# Patient Record
Sex: MALE | Race: BLACK OR AFRICAN AMERICAN | NOT HISPANIC OR LATINO | Employment: OTHER | URBAN - METROPOLITAN AREA
[De-identification: names, ages, dates, MRNs, and addresses within clinical notes are randomized per-mention and may not be internally consistent; named-entity substitution may affect disease eponyms.]

---

## 2017-01-03 ENCOUNTER — PATIENT MESSAGE (OUTPATIENT)
Dept: INTERNAL MEDICINE | Facility: CLINIC | Age: 62
End: 2017-01-03

## 2017-01-03 DIAGNOSIS — Z86.010 HISTORY OF ADENOMATOUS POLYP OF COLON: Primary | ICD-10-CM

## 2017-01-27 DIAGNOSIS — Z12.11 SPECIAL SCREENING FOR MALIGNANT NEOPLASMS, COLON: Primary | ICD-10-CM

## 2017-01-27 RX ORDER — POLYETHYLENE GLYCOL 3350, SODIUM SULFATE ANHYDROUS, SODIUM BICARBONATE, SODIUM CHLORIDE, POTASSIUM CHLORIDE 236; 22.74; 6.74; 5.86; 2.97 G/4L; G/4L; G/4L; G/4L; G/4L
4 POWDER, FOR SOLUTION ORAL ONCE
Qty: 4000 ML | Refills: 0 | Status: SHIPPED | OUTPATIENT
Start: 2017-01-27 | End: 2017-01-27

## 2017-01-31 ENCOUNTER — ANESTHESIA (OUTPATIENT)
Dept: ENDOSCOPY | Facility: HOSPITAL | Age: 62
End: 2017-01-31
Payer: COMMERCIAL

## 2017-01-31 ENCOUNTER — HOSPITAL ENCOUNTER (OUTPATIENT)
Facility: HOSPITAL | Age: 62
Discharge: HOME | End: 2017-01-31
Attending: COLON & RECTAL SURGERY | Admitting: COLON & RECTAL SURGERY
Payer: COMMERCIAL

## 2017-01-31 ENCOUNTER — ANESTHESIA EVENT (OUTPATIENT)
Dept: ENDOSCOPY | Facility: HOSPITAL | Age: 62
End: 2017-01-31
Payer: COMMERCIAL

## 2017-01-31 VITALS
TEMPERATURE: 98 F | WEIGHT: 158 LBS | HEIGHT: 67 IN | SYSTOLIC BLOOD PRESSURE: 153 MMHG | RESPIRATION RATE: 18 BRPM | DIASTOLIC BLOOD PRESSURE: 82 MMHG | OXYGEN SATURATION: 100 % | BODY MASS INDEX: 24.8 KG/M2 | HEART RATE: 56 BPM

## 2017-01-31 VITALS — RESPIRATION RATE: 14 BRPM

## 2017-01-31 DIAGNOSIS — Z13.9 SCREENING: ICD-10-CM

## 2017-01-31 PROCEDURE — 25000003 PHARM REV CODE 250: Performed by: NURSE PRACTITIONER

## 2017-01-31 PROCEDURE — 88305 TISSUE EXAM BY PATHOLOGIST: CPT | Mod: 26,,, | Performed by: PATHOLOGY

## 2017-01-31 PROCEDURE — 45380 COLONOSCOPY AND BIOPSY: CPT | Mod: 33,,, | Performed by: COLON & RECTAL SURGERY

## 2017-01-31 PROCEDURE — 25000003 PHARM REV CODE 250: Performed by: REGISTERED NURSE

## 2017-01-31 PROCEDURE — 37000009 HC ANESTHESIA EA ADD 15 MINS: Performed by: COLON & RECTAL SURGERY

## 2017-01-31 PROCEDURE — 45380 COLONOSCOPY AND BIOPSY: CPT | Performed by: COLON & RECTAL SURGERY

## 2017-01-31 PROCEDURE — 37000008 HC ANESTHESIA 1ST 15 MINUTES: Performed by: COLON & RECTAL SURGERY

## 2017-01-31 PROCEDURE — 27201012 HC FORCEPS, HOT/COLD, DISP: Performed by: COLON & RECTAL SURGERY

## 2017-01-31 PROCEDURE — 88305 TISSUE EXAM BY PATHOLOGIST: CPT | Performed by: PATHOLOGY

## 2017-01-31 PROCEDURE — D9220A PRA ANESTHESIA: Mod: 33,,, | Performed by: ANESTHESIOLOGY

## 2017-01-31 PROCEDURE — 63600175 PHARM REV CODE 636 W HCPCS: Performed by: REGISTERED NURSE

## 2017-01-31 RX ORDER — LIDOCAINE HCL/PF 100 MG/5ML
SYRINGE (ML) INTRAVENOUS
Status: DISCONTINUED | OUTPATIENT
Start: 2017-01-31 | End: 2017-01-31

## 2017-01-31 RX ORDER — SODIUM CHLORIDE 9 MG/ML
INJECTION, SOLUTION INTRAVENOUS CONTINUOUS
Status: DISCONTINUED | OUTPATIENT
Start: 2017-01-31 | End: 2017-01-31 | Stop reason: HOSPADM

## 2017-01-31 RX ORDER — PROPOFOL 10 MG/ML
VIAL (ML) INTRAVENOUS CONTINUOUS PRN
Status: DISCONTINUED | OUTPATIENT
Start: 2017-01-31 | End: 2017-01-31

## 2017-01-31 RX ORDER — PROPOFOL 10 MG/ML
VIAL (ML) INTRAVENOUS
Status: DISCONTINUED | OUTPATIENT
Start: 2017-01-31 | End: 2017-01-31

## 2017-01-31 RX ADMIN — PROPOFOL 175 MCG/KG/MIN: 10 INJECTION, EMULSION INTRAVENOUS at 11:01

## 2017-01-31 RX ADMIN — SODIUM CHLORIDE: 0.9 INJECTION, SOLUTION INTRAVENOUS at 10:01

## 2017-01-31 RX ADMIN — LIDOCAINE HYDROCHLORIDE 50 MG: 20 INJECTION, SOLUTION INTRAVENOUS at 11:01

## 2017-01-31 RX ADMIN — PROPOFOL 100 MG: 10 INJECTION, EMULSION INTRAVENOUS at 11:01

## 2017-01-31 RX ADMIN — PROPOFOL 30 MG: 10 INJECTION, EMULSION INTRAVENOUS at 11:01

## 2017-01-31 NOTE — IP AVS SNAPSHOT
[facility]  [address]  [facility] [address]  Phone: [phone]           Patient Discharge Instructions     Our goal is to set you up for success. This packet includes information on your condition, medications, and your home care. It will help you to care for yourself so you don't get sicker and need to go back to the hospital.     Please ask your nurse if you have any questions.        There are many details to remember when preparing to leave the hospital. Here is what you will need to do:    1. Take your medicine. If you are prescribed medications, review your Medication List in the following pages. You may have new medications to  at the pharmacy and others that you'll need to stop taking. Review the instructions for how and when to take your medications. Talk with your doctor or nurses if you are unsure of what to do.     2. Go to your follow-up appointments. Specific follow-up information is listed in the following pages. Your may be contacted by a transition nurse or clinical provider about future appointments. Be sure we have all of the phone numbers to reach you, if needed. Please contact your provider's office if you are unable to make an appointment.     3. Watch for warning signs. Your doctor or nurse will give you detailed warning signs to watch for and when to call for assistance. These instructions may also include educational information about your condition. If you experience any of warning signs to your health, call your doctor.               [facility]  Unless otherwise directed by your provider, please contact [facility], our nurse care line that is available for 24/7 assistance.     [phone] (toll-free)    Registered nurses in the [facility] provide clinical advisement, health education, appointment booking, and other advisory services.                    ** Verify the list of medication(s) below is accurate and up  to date. Carry this with you in case of emergency. If your medications have changed, please notify your healthcare provider.             Medication List      CONTINUE taking these medications        Additional Info                      diltiaZEM 240 MG 24 hr capsule   Commonly known as:  CARDIZEM CD   Quantity:  90 capsule   Refills:  3   Dose:  240 mg    Instructions:  Take 1 capsule (240 mg total) by mouth once daily.     Begin Date    AM    Noon    PM    Bedtime       sildenafil 100 MG tablet   Commonly known as:  VIAGRA   Quantity:  36 tablet   Refills:  3   Dose:  100 mg    Instructions:  Take 1 tablet (100 mg total) by mouth as needed for Erectile Dysfunction.     Begin Date    AM    Noon    PM    Bedtime       ZYRTEC 10 MG tablet   Refills:  0   Dose:  1 tablet   Generic drug:  cetirizine    Instructions:  Take 1 tablet by mouth Daily.     Begin Date    AM    Noon    PM    Bedtime                  Please bring to all follow up appointments:    1. A copy of your discharge instructions.  2. All medicines you are currently taking in their original bottles.  3. Identification and insurance card.    Please arrive 15 minutes ahead of scheduled appointment time.    Please call 24 hours in advance if you must reschedule your appointment and/or time.        Your Scheduled Appointments     Apr 27, 2017 10:00 AM CDT   Annual Checkup/Physical with Melquiades Valdivia MD   [facility] ([facility])    [address]  [place] [address] [phone]              Follow-up Information     Follow up with LICHA Winn MD.    Specialty:  Colon and Rectal Surgery    Why:  As needed    Contact information:    [address] [facility] [address] [phone]          Discharge Instructions     Future Orders    Activity as tolerated     Call MD for:  persistent nausea and vomiting or diarrhea     Call MD for:  severe uncontrolled pain     Call MD for:  temperature >100.4      Diet general     Questions:    Total calories:      Fat restriction, if any:      Protein restriction, if any:      Na restriction, if any:      Fluid restriction:      Additional restrictions:          Discharge Instructions         Colonoscopy     A camera attached to a flexible tube with a viewing lens is used to take video pictures.     Colonoscopy is used to view the inside of your lower digestive tract (colon and rectum). It can help screen for colon cancer and can help find the source of abdominal pain, bleeding, and changes in bowel habits. The test is usually done in the hospital on an outpatient basis. During the exam, the doctor can remove a small tissue sample ( a biopsy) for testing. Small growths, such as polyps, may also be removed during colonoscopy.  Getting ready   · Be sure to tell your doctor about any medications you take. Also tell your doctor about any health conditions you may have.  · Discuss the risks of the test with your doctor. These include bleeding and bowel puncture.  · Your rectum and colon must be empty for the test. So be sure to follow the diet and bowel prep instructions exactly. If you dont, the test may need to be rescheduled.  · Ask your doctor whether you need to have a friend or family member prepared to drive you home after the test.  During the test   · You are given sedating (relaxing) medication through an IV line. You may be drowsy or completely asleep.  · The procedure takes 30 minutes or longer.  · The doctor performs a digital rectal exam to check for anal and rectal problems. The rectum is lubricated and the scope inserted.  · If you are awake, you may have a feeling similar to needing to have a bowel movement. You may also feel pressure as air is pumped into the colon. Its OK to pass gas during the procedure.  After the test   ·      Colonoscopy provides an inside view of the entire colon.     You may discuss the results with your doctor right away or at a  "future visit.  · Try to pass all the gas right after the test to help prevent bloating and cramping.  · After the test, you can go back to your normal eating and other activities.  Risks and possible complications include:  · Bleeding · A puncture or tear in the colon · Risks of anesthesia       © 8737-3242 [employer]. [address] [place] [address]. All rights reserved. This information is not intended as a substitute for professional medical care. Always follow your healthcare professional's instructions.            Primary Diagnosis     Your primary diagnosis was:  Screening      Admission Information     Date & Time Provider Department CSN    1/31/2017  9:19 AM BASILIO Winn MD [facility] [id number]      Care Providers     Provider Role Specialty Primary office phone    BASILIO Winn MD Attending Provider Colon and Rectal Surgery [phone]    BASILIO Winn MD Surgeon  Colon and Rectal Surgery [phone]      Your Vitals Were     BP Pulse Temp Resp Height Weight    122/63 (BP Location: Left arm, Patient Position: Lying, BP Method: Automatic) 51 97.8 °F (36.6 °C) (Axillary) 18 5' 7" (1.702 m) 71.7 kg (158 lb)    SpO2 BMI             100% 24.75 kg/m2         Recent Lab Values     No lab values to display.      Pending Labs     Order Current Status    Specimen to Pathology - Surgery Collected (01/31/17 1138)      Allergies as of 1/31/2017        Reactions    Allegra-d 12 Hour [Fexofenadine-pseudoephedrine] Other (See Comments)    Trouble urinating    Mucinex D [Pseudoephedrine-guaifenesin] Other (See Comments)    Trouble urinating    Hyzaar  [Losartan-hydrochlorothiazide]     Other reaction(s): Hives      Advance Directives     An advance directive is a document which, in the event you are no longer able to make decisions for yourself, tells your healthcare team what kind of treatment you do or do not want to receive, or who you would like to make those " decisions for you.  If you do not currently have an advance directive, [facility] encourages you to create one.  For more information call:  [phone]), [phone] ([phone]),  or log on to [email or web address].        Language Assistance Services     ATTENTION: Language assistance services are available, free of charge. Please call [phone].      ATENCIÓN: Si habla lexiimelanie, tiene a ireland disposición servicios gratuitos de asistencia lingüística. Llame al [phone].     [facility] Ý: N?u b?n nói Ti?ng Vi?t, có các d?ch v? h? tr? ngôn ng? mi?n phí dành cho b?n. G?i s? [phone].         [facility] complies with applicable Federal civil rights laws and does not discriminate on the basis of race, color, national origin, age, disability, or sex.

## 2017-01-31 NOTE — ANESTHESIA PREPROCEDURE EVALUATION
01/31/2017  Abdias Kim is a 61 y.o., male.    [facility] Anesthesia Evaluation    I have reviewed the Patient Summary Reports.    I have reviewed the Nursing Notes.   I have reviewed the Medications.     Review of Systems  Anesthesia Hx:  No problems with previous Anesthesia  History of prior surgery of interest to airway management or planning: Previous anesthesia: General Denies Family Hx of Anesthesia complications.   Denies Personal Hx of Anesthesia complications.   Social:  Non-Smoker, Social Alcohol Use    Hematology/Oncology:         -- Anemia:   Cardiovascular:   Exercise tolerance: good Hypertension        Physical Exam  General:  Well nourished    Airway/Jaw/Neck:  Airway Findings: Mouth Opening: Normal Tongue: Normal  General Airway Assessment: Adult  Mallampati: II  Improves to II with phonation.  TM Distance: Normal, at least 6 cm  Jaw/Neck Findings:  Neck ROM: Normal ROM      Dental:  Dental Findings: In tact   Chest/Lungs:  Chest/Lungs Findings: Clear to auscultation, Normal Respiratory Rate     Heart/Vascular:  Heart Findings: Rate: Normal  Rhythm: Regular Rhythm  Sounds: Normal        Mental Status:  Mental Status Findings:  Cooperative, Alert and Oriented         Anesthesia Plan  Type of Anesthesia, risks & benefits discussed:  Anesthesia Type:  general  Patient's Preference:   Intra-op Monitoring Plan: standard ASA monitors  Intra-op Monitoring Plan Comments:   Post Op Pain Control Plan:   Post Op Pain Control Plan Comments:   Induction:   IV  Beta Blocker:  Patient is not currently on a Beta-Blocker (No further documentation required).       Informed Consent: Patient understands risks and agrees with Anesthesia plan.  Questions answered. Anesthesia consent signed with patient.  ASA Score: 2     Day of Surgery Review of History & Physical:    H&P update referred to the surgeon.         Ready  For Surgery From Anesthesia Perspective.

## 2017-01-31 NOTE — H&P
Endoscopy H&P    Procedure : Colonoscopy Screening    Abdias Kim is a 61 y.o. male presenting today for colonoscopy. Indications:  asymptomatic screening exam, family history of colon cancer (father, 60's), and personal history of colon polyps     Previous colonoscopy in 2013 - One sigmoid polyp biopsied; final pathology was tubular adenoma.       Past Medical History   Diagnosis Date    Anemia     Diverticulosis     Hypertension      Prior Sedation Problems: NO  Family History   Problem Relation Age of Onset    Heart disease Mother      mi    Cancer Father      colon/prostate    Stroke Neg Hx     Diabetes Neg Hx      Social History     Social History    Marital status:      Spouse name: N/A    Number of children: N/A    Years of education: N/A     Occupational History    Not on file.     Social History Main Topics    Smoking status: Never Smoker    Smokeless tobacco: Never Used    Alcohol use Yes      Comment: weekends    Drug use: No    Sexual activity: Not on file     Other Topics Concern    Not on file     Social History Narrative     Review of patient's allergies indicates:   Allergen Reactions    Allegra-d 12 hour [fexofenadine-pseudoephedrine] Other (See Comments)     Trouble urinating    Mucinex d [pseudoephedrine-guaifenesin] Other (See Comments)     Trouble urinating    Hyzaar  [losartan-hydrochlorothiazide]      Other reaction(s): Hives     No current facility-administered medications for this encounter.        Review of Systems - Negative except as outlined in the HPI     Physical Exam:  General: well developed, well nourished, no distress  Head: normocephalic, atraumatic  Airway:  normal oropharynx, airway normal  Lungs:  normal respiratory effort  Heart: regular rate and rhythm  Abdomen: soft, non-tender non-distented; bowel sounds normal; no masses,  no organomegaly  Extremities: warm, well  perfused    Deep Sedation: Mallampati Score per anesthesia     Sedation Plan: MAC    ASA : II    A/P: Proceed with colonoscopy

## 2017-01-31 NOTE — DISCHARGE INSTRUCTIONS
Colonoscopy     A camera attached to a flexible tube with a viewing lens is used to take video pictures.     Colonoscopy is used to view the inside of your lower digestive tract (colon and rectum). It can help screen for colon cancer and can help find the source of abdominal pain, bleeding, and changes in bowel habits. The test is usually done in the hospital on an outpatient basis. During the exam, the doctor can remove a small tissue sample ( a biopsy) for testing. Small growths, such as polyps, may also be removed during colonoscopy.  Getting ready   · Be sure to tell your doctor about any medications you take. Also tell your doctor about any health conditions you may have.  · Discuss the risks of the test with your doctor. These include bleeding and bowel puncture.  · Your rectum and colon must be empty for the test. So be sure to follow the diet and bowel prep instructions exactly. If you dont, the test may need to be rescheduled.  · Ask your doctor whether you need to have a friend or family member prepared to drive you home after the test.  During the test   · You are given sedating (relaxing) medication through an IV line. You may be drowsy or completely asleep.  · The procedure takes 30 minutes or longer.  · The doctor performs a digital rectal exam to check for anal and rectal problems. The rectum is lubricated and the scope inserted.  · If you are awake, you may have a feeling similar to needing to have a bowel movement. You may also feel pressure as air is pumped into the colon. Its OK to pass gas during the procedure.  After the test   ·      Colonoscopy provides an inside view of the entire colon.     You may discuss the results with your doctor right away or at a future visit.  · Try to pass all the gas right after the test to help prevent bloating and cramping.  · After the test, you can go back to your normal eating and other activities.  Risks and possible complications include:  · Bleeding · A  puncture or tear in the colon · Risks of anesthesia       © 2983-9518 [employer]. [address] [place] [address], [place] [address]. All rights reserved. This information is not intended as a substitute for professional medical care. Always follow your healthcare professional's instructions.

## 2017-01-31 NOTE — ANESTHESIA POSTPROCEDURE EVALUATION
"Anesthesia Post Evaluation    Patient: Abdias Kim    Procedure(s) Performed: Procedure(s) (LRB):  COLONOSCOPY (N/A)    Final Anesthesia Type: general  Patient location during evaluation: GI PACU  Patient participation: Yes- Able to Participate  Level of consciousness: awake and alert  Post-procedure vital signs: reviewed and stable  Pain management: adequate  Airway patency: patent  PONV status at discharge: No PONV  Anesthetic complications: no      Cardiovascular status: blood pressure returned to baseline  Respiratory status: unassisted  Hydration status: euvolemic  Follow-up not needed.        Visit Vitals    BP (!) 153/82    Pulse (!) 56    Temp 36.6 °C (97.8 °F) (Axillary)    Resp 18    Ht 5' 7" (1.702 m)    Wt 71.7 kg (158 lb)    SpO2 100%    BMI 24.75 kg/m2       Pain/Terence Score: Pain Assessment Performed: Yes (1/31/2017 11:58 AM)  Presence of Pain: denies (1/31/2017 11:58 AM)  Terence Score: 10 (1/31/2017 11:58 AM)      "

## 2017-01-31 NOTE — TRANSFER OF CARE
"Anesthesia Transfer of Care Note    Patient: Abdias Kim    Procedure(s) Performed: Procedure(s) (LRB):  COLONOSCOPY (N/A)    Patient location: [facility]    Anesthesia Type: general    Transport from OR: Transported from OR on 6-10 L/min O2 by face mask with adequate spontaneous ventilation    Post pain: adequate analgesia    Post assessment: no apparent anesthetic complications    Post vital signs: stable    Level of consciousness: sedated    Nausea/Vomiting: no nausea/vomiting    Complications: none          Last vitals:   Visit Vitals    BP (!) 186/86    Pulse 64    Temp 37 °C (98.6 °F)    Resp 15    Ht 5' 7" (1.702 m)    Wt 71.7 kg (158 lb)    SpO2 100%    BMI 24.75 kg/m2     "

## 2017-02-06 NOTE — PROGRESS NOTES
1) Sigmoid colon polyp.  2) Sigmoid colon polyp.  FINAL PATHOLOGIC DIAGNOSIS  1. COLON, SIGMOID POLYP (BIOPSY):  -Nonneoplastic inflammatory polyp with surrounding regenerative changes  2. COLON, SIGMOID POLYP (BIOPSY):  - Tubular adenoma  Diagnosed by: Mirela De La Rosa M.D.  (Electronically Signed: 2017-02-01 15:27:14)    Repeat colonoscopy in 5 years       If you have any questions or if I can clarify any of the above please contact me:    Dyn [phone]   Pager [phone]  email [email or web address]  Nurse Natalia Gutierrez [phone]   Isis Martinez:  [phone]    Sincerely  H, Channing Winn MD, FACS, FASCRS  Staff Surgeon  Dept of Colon and Rectal Surgery

## 2017-02-07 ENCOUNTER — TELEPHONE (OUTPATIENT)
Dept: ENDOSCOPY | Facility: HOSPITAL | Age: 62
End: 2017-02-07

## 2017-03-07 DIAGNOSIS — N52.9 ERECTILE DYSFUNCTION, UNSPECIFIED ERECTILE DYSFUNCTION TYPE: ICD-10-CM

## 2017-03-07 RX ORDER — SILDENAFIL 100 MG/1
100 TABLET, FILM COATED ORAL
Qty: 36 TABLET | Refills: 3 | OUTPATIENT
Start: 2017-03-07 | End: 2017-03-23 | Stop reason: SDUPTHER

## 2017-03-07 RX ORDER — DILTIAZEM HYDROCHLORIDE 240 MG/1
240 CAPSULE, COATED, EXTENDED RELEASE ORAL DAILY
Qty: 90 CAPSULE | Refills: 3 | Status: SHIPPED | OUTPATIENT
Start: 2017-03-07 | End: 2018-03-26 | Stop reason: SDUPTHER

## 2017-03-23 DIAGNOSIS — N52.9 ERECTILE DYSFUNCTION, UNSPECIFIED ERECTILE DYSFUNCTION TYPE: ICD-10-CM

## 2017-03-23 RX ORDER — SILDENAFIL 100 MG/1
100 TABLET, FILM COATED ORAL
Qty: 36 TABLET | Refills: 3 | Status: SHIPPED | OUTPATIENT
Start: 2017-03-23 | End: 2017-03-23 | Stop reason: SDUPTHER

## 2017-03-23 RX ORDER — SILDENAFIL 100 MG/1
100 TABLET, FILM COATED ORAL
Qty: 36 TABLET | Refills: 3 | Status: SHIPPED | OUTPATIENT
Start: 2017-03-23 | End: 2018-03-26 | Stop reason: SDUPTHER

## 2017-04-18 ENCOUNTER — TELEPHONE (OUTPATIENT)
Dept: INTERNAL MEDICINE | Facility: CLINIC | Age: 62
End: 2017-04-18

## 2017-04-18 ENCOUNTER — PATIENT MESSAGE (OUTPATIENT)
Dept: INTERNAL MEDICINE | Facility: CLINIC | Age: 62
End: 2017-04-18

## 2017-04-18 DIAGNOSIS — Z00.00 ROUTINE GENERAL MEDICAL EXAMINATION AT A HEALTH CARE FACILITY: Primary | ICD-10-CM

## 2017-04-24 ENCOUNTER — LAB VISIT (OUTPATIENT)
Dept: LAB | Facility: HOSPITAL | Age: 62
End: 2017-04-24
Attending: INTERNAL MEDICINE
Payer: COMMERCIAL

## 2017-04-24 DIAGNOSIS — Z00.00 ROUTINE GENERAL MEDICAL EXAMINATION AT A HEALTH CARE FACILITY: ICD-10-CM

## 2017-04-24 DIAGNOSIS — Z00.00 ROUTINE HEALTH MAINTENANCE: ICD-10-CM

## 2017-04-24 LAB
ALBUMIN SERPL BCP-MCNC: 4.1 G/DL
ALP SERPL-CCNC: 52 U/L
ALT SERPL W/O P-5'-P-CCNC: 20 U/L
ANION GAP SERPL CALC-SCNC: 9 MMOL/L
ANISOCYTOSIS BLD QL SMEAR: SLIGHT
AST SERPL-CCNC: 21 U/L
BASOPHILS # BLD AUTO: 0.01 K/UL
BASOPHILS NFR BLD: 0.2 %
BILIRUB SERPL-MCNC: 0.7 MG/DL
BUN SERPL-MCNC: 19 MG/DL
CALCIUM SERPL-MCNC: 9.5 MG/DL
CHLORIDE SERPL-SCNC: 107 MMOL/L
CHOLEST/HDLC SERPL: 3.6 {RATIO}
CO2 SERPL-SCNC: 25 MMOL/L
COMPLEXED PSA SERPL-MCNC: 0.5 NG/ML
CREAT SERPL-MCNC: 1.3 MG/DL
DIFFERENTIAL METHOD: ABNORMAL
EOSINOPHIL # BLD AUTO: 0.2 K/UL
EOSINOPHIL NFR BLD: 4.6 %
ERYTHROCYTE [DISTWIDTH] IN BLOOD BY AUTOMATED COUNT: 15.2 %
EST. GFR  (AFRICAN AMERICAN): >60 ML/MIN/1.73 M^2
EST. GFR  (NON AFRICAN AMERICAN): 58.1 ML/MIN/1.73 M^2
GLUCOSE SERPL-MCNC: 82 MG/DL
HCT VFR BLD AUTO: 37.4 %
HDL/CHOLESTEROL RATIO: 27.6 %
HDLC SERPL-MCNC: 203 MG/DL
HDLC SERPL-MCNC: 56 MG/DL
HGB BLD-MCNC: 12.8 G/DL
HYPOCHROMIA BLD QL SMEAR: ABNORMAL
LDLC SERPL CALC-MCNC: 135.8 MG/DL
LYMPHOCYTES # BLD AUTO: 1.5 K/UL
LYMPHOCYTES NFR BLD: 33.5 %
MCH RBC QN AUTO: 22.3 PG
MCHC RBC AUTO-ENTMCNC: 34.2 %
MCV RBC AUTO: 65 FL
MONOCYTES # BLD AUTO: 0.3 K/UL
MONOCYTES NFR BLD: 7.4 %
NEUTROPHILS # BLD AUTO: 2.4 K/UL
NEUTROPHILS NFR BLD: 54.3 %
NONHDLC SERPL-MCNC: 147 MG/DL
PLATELET # BLD AUTO: 177 K/UL
PMV BLD AUTO: 10.1 FL
POTASSIUM SERPL-SCNC: 4.9 MMOL/L
PROT SERPL-MCNC: 7.7 G/DL
RBC # BLD AUTO: 5.74 M/UL
SODIUM SERPL-SCNC: 141 MMOL/L
TRIGL SERPL-MCNC: 56 MG/DL
WBC # BLD AUTO: 4.33 K/UL

## 2017-04-24 PROCEDURE — 80053 COMPREHEN METABOLIC PANEL: CPT

## 2017-04-24 PROCEDURE — 84153 ASSAY OF PSA TOTAL: CPT

## 2017-04-24 PROCEDURE — 86803 HEPATITIS C AB TEST: CPT

## 2017-04-24 PROCEDURE — 85025 COMPLETE CBC W/AUTO DIFF WBC: CPT

## 2017-04-24 PROCEDURE — 36415 COLL VENOUS BLD VENIPUNCTURE: CPT | Mod: PO

## 2017-04-24 PROCEDURE — 80061 LIPID PANEL: CPT

## 2017-04-25 LAB — HCV AB SERPL QL IA: NEGATIVE

## 2017-04-27 ENCOUNTER — OFFICE VISIT (OUTPATIENT)
Dept: INTERNAL MEDICINE | Facility: CLINIC | Age: 62
End: 2017-04-27
Payer: COMMERCIAL

## 2017-04-27 VITALS
HEIGHT: 67 IN | DIASTOLIC BLOOD PRESSURE: 72 MMHG | SYSTOLIC BLOOD PRESSURE: 122 MMHG | BODY MASS INDEX: 26.26 KG/M2 | HEART RATE: 56 BPM | WEIGHT: 167.31 LBS

## 2017-04-27 DIAGNOSIS — J30.9 ALLERGIC RHINITIS, UNSPECIFIED ALLERGIC RHINITIS TRIGGER, UNSPECIFIED RHINITIS SEASONALITY: ICD-10-CM

## 2017-04-27 DIAGNOSIS — N52.9 ERECTILE DYSFUNCTION, UNSPECIFIED ERECTILE DYSFUNCTION TYPE: ICD-10-CM

## 2017-04-27 DIAGNOSIS — I10 ESSENTIAL HYPERTENSION: Primary | ICD-10-CM

## 2017-04-27 PROCEDURE — 99999 PR PBB SHADOW E&M-EST. PATIENT-LVL III: CPT | Mod: PBBFAC,,, | Performed by: INTERNAL MEDICINE

## 2017-04-27 PROCEDURE — 3078F DIAST BP <80 MM HG: CPT | Mod: S$GLB,,, | Performed by: INTERNAL MEDICINE

## 2017-04-27 PROCEDURE — 99214 OFFICE O/P EST MOD 30 MIN: CPT | Mod: S$GLB,,, | Performed by: INTERNAL MEDICINE

## 2017-04-27 PROCEDURE — 3074F SYST BP LT 130 MM HG: CPT | Mod: S$GLB,,, | Performed by: INTERNAL MEDICINE

## 2017-04-27 PROCEDURE — 1160F RVW MEDS BY RX/DR IN RCRD: CPT | Mod: S$GLB,,, | Performed by: INTERNAL MEDICINE

## 2017-05-01 PROBLEM — Z13.9 SCREENING: Status: RESOLVED | Noted: 2017-01-31 | Resolved: 2017-05-01

## 2017-05-01 PROBLEM — N52.9 ED (ERECTILE DYSFUNCTION): Status: ACTIVE | Noted: 2017-05-01

## 2017-05-01 NOTE — PROGRESS NOTES
Subjective:       Patient ID: Abdias Kim is a 61 y.o. male.    Chief Complaint: Annual Exam    Hypertension   This is a chronic problem. The problem is unchanged. The problem is controlled. Pertinent negatives include no chest pain, headaches, palpitations or shortness of breath. The current treatment provides significant improvement. There are no compliance problems.      Review of Systems   Constitutional: Negative for activity change, fatigue and unexpected weight change.   HENT: Negative for hearing loss, rhinorrhea, sore throat and trouble swallowing.    Eyes: Negative for discharge. Visual disturbance: time for eye recheck- pt will arrange.   Respiratory: Negative for chest tightness, shortness of breath and wheezing.    Cardiovascular: Negative for chest pain and palpitations.   Gastrointestinal: Negative for abdominal pain, blood in stool, constipation, diarrhea and vomiting.   Endocrine: Positive for polyuria. Negative for polydipsia.   Genitourinary: Negative for difficulty urinating, dysuria, frequency, hematuria and urgency.   Musculoskeletal: Positive for arthralgias. Negative for joint swelling.   Skin: Negative for rash.   Neurological: Negative for speech difficulty, weakness and headaches.   Psychiatric/Behavioral: Negative for confusion and dysphoric mood.       Objective:      Physical Exam   Constitutional: He is oriented to person, place, and time. He appears well-developed and well-nourished. No distress.   HENT:   Head: Normocephalic and atraumatic.   Mouth/Throat: Oropharynx is clear and moist.   Eyes: Conjunctivae are normal. Pupils are equal, round, and reactive to light.   Neck: Normal range of motion. Neck supple.   Cardiovascular: Normal rate, regular rhythm and normal heart sounds.    Pulmonary/Chest: Effort normal and breath sounds normal. He has no wheezes.   Abdominal: Soft. Bowel sounds are normal. There is no tenderness.   Musculoskeletal: Normal range of motion. He exhibits no  edema or tenderness.   Neurological: He is alert and oriented to person, place, and time. No cranial nerve deficit.   Skin: No erythema.   Psychiatric: He has a normal mood and affect.   Vitals reviewed.      Assessment:       1. Essential hypertension    2. Allergic rhinitis, unspecified allergic rhinitis trigger, unspecified rhinitis seasonality    3. Erectile dysfunction, unspecified erectile dysfunction type        Plan:       Abdias was seen today for annual exam.    Diagnoses and all orders for this visit:    Essential hypertension    Allergic rhinitis, unspecified allergic rhinitis trigger, unspecified rhinitis seasonality    Erectile dysfunction, unspecified erectile dysfunction type        Return in about 6 months (around 10/27/2017) for F/U WITH ME OR APC.

## 2017-07-10 ENCOUNTER — TELEPHONE (OUTPATIENT)
Dept: INTERNAL MEDICINE | Facility: CLINIC | Age: 62
End: 2017-07-10

## 2017-07-10 NOTE — TELEPHONE ENCOUNTER
----- Message from Morenita Saunders sent at 7/10/2017  8:20 AM CDT -----  Contact: Dank/ [facility] in [place], [place]/ [phone]   Dank is calling to check to see if the office receive a fax for authorization. Please call and advise       Thank you

## 2017-07-19 ENCOUNTER — TELEPHONE (OUTPATIENT)
Dept: INTERNAL MEDICINE | Facility: CLINIC | Age: 62
End: 2017-07-19

## 2017-07-19 NOTE — TELEPHONE ENCOUNTER
----- Message from Matt William MA sent at 7/19/2017  9:59 AM CDT -----  Contact: [facility] - [phone]   Requesting a diagnosis code for the Rx for sildenafil (VIAGRA) 100 MG tablet. Please call. Thanks!

## 2017-11-29 ENCOUNTER — INITIAL CONSULT (OUTPATIENT)
Dept: OPTOMETRY | Facility: CLINIC | Age: 62
End: 2017-11-29
Payer: COMMERCIAL

## 2017-11-29 ENCOUNTER — IMMUNIZATION (OUTPATIENT)
Dept: INTERNAL MEDICINE | Facility: CLINIC | Age: 62
End: 2017-11-29
Payer: COMMERCIAL

## 2017-11-29 ENCOUNTER — OFFICE VISIT (OUTPATIENT)
Dept: INTERNAL MEDICINE | Facility: CLINIC | Age: 62
End: 2017-11-29
Payer: COMMERCIAL

## 2017-11-29 VITALS
BODY MASS INDEX: 25.99 KG/M2 | DIASTOLIC BLOOD PRESSURE: 82 MMHG | HEART RATE: 60 BPM | HEIGHT: 67 IN | SYSTOLIC BLOOD PRESSURE: 128 MMHG | WEIGHT: 165.56 LBS

## 2017-11-29 DIAGNOSIS — H52.203 HYPEROPIA WITH ASTIGMATISM AND PRESBYOPIA, BILATERAL: ICD-10-CM

## 2017-11-29 DIAGNOSIS — N40.1 BPH ASSOCIATED WITH NOCTURIA: ICD-10-CM

## 2017-11-29 DIAGNOSIS — H04.123 BILATERAL DRY EYES: Primary | ICD-10-CM

## 2017-11-29 DIAGNOSIS — R35.1 BPH ASSOCIATED WITH NOCTURIA: ICD-10-CM

## 2017-11-29 DIAGNOSIS — I10 ESSENTIAL HYPERTENSION: Primary | ICD-10-CM

## 2017-11-29 DIAGNOSIS — H52.03 HYPEROPIA WITH ASTIGMATISM AND PRESBYOPIA, BILATERAL: ICD-10-CM

## 2017-11-29 DIAGNOSIS — H25.13 NUCLEAR SCLEROTIC CATARACT OF BOTH EYES: ICD-10-CM

## 2017-11-29 DIAGNOSIS — H53.9 VISUAL DISTURBANCE: ICD-10-CM

## 2017-11-29 DIAGNOSIS — H52.4 HYPEROPIA WITH ASTIGMATISM AND PRESBYOPIA, BILATERAL: ICD-10-CM

## 2017-11-29 PROCEDURE — 90686 IIV4 VACC NO PRSV 0.5 ML IM: CPT | Mod: S$GLB,,, | Performed by: INTERNAL MEDICINE

## 2017-11-29 PROCEDURE — 99999 PR PBB SHADOW E&M-EST. PATIENT-LVL II: CPT | Mod: PBBFAC,,, | Performed by: OPTOMETRIST

## 2017-11-29 PROCEDURE — 90471 IMMUNIZATION ADMIN: CPT | Mod: S$GLB,,, | Performed by: INTERNAL MEDICINE

## 2017-11-29 PROCEDURE — 92015 DETERMINE REFRACTIVE STATE: CPT | Mod: S$GLB,,, | Performed by: OPTOMETRIST

## 2017-11-29 PROCEDURE — 99999 PR PBB SHADOW E&M-EST. PATIENT-LVL III: CPT | Mod: PBBFAC,,, | Performed by: INTERNAL MEDICINE

## 2017-11-29 PROCEDURE — 99213 OFFICE O/P EST LOW 20 MIN: CPT | Mod: 25,S$GLB,, | Performed by: INTERNAL MEDICINE

## 2017-11-29 PROCEDURE — 92004 COMPRE OPH EXAM NEW PT 1/>: CPT | Mod: S$GLB,,, | Performed by: OPTOMETRIST

## 2017-11-29 NOTE — PROGRESS NOTES
HPI     Mr. Abdias Kim was referred by Melquiades Valdivia MD for visual   disturbance.    Patient complains of changes in vision at all ranges even with glasses on   for the past 6-9 months (glasses are about 6 years old). Patient requests   refraction today.   Pt c/o eyes often burning in the evening and watering, onset since about   2011.    (-)drops  (-)flashes  (+)floaters: OU longstanding and stable   (-)diplopia    Diabetic no  No results found for: HGBA1C    OCULAR HISTORY  Last Eye Exam 2011   (-)eye surgery   (-)diagnosed or treated for any eye conditions or diseases none     FAMILY HISTORY  (-)Glaucoma none       Last edited by Rupinder Romo, OD on 11/29/2017  5:05 PM. (History)            Assessment /Plan     For exam results, see Encounter Report.    Bilateral dry eyes   Cause of burning/watery eyes. Discussed relationship between dry eyes and epiphora. Recommended artificial tears BID OU.    Nuclear sclerotic cataract of both eyes   Mild, not visually significant. Monitor.    Hyperopia with astigmatism and presbyopia, bilateral   Small change OU. New glasses prescription released, adaptation expected.    Eyeglass Final Rx     Eyeglass Final Rx       Sphere Cylinder Axis Dist VA Add    Right +1.25 +0.75 002 20/20 +2.25    Left +0.75 +0.75 002 20/20 +2.25    Expiration Date:  11/30/2018                   RTC 1 yr

## 2017-11-29 NOTE — LETTER
November 29, 2017      Melquiades Valdivia MD  [address]  [facility] [address]           [facility]  [address]  [facility] [address]  Phone: [phone]          Patient: Abdias Kim   MR Number: [id number]   YOB: 1955   Date of Visit: 11/29/2017       Dear Dr. Melquiades Valdivia:    Thank you for referring Abdias Kim to me for evaluation. Attached you will find relevant portions of my assessment and plan of care.    If you have questions, please do not hesitate to call me. I look forward to following Abdias Kim along with you.    Sincerely,    Rupinder Romo, OD    Enclosure  CC:  No Recipients    If you would like to receive this communication electronically, please contact [email or web address] or [phone] to request more information on [employer] access.    For providers and/or their staff who would like to refer a patient to [facility], please contact us through our one-stop-shop provider referral line, [facility], at [phone].    If you feel you have received this communication in error or would no longer like to receive these types of communications, please e-mail [email or web address]

## 2017-12-01 NOTE — PROGRESS NOTES
Subjective:       Patient ID: Abdias Kim is a 62 y.o. male.    Chief Complaint: Hypertension (6 mth fu)    Hypertension   This is a chronic problem. The problem is unchanged. The problem is controlled. Pertinent negatives include no chest pain, headaches, neck pain, palpitations or shortness of breath. The current treatment provides significant improvement. There are no compliance problems.      Review of Systems   Constitutional: Negative for activity change and unexpected weight change.   HENT: Negative for hearing loss, rhinorrhea and trouble swallowing.    Eyes: Positive for visual disturbance. Negative for discharge.   Respiratory: Negative for chest tightness, shortness of breath and wheezing.    Cardiovascular: Negative for chest pain and palpitations.   Gastrointestinal: Negative for blood in stool, constipation, diarrhea and vomiting.   Endocrine: Positive for polyuria (nocturia). Negative for polydipsia.   Genitourinary: Negative for difficulty urinating, hematuria and urgency.   Musculoskeletal: Negative for arthralgias, joint swelling and neck pain.   Neurological: Negative for weakness and headaches.   Psychiatric/Behavioral: Negative for confusion and dysphoric mood.       Objective:      Physical Exam   Constitutional: He is oriented to person, place, and time. He appears well-developed and well-nourished. No distress.   HENT:   Head: Normocephalic and atraumatic.   Mouth/Throat: Oropharynx is clear and moist.   Eyes: Conjunctivae are normal. Pupils are equal, round, and reactive to light.   Neck: Normal range of motion. Neck supple.   Cardiovascular: Normal rate, regular rhythm and normal heart sounds.    Pulmonary/Chest: Effort normal and breath sounds normal. He has no wheezes.   Abdominal: Soft. Bowel sounds are normal. There is no tenderness.   Musculoskeletal: Normal range of motion. He exhibits no edema or tenderness.   Neurological: He is alert and oriented to person, place, and time. No  cranial nerve deficit.   Skin: No erythema.   Psychiatric: He has a normal mood and affect.   Vitals reviewed.      Assessment:       1. Essential hypertension    2. Visual disturbance    3. BPH associated with nocturia        Plan:       Abdias was seen today for hypertension.    Diagnoses and all orders for this visit:    Essential hypertension    Visual disturbance  Comments:  wants recheck of glasses  Orders:  -     Ambulatory consult to Optometry    BPH associated with nocturia  Comments:  more bothersome lately        Return in about 6 months (around 5/29/2018) for PHYSICAL EXAM, F/U WITH PCP.

## 2018-03-26 DIAGNOSIS — N52.9 ERECTILE DYSFUNCTION, UNSPECIFIED ERECTILE DYSFUNCTION TYPE: ICD-10-CM

## 2018-03-26 RX ORDER — DILTIAZEM HYDROCHLORIDE 240 MG/1
240 CAPSULE, COATED, EXTENDED RELEASE ORAL DAILY
Qty: 90 CAPSULE | Refills: 3 | Status: SHIPPED | OUTPATIENT
Start: 2018-03-26 | End: 2019-04-22 | Stop reason: SDUPTHER

## 2018-03-26 RX ORDER — SILDENAFIL 100 MG/1
100 TABLET, FILM COATED ORAL
Qty: 36 TABLET | Refills: 3 | Status: SHIPPED | OUTPATIENT
Start: 2018-03-26 | End: 2019-07-29 | Stop reason: SDUPTHER

## 2018-05-02 ENCOUNTER — TELEPHONE (OUTPATIENT)
Dept: INTERNAL MEDICINE | Facility: CLINIC | Age: 63
End: 2018-05-02

## 2018-05-02 DIAGNOSIS — Z12.5 SCREENING FOR PROSTATE CANCER: ICD-10-CM

## 2018-05-02 DIAGNOSIS — Z00.00 ROUTINE GENERAL MEDICAL EXAMINATION AT A HEALTH CARE FACILITY: Primary | ICD-10-CM

## 2018-05-25 ENCOUNTER — PATIENT MESSAGE (OUTPATIENT)
Dept: INTERNAL MEDICINE | Facility: CLINIC | Age: 63
End: 2018-05-25

## 2018-05-25 DIAGNOSIS — K42.9 UMBILICAL HERNIA WITHOUT OBSTRUCTION AND WITHOUT GANGRENE: Primary | ICD-10-CM

## 2018-05-28 ENCOUNTER — PATIENT MESSAGE (OUTPATIENT)
Dept: INTERNAL MEDICINE | Facility: CLINIC | Age: 63
End: 2018-05-28

## 2018-06-06 ENCOUNTER — OFFICE VISIT (OUTPATIENT)
Dept: SURGERY | Facility: CLINIC | Age: 63
End: 2018-06-06
Payer: COMMERCIAL

## 2018-06-06 VITALS
BODY MASS INDEX: 24.55 KG/M2 | WEIGHT: 156.44 LBS | SYSTOLIC BLOOD PRESSURE: 169 MMHG | HEART RATE: 70 BPM | TEMPERATURE: 98 F | HEIGHT: 67 IN | DIASTOLIC BLOOD PRESSURE: 92 MMHG

## 2018-06-06 DIAGNOSIS — R10.9 ABDOMINAL WALL PAIN: Primary | ICD-10-CM

## 2018-06-06 PROCEDURE — 99999 PR PBB SHADOW E&M-EST. PATIENT-LVL III: CPT | Mod: PBBFAC,,, | Performed by: SURGERY

## 2018-06-06 PROCEDURE — 99203 OFFICE O/P NEW LOW 30 MIN: CPT | Mod: S$GLB,,, | Performed by: SURGERY

## 2018-06-06 NOTE — PROGRESS NOTES
"Patient ID: Abdias Kim is a 62 y.o. male with a history of HTN and diverticulosis.        HPI: Patient presents with a history of palpable and reducible mass approximately 2 inches above his umbilicus two weeks ago. Patient reported feeling sore and bloated when the mass was palpable. Patient could reduce mass and heard a "squishing" sound. Patient experienced a band-like pattern of soreness 8 to 10 weeks prior to the occurrence of the mass that resolved soon after. He does not recall any inciting activities. This pain has now resolved and he has noted to mass present since this time.     Review of Systems   Constitutional: Positive for appetite change. Negative for fever.   Respiratory: Negative for shortness of breath.    Cardiovascular: Negative for chest pain and palpitations.   Gastrointestinal: Negative for constipation, diarrhea, nausea and vomiting.   Genitourinary: Negative for dysuria.   Neurological: Negative for seizures and headaches.   Hematological: Does not bruise/bleed easily.      Current Medications          Current Outpatient Prescriptions   Medication Sig Dispense Refill    cetirizine (ZYRTEC) 10 MG tablet Take 1 tablet by mouth Daily.        diltiaZEM (CARDIZEM CD) 240 MG 24 hr capsule Take 1 capsule (240 mg total) by mouth once daily. 90 capsule 3    sildenafil (VIAGRA) 100 MG tablet Take 1 tablet (100 mg total) by mouth as needed for Erectile Dysfunction. 36 tablet 3      No current facility-administered medications for this visit.                   Review of patient's allergies indicates:   Allergen Reactions    Allegra-d 12 hour [fexofenadine-pseudoephedrine] Other (See Comments)       Trouble urinating    Mucinex d [pseudoephedrine-guaifenesin] Other (See Comments)       Trouble urinating    Hyzaar  [losartan-hydrochlorothiazide]         Other reaction(s): Hives              Past Medical History:   Diagnosis Date    Anemia      Diverticulosis      Hypertension               "   Past Surgical History:   Procedure Laterality Date    APPENDECTOMY        COLONOSCOPY N/A 1/31/2017     Procedure: COLONOSCOPY;  Surgeon: BASILIO Winn MD;  Location: [facility] ([address] [place] [address]);  Service: Endoscopy;  Laterality: N/A;               Family History   Problem Relation Age of Onset    Heart disease Mother           mi    Cancer Father           colon/prostate    Stroke Neg Hx      Diabetes Neg Hx           Social History   Social History            Social History    Marital status:        Spouse name: N/A    Number of children: N/A    Years of education: N/A          Occupational History    Not on file.             Social History Main Topics    Smoking status: Never Smoker    Smokeless tobacco: Never Used    Alcohol use Yes         Comment: few times a week     Drug use: No    Sexual activity: Not on file           Other Topics Concern    Not on file          Social History Narrative    No narrative on file                Vitals:     06/06/18 1521   BP: (!) 169/92   Pulse: 70   Temp: 97.8 °F (36.6 °C)         Physical Exam   Constitutional: He appears well-developed and well-nourished. No distress.   Cardiovascular: Normal rate, regular rhythm and normal heart sounds.    Pulmonary/Chest: Effort normal and breath sounds normal. No respiratory distress. He has no wheezes.   Abdominal: Soft. Bowel sounds are normal. He exhibits no distension.   Skin: No rash noted.      Abdomen: No mass palpated at this time.     Assessment & Plan:      62 year old male with suspected umbilical hernia     Plan: Observation: Patient received contact details and was encouraged to follow up PRN should symptoms or mass recur.

## 2018-06-06 NOTE — LETTER
June 6, 2018      Melquiades Valdivia MD  [address] [facility] [facility] [address]  [facility] [address]           [facility] - [facility]  [address] [facility]  [facility] [address]  Phone: [phone]          Patient: Abdias Kim   MR Number: [id number]   YOB: 1955   Date of Visit: 6/6/2018       Dear Dr. Melquiades Valdivia:    Thank you for referring Abdias Kim to me for evaluation. Attached you will find relevant portions of my assessment and plan of care.    If you have questions, please do not hesitate to call me. I look forward to following Abdias Kim along with you.    Sincerely,    Aric Krueger Jr., MD    Enclosure  CC:  No Recipients    If you would like to receive this communication electronically, please contact [email or web address] or [phone] to request more information on [employer] access.    For providers and/or their staff who would like to refer a patient to [facility], please contact us through our one-stop-shop provider referral line, [facility], at [phone].    If you feel you have received this communication in error or would no longer like to receive these types of communications, please e-mail [email or web address]

## 2018-06-06 NOTE — MEDICAL/APP STUDENT
"Patient ID: Abdias Kim is a 62 y.o. male with a history of HTN and diverticulosis.      HPI: Patient presents with a palpable and reducible mass approximately 2 inches above his umbilicus two weeks ago. Patient reported feeling sore and bloated when the mass was palpable. Patient could reduce mass and heard a "squishing" sound. Patient experienced a band-like pattern of soreness 8 to 10 weeks prior to the occurrence of the mass that resolved soon after. He does not recall any inciting activities.     Review of Systems   Constitutional: Positive for appetite change. Negative for fever.   Respiratory: Negative for shortness of breath.    Cardiovascular: Negative for chest pain and palpitations.   Gastrointestinal: Negative for constipation, diarrhea, nausea and vomiting.   Genitourinary: Negative for dysuria.   Neurological: Negative for seizures and headaches.   Hematological: Does not bruise/bleed easily.     Current Outpatient Prescriptions   Medication Sig Dispense Refill    cetirizine (ZYRTEC) 10 MG tablet Take 1 tablet by mouth Daily.      diltiaZEM (CARDIZEM CD) 240 MG 24 hr capsule Take 1 capsule (240 mg total) by mouth once daily. 90 capsule 3    sildenafil (VIAGRA) 100 MG tablet Take 1 tablet (100 mg total) by mouth as needed for Erectile Dysfunction. 36 tablet 3     No current facility-administered medications for this visit.        Review of patient's allergies indicates:   Allergen Reactions    Allegra-d 12 hour [fexofenadine-pseudoephedrine] Other (See Comments)     Trouble urinating    Mucinex d [pseudoephedrine-guaifenesin] Other (See Comments)     Trouble urinating    Hyzaar  [losartan-hydrochlorothiazide]      Other reaction(s): Hives       Past Medical History:   Diagnosis Date    Anemia     Diverticulosis     Hypertension        Past Surgical History:   Procedure Laterality Date    APPENDECTOMY      COLONOSCOPY N/A 1/31/2017    Procedure: COLONOSCOPY;  Surgeon: BASILIO Winn MD;  " Location: [facility] ([address] [place] [address]);  Service: Endoscopy;  Laterality: N/A;       Family History   Problem Relation Age of Onset    Heart disease Mother         mi    Cancer Father         colon/prostate    Stroke Neg Hx     Diabetes Neg Hx        Social History     Social History    Marital status:      Spouse name: N/A    Number of children: N/A    Years of education: N/A     Occupational History    Not on file.     Social History Main Topics    Smoking status: Never Smoker    Smokeless tobacco: Never Used    Alcohol use Yes      Comment: few times a week     Drug use: No    Sexual activity: Not on file     Other Topics Concern    Not on file     Social History Narrative    No narrative on file       Vitals:    06/06/18 1521   BP: (!) 169/92   Pulse: 70   Temp: 97.8 °F (36.6 °C)       Physical Exam   Constitutional: He appears well-developed and well-nourished. No distress.   Cardiovascular: Normal rate, regular rhythm and normal heart sounds.    Pulmonary/Chest: Effort normal and breath sounds normal. No respiratory distress. He has no wheezes.   Abdominal: Soft. Bowel sounds are normal. He exhibits no distension.   Skin: No rash noted.     Abdomen: No mass palpated at this time.    Assessment & Plan:      62 year old male with suspected umbilical hernia    Plan: Observation: Patient received contact details and was encouraged to follow up PRN should symptoms or mass recur.

## 2018-06-14 ENCOUNTER — TELEPHONE (OUTPATIENT)
Dept: SURGERY | Facility: CLINIC | Age: 63
End: 2018-06-14

## 2018-06-14 NOTE — TELEPHONE ENCOUNTER
----- Message from Fabián Edgar sent at 6/14/2018  2:39 PM CDT -----  Needs Advice    Reason for call:  Still feeling symptoms of a hernia. Pt said when he came in he was told by Dr. Krueger that he didn't feel a hernia and that it might be small. Pt said he was told to call office if he still felt symptoms.    Communication Preference: By phone at [phone]  Additional Information:

## 2018-06-22 ENCOUNTER — HOSPITAL ENCOUNTER (OUTPATIENT)
Dept: RADIOLOGY | Facility: HOSPITAL | Age: 63
Discharge: HOME | End: 2018-06-22
Attending: SURGERY
Payer: COMMERCIAL

## 2018-06-22 ENCOUNTER — OFFICE VISIT (OUTPATIENT)
Dept: SURGERY | Facility: CLINIC | Age: 63
End: 2018-06-22
Payer: COMMERCIAL

## 2018-06-22 VITALS
WEIGHT: 156 LBS | DIASTOLIC BLOOD PRESSURE: 73 MMHG | BODY MASS INDEX: 24.48 KG/M2 | HEART RATE: 49 BPM | HEIGHT: 67 IN | SYSTOLIC BLOOD PRESSURE: 144 MMHG

## 2018-06-22 DIAGNOSIS — K42.9 UMBILICAL HERNIA WITHOUT OBSTRUCTION AND WITHOUT GANGRENE: ICD-10-CM

## 2018-06-22 DIAGNOSIS — R10.12 ABDOMINAL PAIN, LUQ: ICD-10-CM

## 2018-06-22 DIAGNOSIS — R10.12 ABDOMINAL PAIN, LUQ: Primary | ICD-10-CM

## 2018-06-22 PROCEDURE — 25500020 PHARM REV CODE 255: Performed by: SURGERY

## 2018-06-22 PROCEDURE — 99999 PR PBB SHADOW E&M-EST. PATIENT-LVL III: CPT | Mod: PBBFAC,,, | Performed by: SURGERY

## 2018-06-22 PROCEDURE — 74177 CT ABD & PELVIS W/CONTRAST: CPT | Mod: TC

## 2018-06-22 PROCEDURE — 99212 OFFICE O/P EST SF 10 MIN: CPT | Mod: S$GLB,,, | Performed by: SURGERY

## 2018-06-22 PROCEDURE — 74177 CT ABD & PELVIS W/CONTRAST: CPT | Mod: 26,,, | Performed by: RADIOLOGY

## 2018-06-22 RX ADMIN — IOHEXOL 15 ML: 350 INJECTION, SOLUTION INTRAVENOUS at 03:06

## 2018-06-22 RX ADMIN — IOHEXOL 75 ML: 350 INJECTION, SOLUTION INTRAVENOUS at 04:06

## 2018-06-22 NOTE — PROGRESS NOTES
"Patient ID: Abdias Kim is a 62 y.o. male with a history of HTN and diverticulosis.        HPI: Patient presents with a history of palpable and reducible mass approximately 2 inches above his umbilicus two weeks ago. Patient reported feeling sore and bloated when the mass was palpable. Patient could reduce mass and heard a "squishing" sound. Patient experienced a band-like pattern of soreness 8 to 10 weeks prior to the occurrence of the mass that resolved soon after. He does not recall any inciting activities. This pain has now resolved and he has noted to mass present since this time.     Review of Systems   Constitutional: Positive for appetite change. Negative for fever.   Respiratory: Negative for shortness of breath.    Cardiovascular: Negative for chest pain and palpitations.   Gastrointestinal: Negative for constipation, diarrhea, nausea and vomiting. Heavy feeling in LUQ.  Genitourinary: Negative for dysuria.   Neurological: Negative for seizures and headaches.   Hematological: Does not bruise/bleed easily.      Current Medications               Current Outpatient Prescriptions   Medication Sig Dispense Refill    cetirizine (ZYRTEC) 10 MG tablet Take 1 tablet by mouth Daily.        diltiaZEM (CARDIZEM CD) 240 MG 24 hr capsule Take 1 capsule (240 mg total) by mouth once daily. 90 capsule 3    sildenafil (VIAGRA) 100 MG tablet Take 1 tablet (100 mg total) by mouth as needed for Erectile Dysfunction. 36 tablet 3      No current facility-administered medications for this visit.                       Review of patient's allergies indicates:   Allergen Reactions    Allegra-d 12 hour [fexofenadine-pseudoephedrine] Other (See Comments)       Trouble urinating    Mucinex d [pseudoephedrine-guaifenesin] Other (See Comments)       Trouble urinating    Hyzaar  [losartan-hydrochlorothiazide]         Other reaction(s): Hives                 Past Medical History:   Diagnosis Date    Anemia      Diverticulosis   " "   Hypertension                     Past Surgical History:   Procedure Laterality Date    APPENDECTOMY        COLONOSCOPY N/A 1/31/2017     Procedure: COLONOSCOPY;  Surgeon: BASILIO Winn MD;  Location: [facility] ([address] [place] [address]);  Service: Endoscopy;  Laterality: N/A;                   Family History   Problem Relation Age of Onset    Heart disease Mother           mi    Cancer Father           colon/prostate    Stroke Neg Hx      Diabetes Neg Hx           Social History   Social History                Social History    Marital status:        Spouse name: N/A    Number of children: N/A    Years of education: N/A            Occupational History    Not on file.                  Social History Main Topics    Smoking status: Never Smoker    Smokeless tobacco: Never Used    Alcohol use Yes         Comment: few times a week     Drug use: No    Sexual activity: Not on file              Other Topics Concern    Not on file            Social History Narrative    No narrative on file            BP (!) 144/73   Pulse (!) 49   Ht 5' 7" (1.702 m)   Wt 70.8 kg (156 lb)   BMI 24.43 kg/m²        Physical Exam   Constitutional: He appears well-developed and well-nourished. No distress.   Cardiovascular: Normal rate, regular rhythm and normal heart sounds.    Pulmonary/Chest: Effort normal and breath sounds normal. No respiratory distress. He has no wheezes.   Abdominal: Soft. Bowel sounds are normal. He exhibits no distension.   Skin: No rash noted.      Abdomen: Questionable small mass supra umbilical.     Assessment & Plan:     62 year old male with suspected umbilical hernia     Plan:   Will plan for CT abd pelvis with po contrast.  Will contact after with results.    "

## 2018-06-28 ENCOUNTER — TELEPHONE (OUTPATIENT)
Dept: SURGERY | Facility: CLINIC | Age: 63
End: 2018-06-28

## 2018-06-28 NOTE — TELEPHONE ENCOUNTER
Attempted to inform Mr. Kim of the results, but there was no answer nor was there a voice messaging system set up.      ----- Message from Aric Krueger Jr., MD sent at 6/27/2018  8:41 PM CDT -----  There is a tiny umbilical hernia.  If he wants surgery we can do it for him.  Noot a concerning hernia.

## 2018-07-01 ENCOUNTER — PATIENT MESSAGE (OUTPATIENT)
Dept: SURGERY | Facility: CLINIC | Age: 63
End: 2018-07-01

## 2018-07-02 ENCOUNTER — PATIENT MESSAGE (OUTPATIENT)
Dept: INTERNAL MEDICINE | Facility: CLINIC | Age: 63
End: 2018-07-02

## 2018-08-15 ENCOUNTER — LAB VISIT (OUTPATIENT)
Dept: LAB | Facility: HOSPITAL | Age: 63
End: 2018-08-15
Attending: INTERNAL MEDICINE
Payer: COMMERCIAL

## 2018-08-15 DIAGNOSIS — Z00.00 ROUTINE GENERAL MEDICAL EXAMINATION AT A HEALTH CARE FACILITY: ICD-10-CM

## 2018-08-15 DIAGNOSIS — Z12.5 SCREENING FOR PROSTATE CANCER: ICD-10-CM

## 2018-08-15 LAB
ALBUMIN SERPL BCP-MCNC: 4 G/DL
ALP SERPL-CCNC: 51 U/L
ALT SERPL W/O P-5'-P-CCNC: 23 U/L
ANION GAP SERPL CALC-SCNC: 6 MMOL/L
AST SERPL-CCNC: 18 U/L
BASOPHILS # BLD AUTO: 0.02 K/UL
BASOPHILS NFR BLD: 0.4 %
BILIRUB SERPL-MCNC: 0.9 MG/DL
BUN SERPL-MCNC: 16 MG/DL
CALCIUM SERPL-MCNC: 9.4 MG/DL
CHLORIDE SERPL-SCNC: 109 MMOL/L
CHOLEST SERPL-MCNC: 170 MG/DL
CHOLEST/HDLC SERPL: 2.9 {RATIO}
CO2 SERPL-SCNC: 26 MMOL/L
COMPLEXED PSA SERPL-MCNC: 0.42 NG/ML
CREAT SERPL-MCNC: 1.2 MG/DL
DIFFERENTIAL METHOD: ABNORMAL
EOSINOPHIL # BLD AUTO: 0.1 K/UL
EOSINOPHIL NFR BLD: 2.8 %
ERYTHROCYTE [DISTWIDTH] IN BLOOD BY AUTOMATED COUNT: 16.1 %
EST. GFR  (AFRICAN AMERICAN): >60 ML/MIN/1.73 M^2
EST. GFR  (NON AFRICAN AMERICAN): >60 ML/MIN/1.73 M^2
GLUCOSE SERPL-MCNC: 83 MG/DL
HCT VFR BLD AUTO: 35.7 %
HDLC SERPL-MCNC: 58 MG/DL
HDLC SERPL: 34.1 %
HGB BLD-MCNC: 11.4 G/DL
IMM GRANULOCYTES # BLD AUTO: 0 K/UL
IMM GRANULOCYTES NFR BLD AUTO: 0 %
LDLC SERPL CALC-MCNC: 104 MG/DL
LYMPHOCYTES # BLD AUTO: 1.6 K/UL
LYMPHOCYTES NFR BLD: 34.8 %
MCH RBC QN AUTO: 22 PG
MCHC RBC AUTO-ENTMCNC: 31.9 G/DL
MCV RBC AUTO: 69 FL
MONOCYTES # BLD AUTO: 0.4 K/UL
MONOCYTES NFR BLD: 8.2 %
NEUTROPHILS # BLD AUTO: 2.5 K/UL
NEUTROPHILS NFR BLD: 53.8 %
NONHDLC SERPL-MCNC: 112 MG/DL
NRBC BLD-RTO: 0 /100 WBC
PLATELET # BLD AUTO: 189 K/UL
PMV BLD AUTO: 10.9 FL
POTASSIUM SERPL-SCNC: 4.4 MMOL/L
PROT SERPL-MCNC: 7.1 G/DL
RBC # BLD AUTO: 5.19 M/UL
SODIUM SERPL-SCNC: 141 MMOL/L
TRIGL SERPL-MCNC: 40 MG/DL
WBC # BLD AUTO: 4.62 K/UL

## 2018-08-15 PROCEDURE — 80061 LIPID PANEL: CPT

## 2018-08-15 PROCEDURE — 36415 COLL VENOUS BLD VENIPUNCTURE: CPT | Mod: PO

## 2018-08-15 PROCEDURE — 84153 ASSAY OF PSA TOTAL: CPT

## 2018-08-15 PROCEDURE — 80053 COMPREHEN METABOLIC PANEL: CPT

## 2018-08-15 PROCEDURE — 85025 COMPLETE CBC W/AUTO DIFF WBC: CPT

## 2018-08-20 ENCOUNTER — OFFICE VISIT (OUTPATIENT)
Dept: INTERNAL MEDICINE | Facility: CLINIC | Age: 63
End: 2018-08-20
Payer: COMMERCIAL

## 2018-08-20 VITALS
SYSTOLIC BLOOD PRESSURE: 130 MMHG | DIASTOLIC BLOOD PRESSURE: 70 MMHG | BODY MASS INDEX: 25.6 KG/M2 | OXYGEN SATURATION: 99 % | HEIGHT: 67 IN | HEART RATE: 51 BPM | WEIGHT: 163.13 LBS

## 2018-08-20 DIAGNOSIS — Z23 VACCINE FOR STREPTOCOCCUS PNEUMONIAE AND INFLUENZA: ICD-10-CM

## 2018-08-20 DIAGNOSIS — I10 ESSENTIAL HYPERTENSION: Primary | ICD-10-CM

## 2018-08-20 DIAGNOSIS — K42.9 UMBILICAL HERNIA WITHOUT OBSTRUCTION AND WITHOUT GANGRENE: ICD-10-CM

## 2018-08-20 PROCEDURE — 82270 OCCULT BLOOD FECES: CPT | Mod: S$GLB,,, | Performed by: INTERNAL MEDICINE

## 2018-08-20 PROCEDURE — 99214 OFFICE O/P EST MOD 30 MIN: CPT | Mod: S$GLB,,, | Performed by: INTERNAL MEDICINE

## 2018-08-20 PROCEDURE — 99999 PR PBB SHADOW E&M-EST. PATIENT-LVL III: CPT | Mod: PBBFAC,,, | Performed by: INTERNAL MEDICINE

## 2018-08-20 NOTE — PROGRESS NOTES
Subjective:       Patient ID: Abdias Kim is a 63 y.o. male.    Chief Complaint: Annual Exam    Hypertension   This is a chronic problem. The problem is unchanged. The problem is controlled. Pertinent negatives include no chest pain, headaches, neck pain, palpitations or shortness of breath. The current treatment provides significant improvement. There are no compliance problems.      Review of Systems   Constitutional: Negative for activity change and unexpected weight change.   HENT: Negative for hearing loss, rhinorrhea and trouble swallowing.    Eyes: Negative for discharge and visual disturbance.   Respiratory: Negative for chest tightness, shortness of breath and wheezing.    Cardiovascular: Negative for chest pain and palpitations.   Gastrointestinal: Negative for blood in stool, constipation, diarrhea and vomiting.   Endocrine: Negative for polydipsia and polyuria.   Genitourinary: Negative for difficulty urinating, hematuria and urgency.   Musculoskeletal: Negative for arthralgias, joint swelling and neck pain.   Neurological: Negative for weakness and headaches.   Psychiatric/Behavioral: Negative for confusion and dysphoric mood.       Objective:      Physical Exam   Constitutional: He is oriented to person, place, and time. He appears well-developed and well-nourished. No distress.   HENT:   Head: Normocephalic and atraumatic.   Mouth/Throat: Oropharynx is clear and moist.   Eyes: Conjunctivae are normal. Pupils are equal, round, and reactive to light.   Neck: Normal range of motion. Neck supple.   Cardiovascular: Normal rate, regular rhythm and normal heart sounds.   Pulmonary/Chest: Effort normal and breath sounds normal. He has no wheezes.   Abdominal: Soft. Bowel sounds are normal. There is no tenderness. A hernia is present.       Genitourinary: Prostate normal. Rectal exam shows guaiac negative stool.   Musculoskeletal: Normal range of motion. He exhibits no edema or tenderness.   Neurological:  He is alert and oriented to person, place, and time. No cranial nerve deficit.   Skin: No erythema.   Psychiatric: He has a normal mood and affect.   Vitals reviewed.      Assessment:       1. Essential hypertension    2. Vaccine for streptococcus pneumoniae and influenza    3. Umbilical hernia without obstruction and without gangrene        Plan:       Abdias was seen today for annual exam.    Diagnoses and all orders for this visit:    Essential hypertension  -     CBC auto differential; Future  -     Comprehensive metabolic panel; Future  -     PSA, Screening; Future  -     Lipid panel; Future    Vaccine for streptococcus pneumoniae and influenza  -     (In Office Administered) Pneumococcal Conjugate Vaccine (13 Valent) (IM)    Umbilical hernia without obstruction and without gangrene  Comments:  observe for now-  asymptomatic        Follow-up in about 1 year (around 8/20/2019) for F/U APPOINTMENT WITH ME, PHYSICAL EXAM, WITH LAB BEFORE.

## 2019-04-22 RX ORDER — DILTIAZEM HYDROCHLORIDE 240 MG/1
240 CAPSULE, COATED, EXTENDED RELEASE ORAL DAILY
Qty: 90 CAPSULE | Refills: 3 | Status: SHIPPED | OUTPATIENT
Start: 2019-04-22 | End: 2020-04-14 | Stop reason: SDUPTHER

## 2019-06-12 ENCOUNTER — PATIENT MESSAGE (OUTPATIENT)
Dept: INTERNAL MEDICINE | Facility: CLINIC | Age: 64
End: 2019-06-12

## 2019-07-24 ENCOUNTER — LAB VISIT (OUTPATIENT)
Dept: LAB | Facility: HOSPITAL | Age: 64
End: 2019-07-24
Attending: INTERNAL MEDICINE
Payer: MEDICARE

## 2019-07-24 DIAGNOSIS — I10 ESSENTIAL HYPERTENSION: ICD-10-CM

## 2019-07-24 LAB
ALBUMIN SERPL BCP-MCNC: 4 G/DL (ref 3.5–5.2)
ALP SERPL-CCNC: 54 U/L (ref 55–135)
ALT SERPL W/O P-5'-P-CCNC: 34 U/L (ref 10–44)
ANION GAP SERPL CALC-SCNC: 7 MMOL/L (ref 8–16)
ANISOCYTOSIS BLD QL SMEAR: SLIGHT
AST SERPL-CCNC: 23 U/L (ref 10–40)
BASOPHILS # BLD AUTO: 0.03 K/UL (ref 0–0.2)
BASOPHILS NFR BLD: 0.6 % (ref 0–1.9)
BILIRUB SERPL-MCNC: 0.8 MG/DL (ref 0.1–1)
BUN SERPL-MCNC: 17 MG/DL (ref 8–23)
CALCIUM SERPL-MCNC: 9.9 MG/DL (ref 8.7–10.5)
CHLORIDE SERPL-SCNC: 104 MMOL/L (ref 95–110)
CHOLEST SERPL-MCNC: 216 MG/DL (ref 120–199)
CHOLEST/HDLC SERPL: 3.7 {RATIO} (ref 2–5)
CO2 SERPL-SCNC: 27 MMOL/L (ref 23–29)
COMPLEXED PSA SERPL-MCNC: 0.48 NG/ML (ref 0–4)
CREAT SERPL-MCNC: 1.2 MG/DL (ref 0.5–1.4)
DACRYOCYTES BLD QL SMEAR: ABNORMAL
DIFFERENTIAL METHOD: ABNORMAL
EOSINOPHIL # BLD AUTO: 0.2 K/UL (ref 0–0.5)
EOSINOPHIL NFR BLD: 4.6 % (ref 0–8)
ERYTHROCYTE [DISTWIDTH] IN BLOOD BY AUTOMATED COUNT: 16.2 % (ref 11.5–14.5)
EST. GFR  (AFRICAN AMERICAN): >60 ML/MIN/1.73 M^2
EST. GFR  (NON AFRICAN AMERICAN): >60 ML/MIN/1.73 M^2
GLUCOSE SERPL-MCNC: 86 MG/DL (ref 70–110)
HCT VFR BLD AUTO: 38.6 % (ref 40–54)
HDLC SERPL-MCNC: 59 MG/DL (ref 40–75)
HDLC SERPL: 27.3 % (ref 20–50)
HGB BLD-MCNC: 12.8 G/DL (ref 14–18)
HYPOCHROMIA BLD QL SMEAR: ABNORMAL
IMM GRANULOCYTES # BLD AUTO: 0.01 K/UL (ref 0–0.04)
IMM GRANULOCYTES NFR BLD AUTO: 0.2 % (ref 0–0.5)
LDLC SERPL CALC-MCNC: 142.2 MG/DL (ref 63–159)
LYMPHOCYTES # BLD AUTO: 1.9 K/UL (ref 1–4.8)
LYMPHOCYTES NFR BLD: 39.2 % (ref 18–48)
MCH RBC QN AUTO: 22.8 PG (ref 27–31)
MCHC RBC AUTO-ENTMCNC: 33.2 G/DL (ref 32–36)
MCV RBC AUTO: 69 FL (ref 82–98)
MONOCYTES # BLD AUTO: 0.4 K/UL (ref 0.3–1)
MONOCYTES NFR BLD: 8.4 % (ref 4–15)
NEUTROPHILS # BLD AUTO: 2.3 K/UL (ref 1.8–7.7)
NEUTROPHILS NFR BLD: 47 % (ref 38–73)
NONHDLC SERPL-MCNC: 157 MG/DL
NRBC BLD-RTO: 0 /100 WBC
OVALOCYTES BLD QL SMEAR: ABNORMAL
PLATELET # BLD AUTO: 168 K/UL (ref 150–350)
PMV BLD AUTO: 11.7 FL (ref 9.2–12.9)
POIKILOCYTOSIS BLD QL SMEAR: SLIGHT
POLYCHROMASIA BLD QL SMEAR: ABNORMAL
POTASSIUM SERPL-SCNC: 4.3 MMOL/L (ref 3.5–5.1)
PROT SERPL-MCNC: 7.2 G/DL (ref 6–8.4)
RBC # BLD AUTO: 5.61 M/UL (ref 4.6–6.2)
SODIUM SERPL-SCNC: 138 MMOL/L (ref 136–145)
TRIGL SERPL-MCNC: 74 MG/DL (ref 30–150)
WBC # BLD AUTO: 4.79 K/UL (ref 3.9–12.7)

## 2019-07-24 PROCEDURE — 36415 COLL VENOUS BLD VENIPUNCTURE: CPT | Mod: PO

## 2019-07-24 PROCEDURE — 85025 COMPLETE CBC W/AUTO DIFF WBC: CPT

## 2019-07-24 PROCEDURE — 80053 COMPREHEN METABOLIC PANEL: CPT

## 2019-07-24 PROCEDURE — 84153 ASSAY OF PSA TOTAL: CPT

## 2019-07-24 PROCEDURE — 80061 LIPID PANEL: CPT

## 2019-07-26 RX ORDER — FLUTICASONE PROPIONATE 50 MCG
SPRAY, SUSPENSION (ML) NASAL
COMMUNITY
End: 2022-05-16 | Stop reason: SDUPTHER

## 2019-07-29 ENCOUNTER — OFFICE VISIT (OUTPATIENT)
Dept: INTERNAL MEDICINE | Facility: CLINIC | Age: 64
End: 2019-07-29
Payer: MEDICARE

## 2019-07-29 VITALS
SYSTOLIC BLOOD PRESSURE: 138 MMHG | DIASTOLIC BLOOD PRESSURE: 80 MMHG | HEART RATE: 64 BPM | BODY MASS INDEX: 27.41 KG/M2 | HEIGHT: 67 IN | WEIGHT: 174.63 LBS

## 2019-07-29 DIAGNOSIS — Z12.5 SCREENING FOR PROSTATE CANCER: ICD-10-CM

## 2019-07-29 DIAGNOSIS — N52.9 ERECTILE DYSFUNCTION, UNSPECIFIED ERECTILE DYSFUNCTION TYPE: ICD-10-CM

## 2019-07-29 DIAGNOSIS — M19.041 PRIMARY OSTEOARTHRITIS OF RIGHT HAND: ICD-10-CM

## 2019-07-29 DIAGNOSIS — M54.2 NECK PAIN: ICD-10-CM

## 2019-07-29 DIAGNOSIS — I10 ESSENTIAL HYPERTENSION: Primary | ICD-10-CM

## 2019-07-29 DIAGNOSIS — K21.9 GASTROESOPHAGEAL REFLUX DISEASE, ESOPHAGITIS PRESENCE NOT SPECIFIED: ICD-10-CM

## 2019-07-29 PROCEDURE — 99999 PR PBB SHADOW E&M-EST. PATIENT-LVL III: ICD-10-PCS | Mod: PBBFAC,,, | Performed by: INTERNAL MEDICINE

## 2019-07-29 PROCEDURE — 99214 OFFICE O/P EST MOD 30 MIN: CPT | Mod: S$PBB,,, | Performed by: INTERNAL MEDICINE

## 2019-07-29 PROCEDURE — 82270 OCCULT BLOOD FECES: CPT | Mod: PBBFAC | Performed by: INTERNAL MEDICINE

## 2019-07-29 PROCEDURE — 99999 PR PBB SHADOW E&M-EST. PATIENT-LVL III: CPT | Mod: PBBFAC,,, | Performed by: INTERNAL MEDICINE

## 2019-07-29 PROCEDURE — 99213 OFFICE O/P EST LOW 20 MIN: CPT | Mod: PBBFAC | Performed by: INTERNAL MEDICINE

## 2019-07-29 PROCEDURE — 99214 PR OFFICE/OUTPT VISIT, EST, LEVL IV, 30-39 MIN: ICD-10-PCS | Mod: S$PBB,,, | Performed by: INTERNAL MEDICINE

## 2019-07-29 RX ORDER — SILDENAFIL 100 MG/1
100 TABLET, FILM COATED ORAL
Qty: 36 TABLET | Refills: 3 | Status: SHIPPED | OUTPATIENT
Start: 2019-07-29 | End: 2020-11-18 | Stop reason: SDUPTHER

## 2019-07-29 NOTE — PROGRESS NOTES
Subjective:       Patient ID: Abdias Kim is a 63 y.o. male.    Chief Complaint: Annual Exam; Hypertension; and Diverticulosis    Hypertension   This is a chronic problem. The problem is unchanged. The problem is controlled. Associated symptoms include neck pain (discussed neck exercises). Pertinent negatives include no chest pain, headaches, palpitations or shortness of breath. The current treatment provides significant improvement. There are no compliance problems.      Review of Systems   Constitutional: Positive for activity change. Negative for unexpected weight change.   HENT: Negative for hearing loss, rhinorrhea and trouble swallowing.    Eyes: Negative for discharge and visual disturbance.   Respiratory: Negative for chest tightness, shortness of breath and wheezing.    Cardiovascular: Negative for chest pain and palpitations.   Gastrointestinal: Negative for blood in stool, constipation, diarrhea and vomiting.        Occ heartburn last 2-3 mos, improves with Nexium   Endocrine: Negative for polydipsia and polyuria.   Genitourinary: Negative for difficulty urinating, hematuria and urgency.   Musculoskeletal: Positive for arthralgias (hand and foot) and neck pain (discussed neck exercises). Negative for joint swelling.   Neurological: Negative for weakness and headaches.   Psychiatric/Behavioral: Negative for confusion and dysphoric mood.       Objective:      Physical Exam   Constitutional: He is oriented to person, place, and time. He appears well-developed and well-nourished. No distress.   HENT:   Head: Normocephalic and atraumatic.   Mouth/Throat: Oropharynx is clear and moist.   Eyes: Pupils are equal, round, and reactive to light. Conjunctivae are normal.   Neck: Normal range of motion. Neck supple.   Cardiovascular: Normal rate, regular rhythm and normal heart sounds.   Pulmonary/Chest: Effort normal and breath sounds normal. He has no wheezes.   Abdominal: Soft. Bowel sounds are normal. There is  no tenderness.   Genitourinary: Prostate normal. Rectal exam shows guaiac negative stool.   Musculoskeletal: Normal range of motion. He exhibits no edema or tenderness.   Neurological: He is alert and oriented to person, place, and time. No cranial nerve deficit.   Skin: No erythema.   Psychiatric: He has a normal mood and affect.   Vitals reviewed.      Assessment:       1. Essential hypertension    2. Erectile dysfunction, unspecified erectile dysfunction type    3. Gastroesophageal reflux disease, esophagitis presence not specified    4. Primary osteoarthritis of right hand    5. Neck pain    6. Screening for prostate cancer        Plan:       Abdias was seen today for annual exam, hypertension and diverticulosis.    Diagnoses and all orders for this visit:    Essential hypertension  -     CBC auto differential; Future  -     Comprehensive metabolic panel; Future  -     Lipid panel; Future    Erectile dysfunction, unspecified erectile dysfunction type  -     sildenafil (VIAGRA) 100 MG tablet; Take 1 tablet (100 mg total) by mouth as needed for Erectile Dysfunction.    Gastroesophageal reflux disease, esophagitis presence not specified  Comments:  rec one month trial of Nexium daily to see if esophagus can heal itself    Primary osteoarthritis of right hand  Comments:  trial of NSAID prn    Neck pain  Comments:  try neck exercises    Screening for prostate cancer  -     PSA, Screening; Future        Follow up in about 1 year (around 7/29/2020) for PHYSICAL EXAM, WITH LAB BEFORE.

## 2019-10-22 ENCOUNTER — PATIENT MESSAGE (OUTPATIENT)
Dept: INTERNAL MEDICINE | Facility: CLINIC | Age: 64
End: 2019-10-22

## 2019-10-22 DIAGNOSIS — K21.9 GASTROESOPHAGEAL REFLUX DISEASE, ESOPHAGITIS PRESENCE NOT SPECIFIED: Primary | ICD-10-CM

## 2019-10-23 ENCOUNTER — TELEPHONE (OUTPATIENT)
Dept: INTERNAL MEDICINE | Facility: CLINIC | Age: 64
End: 2019-10-23

## 2019-10-23 NOTE — TELEPHONE ENCOUNTER
Attempted to schedule appointment with the first available for January 30th. Spoke with gastro department, they were able to get him in next month. Notified patient of appointment.

## 2019-10-23 NOTE — TELEPHONE ENCOUNTER
----- Message from Maria Dolores Lujan DNP sent at 10/22/2019  4:46 PM CDT -----  Regarding: referral  Gastro referral please schedule see email with pt and myself as to why

## 2019-10-29 ENCOUNTER — PATIENT OUTREACH (OUTPATIENT)
Dept: ADMINISTRATIVE | Facility: OTHER | Age: 64
End: 2019-10-29

## 2019-10-31 ENCOUNTER — OFFICE VISIT (OUTPATIENT)
Dept: GASTROENTEROLOGY | Facility: CLINIC | Age: 64
End: 2019-10-31
Payer: MEDICARE

## 2019-10-31 VITALS
BODY MASS INDEX: 27.71 KG/M2 | HEART RATE: 65 BPM | SYSTOLIC BLOOD PRESSURE: 171 MMHG | HEIGHT: 67 IN | DIASTOLIC BLOOD PRESSURE: 84 MMHG | WEIGHT: 176.56 LBS

## 2019-10-31 DIAGNOSIS — Z86.010 HX OF COLONIC POLYPS: ICD-10-CM

## 2019-10-31 DIAGNOSIS — K21.9 GASTROESOPHAGEAL REFLUX DISEASE, ESOPHAGITIS PRESENCE NOT SPECIFIED: Primary | ICD-10-CM

## 2019-10-31 DIAGNOSIS — R14.2 BELCHING: ICD-10-CM

## 2019-10-31 PROCEDURE — 99999 PR PBB SHADOW E&M-EST. PATIENT-LVL V: ICD-10-PCS | Mod: PBBFAC,,, | Performed by: NURSE PRACTITIONER

## 2019-10-31 PROCEDURE — 99204 PR OFFICE/OUTPT VISIT, NEW, LEVL IV, 45-59 MIN: ICD-10-PCS | Mod: S$PBB,,, | Performed by: NURSE PRACTITIONER

## 2019-10-31 PROCEDURE — 99999 PR PBB SHADOW E&M-EST. PATIENT-LVL V: CPT | Mod: PBBFAC,,, | Performed by: NURSE PRACTITIONER

## 2019-10-31 PROCEDURE — 99204 OFFICE O/P NEW MOD 45 MIN: CPT | Mod: S$PBB,,, | Performed by: NURSE PRACTITIONER

## 2019-10-31 PROCEDURE — 99215 OFFICE O/P EST HI 40 MIN: CPT | Mod: PBBFAC | Performed by: NURSE PRACTITIONER

## 2019-10-31 NOTE — LETTER
October 31, 2019      Melquiades Valdivia MD  [address]  [facility] [address]           [facility]  [address]  [facility] [address]  Phone: [phone]  Fax: [phone]          Patient: Abdias Kim   MR Number: [id number]   YOB: 1955   Date of Visit: 10/31/2019       Dear Dr. Melquiades Valdivia:    Thank you for referring Abdias Kim to me for evaluation. Attached you will find relevant portions of my assessment and plan of care.    If you have questions, please do not hesitate to call me. I look forward to following Abdias Kim along with you.    Sincerely,    Tanya Peres, CANDELARIO    Enclosure  CC:  No Recipients    If you would like to receive this communication electronically, please contact [email or web address] or [phone] to request more information on [employer] access.    For providers and/or their staff who would like to refer a patient to [facility], please contact us through our one-stop-shop provider referral line, [facility], at [phone].    If you feel you have received this communication in error or would no longer like to receive these types of communications, please e-mail [email or web address]

## 2019-10-31 NOTE — PROGRESS NOTES
[facility] Consultation Note    Reason for Consult:  The primary encounter diagnosis was Gastroesophageal reflux disease, esophagitis presence not specified. Diagnoses of Belching and Hx of colonic polyps were also pertinent to this visit.    PCP:   Melquiades Valdivia   [address] / [place] [address]    Referring MD:  Melquiades Valdivia Md  [address] [place] [address]    HPI:  This is a 64 y.o. male here for evaluation of GERD.  He is a new patient.  Referred by primary care.  Patient reports he has had reflux off and on for many years.  Most recently it has gotten a little worse.  He describes his reflux as belching, abdominal fullness and early satiety.  He does not get pyrosis or regurgitation.  Denies nausea or vomiting and melena.  He does eat dinner late and lays down shortly after which causes discomfort.    Has been taking Nexium on and off in the past.  Most recently during this flare he is taking it daily with complete relief.    ROS:  Constitutional: No fevers, chills, No weight loss  ENT: No allergies  CV: No chest pain  Pulm: No cough, No shortness of breath  Ophtho: No vision changes  GI: see HPI  Derm: No rash  Heme: No lymphadenopathy, No bruising  MSK: + arthritis  : No dysuria, No hematuria  Neuro: No syncope, No seizure  Psych: No anxiety, No depression    Medical History:  has a past medical history of Anemia, Diverticulosis, and Hypertension.    Surgical History:  has a past surgical history that includes Appendectomy and Colonoscopy (N/A, 1/31/2017).    Family History: family history includes Cancer in his father; Colon polyps in his father; Heart disease in his mother..     Social History:  reports that he has never smoked. He has never used smokeless tobacco. He reports that he drinks alcohol. He reports that he does not use drugs.    Review of patient's allergies indicates:   Allergen Reactions    Allegra-d 12 hour [fexofenadine-pseudoephedrine]  "Other (See Comments)     Trouble urinating    Mucinex d [pseudoephedrine-guaifenesin] Other (See Comments)     Trouble urinating    Losartan-hydrochlorothiazide Hives     Other reaction(s): Hives       Current Outpatient Medications on File Prior to Visit   Medication Sig Dispense Refill    cetirizine (ZYRTEC) 10 MG tablet Take 1 tablet by mouth Daily.      diltiaZEM (CARDIZEM CD) 240 MG 24 hr capsule Take 1 capsule (240 mg total) by mouth once daily. 90 capsule 3    fluticasone propionate (FLONASE) 50 mcg/actuation nasal spray       sildenafil (VIAGRA) 100 MG tablet Take 1 tablet (100 mg total) by mouth as needed for Erectile Dysfunction. 36 tablet 3     No current facility-administered medications on file prior to visit.          Objective Findings:    Vital Signs:  BP (!) 171/84 (BP Location: Left arm)   Pulse 65   Ht 5' 7" (1.702 m)   Wt 80.1 kg (176 lb 9.4 oz)   BMI 27.66 kg/m²   Body mass index is 27.66 kg/m².    Physical Exam:  General Appearance: Well appearing in no acute distress  Head:   Normocephalic, without obvious abnormality  Eyes:    No scleral icterus  ENT: Neck supple  Lungs: CTA bilaterally in anterior and posterior fields, no wheezes, no crackles.  Heart:  Regular rate and rhythm, S1, S2 normal, no murmurs heard  Abdomen: Soft,+ reducible umbilical hernia  Extremities: No edema  Skin: No rash  Neurologic: AAO x 3      Labs:  Lab Results   Component Value Date    WBC 4.79 2019    HGB 12.8 (L) 2019    HCT 38.6 (L) 2019     2019    CHOL 216 (H) 2019    TRIG 74 2019    HDL 59 2019    ALT 34 2019    AST 23 2019     2019    K 4.3 2019     2019    CREATININE 1.2 2019    BUN 17 2019    CO2 27 2019    TSH 0.839 10/22/2011    PSA 0.48 2019    INR 1.0 2004       Imagin2018 CT   No acute process identified in the abdomen and pelvis.  Small fat containing abdominal " hernia.  Colonic diverticulosis without definitive CT evidence of diverticulitis.  Small hepatic and right renal hypodensities, too small to characterize.    Endoscopy:    1/2017 Colonoscopy for hx of polyps  - Two 2 to 3 mm polyps in the proximal sigmoid                         colon and in the distal sigmoid colon, removed with                         a jumbo cold forceps. Resected and retrieved.                        - Diverticulosis in the entire examined colon.    Assessment:    Mr. Kim is a 64 y.o.  M with:  1. Gastroesophageal reflux disease, esophagitis presence not specified    2. Belching    3. Hx of colonic polyps      GERD worse with eating late. Drinks a lot of caffeine and sodas. Discussed diet and lifestyle mods since he is concerned with PPI use.      Recommendations:  1. PPI PRN or QOD  2. EGD w/ screening colon in Jan 2020.    No follow-ups on file.      Order summary:  Orders Placed This Encounter    Case request GI: EGD (ESOPHAGOGASTRODUODENOSCOPY), COLONOSCOPY         Thank you so much for allowing me to participate in the care of Abdias Kim    Tanya Peres, PALAK-C

## 2019-10-31 NOTE — PATIENT INSTRUCTIONS
Message me in December and let me know how you are doing.    [email or web address]  Dropping Acid The Reflux Diet Cookbook and Cure -  Moises Sousa M.D.    GERD  Worst Foods for Acid Reflux  Chocolate (milk chocolate worse than dark chocolate)  Soda (all carbonated beverages)  Alcohol (beer, liquor, wine)  Fried foods  Crump, sausage, ribs  Cream sauce  Fatty meats (beef)  Butter, margarine, lard, shortening  Coffee, tea  Mint   High fat nuts  Hot sauces and pepper  Citrus fruit/juices      Acidic foods (pH - 1 is MORE acidic, 5 is LESS acidic)     Do not eat or drink these (lower numbers are worse)    Induction diet - For 2 weeks eat nothing below pH 5     Lemon juice 2.3  Grape cranberry juice 2.5  Stomach Acid 2.5  Gelatin Dessert 2.6  Lemon/lime 2.9/2.7  Vinegar 2.9  Gatorade 3.0  Fruits - plums, apricots, strawberries, cherries 3.0  Vitamin C (ascorbic acid) 3.0  Iced tea, Snapple 3.1  Mustard 3.2  Soft drinks 3.3  Nectarines 3.3  Pomegranate 3.3  Applesauce 3.4  Grapefruit 3.4  Kiwi 3.4  Barbecue sauce 3.4  Caesar dressing 3.5  Thousand island dressing 3.6  Strawberries 3.5  Pineapple juice 3.5  Beer 3.5  Wine 3.5  Grape 3.6  Apples 3.6  Pineapple 3.7  Pickle 3.7  Blackberries, blueberries 3.7  Charan 3.7  Orange 3.8  Cherries 3.9  Red Bull 3.9  Tomatoes 4.2  Coffee 5.1      These are Safe foods:  Agave  Aloe Vera  Apple (only red)  Bagels  Banana (worsens reflux in 1%)  Beans - black, red, lima, lentils  Bread - whole grain, rye  Caramel  Celery  Chamomile tea  Chicken - skinless, never fried  Chicken stock or bouillon  Coffee - one cup/day with milk  Fennel  Fish  Rossy  Green vegetables (no green peppers)  Herbs  Honey  Melon  Milk - skin, soy, or Lactaid skim milk  Mushrooms  Oatmeal  Olive oil  Parsley  Pasta  Pears  Popcorn  Potatoes  Red bell peppers  Rice  Soups  Tofu  Turkey Breast  Turnip  Vegetables - no onion, tomatoes, peppers  Vinaigrette  Water - non carbonated  Whole grain breads,  crackers, breakfast cereals      Best Foods for Acid Reflux  Whole grain breads  Oatmeal  Aloe Vera  Salad (no tomatoes, onions, cheese, or high fat dressing)  Banana  Melon  Fennel  Chicken and turkey (skinless, never fried)  Fish/seafood (never fried)  Celery  Parsley  Couscous and Rice    Maybe bad foods (Everyone is unique)  Tomatoes  Garlic  Onion  Nuts (macadamia nuts)  Apples (especially green)  Cucumber  Green peppers  Spicy food  Some herbal teas    GERD tips  Change what you eat:  Eat smaller meals  Eat slowly and chew thoroughly until food is almost liquid  Cut down on junk carbohydrates such as sugar and white flour  Use herbs in your cooking  Eat more raw foods (more than 10 ingredients is not a raw food)  Avoid trans fats and partially hydrogenated oils  Eat more fish and switch to grass fed beef  Switch your cooking oil to macadamia nut or olive oil  Watch extremes of salt intake (too high or too low is bad)    If just cutting out acidic foods is not enough, change how you eat:  Large breakfast, medium lunch, light dinner  Dont mix fruit juices, sweet fruits, and refined starches with meats and heavy food  Dont wash your food down with a lot of liquid      Change these habits:  Stop smoking  Eat dinner earlier (3-4 hours before lying down to sleep)  Elevate the head of your bed 6 inches (blocks under the head of the bed are better than pillows) OR [employer] sells a special MedCline Reflux relief system  That may be purchased online for a comfortable, individual solution for raising the head of the bed.  Exercise (but wait 2 hours after eating)  Drink more water (between meals)    Take these supplements:  Multi vitamin  Probiotic  Fish oil    Most common food allergens: milk, eggs, peanuts, tree nuts, fish, shellfish, wheat, and soy    All natural immediate relief:  Chew 2-3 soft probiotic capsules - Dr. Muse's Probiotics 12 Plus  Chew chewable DGL licorice tablet  Chew papaya tablet with high  "protein meal - [employer]  Drink 2 ounces of aloe vera juice  Swedish bitters  Prelief- reduce the acid in food to keep it form burning sensitive tissue  Iberogast  Slippery Elm  Drink Chamomile Tea  Teaspoon of baking soda in water  Spoonful of vinegar in water      All natural ulcer healers:  Zinc carnosine - 75.5 mg with food twice a day x 8 weeks   PepZinGI by Marlyn - $8 for 60 pills  DGL (deglycyrrhizinated licorice) - 2 tablets before meals. Heals stomach lining   Natural Factors brand, Enzymatic therapy brand.  Aloe Vera juice  - 2 to 8 ounces a day   [place] or [place]                        What is an abdominal wall hernia? -- Your internal organs and tissues are held in place by a tough outer wall of tissue called the "abdominal wall." An abdominal hernia is an area in that wall that is weak or torn. Often when there is a hernia, organs or tissues that are normally held in place by the abdominal wall bulge or stick out through the weak or torn spot.  There are many different kinds of abdominal wall hernias (figure 1).  What are the symptoms of abdominal wall hernias? -- Abdominal wall hernias do not always cause symptoms. When they do, they can cause some or all of these symptoms:  ?A bulge somewhere on the trunk of the body - This bulge can be so small that you don't even realize it's there.  ?Pain, especially when coughing, straining, or using nearby muscles  ?A pulling sensation around the bulge  Abdominal wall hernias can balloon out and form a sac. That sac can end up holding a loop of intestine or a piece of fat that should normally be tucked inside the belly. This can be painful and even dangerous if the tissue in the hernia gets trapped and unable to slide back into the belly. When this happens, the tissue does not get enough blood, so it can become swollen or even die (figure 2).  Should I see a doctor or nurse? -- Yes. See a doctor or nurse if you have any of the symptoms of a " "hernia. In most cases, doctors can diagnose a hernia just by doing an exam. During the exam, the doctor might ask you to cough or bear down while pressing on your hernia. This might be uncomfortable, but it is necessary to find the source of the problem.  Most of the time, the contents of the hernia can be "reduced," or gently pushed back into the belly. Still, there are times when the hernia gets trapped and won't go back in. If that happens, the tissue that is trapped can get damaged.  If you develop pain around a hernia bulge or feel sick, call your doctor or surgeon right away.   How are hernias treated? -- Not all hernias need treatment right away. But many do need to be repaired with surgery. Surgeons can repair most hernias in 1 of 2 ways. The right surgery for you will depend on the size of your hernia, where on the abdominal wall it is, whether this is the first time it is getting repaired, and what your general health is like. The types of surgery are:  ?Open surgery - During an open surgery, the surgeon makes an incision near the hernia. Then he or she looks at the tissue that is stuck in the hernia, and if it is healthy, gently pushes it back into place. Sometimes a piece of tissue needs to be removed. Next, the surgeon sews the layers of the abdominal wall back together, so that nothing can bulge through. In some cases, surgeons will also patch the area with a piece of mesh. The mesh takes some of the strain off the abdominal wall. That way the hernia is less likely to happen again.  ?Laparoscopic surgery - During laparoscopic surgery, the surgeon makes a few incisions that are much smaller than those used in open surgery. Then he or she inserts long, thin tools into the area near the hernia. One of the tools has a camera (called a "laparoscope") on the end, which sends pictures to a TV screen. The surgeon can look at the picture on the screen to guide his or her movements. Then he or she uses the long " tools to repair the hernia using mesh.  If your hernia has reduced the blood supply to a loop of intestine, your doctor might need to remove that piece of intestine. Then he or she will sew the intestine back together.  The recovery and aftercare for each type of hernia repair is different. Your doctor or nurse can tell you what to expect after your surgery.

## 2019-11-01 ENCOUNTER — IMMUNIZATION (OUTPATIENT)
Dept: PHARMACY | Facility: CLINIC | Age: 64
End: 2019-11-01
Payer: COMMERCIAL

## 2019-11-01 ENCOUNTER — IMMUNIZATION (OUTPATIENT)
Dept: PHARMACY | Facility: CLINIC | Age: 64
End: 2019-11-01

## 2019-12-04 DIAGNOSIS — Z12.11 SPECIAL SCREENING FOR MALIGNANT NEOPLASMS, COLON: Primary | ICD-10-CM

## 2019-12-04 RX ORDER — POLYETHYLENE GLYCOL 3350, SODIUM SULFATE ANHYDROUS, SODIUM BICARBONATE, SODIUM CHLORIDE, POTASSIUM CHLORIDE 236; 22.74; 6.74; 5.86; 2.97 G/4L; G/4L; G/4L; G/4L; G/4L
4 POWDER, FOR SOLUTION ORAL ONCE
Qty: 4000 ML | Refills: 0 | Status: SHIPPED | OUTPATIENT
Start: 2019-12-04 | End: 2019-12-04

## 2019-12-05 ENCOUNTER — PATIENT MESSAGE (OUTPATIENT)
Dept: GASTROENTEROLOGY | Facility: CLINIC | Age: 64
End: 2019-12-05

## 2020-02-05 ENCOUNTER — ANESTHESIA (OUTPATIENT)
Dept: ENDOSCOPY | Facility: HOSPITAL | Age: 65
End: 2020-02-05
Payer: MEDICARE

## 2020-02-05 ENCOUNTER — HOSPITAL ENCOUNTER (OUTPATIENT)
Facility: HOSPITAL | Age: 65
Discharge: HOME | End: 2020-02-05
Attending: INTERNAL MEDICINE | Admitting: INTERNAL MEDICINE
Payer: MEDICARE

## 2020-02-05 ENCOUNTER — ANESTHESIA EVENT (OUTPATIENT)
Dept: ENDOSCOPY | Facility: HOSPITAL | Age: 65
End: 2020-02-05
Payer: MEDICARE

## 2020-02-05 VITALS
RESPIRATION RATE: 17 BRPM | HEIGHT: 67 IN | DIASTOLIC BLOOD PRESSURE: 90 MMHG | OXYGEN SATURATION: 99 % | SYSTOLIC BLOOD PRESSURE: 167 MMHG | HEART RATE: 53 BPM | TEMPERATURE: 98 F | BODY MASS INDEX: 25.11 KG/M2 | WEIGHT: 160 LBS

## 2020-02-05 DIAGNOSIS — K21.9 GERD (GASTROESOPHAGEAL REFLUX DISEASE): ICD-10-CM

## 2020-02-05 DIAGNOSIS — K21.9 GASTROESOPHAGEAL REFLUX DISEASE, ESOPHAGITIS PRESENCE NOT SPECIFIED: Primary | ICD-10-CM

## 2020-02-05 PROCEDURE — E9220 PRA ENDO ANESTHESIA: ICD-10-PCS | Mod: ,,, | Performed by: NURSE ANESTHETIST, CERTIFIED REGISTERED

## 2020-02-05 PROCEDURE — 88305 TISSUE EXAM BY PATHOLOGIST: CPT | Performed by: PATHOLOGY

## 2020-02-05 PROCEDURE — 25000003 PHARM REV CODE 250: Performed by: NURSE ANESTHETIST, CERTIFIED REGISTERED

## 2020-02-05 PROCEDURE — 63600175 PHARM REV CODE 636 W HCPCS: Performed by: NURSE ANESTHETIST, CERTIFIED REGISTERED

## 2020-02-05 PROCEDURE — 63600175 PHARM REV CODE 636 W HCPCS: Performed by: INTERNAL MEDICINE

## 2020-02-05 PROCEDURE — 37000008 HC ANESTHESIA 1ST 15 MINUTES: Performed by: INTERNAL MEDICINE

## 2020-02-05 PROCEDURE — 27201012 HC FORCEPS, HOT/COLD, DISP: Performed by: INTERNAL MEDICINE

## 2020-02-05 PROCEDURE — 37000009 HC ANESTHESIA EA ADD 15 MINS: Performed by: INTERNAL MEDICINE

## 2020-02-05 PROCEDURE — 43235 EGD DIAGNOSTIC BRUSH WASH: CPT | Mod: 51,,, | Performed by: INTERNAL MEDICINE

## 2020-02-05 PROCEDURE — 45380 PR COLONOSCOPY,BIOPSY: ICD-10-PCS | Mod: PT,,, | Performed by: INTERNAL MEDICINE

## 2020-02-05 PROCEDURE — 88305 TISSUE EXAM BY PATHOLOGIST: ICD-10-PCS | Mod: 26,,, | Performed by: PATHOLOGY

## 2020-02-05 PROCEDURE — 43235 PR EGD, FLEX, DIAGNOSTIC: ICD-10-PCS | Mod: 51,,, | Performed by: INTERNAL MEDICINE

## 2020-02-05 PROCEDURE — 43235 EGD DIAGNOSTIC BRUSH WASH: CPT | Performed by: INTERNAL MEDICINE

## 2020-02-05 PROCEDURE — 45380 COLONOSCOPY AND BIOPSY: CPT | Mod: PT,,, | Performed by: INTERNAL MEDICINE

## 2020-02-05 PROCEDURE — 45380 COLONOSCOPY AND BIOPSY: CPT | Performed by: INTERNAL MEDICINE

## 2020-02-05 PROCEDURE — E9220 PRA ENDO ANESTHESIA: HCPCS | Mod: ,,, | Performed by: NURSE ANESTHETIST, CERTIFIED REGISTERED

## 2020-02-05 PROCEDURE — 88305 TISSUE EXAM BY PATHOLOGIST: CPT | Mod: 26,,, | Performed by: PATHOLOGY

## 2020-02-05 RX ORDER — PROPOFOL 10 MG/ML
VIAL (ML) INTRAVENOUS
Status: DISCONTINUED | OUTPATIENT
Start: 2020-02-05 | End: 2020-02-05

## 2020-02-05 RX ORDER — PROPOFOL 10 MG/ML
VIAL (ML) INTRAVENOUS CONTINUOUS PRN
Status: DISCONTINUED | OUTPATIENT
Start: 2020-02-05 | End: 2020-02-05

## 2020-02-05 RX ORDER — OMEPRAZOLE 20 MG/1
20 CAPSULE, DELAYED RELEASE ORAL
Qty: 90 CAPSULE | Refills: 3 | Status: SHIPPED | OUTPATIENT
Start: 2020-02-05 | End: 2021-01-28 | Stop reason: SDUPTHER

## 2020-02-05 RX ORDER — LIDOCAINE HCL/PF 100 MG/5ML
SYRINGE (ML) INTRAVENOUS
Status: DISCONTINUED | OUTPATIENT
Start: 2020-02-05 | End: 2020-02-05

## 2020-02-05 RX ORDER — PHENYLEPHRINE HYDROCHLORIDE 10 MG/ML
INJECTION INTRAVENOUS
Status: DISCONTINUED | OUTPATIENT
Start: 2020-02-05 | End: 2020-02-05

## 2020-02-05 RX ORDER — SODIUM CHLORIDE 9 MG/ML
INJECTION, SOLUTION INTRAVENOUS CONTINUOUS
Status: DISCONTINUED | OUTPATIENT
Start: 2020-02-05 | End: 2020-02-05 | Stop reason: HOSPADM

## 2020-02-05 RX ADMIN — TOPICAL ANESTHETIC 1 EACH: 200 SPRAY DENTAL; PERIODONTAL at 01:02

## 2020-02-05 RX ADMIN — PHENYLEPHRINE HYDROCHLORIDE 100 MCG: 10 INJECTION INTRAVENOUS at 01:02

## 2020-02-05 RX ADMIN — PROPOFOL 200 MCG/KG/MIN: 10 INJECTION, EMULSION INTRAVENOUS at 01:02

## 2020-02-05 RX ADMIN — SODIUM CHLORIDE: 0.9 INJECTION, SOLUTION INTRAVENOUS at 12:02

## 2020-02-05 RX ADMIN — PROPOFOL 20 MG: 10 INJECTION, EMULSION INTRAVENOUS at 01:02

## 2020-02-05 RX ADMIN — Medication 100 MG: at 01:02

## 2020-02-05 RX ADMIN — PROPOFOL 50 MG: 10 INJECTION, EMULSION INTRAVENOUS at 01:02

## 2020-02-05 NOTE — H&P
Short Stay Endoscopy History and Physical    PCP - Melquiades Valdivia MD    Procedure - EGD/Colonoscopy  ASA - per anesthesia  Mallampati - per anesthesia  History of Anesthesia problems - no  Family history Anesthesia problems -  no   Plan of anesthesia - MAC    HPI:  This is a 64 y.o. male here for evaluation of GERD, screening for Courtney's esophagus.  He has never had an upper endoscopy.  Also with history of colon polyps.       ROS:  Constitutional: No fevers, chills, No weight loss  CV: No chest pain  Pulm: No cough, No shortness of breath  Ophtho: No vision changes  GI: see HPI    Medical History:  has a past medical history of Anemia, Diverticulosis, and Hypertension.    Surgical History:  has a past surgical history that includes Appendectomy and Colonoscopy (N/A, 1/31/2017).    Family History: family history includes Cancer in his father; Colon polyps in his father; Heart disease in his mother.. Otherwise no colon cancer, inflammatory bowel disease, or GI malignancies.    Social History:  reports that he has never smoked. He has never used smokeless tobacco. He reports that he drinks alcohol. He reports that he does not use drugs.    Review of patient's allergies indicates:   Allergen Reactions    Allegra-d 12 hour [fexofenadine-pseudoephedrine] Other (See Comments)     Trouble urinating    Mucinex d [pseudoephedrine-guaifenesin] Other (See Comments)     Trouble urinating    Losartan-hydrochlorothiazide Hives     Other reaction(s): Hives       Medications:   Medications Prior to Admission   Medication Sig Dispense Refill Last Dose    cetirizine (ZYRTEC) 10 MG tablet Take 1 tablet by mouth Daily.   2/4/2020 at Unknown time    diltiaZEM (CARDIZEM CD) 240 MG 24 hr capsule Take 1 capsule (240 mg total) by mouth once daily. 90 capsule 3 2/4/2020 at Unknown time    fluticasone propionate (FLONASE) 50 mcg/actuation nasal spray    2/4/2020 at Unknown time    sildenafil (VIAGRA) 100 MG tablet Take 1 tablet (100  mg total) by mouth as needed for Erectile Dysfunction. 36 tablet 3 Past Week at Unknown time       Physical Exam:    Vital Signs:   Vitals:    02/05/20 1235   BP: (!) 187/97   Pulse: 76   Resp: 14   Temp: 98.8 °F (37.1 °C)       General Appearance: Well appearing in no acute distress  Eyes:    No scleral icterus  ENT: Neck supple, Lips, mucosa, and tongue normal; teeth and gums normal  Lungs: CTA anteriorly  Heart:  Regular rate, S1, S2 normal, no murmurs heard.  Abdomen: Soft, non tender, non distended with normal bowel sounds. No hepatosplenomegaly, ascites, or mass.    Labs:  Lab Results   Component Value Date    WBC 4.79 07/24/2019    HGB 12.8 (L) 07/24/2019    HCT 38.6 (L) 07/24/2019     07/24/2019    CHOL 216 (H) 07/24/2019    TRIG 74 07/24/2019    HDL 59 07/24/2019    ALT 34 07/24/2019    AST 23 07/24/2019     07/24/2019    K 4.3 07/24/2019     07/24/2019    CREATININE 1.2 07/24/2019    BUN 17 07/24/2019    CO2 27 07/24/2019    TSH 0.839 10/22/2011    PSA 0.48 07/24/2019    INR 1.0 05/20/2004         Assessment:  64 y.o. male with GERD and history of colon polyps.    Plan:  Proceed with EGD and colonoscopy today.  I have explained the risks and benefits of endoscopy procedures to the patient including but not limited to bleeding, perforation, infection, and death.  All questions answered.        Cody Maria MD

## 2020-02-05 NOTE — PLAN OF CARE
Patient states ready for discharge. Patient educated on discharge instructions. Patient verbalizes understanding.    Pt has elevated blood pressure with SBP in 160s - baseline in preprocedure shows BP in 180s.  Discussed with Qasim CARRILLO Anesthesia andpt is stable for discharge - just be sure to take his home BP medication at the usual time when he gets home.  Pt reports he takes his BP medication in the evening and he has not missed a dose due to the procedure today.

## 2020-02-05 NOTE — TRANSFER OF CARE
"Anesthesia Transfer of Care Note    Patient: Abdias Kim    Procedure(s) Performed: Procedure(s) (LRB):  EGD (ESOPHAGOGASTRODUODENOSCOPY) (N/A)  COLONOSCOPY (N/A)    Patient location: GI    Anesthesia Type: general    Transport from OR: Transported from OR on room air with adequate spontaneous ventilation    Post pain: adequate analgesia    Post assessment: no apparent anesthetic complications and tolerated procedure well    Post vital signs: stable    Level of consciousness: lethargic    Nausea/Vomiting: no nausea/vomiting    Complications: none    Transfer of care protocol was followed      Last vitals:   Visit Vitals  BP (!) 187/97 (BP Location: Left arm, Patient Position: Lying)   Pulse 76   Temp 37.1 °C (98.8 °F) (Temporal)   Resp 14   Ht 5' 7" (1.702 m)   Wt 72.6 kg (160 lb)   SpO2 100%   BMI 25.06 kg/m²     "

## 2020-02-05 NOTE — ANESTHESIA POSTPROCEDURE EVALUATION
Anesthesia Post Evaluation    Patient: Abdias Kim    Procedure(s) Performed: Procedure(s) (LRB):  EGD (ESOPHAGOGASTRODUODENOSCOPY) (N/A)  COLONOSCOPY (N/A)    Final Anesthesia Type: general    Patient location during evaluation: GI PACU  Patient participation: Yes- Able to Participate  Level of consciousness: awake and alert and oriented  Post-procedure vital signs: reviewed and stable  Pain management: adequate  Airway patency: patent    PONV status at discharge: No PONV  Anesthetic complications: no      Cardiovascular status: blood pressure returned to baseline and hemodynamically stable  Respiratory status: unassisted, spontaneous ventilation and room air  Hydration status: euvolemic  Follow-up not needed.          Vitals Value Taken Time   /90 2/5/2020  2:18 PM   Temp 36.6 °C (97.9 °F) 2/5/2020  1:50 PM   Pulse 53 2/5/2020  2:18 PM   Resp 17 2/5/2020  2:18 PM   SpO2 99 % 2/5/2020  2:18 PM         No case tracking events are documented in the log.      Pain/Terence Score: Terence Score: 10 (2/5/2020  2:18 PM)

## 2020-02-05 NOTE — [employer] PATIENT INSTRUCTIONS
Discharge Summary/Instructions after an Endoscopic Procedure  Patient Name: Abdias Kim  Patient MRN: [id number]  Patient YOB: 1955  Wednesday, February 05, 2020  Cody Maria MD  RESTRICTIONS:  During your procedure today, you received medications for sedation.  These   medications may affect your judgment, balance and coordination.  Therefore,   for 24 hours, you have the following restrictions:   - DO NOT drive a car, operate machinery, make legal/financial decisions,   sign important papers or drink alcohol.    ACTIVITY:  Today: no heavy lifting, straining or running due to procedural   sedation/anesthesia.  The following day: return to full activity including work.  DIET:  Eat and drink normally unless instructed otherwise.     TREATMENT FOR COMMON SIDE EFFECTS:  - Mild abdominal pain, nausea, belching, bloating or excessive gas:  rest,   eat lightly and use a heating pad.  - Sore Throat: treat with throat lozenges and/or gargle with warm salt   water.  - Because air was used during the procedure, expelling large amounts of air   from your rectum or belching is normal.  - If a bowel prep was taken, you may not have a bowel movement for 1-3 days.    This is normal.  SYMPTOMS TO WATCH FOR AND REPORT TO YOUR PHYSICIAN:  1. Abdominal pain or bloating, other than gas cramps.  2. Chest pain.  3. Back pain.  4. Signs of infection such as: chills or fever occurring within 24 hours   after the procedure.  5. Rectal bleeding, which would show as bright red, maroon, or black stools.   (A tablespoon of blood from the rectum is not serious, especially if   hemorrhoids are present.)  6. Vomiting.  7. Weakness or dizziness.  GO DIRECTLY TO THE NEAREST EMERGENCY ROOM IF YOU HAVE ANY OF THE FOLLOWING:      Difficulty breathing              Chills and/or fever over 101 F   Persistent vomiting and/or vomiting blood   Severe abdominal pain   Severe chest pain   Black, tarry stools   Bleeding- more than one  tablespoon   Any other symptom or condition that you feel may need urgent attention  Your doctor recommends these additional instructions:  If any biopsies were taken, your doctors clinic will contact you in 1 to 2   weeks with any results.  - Use Prilosec (omeprazole) 20 mg PO daily for 12 weeks.   - Repeat upper endoscopy in 12 weeks to check healing.   - Perform a colonoscopy now.   - See colonoscopy report for further details.  For questions, problems or results please call your physician - Cody Maria MD at Work:  [phone].  [facility], EMERGENCY ROOM PHONE NUMBER: [phone]  IF A COMPLICATION OR EMERGENCY SITUATION ARISES AND YOU ARE UNABLE TO REACH   YOUR PHYSICIAN - GO DIRECTLY TO THE EMERGENCY ROOM.  Cody Maria MD  2/5/2020 1:19:21 PM  This report has been verified and signed electronically.  [employer]

## 2020-02-05 NOTE — ANESTHESIA PREPROCEDURE EVALUATION
02/05/2020  Abdias Kim is a 64 y.o., male.  Past Medical History:   Diagnosis Date    Anemia     Diverticulosis     Hypertension      Past Surgical History:   Procedure Laterality Date    APPENDECTOMY      COLONOSCOPY N/A 1/31/2017    Procedure: COLONOSCOPY;  Surgeon: BASILIO Winn MD;  Location: [address];  Service: Endoscopy;  Laterality: N/A;         Anesthesia Evaluation    I have reviewed the Patient Summary Reports.     I have reviewed the Medications.     Review of Systems  Anesthesia Hx:  Denies Family Hx of Anesthesia complications.   Denies Personal Hx of Anesthesia complications.   Hematology/Oncology:  Hematology Normal   Oncology Normal     EENT/Dental:EENT/Dental Normal   Cardiovascular:  Cardiovascular Normal     Pulmonary:  Pulmonary Normal    Renal/:  Renal/ Normal     Hepatic/GI:  Hepatic/GI Normal    Musculoskeletal:  Musculoskeletal Normal    Neurological:  Neurology Normal    Endocrine:  Endocrine Normal    Dermatological:  Skin Normal    Psych:  Psychiatric Normal           Physical Exam  General:  Well nourished    Airway/Jaw/Neck:  AIRWAY FINDINGS: Normal      Eyes/Ears/Nose:  EYES/EARS/NOSE FINDINGS: Normal   Dental:  DENTAL FINDINGS: Normal   Chest/Lungs:  Chest/Lungs Clear    Heart/Vascular:  Heart Findings: Normal Heart murmur: negative Vascular Findings: Normal    Abdomen:  Abdomen Findings: Normal    Musculoskeletal:  Musculoskeletal Findings: Normal   Skin:  Skin Findings: Normal    Mental Status:  Mental Status Findings: Normal        Anesthesia Plan  Type of Anesthesia, risks & benefits discussed:  Anesthesia Type:  general  Patient's Preference:   Intra-op Monitoring Plan: standard ASA monitors  Intra-op Monitoring Plan Comments:   Post Op Pain Control Plan:   Post Op Pain Control Plan Comments:   Induction:   IV  Beta Blocker:  Patient is not  currently on a Beta-Blocker (No further documentation required).       Informed Consent: Patient understands risks and agrees with Anesthesia plan.  Questions answered. Anesthesia consent signed with patient.  ASA Score: 2     Day of Surgery Review of History & Physical:    H&P update referred to the provider.         Ready For Surgery From Anesthesia Perspective.

## 2020-02-05 NOTE — PROVATION PATIENT INSTRUCTIONS
Discharge Summary/Instructions after an Endoscopic Procedure  Patient Name: Abdias Kim  Patient MRN: [id number]  Patient YOB: 1955  Wednesday, February 05, 2020  Cody Maria MD  RESTRICTIONS:  During your procedure today, you received medications for sedation.  These   medications may affect your judgment, balance and coordination.  Therefore,   for 24 hours, you have the following restrictions:   - DO NOT drive a car, operate machinery, make legal/financial decisions,   sign important papers or drink alcohol.    ACTIVITY:  Today: no heavy lifting, straining or running due to procedural   sedation/anesthesia.  The following day: return to full activity including work.  DIET:  Eat and drink normally unless instructed otherwise.     TREATMENT FOR COMMON SIDE EFFECTS:  - Mild abdominal pain, nausea, belching, bloating or excessive gas:  rest,   eat lightly and use a heating pad.  - Sore Throat: treat with throat lozenges and/or gargle with warm salt   water.  - Because air was used during the procedure, expelling large amounts of air   from your rectum or belching is normal.  - If a bowel prep was taken, you may not have a bowel movement for 1-3 days.    This is normal.  SYMPTOMS TO WATCH FOR AND REPORT TO YOUR PHYSICIAN:  1. Abdominal pain or bloating, other than gas cramps.  2. Chest pain.  3. Back pain.  4. Signs of infection such as: chills or fever occurring within 24 hours   after the procedure.  5. Rectal bleeding, which would show as bright red, maroon, or black stools.   (A tablespoon of blood from the rectum is not serious, especially if   hemorrhoids are present.)  6. Vomiting.  7. Weakness or dizziness.  GO DIRECTLY TO THE NEAREST EMERGENCY ROOM IF YOU HAVE ANY OF THE FOLLOWING:      Difficulty breathing              Chills and/or fever over 101 F   Persistent vomiting and/or vomiting blood   Severe abdominal pain   Severe chest pain   Black, tarry stools   Bleeding- more than one  tablespoon   Any other symptom or condition that you feel may need urgent attention  Your doctor recommends these additional instructions:  If any biopsies were taken, your doctors clinic will contact you in 1 to 2   weeks with any results.  - Discharge patient to home.   - Patient has a contact number available for emergencies.  The signs and   symptoms of potential delayed complications were discussed with the   patient.  Return to normal activities tomorrow.  Written discharge   instructions were provided to the patient.   - Resume previous diet.   - Continue present medications.   - Await pathology results.   - Telephone GI clinic for pathology results in 2 weeks.   - Repeat colonoscopy in 5 years for surveillance.  For questions, problems or results please call your physician - Cody Maria MD at Work:  [phone].  [facility], EMERGENCY ROOM PHONE NUMBER: [phone]  IF A COMPLICATION OR EMERGENCY SITUATION ARISES AND YOU ARE UNABLE TO REACH   YOUR PHYSICIAN - GO DIRECTLY TO THE EMERGENCY ROOM.  Cody Maria MD  2/5/2020 1:44:51 PM  This report has been verified and signed electronically.  [employer]ATION

## 2020-02-24 LAB
FINAL PATHOLOGIC DIAGNOSIS: NORMAL
GROSS: NORMAL

## 2020-04-03 ENCOUNTER — DOCUMENTATION ONLY (OUTPATIENT)
Dept: GASTROENTEROLOGY | Facility: CLINIC | Age: 65
End: 2020-04-03

## 2020-04-03 NOTE — PROGRESS NOTES
Case reviewed for endoscopy appropriateness in regards to the most recent directive from the [facility] regarding outpatient medical and surgical procedures during the COVID-19 pandemic.  This patient's procedure may be postponed until more a appropriate time, to be determined.      Priority: Medium

## 2020-04-09 ENCOUNTER — PATIENT MESSAGE (OUTPATIENT)
Dept: ENDOSCOPY | Facility: HOSPITAL | Age: 65
End: 2020-04-09

## 2020-04-14 RX ORDER — DILTIAZEM HYDROCHLORIDE 240 MG/1
240 CAPSULE, COATED, EXTENDED RELEASE ORAL DAILY
Qty: 90 CAPSULE | Refills: 3 | Status: ON HOLD | OUTPATIENT
Start: 2020-04-14 | End: 2021-05-01 | Stop reason: HOSPADM

## 2020-04-22 ENCOUNTER — PATIENT MESSAGE (OUTPATIENT)
Dept: ADMINISTRATIVE | Facility: OTHER | Age: 65
End: 2020-04-22

## 2020-04-28 ENCOUNTER — TELEPHONE (OUTPATIENT)
Dept: ENDOSCOPY | Facility: HOSPITAL | Age: 65
End: 2020-04-28

## 2020-04-28 DIAGNOSIS — K21.9 GASTROESOPHAGEAL REFLUX DISEASE, ESOPHAGITIS PRESENCE NOT SPECIFIED: Primary | ICD-10-CM

## 2020-04-29 ENCOUNTER — LAB VISIT (OUTPATIENT)
Dept: INTERNAL MEDICINE | Facility: CLINIC | Age: 65
End: 2020-04-29
Payer: MEDICARE

## 2020-04-29 DIAGNOSIS — K21.9 GASTROESOPHAGEAL REFLUX DISEASE, ESOPHAGITIS PRESENCE NOT SPECIFIED: ICD-10-CM

## 2020-04-29 LAB — SARS-COV-2 RNA RESP QL NAA+PROBE: NOT DETECTED

## 2020-04-29 PROCEDURE — U0002 COVID-19 LAB TEST NON-CDC: HCPCS

## 2020-04-30 ENCOUNTER — ANESTHESIA EVENT (OUTPATIENT)
Dept: ENDOSCOPY | Facility: HOSPITAL | Age: 65
End: 2020-04-30
Payer: MEDICARE

## 2020-04-30 ENCOUNTER — HOSPITAL ENCOUNTER (OUTPATIENT)
Facility: HOSPITAL | Age: 65
Discharge: HOME | End: 2020-04-30
Attending: INTERNAL MEDICINE | Admitting: INTERNAL MEDICINE
Payer: MEDICARE

## 2020-04-30 ENCOUNTER — ANESTHESIA (OUTPATIENT)
Dept: ENDOSCOPY | Facility: HOSPITAL | Age: 65
End: 2020-04-30
Payer: MEDICARE

## 2020-04-30 VITALS
OXYGEN SATURATION: 98 % | RESPIRATION RATE: 16 BRPM | HEART RATE: 49 BPM | SYSTOLIC BLOOD PRESSURE: 158 MMHG | DIASTOLIC BLOOD PRESSURE: 82 MMHG | HEIGHT: 67 IN | WEIGHT: 157 LBS | TEMPERATURE: 98 F | BODY MASS INDEX: 24.64 KG/M2

## 2020-04-30 DIAGNOSIS — K20.90 ESOPHAGITIS: Primary | ICD-10-CM

## 2020-04-30 DIAGNOSIS — K20.0 EOSINOPHILIC ESOPHAGITIS: ICD-10-CM

## 2020-04-30 DIAGNOSIS — K21.9 GASTROESOPHAGEAL REFLUX DISEASE, ESOPHAGITIS PRESENCE NOT SPECIFIED: ICD-10-CM

## 2020-04-30 PROCEDURE — 37000008 HC ANESTHESIA 1ST 15 MINUTES: Performed by: INTERNAL MEDICINE

## 2020-04-30 PROCEDURE — D9220A PRA ANESTHESIA: Mod: ANES,,, | Performed by: ANESTHESIOLOGY

## 2020-04-30 PROCEDURE — D9220A PRA ANESTHESIA: ICD-10-PCS | Mod: CRNA,,, | Performed by: NURSE ANESTHETIST, CERTIFIED REGISTERED

## 2020-04-30 PROCEDURE — 25000003 PHARM REV CODE 250: Performed by: INTERNAL MEDICINE

## 2020-04-30 PROCEDURE — 43235 PR EGD, FLEX, DIAGNOSTIC: ICD-10-PCS | Mod: ,,, | Performed by: INTERNAL MEDICINE

## 2020-04-30 PROCEDURE — 94761 N-INVAS EAR/PLS OXIMETRY MLT: CPT

## 2020-04-30 PROCEDURE — 25000003 PHARM REV CODE 250: Performed by: ANESTHESIOLOGY

## 2020-04-30 PROCEDURE — 43235 EGD DIAGNOSTIC BRUSH WASH: CPT | Mod: ,,, | Performed by: INTERNAL MEDICINE

## 2020-04-30 PROCEDURE — 43235 EGD DIAGNOSTIC BRUSH WASH: CPT | Performed by: INTERNAL MEDICINE

## 2020-04-30 PROCEDURE — 63600175 PHARM REV CODE 636 W HCPCS: Performed by: NURSE ANESTHETIST, CERTIFIED REGISTERED

## 2020-04-30 PROCEDURE — 37000009 HC ANESTHESIA EA ADD 15 MINS: Performed by: INTERNAL MEDICINE

## 2020-04-30 PROCEDURE — D9220A PRA ANESTHESIA: ICD-10-PCS | Mod: ANES,,, | Performed by: ANESTHESIOLOGY

## 2020-04-30 PROCEDURE — D9220A PRA ANESTHESIA: Mod: CRNA,,, | Performed by: NURSE ANESTHETIST, CERTIFIED REGISTERED

## 2020-04-30 RX ORDER — SODIUM CHLORIDE 9 MG/ML
INJECTION, SOLUTION INTRAVENOUS CONTINUOUS
Status: DISCONTINUED | OUTPATIENT
Start: 2020-04-30 | End: 2020-04-30 | Stop reason: HOSPADM

## 2020-04-30 RX ORDER — PROPOFOL 10 MG/ML
VIAL (ML) INTRAVENOUS
Status: DISCONTINUED | OUTPATIENT
Start: 2020-04-30 | End: 2020-04-30

## 2020-04-30 RX ORDER — LIDOCAINE HYDROCHLORIDE 20 MG/ML
INJECTION INTRAVENOUS
Status: DISCONTINUED | OUTPATIENT
Start: 2020-04-30 | End: 2020-04-30

## 2020-04-30 RX ORDER — GLYCOPYRROLATE 0.2 MG/ML
INJECTION INTRAMUSCULAR; INTRAVENOUS
Status: DISCONTINUED
Start: 2020-04-30 | End: 2020-04-30 | Stop reason: HOSPADM

## 2020-04-30 RX ORDER — PROPOFOL 10 MG/ML
VIAL (ML) INTRAVENOUS CONTINUOUS PRN
Status: DISCONTINUED | OUTPATIENT
Start: 2020-04-30 | End: 2020-04-30

## 2020-04-30 RX ORDER — GLYCOPYRROLATE 0.2 MG/ML
0.2 INJECTION INTRAMUSCULAR; INTRAVENOUS ONCE
Status: COMPLETED | OUTPATIENT
Start: 2020-04-30 | End: 2020-04-30

## 2020-04-30 RX ADMIN — SODIUM CHLORIDE: 0.9 INJECTION, SOLUTION INTRAVENOUS at 09:04

## 2020-04-30 RX ADMIN — PROPOFOL 140 MCG/KG/MIN: 10 INJECTION, EMULSION INTRAVENOUS at 10:04

## 2020-04-30 RX ADMIN — GLYCOPYRROLATE 0.2 MG: 0.2 INJECTION INTRAMUSCULAR; INTRAVENOUS at 12:04

## 2020-04-30 RX ADMIN — PROPOFOL 80 MG: 10 INJECTION, EMULSION INTRAVENOUS at 10:04

## 2020-04-30 RX ADMIN — LIDOCAINE HYDROCHLORIDE 40 MG: 20 INJECTION, SOLUTION INTRAVENOUS at 10:04

## 2020-04-30 NOTE — DISCHARGE INSTRUCTIONS

## 2020-04-30 NOTE — PROVATION PATIENT INSTRUCTIONS
Discharge Summary/Instructions after an Endoscopic Procedure  Patient Name: Abdias Kim  Patient MRN: [id number]  Patient YOB: 1955  Thursday, April 30, 2020  Cody Maria MD  RESTRICTIONS:  During your procedure today, you received medications for sedation.  These   medications may affect your judgment, balance and coordination.  Therefore,   for 24 hours, you have the following restrictions:   - DO NOT drive a car, operate machinery, make legal/financial decisions,   sign important papers or drink alcohol.    ACTIVITY:  Today: no heavy lifting, straining or running due to procedural   sedation/anesthesia.  The following day: return to full activity including work.  DIET:  Eat and drink normally unless instructed otherwise.     TREATMENT FOR COMMON SIDE EFFECTS:  - Mild abdominal pain, nausea, belching, bloating or excessive gas:  rest,   eat lightly and use a heating pad.  - Sore Throat: treat with throat lozenges and/or gargle with warm salt   water.  - Because air was used during the procedure, expelling large amounts of air   from your rectum or belching is normal.  - If a bowel prep was taken, you may not have a bowel movement for 1-3 days.    This is normal.  SYMPTOMS TO WATCH FOR AND REPORT TO YOUR PHYSICIAN:  1. Abdominal pain or bloating, other than gas cramps.  2. Chest pain.  3. Back pain.  4. Signs of infection such as: chills or fever occurring within 24 hours   after the procedure.  5. Rectal bleeding, which would show as bright red, maroon, or black stools.   (A tablespoon of blood from the rectum is not serious, especially if   hemorrhoids are present.)  6. Vomiting.  7. Weakness or dizziness.  GO DIRECTLY TO THE NEAREST EMERGENCY ROOM IF YOU HAVE ANY OF THE FOLLOWING:      Difficulty breathing              Chills and/or fever over 101 F   Persistent vomiting and/or vomiting blood   Severe abdominal pain   Severe chest pain   Black, tarry stools   Bleeding- more than one  tablespoon   Any other symptom or condition that you feel may need urgent attention  Your doctor recommends these additional instructions:  If any biopsies were taken, your doctors clinic will contact you in 1 to 2   weeks with any results.  - Discharge patient to home.   - Resume previous diet.   - Continue present medications.   - Return to GI office PRN.   - Patient has a contact number available for emergencies.  The signs and   symptoms of potential delayed complications were discussed with the   patient.  Return to normal activities tomorrow.  Written discharge   instructions were provided to the patient.   For questions, problems or results please call your physician - Cody aMria MD at Work:  [phone].  [facility], EMERGENCY ROOM PHONE NUMBER: [phone]  IF A COMPLICATION OR EMERGENCY SITUATION ARISES AND YOU ARE UNABLE TO REACH   YOUR PHYSICIAN - GO DIRECTLY TO THE EMERGENCY ROOM.  Cody Maria MD  4/30/2020 11:20:05 AM  This report has been verified and signed electronically.  PR[employer]ATION

## 2020-04-30 NOTE — ANESTHESIA PREPROCEDURE EVALUATION
04/30/2020  Abdias Kim is a 64 y.o., male.  Past Medical History:   Diagnosis Date    Anemia     Diverticulosis     Hypertension      Past Surgical History:   Procedure Laterality Date    APPENDECTOMY      COLONOSCOPY N/A 1/31/2017    Procedure: COLONOSCOPY;  Surgeon: BASILIO Winn MD;  Location: [address]);  Service: Endoscopy;  Laterality: N/A;    COLONOSCOPY N/A 2/5/2020    Procedure: COLONOSCOPY;  Surgeon: Cody Maria MD;  Location: [facility] ([address] [place] [address]);  Service: Endoscopy;  Laterality: N/A;  OKay for Rice or vivar. Needs to be done in Jan 2020    ESOPHAGOGASTRODUODENOSCOPY N/A 2/5/2020    Procedure: EGD (ESOPHAGOGASTRODUODENOSCOPY);  Surgeon: Cody Maria MD;  Location: [address]);  Service: Endoscopy;  Laterality: N/A;         Pre-op Assessment    I have reviewed the Patient Summary Reports.      I have reviewed the Medications.     Review of Systems  Anesthesia Hx:  Denies Family Hx of Anesthesia complications.   Denies Personal Hx of Anesthesia complications.   Social:  Non-Smoker    Hematology/Oncology:  Hematology Normal   Oncology Normal     EENT/Dental:EENT/Dental Normal   Cardiovascular:   Hypertension    Pulmonary:  Pulmonary Normal    Renal/:  Renal/ Normal     Hepatic/GI:   GERD Tubular adenoma of colon   Musculoskeletal:  Musculoskeletal Normal    Neurological:  Neurology Normal    Endocrine:  Endocrine Normal    Dermatological:  Skin Normal    Psych:  Psychiatric Normal           Physical Exam  General:  Well nourished    Airway/Jaw/Neck:  AIRWAY FINDINGS: Normal      Eyes/Ears/Nose:  EYES/EARS/NOSE FINDINGS: Normal   Dental:  DENTAL FINDINGS: Normal   Chest/Lungs:  Chest/Lungs Clear    Heart/Vascular:  Heart Findings: Normal Heart murmur: negative Vascular Findings: Normal    Abdomen:  Abdomen Findings: Normal    Musculoskeletal:  Musculoskeletal  Findings: Normal   Skin:  Skin Findings: Normal    Mental Status:  Mental Status Findings: Normal        Anesthesia Plan  Type of Anesthesia, risks & benefits discussed:  Anesthesia Type:  general  Patient's Preference:   Intra-op Monitoring Plan: standard ASA monitors  Intra-op Monitoring Plan Comments:   Post Op Pain Control Plan:   Post Op Pain Control Plan Comments:   Induction:   IV  Beta Blocker:  Patient is not currently on a Beta-Blocker (No further documentation required).       Informed Consent: Patient understands risks and agrees with Anesthesia plan.  Questions answered. Anesthesia consent signed with patient.  ASA Score: 2     Day of Surgery Review of History & Physical:    H&P update referred to the provider.         Ready For Surgery From Anesthesia Perspective.

## 2020-04-30 NOTE — PLAN OF CARE
Awake and alert. VSS. Denies pain or nausea. Tolerating liquids well. Voiding well. DC instructions given to patient and family and they verbalize understanding. Patient's heartrate 49. Patient instructed to monitor B/P and pulse the next week and notify his PCP Dr. Solano if heartrate in 30's.

## 2020-04-30 NOTE — PROGRESS NOTES
Dr. Soto notified that patient's heartrate has been around 38. Patient is awake and alert. Denies any symptoms. CM shows SB. See MAR.

## 2020-04-30 NOTE — H&P
Short Stay Endoscopy History and Physical    PCP - Melquiades Valdivia MD     Procedure - EGD  ASA - per anesthesia  Mallampati - per anesthesia  History of Anesthesia problems - no  Family history Anesthesia problems -  no   Plan of anesthesia - General    HPI:  64 year old male with a history of HTN and GERD who presents for EGD.    ROS:  Constitutional: No fevers, chills, No weight loss  CV: No chest pain  Pulm: No cough, No shortness of breath  Ophtho: No vision changes  GI: see HPI  Derm: No rash    Medical History:  has a past medical history of Anemia, Diverticulosis, and Hypertension.    Surgical History:  has a past surgical history that includes Appendectomy; Colonoscopy (N/A, 1/31/2017); Esophagogastroduodenoscopy (N/A, 2/5/2020); and Colonoscopy (N/A, 2/5/2020).    Family History: family history includes Cancer in his father; Colon polyps in his father; Heart disease in his mother.. Otherwise no colon cancer, inflammatory bowel disease, or GI malignancies.    Social History:  reports that he has never smoked. He has never used smokeless tobacco. He reports that he drinks alcohol. He reports that he does not use drugs.    Review of patient's allergies indicates:   Allergen Reactions    Allegra-d 12 hour [fexofenadine-pseudoephedrine] Other (See Comments)     Trouble urinating    Mucinex d [pseudoephedrine-guaifenesin] Other (See Comments)     Trouble urinating    Losartan-hydrochlorothiazide Hives     Other reaction(s): Hives       Medications:   Medications Prior to Admission   Medication Sig Dispense Refill Last Dose    cetirizine (ZYRTEC) 10 MG tablet Take 1 tablet by mouth Daily.   4/29/2020 at Unknown time    diltiaZEM (CARDIZEM CD) 240 MG 24 hr capsule Take 1 capsule (240 mg total) by mouth once daily. 90 capsule 3 4/30/2020 at Unknown time    fluticasone propionate (FLONASE) 50 mcg/actuation nasal spray    4/29/2020 at Unknown time    omeprazole (PRILOSEC) 20 MG capsule Take 1 capsule (20 mg  total) by mouth before breakfast. 90 capsule 3 4/29/2020 at Unknown time    sildenafil (VIAGRA) 100 MG tablet Take 1 tablet (100 mg total) by mouth as needed for Erectile Dysfunction. 36 tablet 3 4/29/2020 at Unknown time       Physical Exam:    Vital Signs:   Vitals:    04/30/20 0848   BP: (!) 170/87   Pulse: (!) 57   Resp: 18   Temp: 98.2 °F (36.8 °C)       General Appearance: Well appearing in no acute distress  Eyes:    No scleral icterus  ENT: Neck supple, Lips, mucosa, and tongue normal; teeth and gums normal  Lungs: CTA anteriorly  Heart:  Regular rate, S1, S2 normal, no murmurs heard.  Abdomen: Soft, non tender, non distended with normal bowel sounds. No hepatosplenomegaly, ascites, or mass.  Extremities: No edema  Skin: No rash    Labs:  Lab Results   Component Value Date    WBC 4.79 07/24/2019    HGB 12.8 (L) 07/24/2019    HCT 38.6 (L) 07/24/2019     07/24/2019    CHOL 216 (H) 07/24/2019    TRIG 74 07/24/2019    HDL 59 07/24/2019    ALT 34 07/24/2019    AST 23 07/24/2019     07/24/2019    K 4.3 07/24/2019     07/24/2019    CREATININE 1.2 07/24/2019    BUN 17 07/24/2019    CO2 27 07/24/2019    TSH 0.839 10/22/2011    PSA 0.48 07/24/2019    INR 1.0 05/20/2004       I have explained the risks and benefits of endoscopy procedures to the patient including but not limited to bleeding, perforation, infection, and death.      Demetria Paiz M.D.  Gastroenterology Fellow, PGY-VI  Pager: [phone]  [facility]

## 2020-04-30 NOTE — TRANSFER OF CARE
"Anesthesia Transfer of Care Note    Patient: Abdias Kim    Procedure(s) Performed: Procedure(s) (LRB):  EGD (ESOPHAGOGASTRODUODENOSCOPY) (N/A)    Patient location: PACU    Anesthesia Type: general    Transport from OR: Transported from OR on 2-3 L/min O2 by NC with adequate spontaneous ventilation    Post pain: adequate analgesia    Post assessment: no apparent anesthetic complications    Post vital signs: stable    Level of consciousness: awake    Nausea/Vomiting: no nausea/vomiting    Complications: none    Transfer of care protocol was followed      Last vitals:   Visit Vitals  BP (!) 170/87 (BP Location: Left arm, Patient Position: Lying)   Pulse (!) 57   Temp 36.8 °C (98.2 °F) (Oral)   Resp 18   Ht 5' 7" (1.702 m)   Wt 71.2 kg (157 lb)   SpO2 100%   BMI 24.59 kg/m²     "

## 2020-05-01 NOTE — ANESTHESIA POSTPROCEDURE EVALUATION
Anesthesia Post Evaluation    Patient: Abdias Kim    Procedure(s) Performed: Procedure(s) (LRB):  EGD (ESOPHAGOGASTRODUODENOSCOPY) (N/A)    Final Anesthesia Type: general    Patient location during evaluation: PACU  Patient participation: Yes- Able to Participate  Level of consciousness: awake and alert  Post-procedure vital signs: reviewed and stable  Pain management: adequate  Airway patency: patent    PONV status at discharge: No PONV  Anesthetic complications: no      Cardiovascular status: blood pressure returned to baseline  Respiratory status: unassisted  Hydration status: euvolemic  Follow-up not needed.          Vitals Value Taken Time   /82 4/30/2020 12:49 PM   Temp 36.4 °C (97.5 °F) 4/30/2020 11:05 AM   Pulse 49 4/30/2020 12:49 PM   Resp 16 4/30/2020 12:49 PM   SpO2 98 % 4/30/2020 12:49 PM         No case tracking events are documented in the log.      Pain/Terence Score: Terence Score: 10 (4/30/2020 12:30 PM)

## 2020-05-04 ENCOUNTER — PATIENT OUTREACH (OUTPATIENT)
Dept: OTHER | Facility: OTHER | Age: 65
End: 2020-05-04

## 2020-05-04 NOTE — PROGRESS NOTES
Mr. Calero notes that the blood pressure readings seem to be higher than his office visits. He does have a personal cuff that he used for comparison and notes that the numbers are similar. I encouraged him to pick a few days during the week to take his readings since stress/anxiety about having to take his BP more may be a factor. He is agreeable.         HYPERTENSION  Our goal is to get BP to consistently below 130/80mmHg and make the process convenient so patient can avoid extra trips to the office. Getting your blood pressure below 130/80mmHg (definition of control) will reduce your risk for heart attack, kidney failure, stroke and death (as well as kidney failure, eye disease, & dementia)      Reviewed that the Digital Medicine care team - consisting of a clinician and a health  - will follow the most current evidence-based national guidelines for treating your condition.  The health  will focus on lifestyle modifications and motivation while the clinician will focus on medication therapy.  The care team will review all data on a regular basis and reach out as needed.      Explained that one of the key parts of the program is communication with the care team.  Asked patient to respond to outreach attempts and complete questionnaires.  Stressed importance of medication adherence.    Explained that we expect patient to obtain several blood pressures per week at random times of day.  Instructed patient not to allow anyone else to use phone and monitoring device.  Confirmed appropriate BP monitoring technique.      Explained to patient that the digital medicine team is not available for emergencies.  Patient will call [facility] on-call ([phone] or [phone]) or [phone] if needed.      Patient's BP goal is 130/80.Patient's BP average is 152/84 mmHg, which is above goal, per 2017 ACC/AHA Hypertension Guidelines.

## 2020-05-04 NOTE — LETTER
May 4, 2020     Abdias Kim  [address] [place] [address]       Dear Abdias,    Welcome to [facility]! Our goal is to make care effective, proactive and convenient by using data you send us from home to better treat your chronic conditions.              My name is Sofia Mari, and I am your dedicated Digital Medicine clinician. As an expert in medication management, I will help ensure that the medications you are taking continue to provide the intended benefits and help you reach your goals. You can reach me directly at [phone] or by sending me a message directly through your [facility] account.      I am Korina Aranda and I will be your health . My job is to help you identify lifestyle changes to improve your disease control. We will talk about nutrition, exercise, and other ways you may be able to adjust your current habits to better your health. Additionally, we will help ensure you are completing the tests and screenings that are necessary to help manage your conditions. You can reach me directly at [phone] or by sending me a message directly through your [facility] account.    Most importantly, YOU are at the center of this team. Together, we will work to improve your overall health and encourage you to meet your goals for a healthier lifestyle.     What we expect from YOU:  · Please take frequent home blood pressure measurements. We ask that you take at least 1 blood pressure reading per week, but more information will better help us get you know you. Be sure you rest for a few minutes before taking the reading in a quiet, comfortable place.     Be available to receive phone calls or gdgtt messages, when appropriate, from your care team. Please let us know if there are any specific days or times that work best for us to reach you via phone.     Complete routine tests and screenings. Dont worry, we will help keep you on track!           What you  should expect from your Digital Medicine Care Team:   We will work with you to create a personalized plan of care and provide you with encouragement and education, including regarding lifestyle changes, that could help you manage your disease states.     We will adjust your current medications, if needed, and continue to monitor your long-term progress.     We will provide you and your physician with monthly progress reports after you have been in the program for more than 30 days.     We will send you reminders through [employer] and text messages to help ensure you do not miss any testing deadlines to help manage your disease states.    You will be able to reach us by phone or through your [facility] mobile account by clicking our names under Care Team on the right side of the home screen.    I look forward to working with you to achieve your blood pressure goals!    We look forward to working with you to help manage your health,    Sincerely,    Your Digital Medicine Team    Please visit our websites to learn more:   · Hypertension: [email or web address]      Remember, we are not available for emergencies. If you have an emergency, please contact your doctors office directly or call [facility] ([phone] or [phone]) or [phone].

## 2020-05-05 ENCOUNTER — PATIENT OUTREACH (OUTPATIENT)
Dept: OTHER | Facility: OTHER | Age: 65
End: 2020-05-05

## 2020-05-05 DIAGNOSIS — I10 ESSENTIAL HYPERTENSION: Primary | ICD-10-CM

## 2020-05-05 RX ORDER — MULTIVITAMIN
1 TABLET ORAL DAILY
COMMUNITY

## 2020-05-05 NOTE — PROGRESS NOTES
"Digital Medicine: Clinician Introduction    Abdias Kim is a 64 y.o. male who is newly enrolled in the Digital Medicine Clinic.    The following information was reviewed and updated:  Preferred pharmacy   [facility] - [place], [place] - [address]  [address]  [place] [address]  Phone: [phone] Fax: [phone]    [id number] - [place] - [address] AT [facility]  [address]  [place] [address]  Phone: [phone] Fax: [phone]      Patient prefers a 90 days supply.     Review of patient's allergies indicates:   Allergen Reactions    Allegra-d 12 hour [fexofenadine-pseudoephedrine] Other (See Comments)     Trouble urinating    Mucinex d [pseudoephedrine-guaifenesin] Other (See Comments)     Trouble urinating    Losartan-hydrochlorothiazide Hives     Other reaction(s): Hives       Called patient to welcome into [facility]. Patient endorses adherence to medication regimen. Patient denies hypotensive s/sx (lightheadedness, dizziness, nausea, fatigue); patient denies hypertensive s/sx (SOB, CP, severe headaches, changes in vision). Instructed patient to seek medical care if BP > 180/110 and is accompanied by hypertensive s/sx associated, patient confirms understanding.     Patient stated his father passed way last night, I expressed my condolences. This is causing some stress, I encouraged him to seek out resources if needed.     We reviewed BP monitoring technique, patient was sitting on the couch while taking readings and resting his arm on the "arm" of the couch. I advised him to sit at his kitchen table with appropriate posture, also counseled to take a 2nd reading if the 1st was elevated.     We also discussed nutrition including decreasing salt and fried food intake, patient verbally agreed.     Had reaction to Hyzaar, unsure whether hives were caused by losartan or HCTZ component. Diltiazem is being used for HTN, no " previous cardiac issues including arrhythmias or need for rate control. No other HTN meds in the past.     Patient denies having questions or concerns. Patient has my contact information and knows to call with any concerns or clinical changes.        The history is provided by the patient. No  was used.     HYPERTENSION  Our goal is to get BP to consistently below 130/80mmHg and make the process convenient so patient can avoid extra trips to the office. Getting your blood pressure below 130/80mmHg (definition of control) will reduce your risk for heart attack, kidney failure, stroke and death (as well as kidney failure, eye disease, & dementia)      Reviewed non-pharmacologic therapies and impact on BP      Explained that we expect patient to obtain several blood pressures per week at random times of day.  Instructed patient not to allow anyone else to use phone and monitoring device.  Confirmed appropriate BP monitoring technique.      Explained to patient that the digital medicine team is not available for emergencies.  Patient will call [facility] on-call ([phone] or [phone]) or [phone] if needed.    Patient's BP goal is 130/80. Patients BP average is 157/84 mmHg, which is above goal, per 2017 ACC/AHA Hypertension Guidelines.  When asked what the patient thinks is causing BP to be elevated, they state: stress, meds          Assessment:  Reviewed recent readings. Per 2017 ACC/ AHA HTN guidelines (goal of BP < 130/80), current 30-day average needs to be addressed more thoroughly today. Patient is undergoing some stress at this time, coupled with inappropriate technique. Current therapy is not appropriate per AHA guidelines - should be using ARB, dihydropyridine CCB, or thiazide diuretic. Can substitute CCB at this time, rash is most likely d/t HCTZ component.         Med Review complete.    Allergies reviewed.      Last 5 Patient Entered Readings                                       Current 30 Day Average: 157/84     Recent Readings 5/5/2020 5/4/2020 5/4/2020 5/4/2020 5/3/2020    SBP (mmHg) 163 152 146 166 144    DBP (mmHg) 86 81 87 89 66    Pulse 65 60 65 57 41            INTERVENTION(S)  reviewed appropriate dose schedule, recommended diet modifications, reviewed monitoring technique and encouragement/support    PLAN  patient verbalizes understanding, patient amenable to changes and continue monitoring    Continue current medication regimen at this time, emphasis on monitoring technique. I will continue to monitor regularly and will follow-up in 2 to 3 weeks, sooner if blood pressure begins to trend upward or downward. If BP remains elevated, substitute diltiazem for nifedipine 30 mg QD.      There are no preventive care reminders to display for this patient.    Current Medication Regimen:  Hypertension Medications             diltiaZEM (CARDIZEM CD) 240 MG 24 hr capsule Take 1 capsule (240 mg total) by mouth once daily.            Reviewed the importance of self-monitoring, medication adherence, and that the health  can be used as a resource for lifestyle modifications to help reduce or maintain a healthy lifestyle.    Sent link to [facility] webpages and my contact information via [email or web address] for future questions. Follow up scheduled.         Screenings

## 2020-05-18 ENCOUNTER — PATIENT OUTREACH (OUTPATIENT)
Dept: OTHER | Facility: OTHER | Age: 65
End: 2020-05-18

## 2020-05-18 NOTE — PROGRESS NOTES
Digital Medicine: Health  Introduction    Introduced Abdias Kim to Digital Medicine. Discussed health  role and recommended lifestyle modifications.    I called Mr. Calero to complete my HC enrollment call, but he notes that his father  at the beginning of the month, so that has been extremely hard. His  was this past Friday. He admits that stress/worry tends to increase his blood pressure, so maybe the relief of those feelings could be decreasing his blood pressure. I will follow up more about diet and exercise at the next call.                 Intervention/Plan    There are no preventive care reminders to display for this patient.    Reviewed the importance of self-monitoring, medication adherence, and that the health  can be used as a resource for lifestyle modifications to help reduce or maintain a healthy lifestyle.    Sent link to [facility] webpages and my contact information via [employer] for future questions. Follow up scheduled.         Screenings    SDOH

## 2020-05-26 ENCOUNTER — PATIENT OUTREACH (OUTPATIENT)
Dept: OTHER | Facility: OTHER | Age: 65
End: 2020-05-26

## 2020-05-26 NOTE — PROGRESS NOTES
2020 11:32 AM   Called patient, I was not able to leave a voicemail. I will reach back out in 4 weeks.    Pt has been stressed with recent death and  of his father, but BP has continued to trend down nicely. Avg BP has greatly improved. Will reach out sooner if BP trends upward.     Last 5 Patient Entered Readings                                      Current 30 Day Average: 137/78     Recent Readings 2020    SBP (mmHg) 131 138 135 118 111    DBP (mmHg) 75 71 72 70 58    Pulse 54 44 59 57 49

## 2020-06-09 ENCOUNTER — PATIENT OUTREACH (OUTPATIENT)
Dept: OTHER | Facility: OTHER | Age: 65
End: 2020-06-09

## 2020-06-09 NOTE — PROGRESS NOTES
"Digital Medicine: Health  Follow-Up    I called Mr. Calero to follow up. He's overall pleased with his blood pressure readings. He notes with his father's passing, it has been stressful. He believes this has been impacting his blood pressure readings, but he notes time is helping. We talked about why taking resting readings are important - he felt like he "was cheating the system" by taking time to relax and be calm.     No  was used.   Follow Up  Follow-up reason(s): reading review      Readings are trending down due to improved technique.        INTERVENTION(S)  encouragement/support and denied questions      There are no preventive care reminders to display for this patient.    Last 5 Patient Entered Readings                                      Current 30 Day Average: 134/74     Recent Readings 6/9/2020 6/9/2020 6/9/2020 6/8/2020 6/7/2020    SBP (mmHg) 160 160 143 129 141    DBP (mmHg) 82 85 70 64 77    Pulse 50 49 52 46 55                      Diet Screening       Deferred.     Physical Activity Screening       Deferred.     Medication Adherence Screening   He did not miss a dose this month.      SDOH  "

## 2020-06-29 PROCEDURE — 99454 REM MNTR PHYSIOL PARAM 16-30: CPT | Mod: PBBFAC | Performed by: INTERNAL MEDICINE

## 2020-07-08 NOTE — PROGRESS NOTES
Digital Medicine: Clinician Follow-Up    Called patient to follow up. Patient endorses adherence to medication regimen. Patient denies hypotensive s/sx (lightheadedness, dizziness, nausea, fatigue); patient denies hypertensive s/sx (SOB, CP, severe headaches, changes in vision).    Pt reports higher reading last night was right after he had finished his taxes, which usually makes him very frustrated. He is happy to have this finished. Reading this AM was at goal, I reviewed ACC/AHA BP goals. Pt verbalized understanding. Denies any complaints with cuff. Verified BP meds. Reviewed BP monitoring technique included taking a second reading w/ appropriate rest.     Patient denies having questions or concerns. Patient has my contact information and knows to call with any concerns or clinical changes.      Assessment:  Reviewed recent readings. Per 2017 ACC/ AHA HTN guidelines (goal of BP < 130/80), current 30-day average is well controlled. No med changes recommended.                 The history is provided by the patient. No  was used.   Follow Up  Follow-up reason(s): reading review          INTERVENTION(S)  reviewed appropriate dose schedule, reviewed monitoring technique, encouragement/support and goal setting    PLAN  patient verbalizes understanding and continue monitoring    Continue current medication regimen. I will continue to monitor regularly and will follow-up in 3-4 months, sooner if blood pressure begins to trend upward or downward.       There are no preventive care reminders to display for this patient.    Last 5 Patient Entered Readings                                      Current 30 Day Average: 131/73     Recent Readings 7/8/2020 7/8/2020 7/8/2020 7/7/2020 7/6/2020    SBP (mmHg) 128 136 151 142 145    DBP (mmHg) 78 73 84 77 76    Pulse 58 47 47 49 48             Hypertension Medications             diltiaZEM (CARDIZEM CD) 240 MG 24 hr capsule Take 1 capsule (240 mg total) by mouth  once daily.                 Screenings

## 2020-07-17 DIAGNOSIS — Z71.89 COMPLEX CARE COORDINATION: ICD-10-CM

## 2020-07-31 ENCOUNTER — LAB VISIT (OUTPATIENT)
Dept: LAB | Facility: HOSPITAL | Age: 65
End: 2020-07-31
Attending: INTERNAL MEDICINE
Payer: MEDICARE

## 2020-07-31 DIAGNOSIS — I10 ESSENTIAL HYPERTENSION: ICD-10-CM

## 2020-07-31 DIAGNOSIS — Z12.5 SCREENING FOR PROSTATE CANCER: ICD-10-CM

## 2020-07-31 LAB
ALBUMIN SERPL BCP-MCNC: 4.2 G/DL (ref 3.5–5.2)
ALP SERPL-CCNC: 66 U/L (ref 55–135)
ALT SERPL W/O P-5'-P-CCNC: 20 U/L (ref 10–44)
ANION GAP SERPL CALC-SCNC: 8 MMOL/L (ref 8–16)
AST SERPL-CCNC: 19 U/L (ref 10–40)
BASOPHILS # BLD AUTO: 0.03 K/UL (ref 0–0.2)
BASOPHILS NFR BLD: 0.7 % (ref 0–1.9)
BILIRUB SERPL-MCNC: 0.7 MG/DL (ref 0.1–1)
BUN SERPL-MCNC: 16 MG/DL (ref 8–23)
CALCIUM SERPL-MCNC: 9.1 MG/DL (ref 8.7–10.5)
CHLORIDE SERPL-SCNC: 110 MMOL/L (ref 95–110)
CHOLEST SERPL-MCNC: 205 MG/DL (ref 120–199)
CHOLEST/HDLC SERPL: 3.9 {RATIO} (ref 2–5)
CO2 SERPL-SCNC: 23 MMOL/L (ref 23–29)
COMPLEXED PSA SERPL-MCNC: 0.55 NG/ML (ref 0–4)
CREAT SERPL-MCNC: 1.3 MG/DL (ref 0.5–1.4)
DIFFERENTIAL METHOD: ABNORMAL
EOSINOPHIL # BLD AUTO: 0.2 K/UL (ref 0–0.5)
EOSINOPHIL NFR BLD: 4.4 % (ref 0–8)
ERYTHROCYTE [DISTWIDTH] IN BLOOD BY AUTOMATED COUNT: 15.7 % (ref 11.5–14.5)
EST. GFR  (AFRICAN AMERICAN): >60 ML/MIN/1.73 M^2
EST. GFR  (NON AFRICAN AMERICAN): 56.9 ML/MIN/1.73 M^2
GLUCOSE SERPL-MCNC: 91 MG/DL (ref 70–110)
HCT VFR BLD AUTO: 38.2 % (ref 40–54)
HDLC SERPL-MCNC: 52 MG/DL (ref 40–75)
HDLC SERPL: 25.4 % (ref 20–50)
HGB BLD-MCNC: 12.1 G/DL (ref 14–18)
IMM GRANULOCYTES # BLD AUTO: 0.01 K/UL (ref 0–0.04)
IMM GRANULOCYTES NFR BLD AUTO: 0.2 % (ref 0–0.5)
LDLC SERPL CALC-MCNC: 139.2 MG/DL (ref 63–159)
LYMPHOCYTES # BLD AUTO: 1.7 K/UL (ref 1–4.8)
LYMPHOCYTES NFR BLD: 37.8 % (ref 18–48)
MCH RBC QN AUTO: 21.5 PG (ref 27–31)
MCHC RBC AUTO-ENTMCNC: 31.7 G/DL (ref 32–36)
MCV RBC AUTO: 68 FL (ref 82–98)
MONOCYTES # BLD AUTO: 0.3 K/UL (ref 0.3–1)
MONOCYTES NFR BLD: 7.5 % (ref 4–15)
NEUTROPHILS # BLD AUTO: 2.2 K/UL (ref 1.8–7.7)
NEUTROPHILS NFR BLD: 49.4 % (ref 38–73)
NONHDLC SERPL-MCNC: 153 MG/DL
NRBC BLD-RTO: 0 /100 WBC
PLATELET # BLD AUTO: 188 K/UL (ref 150–350)
PMV BLD AUTO: 10.4 FL (ref 9.2–12.9)
POTASSIUM SERPL-SCNC: 3.9 MMOL/L (ref 3.5–5.1)
PROT SERPL-MCNC: 7.6 G/DL (ref 6–8.4)
RBC # BLD AUTO: 5.62 M/UL (ref 4.6–6.2)
SODIUM SERPL-SCNC: 141 MMOL/L (ref 136–145)
TRIGL SERPL-MCNC: 69 MG/DL (ref 30–150)
WBC # BLD AUTO: 4.52 K/UL (ref 3.9–12.7)

## 2020-07-31 PROCEDURE — 84153 ASSAY OF PSA TOTAL: CPT

## 2020-07-31 PROCEDURE — 80061 LIPID PANEL: CPT

## 2020-07-31 PROCEDURE — 80053 COMPREHEN METABOLIC PANEL: CPT

## 2020-07-31 PROCEDURE — 36415 COLL VENOUS BLD VENIPUNCTURE: CPT | Mod: PO

## 2020-07-31 PROCEDURE — 85025 COMPLETE CBC W/AUTO DIFF WBC: CPT

## 2020-08-03 RX ORDER — DILTIAZEM HYDROCHLORIDE 240 MG/1
240 CAPSULE, EXTENDED RELEASE ORAL DAILY
Status: ON HOLD | COMMUNITY
Start: 2020-07-27 | End: 2021-05-01 | Stop reason: HOSPADM

## 2020-08-03 RX ORDER — CHLORHEXIDINE GLUCONATE 1.2 MG/ML
0.12 RINSE BUCCAL
COMMUNITY
Start: 2019-09-26 | End: 2020-08-17 | Stop reason: ALTCHOICE

## 2020-08-05 ENCOUNTER — OFFICE VISIT (OUTPATIENT)
Dept: INTERNAL MEDICINE | Facility: CLINIC | Age: 65
End: 2020-08-05
Payer: MEDICARE

## 2020-08-05 VITALS
WEIGHT: 170 LBS | HEIGHT: 67 IN | BODY MASS INDEX: 26.68 KG/M2 | DIASTOLIC BLOOD PRESSURE: 84 MMHG | HEART RATE: 68 BPM | SYSTOLIC BLOOD PRESSURE: 128 MMHG

## 2020-08-05 DIAGNOSIS — D50.8 IRON DEFICIENCY ANEMIA SECONDARY TO INADEQUATE DIETARY IRON INTAKE: ICD-10-CM

## 2020-08-05 DIAGNOSIS — M77.8 SHOULDER TENDONITIS, RIGHT: ICD-10-CM

## 2020-08-05 DIAGNOSIS — I10 ESSENTIAL HYPERTENSION: Primary | ICD-10-CM

## 2020-08-05 DIAGNOSIS — Z12.5 SCREENING FOR PROSTATE CANCER: ICD-10-CM

## 2020-08-05 PROCEDURE — 99213 OFFICE O/P EST LOW 20 MIN: CPT | Mod: PBBFAC | Performed by: INTERNAL MEDICINE

## 2020-08-05 PROCEDURE — 99999 PR PBB SHADOW E&M-EST. PATIENT-LVL III: CPT | Mod: PBBFAC,,, | Performed by: INTERNAL MEDICINE

## 2020-08-05 PROCEDURE — 99999 PR PBB SHADOW E&M-EST. PATIENT-LVL III: ICD-10-PCS | Mod: PBBFAC,,, | Performed by: INTERNAL MEDICINE

## 2020-08-05 PROCEDURE — 99214 OFFICE O/P EST MOD 30 MIN: CPT | Mod: S$PBB,,, | Performed by: INTERNAL MEDICINE

## 2020-08-05 PROCEDURE — 99214 PR OFFICE/OUTPT VISIT, EST, LEVL IV, 30-39 MIN: ICD-10-PCS | Mod: S$PBB,,, | Performed by: INTERNAL MEDICINE

## 2020-08-17 ENCOUNTER — PATIENT OUTREACH (OUTPATIENT)
Dept: OTHER | Facility: OTHER | Age: 65
End: 2020-08-17

## 2020-08-17 NOTE — PROGRESS NOTES
Subjective:       Patient ID: Abdias Kim is a 65 y.o. male.    Chief Complaint: Annual Exam, Hypertension, Anemia, and Arm Pain (R)    Hypertension  This is a chronic problem. The problem is unchanged. The problem is controlled. Pertinent negatives include no chest pain, headaches, neck pain, palpitations or shortness of breath. The current treatment provides significant improvement. There are no compliance problems.    Anemia  There has been no confusion or palpitations.   Arm Pain   Pertinent negatives include no chest pain.   Shoulder Pain   The pain is present in the left shoulder. This is a chronic problem. The problem occurs intermittently. The problem has been waxing and waning. The quality of the pain is described as aching. The pain is moderate. Pertinent negatives include no headaches or limited range of motion.     Review of Systems   Constitutional: Negative for activity change and unexpected weight change.   HENT: Negative for hearing loss, rhinorrhea and trouble swallowing.    Eyes: Negative for discharge and visual disturbance.   Respiratory: Negative for chest tightness, shortness of breath and wheezing.    Cardiovascular: Negative for chest pain and palpitations.   Gastrointestinal: Negative for blood in stool, constipation, diarrhea and vomiting.   Endocrine: Negative for polydipsia and polyuria.   Genitourinary: Negative for difficulty urinating, hematuria and urgency.   Musculoskeletal: Positive for arthralgias. Negative for joint swelling and neck pain.   Neurological: Negative for weakness and headaches.   Psychiatric/Behavioral: Negative for confusion and dysphoric mood.       Objective:      Physical Exam  Vitals signs reviewed.   Constitutional:       General: He is not in acute distress.     Appearance: He is well-developed.   HENT:      Head: Normocephalic and atraumatic.   Eyes:      Conjunctiva/sclera: Conjunctivae normal.      Pupils: Pupils are equal, round, and reactive to light.    Neck:      Musculoskeletal: Normal range of motion and neck supple.   Cardiovascular:      Rate and Rhythm: Normal rate and regular rhythm.      Heart sounds: Normal heart sounds.   Pulmonary:      Effort: Pulmonary effort is normal.      Breath sounds: Normal breath sounds. No wheezing.   Abdominal:      General: Bowel sounds are normal.      Palpations: Abdomen is soft.      Tenderness: There is no abdominal tenderness.   Musculoskeletal: Normal range of motion.      Right shoulder: He exhibits tenderness. He exhibits normal range of motion, no swelling, no effusion, no deformity and normal strength.   Skin:     Findings: No erythema.   Neurological:      Mental Status: He is alert and oriented to person, place, and time.      Cranial Nerves: No cranial nerve deficit.         Assessment:       1. Essential hypertension    2. Iron deficiency anemia secondary to inadequate dietary iron intake    3. Shoulder tendonitis, right    4. Screening for prostate cancer        Plan:       Abdias was seen today for annual exam, hypertension, anemia and arm pain.    Diagnoses and all orders for this visit:    Essential hypertension  -     CBC auto differential; Future  -     Comprehensive metabolic panel; Future  -     Lipid Panel; Future    Iron deficiency anemia secondary to inadequate dietary iron intake  Comments:  stable    Shoulder tendonitis, right  Comments:  discussed NSAID, ROM exercises- call prn worsening    Screening for prostate cancer  -     PSA, Screening; Future        Follow up in about 1 year (around 8/5/2021) for F/U APPOINTMENT WITH ME, WITH LAB BEFORE.

## 2020-08-17 NOTE — PROGRESS NOTES
Digital Medicine: Health  Follow-Up    The history is provided by the patient.             Reason for review: Blood pressure not at goal        Topics Covered on Call: physical activity, Diet and [employer]    Additional Follow-up details:   I called Mr. Calero to follow up. He is doing well.     He saw his PCP recently, and he was pleased with BP readings.    Patient noticed that [employer] is now offering a triple option while taking BP readings.               Diet-no change to diet    No change to diet.        Physical Activity-no change to routine  No change to exercise routine.     Medication Adherence-Medication adherence was assessed.      Substance, Sleep, Stress-Not assessed      Continue current diet/physical activity routine.  Instructed to charge device.  Provided patient education.       Addressed any questions or concerns and patient has my contact information if needed prior to next outreach. Patient verbalizes understanding.      Explained the importance of self-monitoring and medication adherence. Encouraged the patient to communicate with their health  for lifestyle modifications to help improve or maintain a healthy lifestyle.            There are no preventive care reminders to display for this patient.    Last 5 Patient Entered Readings                                      Current 30 Day Average: 134/74     Recent Readings 8/17/2020 8/16/2020 8/16/2020 8/16/2020 8/16/2020    SBP (mmHg) 125 134 137 126 140    DBP (mmHg) 85 66 66 60 80    Pulse 67 44 44 43 51

## 2020-09-22 ENCOUNTER — PATIENT OUTREACH (OUTPATIENT)
Dept: OTHER | Facility: OTHER | Age: 65
End: 2020-09-22

## 2020-09-22 NOTE — PROGRESS NOTES
Digital Medicine: Health  Follow-Up    The history is provided by the patient.             Reason for review: Blood pressure not at goal        Topics Covered on Call: physical activity, Diet and device use    Additional Follow-up details: I called Mr. Calero to follow up. BP is trending up, but patient is unsure of what's causing. We talked about some factors that influence top number, but patient denies any pain, stress, or substance intake. He denies hypertensive symptoms.               Diet-no change to diet    No change to diet.        Physical Activity-no change to routine  No change to exercise routine.       Additional physical activity details: Due to COVID, patient has been unable to go back to the gym. He used to walk with his wife in the neighborhood, but she tore both of her meniscus and had to do therapy. Her last session is tomorrow, and he agrees that they could start walking again.       Medication Adherence-Medication adherence was assessed.        Substance, Sleep, Stress-No change  stress-assessed  Details:Denies   Intervention(s):    Sleep-not assessed  Details:  Intervention(s):    Alcohol -not assessed  Details:  Intervention(s):    Tobacco-Not Assessed  Details:  Intervention(s):          Instructed to charge device.  Provided patient education.       Addressed patient questions and patient has my contact information if needed prior to next outreach. Patient verbalizes understanding.      Explained the importance of self-monitoring and medication adherence. Encouraged the patient to communicate with their health  for lifestyle modifications to help improve or maintain a healthy lifestyle.            There are no preventive care reminders to display for this patient.    Last 5 Patient Entered Readings                                      Current 30 Day Average: 139/77     Recent Readings 9/22/2020 9/20/2020 9/19/2020 9/15/2020 9/15/2020    SBP (mmHg) 147 146 131 145 149    DBP (mmHg)  79 80 68 82 83    Pulse 65 55 44 69 68

## 2020-10-19 ENCOUNTER — PATIENT OUTREACH (OUTPATIENT)
Dept: OTHER | Facility: OTHER | Age: 65
End: 2020-10-19

## 2020-10-19 NOTE — PROGRESS NOTES
Digital Medicine: Health  Follow-Up    The history is provided by the patient.             Reason for review: Blood pressure not at goal        Topics Covered on Call: physical activity and Diet    Additional Follow-up details: I called Mr. Calero to follow up. Patient has been feeling well. Patient questions if pulse rate is something we can address. I told him it was outside of our scope, but that normal pulse rates fall between . I encouraged patient to reach out to PCP to discuss the readings. Patient understands.               Diet-no change to diet    No change to diet.        Physical Activity-Change      He exercises for 60 minutes per day 2 day(s) a week.     He added going to the gym to His physical activity routine.        Additional physical activity details: Patient has resumed going to the gym. He plans on going three days per week.       Medication Adherence-Medication adherence was assessed.      Substance, Sleep, Stress-Not assessed      Continue current diet/physical activity routine.  Provided patient education.       Addressed patient questions and patient has my contact information if needed prior to next outreach. Patient verbalizes understanding.      Explained the importance of self-monitoring and medication adherence. Encouraged the patient to communicate with their health  for lifestyle modifications to help improve or maintain a healthy lifestyle.               There are no preventive care reminders to display for this patient.      Last 5 Patient Entered Readings                                      Current 30 Day Average: 138/75     Recent Readings 10/18/2020 10/17/2020 10/16/2020 10/15/2020 10/14/2020    SBP (mmHg) 130 141 141 136 132    DBP (mmHg) 77 81 82 75 75    Pulse 47 57 55 46 49

## 2020-10-27 ENCOUNTER — PATIENT OUTREACH (OUTPATIENT)
Dept: OTHER | Facility: OTHER | Age: 65
End: 2020-10-27

## 2020-10-27 NOTE — PROGRESS NOTES
"Digital Medicine: Clinician Follow-Up    Patient is doing well with no complaints or concerns. He denies issues with the BP monitor. States he feels "very good".    The history is provided by the patient.   Follow-up reason(s): routine follow up.     Hypertension    Readings are trending down   Patient is not experiencing signs/symptoms of hypotension. Denies lightheadedness, weakness, fatigue  Patient is not experiencing signs/symptoms of hypertension. Denies SOB, CP, headache.            Last 5 Patient Entered Readings                                      Current 30 Day Average: 136/73     Recent Readings 10/26/2020 10/25/2020 10/24/2020 10/23/2020 10/22/2020    SBP (mmHg) 129 140 157 139 128    DBP (mmHg) 76 80 83 71 80    Pulse 47 53 56 47 55                 Depression Screening  Did not address depression screening.    Sleep Apnea Screening    Did not address sleep apnea screening.     Medication Affordability Screening  Did not address medication affordability screening.     Medication Adherence-Medication adherence was assessed.          ASSESSMENT(S)  Patients BP average is 136/73 mmHg, which is above goal. Patient's BP goal is less than or equal to 130/80.     SBP slightly above goal, but several readings meet ACC/AHA recommendations. No med changes recommended at this time.       Hypertension Plan  Continue current therapy.  Continue current diet/physical activity routine.  Instructed to charge device.  F/u in 3 months.      Addressed patient questions and patient has my contact information if needed prior to next outreach. Patient verbalizes understanding.      Explained the importance of self-monitoring and medication adherence. Encouraged the patient to communicate with their health  for lifestyle modifications to help improve or maintain a healthy lifestyle.               There are no preventive care reminders to display for this patient.  There are no preventive care reminders to display for " this patient.      Hypertension Medications             diltiaZEM (CARDIZEM CD) 240 MG 24 hr capsule Take 1 capsule (240 mg total) by mouth once daily.    TIAZAC 240 mg Cs24 240 mg once daily.

## 2020-11-16 ENCOUNTER — PATIENT OUTREACH (OUTPATIENT)
Dept: OTHER | Facility: OTHER | Age: 65
End: 2020-11-16

## 2020-11-25 ENCOUNTER — PATIENT MESSAGE (OUTPATIENT)
Dept: INTERNAL MEDICINE | Facility: CLINIC | Age: 65
End: 2020-11-25

## 2020-11-27 ENCOUNTER — PATIENT MESSAGE (OUTPATIENT)
Dept: INTERNAL MEDICINE | Facility: CLINIC | Age: 65
End: 2020-11-27

## 2020-12-23 ENCOUNTER — TELEPHONE (OUTPATIENT)
Dept: INTERNAL MEDICINE | Facility: CLINIC | Age: 65
End: 2020-12-23

## 2020-12-23 ENCOUNTER — OFFICE VISIT (OUTPATIENT)
Dept: INTERNAL MEDICINE | Facility: CLINIC | Age: 65
End: 2020-12-23
Payer: MEDICARE

## 2020-12-23 VITALS
DIASTOLIC BLOOD PRESSURE: 92 MMHG | BODY MASS INDEX: 26.1 KG/M2 | HEART RATE: 83 BPM | OXYGEN SATURATION: 99 % | WEIGHT: 172.19 LBS | SYSTOLIC BLOOD PRESSURE: 162 MMHG | HEIGHT: 68 IN

## 2020-12-23 DIAGNOSIS — E66.3 OVERWEIGHT (BMI 25.0-29.9): ICD-10-CM

## 2020-12-23 DIAGNOSIS — K42.9 UMBILICAL HERNIA WITHOUT OBSTRUCTION AND WITHOUT GANGRENE: Primary | ICD-10-CM

## 2020-12-23 PROCEDURE — 99999 PR PBB SHADOW E&M-EST. PATIENT-LVL III: CPT | Mod: PBBFAC,,, | Performed by: NURSE PRACTITIONER

## 2020-12-23 PROCEDURE — 99213 OFFICE O/P EST LOW 20 MIN: CPT | Mod: S$PBB,,, | Performed by: NURSE PRACTITIONER

## 2020-12-23 PROCEDURE — 99999 PR PBB SHADOW E&M-EST. PATIENT-LVL III: ICD-10-PCS | Mod: PBBFAC,,, | Performed by: NURSE PRACTITIONER

## 2020-12-23 PROCEDURE — 99213 PR OFFICE/OUTPT VISIT, EST, LEVL III, 20-29 MIN: ICD-10-PCS | Mod: S$PBB,,, | Performed by: NURSE PRACTITIONER

## 2020-12-23 PROCEDURE — 99213 OFFICE O/P EST LOW 20 MIN: CPT | Mod: PBBFAC | Performed by: NURSE PRACTITIONER

## 2020-12-23 NOTE — TELEPHONE ENCOUNTER
----- Message from Ciara Harrison sent at 12/23/2020  1:07 PM CST -----  Contact: self [phone]  Would like to get medical advice.  Symptoms (please be specific):  umbilical hernia  How long has patient had these symptoms:  today  Pharmacy name and phone # (copy from chart):  [id number] - [place] - [address] AT [facility] [phone] (Phone)  [phone] (Fax)  Comments:      Pt states he is in pain from this and has tried to massage the area but that is not helping. Please call and advise. Thank you

## 2020-12-23 NOTE — PROGRESS NOTES
"Subjective:       Patient ID: Abdias Kim is a 65 y.o. male.    Chief Complaint: Hernia    Pt of Dr. Valdivia, here for umbilical hernia pain. Messaged today stating, "Pt states he is in pain from this and has tried to massage the area but that is not helping. Please call and advise. Thank you."    States it occurred an hour ago, he pushed it back in, laid down, and now it doesn't hurt. He was sitting at home in recliner drinking coffee and eating when the pain occurred. Took 2 hrs for it to go back in and subside.    Review of Systems   Constitutional: Negative for activity change, appetite change, chills, diaphoresis, fatigue, fever and unexpected weight change.   Respiratory: Negative for chest tightness and shortness of breath.    Cardiovascular: Negative for chest pain, palpitations, leg swelling and claudication.   Gastrointestinal: Positive for abdominal pain. Negative for blood in stool, change in bowel habit, constipation, diarrhea, nausea, vomiting and change in bowel habit.        Umbilical hernia pain as documented in HPI   Genitourinary: Negative for dysuria.   Musculoskeletal: Negative for arthralgias and back pain.   Allergic/Immunologic: Negative for environmental allergies, food allergies and frequent infections.   Neurological: Negative for dizziness, weakness and numbness.   Hematological: Negative for adenopathy. Does not bruise/bleed easily.   Psychiatric/Behavioral: Negative for suicidal ideas.     Review of patient's allergies indicates:   Allergen Reactions    Allegra-d 12 hour [fexofenadine-pseudoephedrine] Other (See Comments)     Trouble urinating    Mucinex d [pseudoephedrine-guaifenesin] Other (See Comments)     Trouble urinating    Losartan-hydrochlorothiazide Hives and Rash     Other reaction(s): Hives       Current Outpatient Medications:     cetirizine (ZYRTEC) 10 MG tablet, Take 1 tablet by mouth Daily., Disp: , Rfl:     diltiaZEM (CARDIZEM CD) 240 MG 24 hr capsule, Take 1 " capsule (240 mg total) by mouth once daily., Disp: 90 capsule, Rfl: 3    fluticasone propionate (FLONASE) 50 mcg/actuation nasal spray, , Disp: , Rfl:     multivitamin (THERAGRAN) per tablet, Take 1 tablet by mouth once daily., Disp: , Rfl:     omeprazole (PRILOSEC) 20 MG capsule, Take 1 capsule (20 mg total) by mouth before breakfast., Disp: 90 capsule, Rfl: 3    sildenafiL (VIAGRA) 100 MG tablet, Take 1 tablet (100 mg total) by mouth as needed for Erectile Dysfunction., Disp: 36 tablet, Rfl: 3    TIAZAC 240 mg Cs24, 240 mg once daily. , Disp: , Rfl:     Patient Active Problem List   Diagnosis    Hypertension    Allergic rhinitis    Diverticulosis    Anemia, iron deficiency    External hemorrhoids    Tubular adenoma of colon    Tongue laceration    ED (erectile dysfunction)    Umbilical hernia    GERD (gastroesophageal reflux disease)    Eosinophilic esophagitis     Past Medical History:   Diagnosis Date    Anemia     Diverticulosis     Hypertension      Past Surgical History:   Procedure Laterality Date    APPENDECTOMY      COLONOSCOPY N/A 1/31/2017    Procedure: COLONOSCOPY;  Surgeon: BASILIO Winn MD;  Location: [facility] (4TH FLR);  Service: Endoscopy;  Laterality: N/A;    COLONOSCOPY N/A 2/5/2020    Procedure: COLONOSCOPY;  Surgeon: Cody Maria MD;  Location: [facility] (4TH FLR);  Service: Endoscopy;  Laterality: N/A;  OKay for Crow vivar. Needs to be done in Jan 2020    ESOPHAGOGASTRODUODENOSCOPY N/A 2/5/2020    Procedure: EGD (ESOPHAGOGASTRODUODENOSCOPY);  Surgeon: Cody Maria MD;  Location: [facility] (4TH FLR);  Service: Endoscopy;  Laterality: N/A;    ESOPHAGOGASTRODUODENOSCOPY N/A 4/30/2020    Procedure: EGD (ESOPHAGOGASTRODUODENOSCOPY);  Surgeon: Cody Maria MD;  Location: [facility] (2ND FLR);  Service: Endoscopy;  Laterality: N/A;  schedule 12 weeks from now  4/13/20 - removed from 4/29/20, needs to be rescheduled high priority - pg  4/28/20-scheduled from  "high priority list-BB  Covid screening test scheduled for 4/29/20-BB  instructions emailed to patient-BB     Social History     Socioeconomic History    Marital status:      Spouse name: Not on file    Number of children: Not on file    Years of education: Not on file    Highest education level: Not on file   Occupational History    Not on file   Social Needs    Financial resource strain: Not hard at all    Food insecurity     Worry: Never true     Inability: Never true    Transportation needs     Medical: No     Non-medical: No   Tobacco Use    Smoking status: Never Smoker    Smokeless tobacco: Never Used   Substance and Sexual Activity    Alcohol use: Yes     Frequency: 4 or more times a week     Drinks per session: 1 or 2     Binge frequency: Less than monthly     Comment: few times a week     Drug use: No    Sexual activity: Not on file   Lifestyle    Physical activity     Days per week: 1 day     Minutes per session: 60 min    Stress: To some extent   Relationships    Social connections     Talks on phone: Twice a week     Gets together: Once a week     Attends [employer] service: Not on file     Active member of club or organization: No     Attends meetings of clubs or organizations: Never     Relationship status:    Other Topics Concern    Not on file   Social History Narrative    Not on file     Family History   Problem Relation Age of Onset    Heart disease Mother         mi    Cancer Father         colon/prostate    Colon polyps Father     Stroke Neg Hx     Diabetes Neg Hx     Celiac disease Neg Hx     Esophageal cancer Neg Hx     Liver cancer Neg Hx     Liver disease Neg Hx     Stomach cancer Neg Hx     Rectal cancer Neg Hx            Objective:       Vitals:    12/23/20 1551   BP: (!) 162/92   Pulse: 83   SpO2: 99%   Weight: 78.1 kg (172 lb 2.9 oz)   Height: 5' 8" (1.727 m)   PainSc: 0-No pain     Body mass index is 26.18 kg/m².    Physical Exam  Vitals signs and " nursing note reviewed.   Constitutional:       Comments: Overweight     HENT:      Head: Normocephalic.      Mouth/Throat:      Mouth: Mucous membranes are moist.      Pharynx: Oropharynx is clear.   Eyes:      Extraocular Movements: Extraocular movements intact.      Conjunctiva/sclera: Conjunctivae normal.      Pupils: Pupils are equal, round, and reactive to light.   Cardiovascular:      Rate and Rhythm: Normal rate and regular rhythm.      Pulses: Normal pulses.      Heart sounds: Normal heart sounds.   Pulmonary:      Effort: Pulmonary effort is normal.   Abdominal:      General: Abdomen is flat. Bowel sounds are normal. There is no distension.      Palpations: Abdomen is soft. There is no mass.      Tenderness: There is no abdominal tenderness. There is no right CVA tenderness, left CVA tenderness, guarding or rebound.      Hernia: A hernia is present. Hernia is present in the umbilical area.      Comments: Soft, reducible   Musculoskeletal: Normal range of motion.   Skin:     General: Skin is warm and dry.      Capillary Refill: Capillary refill takes less than 2 seconds.   Neurological:      General: No focal deficit present.      Mental Status: He is alert and oriented to person, place, and time.   Psychiatric:         Mood and Affect: Mood normal.         Behavior: Behavior normal.         Thought Content: Thought content normal.         Judgment: Judgment normal.         Assessment:       1. Umbilical hernia without obstruction and without gangrene    2. BMI 26.0-26.9,adult    3. Overweight (BMI 25.0-29.9)        Plan:       Abdias was seen today for hernia.    Diagnoses and all orders for this visit:    Umbilical hernia without obstruction and without gangrene  See information below. If it does not go back in, becomes enlarged, red, painful, and bulge increases, we will need you to see a general surgeon.    May take Tylenol or ibuprofen as needed for pain    Self care instructions provided in AVS    BMI  26.0-26.9,adult  BMI reviewed    Overweight (BMI 25.0-29.9)  BMI reviewed.    Diet and exercise to lose weight.    Follow up if symptoms worsen or fail to improve.

## 2020-12-23 NOTE — PATIENT INSTRUCTIONS
See information below. If it does not go back in, becomes enlarged, red, painful, and bulge increases, we will need you to see a general surgeon.    May take Tylenol or ibuprofen as needed for pain    Hernia (Adult)    A hernia can happen when there is a weakness or defect in the wall of the abdomen or groin. Intestines or nearby tissues may move from their usual location and push through the weakness in the wall. This can cause a hernia (bulge) you may see or feel.  Causes and Risk Factors   A hernia may be present at birth. Or it may be caused by the wear and tear of daily living. Certain factors can make a hernia more likely. These can include:  · Heavy lifting  · Straining, whether from lifting, movement, or constipation  · Chronic cough  · Injury to the abdominal wall  · Excess weight  · Pregnancy  · Prior surgery  · Older age  · Family history of hernia  Symptoms  Symptoms of a hernia may come on suddenly. Or they may appear slowly over time. Some common symptoms include:  · Bulge in the groin area, around the navel, or in the scrotum (the bulge may get bigger when you stand and go away when you lie down)  · Pain or pressure around the bulge  · Pain during activities such as lifting, coughing, or sneezing  · A feeling of weakness or pressure in the groin  · Pain or swelling in the scrotum  Types of hernias  There are different types of hernia. The type you have depends on its location:  · Inguinal: This type is in the groin or scrotum. It is more common in men.  · Femoral: This type is in the groin, upper thigh (where the leg bends), or labia. It is more common in women.  · Ventral: This type is in the abdominal wall.  · Umbilical: This type occurs around the navel (belly button).  · Incisional: This type occurs at the site of a previous surgery.  The condition of the hernia can help determine how urgently it needs to be treated.  · Reducible: It goes back in by itself, or it can be pushed back  in.  · Irreducible: It cant be pushed back in.  · Incarcerated/Strangulated: The intestine is trapped (incarcerated). If this happens, you wont be able to push the bulge back in. If the incarcerated hernia isnt treated, it may become strangulated. This means the area loses blood supply and the tissue may die. This requires emergency surgery! Treatment is needed right away!  In most cases, a hernia will not heal on its own. Surgery is usually needed to repair the defect in the abdominal wall or groin. Youll be told more about surgery, if needed.  If your symptoms are not severe, treatment may sometimes be delayed. In such cases, regular follow-up visits with the provider will be needed. Youll be asked to keep track of your symptoms and to watch for signs of more serious problems. You may also be given guidelines similar to the home care instructions below.  Home Care  To help keep a hernia from getting worse, you may be advised to:  · Avoid heavy lifting and straining as directed.  · Take steps to prevent constipation, such as eating more fiber and drinking more water. This may help reduce straining that can occur when having a bowel movement. Reducing straining may help keep your symptoms from getting worse.  · Maintain a healthy weight or lose excess weight. This can help reduce strain on abdominal muscles and tissues.  · Stop smoking. This can help prevent coughing that may also strain abdominal muscles and tissues.  Follow-up care  Follow up with your healthcare provider, or as directed. If imaging tests were done, they will be reviewed a doctor. You will be told the results and any new findings that may affect your care.  When to seek medical advice  Call your healthcare provider right away if any of these occur:  · Hernia hardens, swells, or grows larger  · Hernia can no longer be pushed back in  · Pain moves to the lower right abdomen (just below the waistline), or spreads to the back  Call 911  Call 911  right away if any of these occur:  · Nausea and vomiting  · Severe pain, redness, or tenderness in the area near the hernia  · Pain worsens quickly and doesnt get better  · Inability to have a bowel movement or pass gas  · Fever of 100.4°F (38°C) or higher  · Trouble breathing  · Fainting  · Rapid heart rate  · Vomiting blood  · Large amounts of blood in stool  Date Last Reviewed: 6/9/2015  © 4811-2955 [employer]. [address] [place] [address], [place] [address]. All rights reserved. This information is not intended as a substitute for professional medical care. Always follow your healthcare professional's instructions.

## 2021-01-03 ENCOUNTER — PATIENT MESSAGE (OUTPATIENT)
Dept: INTERNAL MEDICINE | Facility: CLINIC | Age: 66
End: 2021-01-03

## 2021-01-26 ENCOUNTER — TELEPHONE (OUTPATIENT)
Dept: OPTOMETRY | Facility: CLINIC | Age: 66
End: 2021-01-26

## 2021-01-26 ENCOUNTER — OFFICE VISIT (OUTPATIENT)
Dept: OPTOMETRY | Facility: CLINIC | Age: 66
End: 2021-01-26
Payer: MEDICARE

## 2021-01-26 DIAGNOSIS — Z01.00 EYE EXAM, ROUTINE: Primary | ICD-10-CM

## 2021-01-26 DIAGNOSIS — H52.223 HYPEROPIA OF BOTH EYES WITH REGULAR ASTIGMATISM AND PRESBYOPIA: ICD-10-CM

## 2021-01-26 DIAGNOSIS — H52.03 HYPEROPIA OF BOTH EYES WITH REGULAR ASTIGMATISM AND PRESBYOPIA: ICD-10-CM

## 2021-01-26 DIAGNOSIS — H52.4 HYPEROPIA OF BOTH EYES WITH REGULAR ASTIGMATISM AND PRESBYOPIA: ICD-10-CM

## 2021-01-26 PROCEDURE — 99999 PR PBB SHADOW E&M-EST. PATIENT-LVL III: ICD-10-PCS | Mod: PBBFAC,,, | Performed by: OPTOMETRIST

## 2021-01-26 PROCEDURE — 99999 PR PBB SHADOW E&M-EST. PATIENT-LVL III: CPT | Mod: PBBFAC,,, | Performed by: OPTOMETRIST

## 2021-01-26 PROCEDURE — 92004 PR EYE EXAM, NEW PATIENT,COMPREHESV: ICD-10-PCS | Mod: S$GLB,,, | Performed by: OPTOMETRIST

## 2021-01-26 PROCEDURE — 99213 OFFICE O/P EST LOW 20 MIN: CPT | Mod: PBBFAC,PO | Performed by: OPTOMETRIST

## 2021-01-26 PROCEDURE — 92004 COMPRE OPH EXAM NEW PT 1/>: CPT | Mod: S$GLB,,, | Performed by: OPTOMETRIST

## 2021-01-27 ENCOUNTER — PATIENT MESSAGE (OUTPATIENT)
Dept: GASTROENTEROLOGY | Facility: CLINIC | Age: 66
End: 2021-01-27

## 2021-01-30 ENCOUNTER — PATIENT MESSAGE (OUTPATIENT)
Dept: GASTROENTEROLOGY | Facility: CLINIC | Age: 66
End: 2021-01-30

## 2021-01-31 PROCEDURE — 99457 PR MONITORING, PHYSIOL PARAM, REMOTE, 1ST 20 MINS, PER MONTH: ICD-10-PCS | Mod: S$PBB,,, | Performed by: INTERNAL MEDICINE

## 2021-01-31 PROCEDURE — 99457 RPM TX MGMT 1ST 20 MIN: CPT | Mod: S$PBB,,, | Performed by: INTERNAL MEDICINE

## 2021-02-02 RX ORDER — OMEPRAZOLE 20 MG/1
20 CAPSULE, DELAYED RELEASE ORAL
Qty: 90 CAPSULE | Refills: 0 | Status: SHIPPED | OUTPATIENT
Start: 2021-02-02 | End: 2021-05-07 | Stop reason: SDUPTHER

## 2021-02-04 ENCOUNTER — PATIENT MESSAGE (OUTPATIENT)
Dept: INTERNAL MEDICINE | Facility: CLINIC | Age: 66
End: 2021-02-04

## 2021-02-16 ENCOUNTER — PATIENT MESSAGE (OUTPATIENT)
Dept: INTERNAL MEDICINE | Facility: CLINIC | Age: 66
End: 2021-02-16

## 2021-03-17 ENCOUNTER — PATIENT MESSAGE (OUTPATIENT)
Dept: INTERNAL MEDICINE | Facility: CLINIC | Age: 66
End: 2021-03-17

## 2021-04-12 ENCOUNTER — PATIENT MESSAGE (OUTPATIENT)
Dept: INTERNAL MEDICINE | Facility: CLINIC | Age: 66
End: 2021-04-12

## 2021-04-12 ENCOUNTER — PATIENT MESSAGE (OUTPATIENT)
Dept: ADMINISTRATIVE | Facility: OTHER | Age: 66
End: 2021-04-12

## 2021-04-12 DIAGNOSIS — R07.89 ATYPICAL CHEST PAIN: Primary | ICD-10-CM

## 2021-04-15 ENCOUNTER — PATIENT MESSAGE (OUTPATIENT)
Dept: RESEARCH | Facility: HOSPITAL | Age: 66
End: 2021-04-15

## 2021-04-19 ENCOUNTER — HOSPITAL ENCOUNTER (OUTPATIENT)
Dept: CARDIOLOGY | Facility: HOSPITAL | Age: 66
Discharge: HOME | End: 2021-04-19
Attending: INTERNAL MEDICINE
Payer: MEDICARE

## 2021-04-19 ENCOUNTER — LAB VISIT (OUTPATIENT)
Dept: INTERNAL MEDICINE | Facility: CLINIC | Age: 66
End: 2021-04-19
Payer: MEDICARE

## 2021-04-19 ENCOUNTER — EDUCATION (OUTPATIENT)
Dept: CARDIOLOGY | Facility: CLINIC | Age: 66
End: 2021-04-19

## 2021-04-19 VITALS — WEIGHT: 163 LBS | BODY MASS INDEX: 24.71 KG/M2 | HEIGHT: 68 IN

## 2021-04-19 DIAGNOSIS — R94.39 ABNORMAL CARDIOVASCULAR STRESS TEST: ICD-10-CM

## 2021-04-19 DIAGNOSIS — Z01.812 PRE-PROCEDURE LAB EXAM: ICD-10-CM

## 2021-04-19 DIAGNOSIS — R94.39 ABNORMAL CARDIOVASCULAR STRESS TEST: Primary | ICD-10-CM

## 2021-04-19 DIAGNOSIS — R07.9 CHEST PAIN, UNSPECIFIED TYPE: ICD-10-CM

## 2021-04-19 DIAGNOSIS — R07.89 ATYPICAL CHEST PAIN: ICD-10-CM

## 2021-04-19 LAB
ASCENDING AORTA: 3.55 CM
BSA FOR ECHO PROCEDURE: 1.88 M2
CV ECHO LV RWT: 0.42 CM
CV STRESS BASE HR: 57 BPM
DIASTOLIC BLOOD PRESSURE: 81 MMHG
DOP CALC LVOT AREA: 3.6 CM2
DOP CALC LVOT DIAMETER: 2.14 CM
DOP CALC LVOT PEAK VEL: 1.22 M/S
DOP CALC LVOT STROKE VOLUME: 95.84 CM3
DOP CALCLVOT PEAK VEL VTI: 26.66 CM
E WAVE DECELERATION TIME: 247.61 MSEC
E/A RATIO: 1.01
E/E' RATIO: 18.22 M/S
ECHO LV POSTERIOR WALL: 0.97 CM (ref 0.6–1.1)
EJECTION FRACTION: 65 %
FRACTIONAL SHORTENING: 34 % (ref 28–44)
INTERVENTRICULAR SEPTUM: 0.95 CM (ref 0.6–1.1)
IVRT: 85.63 MSEC
LA MAJOR: 5.12 CM
LA MINOR: 6.08 CM
LA WIDTH: 3.64 CM
LEFT ATRIUM SIZE: 4.2 CM
LEFT ATRIUM VOLUME INDEX MOD: 34.8 ML/M2
LEFT ATRIUM VOLUME INDEX: 38.6 ML/M2
LEFT ATRIUM VOLUME MOD: 65 CM3
LEFT ATRIUM VOLUME: 72.24 CM3
LEFT INTERNAL DIMENSION IN SYSTOLE: 3.02 CM (ref 2.1–4)
LEFT VENTRICLE DIASTOLIC VOLUME INDEX: 51.67 ML/M2
LEFT VENTRICLE DIASTOLIC VOLUME: 96.63 ML
LEFT VENTRICLE MASS INDEX: 80 G/M2
LEFT VENTRICLE SYSTOLIC VOLUME INDEX: 19 ML/M2
LEFT VENTRICLE SYSTOLIC VOLUME: 35.45 ML
LEFT VENTRICULAR INTERNAL DIMENSION IN DIASTOLE: 4.59 CM (ref 3.5–6)
LEFT VENTRICULAR MASS: 149.69 G
LV LATERAL E/E' RATIO: 16.4 M/S
LV SEPTAL E/E' RATIO: 20.5 M/S
MV A" WAVE DURATION": 9.99 MSEC
MV PEAK A VEL: 0.81 M/S
MV PEAK E VEL: 0.82 M/S
MV STENOSIS PRESSURE HALF TIME: 71.81 MS
MV VALVE AREA P 1/2 METHOD: 3.06 CM2
OHS CV CPX 1 MINUTE RECOVERY HEART RATE: 103 BPM
OHS CV CPX 85 PERCENT MAX PREDICTED HEART RATE MALE: 130
OHS CV CPX ESTIMATED METS: 13
OHS CV CPX MAX PREDICTED HEART RATE: 153
OHS CV CPX PATIENT IS FEMALE: 0
OHS CV CPX PATIENT IS MALE: 1
OHS CV CPX PEAK DIASTOLIC BLOOD PRESSURE: 80 MMHG
OHS CV CPX PEAK HEAR RATE: 140 BPM
OHS CV CPX PEAK RATE PRESSURE PRODUCT: NORMAL
OHS CV CPX PEAK SYSTOLIC BLOOD PRESSURE: 164 MMHG
OHS CV CPX PERCENT MAX PREDICTED HEART RATE ACHIEVED: 92
OHS CV CPX RATE PRESSURE PRODUCT PRESENTING: 9633
PISA TR MAX VEL: 2.59 M/S
PULM VEIN S/D RATIO: 0.92
PV PEAK D VEL: 0.84 M/S
PV PEAK S VEL: 0.77 M/S
RA MAJOR: 4.68 CM
RA PRESSURE: 3 MMHG
RA WIDTH: 3.96 CM
RIGHT VENTRICULAR END-DIASTOLIC DIMENSION: 3.42 CM
RV TISSUE DOPPLER FREE WALL SYSTOLIC VELOCITY 1 (APICAL 4 CHAMBER VIEW): 14.9 CM/S
SINUS: 3.86 CM
STJ: 2.85 CM
STRESS ECHO POST EXERCISE DUR MIN: 7 MINUTES
STRESS ECHO POST EXERCISE DUR SEC: 53 SECONDS
STRESS ST DEPRESSION: 2.5 MM
SYSTOLIC BLOOD PRESSURE: 169 MMHG
TDI LATERAL: 0.05 M/S
TDI SEPTAL: 0.04 M/S
TDI: 0.05 M/S
TR MAX PG: 27 MMHG
TRICUSPID ANNULAR PLANE SYSTOLIC EXCURSION: 3.08 CM
TV REST PULMONARY ARTERY PRESSURE: 30 MMHG

## 2021-04-19 PROCEDURE — 93351 STRESS ECHO (CUPID ONLY): ICD-10-PCS | Mod: 26,,, | Performed by: INTERNAL MEDICINE

## 2021-04-19 PROCEDURE — U0003 INFECTIOUS AGENT DETECTION BY NUCLEIC ACID (DNA OR RNA); SEVERE ACUTE RESPIRATORY SYNDROME CORONAVIRUS 2 (SARS-COV-2) (CORONAVIRUS DISEASE [COVID-19]), AMPLIFIED PROBE TECHNIQUE, MAKING USE OF HIGH THROUGHPUT TECHNOLOGIES AS DESCRIBED BY CMS-2020-01-R: HCPCS | Performed by: INTERNAL MEDICINE

## 2021-04-19 PROCEDURE — 93351 STRESS TTE COMPLETE: CPT

## 2021-04-19 PROCEDURE — U0005 INFEC AGEN DETEC AMPLI PROBE: HCPCS | Performed by: INTERNAL MEDICINE

## 2021-04-19 PROCEDURE — 93351 STRESS TTE COMPLETE: CPT | Mod: 26,,, | Performed by: INTERNAL MEDICINE

## 2021-04-19 RX ORDER — DIPHENHYDRAMINE HCL 50 MG
50 CAPSULE ORAL ONCE
Status: CANCELLED | OUTPATIENT
Start: 2021-04-19 | End: 2021-04-19

## 2021-04-19 RX ORDER — CLOPIDOGREL BISULFATE 75 MG/1
75 TABLET ORAL ONCE
Qty: 1 TABLET | Refills: 0 | Status: ON HOLD | OUTPATIENT
Start: 2021-04-21 | End: 2021-04-21 | Stop reason: HOSPADM

## 2021-04-19 RX ORDER — ATORVASTATIN CALCIUM 80 MG/1
80 TABLET, FILM COATED ORAL DAILY
Qty: 90 TABLET | Refills: 3 | Status: ON HOLD | OUTPATIENT
Start: 2021-04-19 | End: 2021-05-01 | Stop reason: HOSPADM

## 2021-04-19 RX ORDER — SODIUM CHLORIDE 9 MG/ML
INJECTION, SOLUTION INTRAVENOUS CONTINUOUS
Status: CANCELLED | OUTPATIENT
Start: 2021-04-19 | End: 2021-04-19

## 2021-04-19 RX ORDER — CLOPIDOGREL BISULFATE 75 MG/1
300 TABLET ORAL ONCE
Qty: 4 TABLET | Refills: 0 | Status: ON HOLD | OUTPATIENT
Start: 2021-04-20 | End: 2021-04-22 | Stop reason: HOSPADM

## 2021-04-20 LAB — SARS-COV-2 RNA RESP QL NAA+PROBE: NOT DETECTED

## 2021-04-21 ENCOUNTER — HOSPITAL ENCOUNTER (OUTPATIENT)
Facility: HOSPITAL | Age: 66
Discharge: HOME | End: 2021-04-22
Attending: INTERNAL MEDICINE | Admitting: INTERNAL MEDICINE
Payer: MEDICARE

## 2021-04-21 DIAGNOSIS — R07.9 CHEST PAIN, UNSPECIFIED TYPE: ICD-10-CM

## 2021-04-21 DIAGNOSIS — R94.39 ABNORMAL CARDIOVASCULAR STRESS TEST: ICD-10-CM

## 2021-04-21 DIAGNOSIS — I25.10 CORONARY ARTERY DISEASE: ICD-10-CM

## 2021-04-21 DIAGNOSIS — I25.10 CORONARY ARTERY DISEASE INVOLVING NATIVE CORONARY ARTERY OF NATIVE HEART WITHOUT ANGINA PECTORIS: Primary | ICD-10-CM

## 2021-04-21 LAB
ABO + RH BLD: NORMAL
BLD GP AB SCN CELLS X3 SERPL QL: NORMAL

## 2021-04-21 PROCEDURE — C1894 INTRO/SHEATH, NON-LASER: HCPCS | Performed by: INTERNAL MEDICINE

## 2021-04-21 PROCEDURE — 25000003 PHARM REV CODE 250: Performed by: INTERNAL MEDICINE

## 2021-04-21 PROCEDURE — 99152 MOD SED SAME PHYS/QHP 5/>YRS: CPT | Mod: GC,,, | Performed by: INTERNAL MEDICINE

## 2021-04-21 PROCEDURE — C1887 CATHETER, GUIDING: HCPCS | Performed by: INTERNAL MEDICINE

## 2021-04-21 PROCEDURE — C1769 GUIDE WIRE: HCPCS | Performed by: INTERNAL MEDICINE

## 2021-04-21 PROCEDURE — 99152 MOD SED SAME PHYS/QHP 5/>YRS: CPT | Performed by: INTERNAL MEDICINE

## 2021-04-21 PROCEDURE — 63600175 PHARM REV CODE 636 W HCPCS: Performed by: INTERNAL MEDICINE

## 2021-04-21 PROCEDURE — G0378 HOSPITAL OBSERVATION PER HR: HCPCS

## 2021-04-21 PROCEDURE — 93458 L HRT ARTERY/VENTRICLE ANGIO: CPT | Performed by: INTERNAL MEDICINE

## 2021-04-21 PROCEDURE — 93458 L HRT ARTERY/VENTRICLE ANGIO: CPT | Mod: 26,GC,, | Performed by: INTERNAL MEDICINE

## 2021-04-21 PROCEDURE — 36415 COLL VENOUS BLD VENIPUNCTURE: CPT | Performed by: INTERNAL MEDICINE

## 2021-04-21 PROCEDURE — 86900 BLOOD TYPING SEROLOGIC ABO: CPT | Performed by: INTERNAL MEDICINE

## 2021-04-21 PROCEDURE — 93458 PR CATH PLACE/CORON ANGIO, IMG SUPER/INTERP,W LEFT HEART VENTRICULOGRAPHY: ICD-10-PCS | Mod: 26,GC,, | Performed by: INTERNAL MEDICINE

## 2021-04-21 PROCEDURE — 99152 PR MOD CONSCIOUS SEDATION, SAME PHYS, 5+ YRS, FIRST 15 MIN: ICD-10-PCS | Mod: GC,,, | Performed by: INTERNAL MEDICINE

## 2021-04-21 PROCEDURE — 99153 MOD SED SAME PHYS/QHP EA: CPT | Performed by: INTERNAL MEDICINE

## 2021-04-21 RX ORDER — CLOPIDOGREL BISULFATE 75 MG/1
75 TABLET ORAL DAILY
Qty: 30 TABLET | Refills: 3 | Status: SHIPPED | OUTPATIENT
Start: 2021-04-21 | End: 2021-04-22 | Stop reason: HOSPADM

## 2021-04-21 RX ORDER — NAPROXEN SODIUM 220 MG/1
81 TABLET, FILM COATED ORAL DAILY
Status: DISCONTINUED | OUTPATIENT
Start: 2021-04-22 | End: 2021-04-22 | Stop reason: HOSPADM

## 2021-04-21 RX ORDER — FENTANYL CITRATE 50 UG/ML
INJECTION, SOLUTION INTRAMUSCULAR; INTRAVENOUS
Status: DISCONTINUED | OUTPATIENT
Start: 2021-04-21 | End: 2021-04-21 | Stop reason: HOSPADM

## 2021-04-21 RX ORDER — MIDAZOLAM HYDROCHLORIDE 1 MG/ML
INJECTION, SOLUTION INTRAMUSCULAR; INTRAVENOUS
Status: DISCONTINUED | OUTPATIENT
Start: 2021-04-21 | End: 2021-04-21 | Stop reason: HOSPADM

## 2021-04-21 RX ORDER — CLOPIDOGREL BISULFATE 75 MG/1
300 TABLET ORAL ONCE
Status: DISCONTINUED | OUTPATIENT
Start: 2021-04-21 | End: 2021-04-21

## 2021-04-21 RX ORDER — CLOPIDOGREL BISULFATE 75 MG/1
75 TABLET ORAL DAILY
Status: DISCONTINUED | OUTPATIENT
Start: 2021-04-22 | End: 2021-04-22

## 2021-04-21 RX ORDER — HEPARIN SOD,PORCINE/0.9 % NACL 1000/500ML
INTRAVENOUS SOLUTION INTRAVENOUS
Status: DISCONTINUED | OUTPATIENT
Start: 2021-04-21 | End: 2021-04-21 | Stop reason: HOSPADM

## 2021-04-21 RX ORDER — DIPHENHYDRAMINE HCL 50 MG
50 CAPSULE ORAL ONCE
Status: COMPLETED | OUTPATIENT
Start: 2021-04-21 | End: 2021-04-21

## 2021-04-21 RX ORDER — HEPARIN SODIUM 1000 [USP'U]/ML
INJECTION, SOLUTION INTRAVENOUS; SUBCUTANEOUS
Status: DISCONTINUED | OUTPATIENT
Start: 2021-04-21 | End: 2021-04-21 | Stop reason: HOSPADM

## 2021-04-21 RX ORDER — NAPROXEN SODIUM 220 MG/1
81 TABLET, FILM COATED ORAL DAILY
Status: DISCONTINUED | OUTPATIENT
Start: 2021-04-21 | End: 2021-04-21

## 2021-04-21 RX ORDER — NAPROXEN SODIUM 220 MG/1
81 TABLET, FILM COATED ORAL DAILY
Status: ON HOLD | COMMUNITY
End: 2021-05-01 | Stop reason: HOSPADM

## 2021-04-21 RX ORDER — SODIUM CHLORIDE 9 MG/ML
INJECTION, SOLUTION INTRAVENOUS CONTINUOUS
Status: ACTIVE | OUTPATIENT
Start: 2021-04-21 | End: 2021-04-21

## 2021-04-21 RX ORDER — LIDOCAINE HYDROCHLORIDE 20 MG/ML
INJECTION, SOLUTION EPIDURAL; INFILTRATION; INTRACAUDAL; PERINEURAL
Status: DISCONTINUED | OUTPATIENT
Start: 2021-04-21 | End: 2021-04-21 | Stop reason: HOSPADM

## 2021-04-21 RX ORDER — NITROGLYCERIN 5 MG/ML
INJECTION, SOLUTION INTRAVENOUS
Status: DISCONTINUED | OUTPATIENT
Start: 2021-04-21 | End: 2021-04-21 | Stop reason: HOSPADM

## 2021-04-21 RX ADMIN — DIPHENHYDRAMINE HYDROCHLORIDE 50 MG: 50 CAPSULE ORAL at 12:04

## 2021-04-21 RX ADMIN — SODIUM CHLORIDE: 0.9 INJECTION, SOLUTION INTRAVENOUS at 12:04

## 2021-04-22 ENCOUNTER — TELEPHONE (OUTPATIENT)
Dept: PREADMISSION TESTING | Facility: HOSPITAL | Age: 66
End: 2021-04-22

## 2021-04-22 VITALS
HEART RATE: 87 BPM | RESPIRATION RATE: 16 BRPM | OXYGEN SATURATION: 96 % | TEMPERATURE: 98 F | HEIGHT: 67 IN | BODY MASS INDEX: 25.58 KG/M2 | WEIGHT: 163 LBS | DIASTOLIC BLOOD PRESSURE: 79 MMHG | SYSTOLIC BLOOD PRESSURE: 159 MMHG

## 2021-04-22 DIAGNOSIS — Z01.818 PREOP TESTING: ICD-10-CM

## 2021-04-22 DIAGNOSIS — I25.10 CORONARY ARTERY DISEASE INVOLVING NATIVE CORONARY ARTERY OF NATIVE HEART WITHOUT ANGINA PECTORIS: Primary | ICD-10-CM

## 2021-04-22 LAB
LEFT CBA DIAS: 15 CM/S
LEFT CBA SYS: 45 CM/S
LEFT CCA DIST DIAS: 21 CM/S
LEFT CCA DIST SYS: 66 CM/S
LEFT CCA MID DIAS: 24 CM/S
LEFT CCA MID SYS: 82 CM/S
LEFT CCA PROX DIAS: 16 CM/S
LEFT CCA PROX SYS: 80 CM/S
LEFT ECA DIAS: 16 CM/S
LEFT ECA SYS: 87 CM/S
LEFT ICA DIST DIAS: 35 CM/S
LEFT ICA DIST SYS: 84 CM/S
LEFT ICA MID DIAS: 33 CM/S
LEFT ICA MID SYS: 81 CM/S
LEFT ICA PROX DIAS: 35 CM/S
LEFT ICA PROX SYS: 84 CM/S
LEFT VERTEBRAL DIAS: 14 CM/S
LEFT VERTEBRAL SYS: 45 CM/S
OHS CV CAROTID RIGHT ICA EDV HIGHEST: 25
OHS CV CAROTID ULTRASOUND LEFT ICA/CCA RATIO: 1.27
OHS CV CAROTID ULTRASOUND RIGHT ICA/CCA RATIO: 1.17
OHS CV PV CAROTID LEFT HIGHEST CCA: 82
OHS CV PV CAROTID LEFT HIGHEST ICA: 84
OHS CV PV CAROTID RIGHT HIGHEST CCA: 89
OHS CV PV CAROTID RIGHT HIGHEST ICA: 74
OHS CV US CAROTID LEFT HIGHEST EDV: 35
RIGHT ARM DIASTOLIC BLOOD PRESSURE: 88 MMHG
RIGHT ARM SYSTOLIC BLOOD PRESSURE: 168 MMHG
RIGHT CBA DIAS: 13 CM/S
RIGHT CBA SYS: 39 CM/S
RIGHT CCA DIST DIAS: 17 CM/S
RIGHT CCA DIST SYS: 63 CM/S
RIGHT CCA MID DIAS: 22 CM/S
RIGHT CCA MID SYS: 84 CM/S
RIGHT CCA PROX DIAS: 13 CM/S
RIGHT CCA PROX SYS: 89 CM/S
RIGHT ECA DIAS: 11 CM/S
RIGHT ECA SYS: 90 CM/S
RIGHT ICA DIST DIAS: 25 CM/S
RIGHT ICA DIST SYS: 60 CM/S
RIGHT ICA MID DIAS: 25 CM/S
RIGHT ICA MID SYS: 74 CM/S
RIGHT ICA PROX DIAS: 15 CM/S
RIGHT ICA PROX SYS: 45 CM/S
RIGHT VERTEBRAL DIAS: 13 CM/S
RIGHT VERTEBRAL SYS: 40 CM/S

## 2021-04-22 PROCEDURE — 25000003 PHARM REV CODE 250: Performed by: STUDENT IN AN ORGANIZED HEALTH CARE EDUCATION/TRAINING PROGRAM

## 2021-04-22 PROCEDURE — G0378 HOSPITAL OBSERVATION PER HR: HCPCS

## 2021-04-22 RX ADMIN — ASPIRIN 81 MG CHEWABLE TABLET 81 MG: 81 TABLET CHEWABLE at 08:04

## 2021-04-23 ENCOUNTER — LAB VISIT (OUTPATIENT)
Dept: INTERNAL MEDICINE | Facility: CLINIC | Age: 66
DRG: 236 | End: 2021-04-23
Payer: MEDICARE

## 2021-04-23 ENCOUNTER — HOSPITAL ENCOUNTER (OUTPATIENT)
Dept: PREADMISSION TESTING | Facility: HOSPITAL | Age: 66
Discharge: HOME | End: 2021-04-23

## 2021-04-23 DIAGNOSIS — I25.10 CORONARY ARTERY DISEASE INVOLVING NATIVE CORONARY ARTERY OF NATIVE HEART WITHOUT ANGINA PECTORIS: ICD-10-CM

## 2021-04-23 DIAGNOSIS — Z01.818 PREOP TESTING: ICD-10-CM

## 2021-04-23 PROCEDURE — U0003 INFECTIOUS AGENT DETECTION BY NUCLEIC ACID (DNA OR RNA); SEVERE ACUTE RESPIRATORY SYNDROME CORONAVIRUS 2 (SARS-COV-2) (CORONAVIRUS DISEASE [COVID-19]), AMPLIFIED PROBE TECHNIQUE, MAKING USE OF HIGH THROUGHPUT TECHNOLOGIES AS DESCRIBED BY CMS-2020-01-R: HCPCS | Performed by: THORACIC SURGERY (CARDIOTHORACIC VASCULAR SURGERY)

## 2021-04-23 PROCEDURE — U0005 INFEC AGEN DETEC AMPLI PROBE: HCPCS | Performed by: THORACIC SURGERY (CARDIOTHORACIC VASCULAR SURGERY)

## 2021-04-24 LAB — SARS-COV-2 RNA RESP QL NAA+PROBE: NOT DETECTED

## 2021-04-25 ENCOUNTER — ANESTHESIA EVENT (OUTPATIENT)
Dept: SURGERY | Facility: HOSPITAL | Age: 66
DRG: 236 | End: 2021-04-25
Payer: MEDICARE

## 2021-04-26 ENCOUNTER — ANESTHESIA (OUTPATIENT)
Dept: SURGERY | Facility: HOSPITAL | Age: 66
DRG: 236 | End: 2021-04-26
Payer: MEDICARE

## 2021-04-26 ENCOUNTER — HOSPITAL ENCOUNTER (INPATIENT)
Facility: HOSPITAL | Age: 66
LOS: 5 days | Discharge: HOME HEALTH | DRG: 236 | End: 2021-05-01
Attending: THORACIC SURGERY (CARDIOTHORACIC VASCULAR SURGERY) | Admitting: THORACIC SURGERY (CARDIOTHORACIC VASCULAR SURGERY)
Payer: MEDICARE

## 2021-04-26 DIAGNOSIS — I25.10 CORONARY ARTERY DISEASE: ICD-10-CM

## 2021-04-26 DIAGNOSIS — I48.91 A-FIB: ICD-10-CM

## 2021-04-26 DIAGNOSIS — Z95.1 S/P CABG (CORONARY ARTERY BYPASS GRAFT): Primary | ICD-10-CM

## 2021-04-26 DIAGNOSIS — I25.118 CORONARY ARTERY DISEASE WITH STABLE ANGINA PECTORIS, UNSPECIFIED VESSEL OR LESION TYPE, UNSPECIFIED WHETHER NATIVE OR TRANSPLANTED HEART: ICD-10-CM

## 2021-04-26 LAB
ABO + RH BLD: NORMAL
ALLENS TEST: ABNORMAL
ANION GAP SERPL CALC-SCNC: 10 MMOL/L (ref 8–16)
APTT BLDCRRT: 26.2 SEC (ref 21–32)
BASOPHILS # BLD AUTO: 0.01 K/UL (ref 0–0.2)
BASOPHILS NFR BLD: 0.1 % (ref 0–1.9)
BLD GP AB SCN CELLS X3 SERPL QL: NORMAL
BUN SERPL-MCNC: 13 MG/DL (ref 8–23)
CALCIUM SERPL-MCNC: 7.6 MG/DL (ref 8.7–10.5)
CHLORIDE SERPL-SCNC: 114 MMOL/L (ref 95–110)
CO2 SERPL-SCNC: 18 MMOL/L (ref 23–29)
CREAT SERPL-MCNC: 1 MG/DL (ref 0.5–1.4)
DELSYS: ABNORMAL
DIFFERENTIAL METHOD: ABNORMAL
EOSINOPHIL # BLD AUTO: 0 K/UL (ref 0–0.5)
EOSINOPHIL NFR BLD: 0.1 % (ref 0–8)
ERYTHROCYTE [DISTWIDTH] IN BLOOD BY AUTOMATED COUNT: 15.8 % (ref 11.5–14.5)
ERYTHROCYTE [SEDIMENTATION RATE] IN BLOOD BY WESTERGREN METHOD: 18 MM/H
EST. GFR  (AFRICAN AMERICAN): >60 ML/MIN/1.73 M^2
EST. GFR  (NON AFRICAN AMERICAN): >60 ML/MIN/1.73 M^2
FIO2: 100
FIO2: 100
FIO2: 32
FIO2: 40
FIO2: 60
FIO2: 80
FLOW: 3
GLUCOSE SERPL-MCNC: 138 MG/DL (ref 70–110)
GLUCOSE SERPL-MCNC: 141 MG/DL (ref 70–110)
GLUCOSE SERPL-MCNC: 170 MG/DL (ref 70–110)
GLUCOSE SERPL-MCNC: 180 MG/DL (ref 70–110)
HCO3 UR-SCNC: 15.4 MMOL/L (ref 24–28)
HCO3 UR-SCNC: 16.4 MMOL/L (ref 24–28)
HCO3 UR-SCNC: 19.2 MMOL/L (ref 24–28)
HCO3 UR-SCNC: 21.1 MMOL/L (ref 24–28)
HCO3 UR-SCNC: 21.5 MMOL/L (ref 24–28)
HCO3 UR-SCNC: 21.8 MMOL/L (ref 24–28)
HCO3 UR-SCNC: 24.8 MMOL/L (ref 24–28)
HCO3 UR-SCNC: 25.5 MMOL/L (ref 24–28)
HCO3 UR-SCNC: 29.2 MMOL/L (ref 24–28)
HCT VFR BLD AUTO: 27.3 % (ref 40–54)
HCT VFR BLD CALC: 24 %PCV (ref 36–54)
HCT VFR BLD CALC: 26 %PCV (ref 36–54)
HCT VFR BLD CALC: 27 %PCV (ref 36–54)
HCT VFR BLD CALC: 27 %PCV (ref 36–54)
HCT VFR BLD CALC: 28 %PCV (ref 36–54)
HGB BLD-MCNC: 9.2 G/DL (ref 14–18)
IMM GRANULOCYTES # BLD AUTO: 0.07 K/UL (ref 0–0.04)
IMM GRANULOCYTES NFR BLD AUTO: 0.5 % (ref 0–0.5)
INR PPP: 1.1 (ref 0.8–1.2)
LDH SERPL L TO P-CCNC: 0.89 MMOL/L (ref 0.36–1.25)
LDH SERPL L TO P-CCNC: 1.46 MMOL/L (ref 0.36–1.25)
LDH SERPL L TO P-CCNC: 2.61 MMOL/L (ref 0.36–1.25)
LDH SERPL L TO P-CCNC: 3.73 MMOL/L (ref 0.36–1.25)
LDH SERPL L TO P-CCNC: 3.81 MMOL/L (ref 0.36–1.25)
LDH SERPL L TO P-CCNC: 4.45 MMOL/L (ref 0.36–1.25)
LYMPHOCYTES # BLD AUTO: 1.2 K/UL (ref 1–4.8)
LYMPHOCYTES NFR BLD: 9 % (ref 18–48)
MAGNESIUM SERPL-MCNC: 2 MG/DL (ref 1.6–2.6)
MAGNESIUM SERPL-MCNC: 2.4 MG/DL (ref 1.6–2.6)
MCH RBC QN AUTO: 22.5 PG (ref 27–31)
MCHC RBC AUTO-ENTMCNC: 33.7 G/DL (ref 32–36)
MCV RBC AUTO: 67 FL (ref 82–98)
MODE: ABNORMAL
MONOCYTES # BLD AUTO: 0.8 K/UL (ref 0.3–1)
MONOCYTES NFR BLD: 5.8 % (ref 4–15)
NEUTROPHILS # BLD AUTO: 11 K/UL (ref 1.8–7.7)
NEUTROPHILS NFR BLD: 84.5 % (ref 38–73)
NRBC BLD-RTO: 0 /100 WBC
PCO2 BLDA: 28.4 MMHG (ref 35–45)
PCO2 BLDA: 31.5 MMHG (ref 35–45)
PCO2 BLDA: 32.5 MMHG (ref 35–45)
PCO2 BLDA: 32.7 MMHG (ref 35–45)
PCO2 BLDA: 35.8 MMHG (ref 35–45)
PCO2 BLDA: 36.5 MMHG (ref 35–45)
PCO2 BLDA: 39.9 MMHG (ref 35–45)
PCO2 BLDA: 41 MMHG (ref 35–45)
PCO2 BLDA: 43 MMHG (ref 35–45)
PEEP: 5
PH SMN: 7.31 [PH] (ref 7.35–7.45)
PH SMN: 7.34 [PH] (ref 7.35–7.45)
PH SMN: 7.38 [PH] (ref 7.35–7.45)
PH SMN: 7.39 [PH] (ref 7.35–7.45)
PH SMN: 7.39 [PH] (ref 7.35–7.45)
PH SMN: 7.43 [PH] (ref 7.35–7.45)
PH SMN: 7.47 [PH] (ref 7.35–7.45)
PHOSPHATE SERPL-MCNC: 2.1 MG/DL (ref 2.7–4.5)
PHOSPHATE SERPL-MCNC: 3 MG/DL (ref 2.7–4.5)
PLATELET # BLD AUTO: 146 K/UL (ref 150–450)
PMV BLD AUTO: 10.9 FL (ref 9.2–12.9)
PO2 BLDA: 103 MMHG (ref 80–100)
PO2 BLDA: 154 MMHG (ref 80–100)
PO2 BLDA: 163 MMHG (ref 80–100)
PO2 BLDA: 178 MMHG (ref 80–100)
PO2 BLDA: 217 MMHG (ref 80–100)
PO2 BLDA: 264 MMHG (ref 80–100)
PO2 BLDA: 379 MMHG (ref 80–100)
PO2 BLDA: 38 MMHG (ref 40–60)
PO2 BLDA: 383 MMHG (ref 80–100)
POC BE: -10 MMOL/L
POC BE: -10 MMOL/L
POC BE: -3 MMOL/L
POC BE: -3 MMOL/L
POC BE: -4 MMOL/L
POC BE: -6 MMOL/L
POC BE: 0 MMOL/L
POC BE: 0 MMOL/L
POC BE: 6 MMOL/L
POC IONIZED CALCIUM: 0.99 MMOL/L (ref 1.06–1.42)
POC IONIZED CALCIUM: 1.02 MMOL/L (ref 1.06–1.42)
POC IONIZED CALCIUM: 1.06 MMOL/L (ref 1.06–1.42)
POC IONIZED CALCIUM: 1.08 MMOL/L (ref 1.06–1.42)
POC IONIZED CALCIUM: 1.08 MMOL/L (ref 1.06–1.42)
POC IONIZED CALCIUM: 1.12 MMOL/L (ref 1.06–1.42)
POC IONIZED CALCIUM: 1.17 MMOL/L (ref 1.06–1.42)
POC SATURATED O2: 100 % (ref 95–100)
POC SATURATED O2: 71 % (ref 95–100)
POC SATURATED O2: 98 % (ref 95–100)
POC SATURATED O2: 99 % (ref 95–100)
POC SATURATED O2: 99 % (ref 95–100)
POC TCO2: 16 MMOL/L (ref 23–27)
POC TCO2: 17 MMOL/L (ref 23–27)
POC TCO2: 20 MMOL/L (ref 23–27)
POC TCO2: 22 MMOL/L (ref 23–27)
POC TCO2: 23 MMOL/L (ref 23–27)
POC TCO2: 23 MMOL/L (ref 23–27)
POC TCO2: 26 MMOL/L (ref 23–27)
POC TCO2: 27 MMOL/L (ref 24–29)
POC TCO2: 30 MMOL/L (ref 23–27)
POCT GLUCOSE: 101 MG/DL (ref 70–110)
POCT GLUCOSE: 161 MG/DL (ref 70–110)
POCT GLUCOSE: 177 MG/DL (ref 70–110)
POCT GLUCOSE: 199 MG/DL (ref 70–110)
POCT GLUCOSE: 205 MG/DL (ref 70–110)
POTASSIUM BLD-SCNC: 3.6 MMOL/L (ref 3.5–5.1)
POTASSIUM BLD-SCNC: 3.8 MMOL/L (ref 3.5–5.1)
POTASSIUM BLD-SCNC: 4.2 MMOL/L (ref 3.5–5.1)
POTASSIUM BLD-SCNC: 4.3 MMOL/L (ref 3.5–5.1)
POTASSIUM BLD-SCNC: 4.4 MMOL/L (ref 3.5–5.1)
POTASSIUM BLD-SCNC: 4.5 MMOL/L (ref 3.5–5.1)
POTASSIUM BLD-SCNC: 4.7 MMOL/L (ref 3.5–5.1)
POTASSIUM BLD-SCNC: 5.3 MMOL/L (ref 3.5–5.1)
POTASSIUM BLD-SCNC: 6 MMOL/L (ref 3.5–5.1)
POTASSIUM SERPL-SCNC: 4.4 MMOL/L (ref 3.5–5.1)
POTASSIUM SERPL-SCNC: 4.5 MMOL/L (ref 3.5–5.1)
PROTHROMBIN TIME: 12.3 SEC (ref 9–12.5)
PS: 7
RBC # BLD AUTO: 4.08 M/UL (ref 4.6–6.2)
SAMPLE: ABNORMAL
SAMPLE: NORMAL
SITE: ABNORMAL
SODIUM BLD-SCNC: 138 MMOL/L (ref 136–145)
SODIUM BLD-SCNC: 139 MMOL/L (ref 136–145)
SODIUM BLD-SCNC: 140 MMOL/L (ref 136–145)
SODIUM BLD-SCNC: 141 MMOL/L (ref 136–145)
SODIUM BLD-SCNC: 143 MMOL/L (ref 136–145)
SODIUM BLD-SCNC: 144 MMOL/L (ref 136–145)
SODIUM BLD-SCNC: 145 MMOL/L (ref 136–145)
SODIUM BLD-SCNC: 145 MMOL/L (ref 136–145)
SODIUM BLD-SCNC: 146 MMOL/L (ref 136–145)
SODIUM SERPL-SCNC: 142 MMOL/L (ref 136–145)
VT: 420
WBC # BLD AUTO: 13.01 K/UL (ref 3.9–12.7)

## 2021-04-26 PROCEDURE — 83605 ASSAY OF LACTIC ACID: CPT

## 2021-04-26 PROCEDURE — 85610 PROTHROMBIN TIME: CPT | Performed by: STUDENT IN AN ORGANIZED HEALTH CARE EDUCATION/TRAINING PROGRAM

## 2021-04-26 PROCEDURE — 63600175 PHARM REV CODE 636 W HCPCS: Performed by: STUDENT IN AN ORGANIZED HEALTH CARE EDUCATION/TRAINING PROGRAM

## 2021-04-26 PROCEDURE — 33534 CABG ARTERIAL TWO: CPT | Mod: GC,,, | Performed by: THORACIC SURGERY (CARDIOTHORACIC VASCULAR SURGERY)

## 2021-04-26 PROCEDURE — 27000221 HC OXYGEN, UP TO 24 HOURS

## 2021-04-26 PROCEDURE — 36620 PR INSERT CATH,ART,PERCUT,SHORTTERM: ICD-10-PCS | Mod: 59,,, | Performed by: ANESTHESIOLOGY

## 2021-04-26 PROCEDURE — 88304 TISSUE EXAM BY PATHOLOGIST: CPT | Performed by: STUDENT IN AN ORGANIZED HEALTH CARE EDUCATION/TRAINING PROGRAM

## 2021-04-26 PROCEDURE — 25000003 PHARM REV CODE 250: Performed by: NURSE PRACTITIONER

## 2021-04-26 PROCEDURE — 27200953 HC CARDIOPLEGIA SYSTEM

## 2021-04-26 PROCEDURE — 94150 VITAL CAPACITY TEST: CPT

## 2021-04-26 PROCEDURE — 94761 N-INVAS EAR/PLS OXIMETRY MLT: CPT

## 2021-04-26 PROCEDURE — 85730 THROMBOPLASTIN TIME PARTIAL: CPT | Performed by: STUDENT IN AN ORGANIZED HEALTH CARE EDUCATION/TRAINING PROGRAM

## 2021-04-26 PROCEDURE — 63600175 PHARM REV CODE 636 W HCPCS

## 2021-04-26 PROCEDURE — C1729 CATH, DRAINAGE: HCPCS | Performed by: THORACIC SURGERY (CARDIOTHORACIC VASCULAR SURGERY)

## 2021-04-26 PROCEDURE — C9248 INJ, CLEVIDIPINE BUTYRATE: HCPCS | Performed by: STUDENT IN AN ORGANIZED HEALTH CARE EDUCATION/TRAINING PROGRAM

## 2021-04-26 PROCEDURE — 88304 TISSUE EXAM BY PATHOLOGIST: CPT | Mod: 26,,, | Performed by: STUDENT IN AN ORGANIZED HEALTH CARE EDUCATION/TRAINING PROGRAM

## 2021-04-26 PROCEDURE — 27100026 HC SHUNT SENSOR, TERUMO

## 2021-04-26 PROCEDURE — 36000713 HC OR TIME LEV V EA ADD 15 MIN: Performed by: THORACIC SURGERY (CARDIOTHORACIC VASCULAR SURGERY)

## 2021-04-26 PROCEDURE — 33508 ENDOSCOPIC VEIN HARVEST: CPT | Mod: 59,GC,, | Performed by: THORACIC SURGERY (CARDIOTHORACIC VASCULAR SURGERY)

## 2021-04-26 PROCEDURE — 84132 ASSAY OF SERUM POTASSIUM: CPT | Performed by: STUDENT IN AN ORGANIZED HEALTH CARE EDUCATION/TRAINING PROGRAM

## 2021-04-26 PROCEDURE — 85520 HEPARIN ASSAY: CPT

## 2021-04-26 PROCEDURE — 82803 BLOOD GASES ANY COMBINATION: CPT

## 2021-04-26 PROCEDURE — 27000175 HC ADULT BYPASS PUMP

## 2021-04-26 PROCEDURE — 94002 VENT MGMT INPAT INIT DAY: CPT

## 2021-04-26 PROCEDURE — 84100 ASSAY OF PHOSPHORUS: CPT | Mod: 91 | Performed by: STUDENT IN AN ORGANIZED HEALTH CARE EDUCATION/TRAINING PROGRAM

## 2021-04-26 PROCEDURE — 25000003 PHARM REV CODE 250

## 2021-04-26 PROCEDURE — 25000003 PHARM REV CODE 250: Performed by: THORACIC SURGERY (CARDIOTHORACIC VASCULAR SURGERY)

## 2021-04-26 PROCEDURE — 82565 ASSAY OF CREATININE: CPT

## 2021-04-26 PROCEDURE — D9220A PRA ANESTHESIA: Mod: ,,, | Performed by: ANESTHESIOLOGY

## 2021-04-26 PROCEDURE — 99900035 HC TECH TIME PER 15 MIN (STAT)

## 2021-04-26 PROCEDURE — 99291 CRITICAL CARE FIRST HOUR: CPT | Mod: GC,,, | Performed by: SURGERY

## 2021-04-26 PROCEDURE — 82330 ASSAY OF CALCIUM: CPT

## 2021-04-26 PROCEDURE — 27000191 HC C-V MONITORING

## 2021-04-26 PROCEDURE — 33508 PR ENDOSCOPY W/VIDEO-ASST VEIN HARVEST,CABG: ICD-10-PCS | Mod: 59,GC,, | Performed by: THORACIC SURGERY (CARDIOTHORACIC VASCULAR SURGERY)

## 2021-04-26 PROCEDURE — 36620 INSERTION CATHETER ARTERY: CPT | Mod: 59,,, | Performed by: ANESTHESIOLOGY

## 2021-04-26 PROCEDURE — D9220A PRA ANESTHESIA: ICD-10-PCS | Mod: ,,, | Performed by: ANESTHESIOLOGY

## 2021-04-26 PROCEDURE — 33517 CABG ARTERY-VEIN SINGLE: CPT | Mod: GC,,, | Performed by: THORACIC SURGERY (CARDIOTHORACIC VASCULAR SURGERY)

## 2021-04-26 PROCEDURE — 36556 INSERT NON-TUNNEL CV CATH: CPT | Performed by: ANESTHESIOLOGY

## 2021-04-26 PROCEDURE — 33517 PR CABG, ARTERY-VEIN, SINGLE: ICD-10-PCS | Mod: GC,,, | Performed by: THORACIC SURGERY (CARDIOTHORACIC VASCULAR SURGERY)

## 2021-04-26 PROCEDURE — 99291 PR CRITICAL CARE, E/M 30-74 MINUTES: ICD-10-PCS | Mod: GC,,, | Performed by: SURGERY

## 2021-04-26 PROCEDURE — P9045 ALBUMIN (HUMAN), 5%, 250 ML: HCPCS | Mod: JG

## 2021-04-26 PROCEDURE — 94010 BREATHING CAPACITY TEST: CPT

## 2021-04-26 PROCEDURE — 84295 ASSAY OF SERUM SODIUM: CPT

## 2021-04-26 PROCEDURE — 36000712 HC OR TIME LEV V 1ST 15 MIN: Performed by: THORACIC SURGERY (CARDIOTHORACIC VASCULAR SURGERY)

## 2021-04-26 PROCEDURE — 63600175 PHARM REV CODE 636 W HCPCS: Performed by: THORACIC SURGERY (CARDIOTHORACIC VASCULAR SURGERY)

## 2021-04-26 PROCEDURE — 36556 PR INSERT NON-TUNNEL CV CATH 5+ YRS OLD: ICD-10-PCS | Mod: 59,,, | Performed by: ANESTHESIOLOGY

## 2021-04-26 PROCEDURE — 27201037 HC PRESSURE MONITORING SET UP

## 2021-04-26 PROCEDURE — 84132 ASSAY OF SERUM POTASSIUM: CPT

## 2021-04-26 PROCEDURE — 27000445 HC TEMPORARY PACEMAKER LEADS

## 2021-04-26 PROCEDURE — 37000008 HC ANESTHESIA 1ST 15 MINUTES: Performed by: THORACIC SURGERY (CARDIOTHORACIC VASCULAR SURGERY)

## 2021-04-26 PROCEDURE — 37000009 HC ANESTHESIA EA ADD 15 MINS: Performed by: THORACIC SURGERY (CARDIOTHORACIC VASCULAR SURGERY)

## 2021-04-26 PROCEDURE — 20000000 HC ICU ROOM

## 2021-04-26 PROCEDURE — 63600175 PHARM REV CODE 636 W HCPCS: Performed by: NURSE PRACTITIONER

## 2021-04-26 PROCEDURE — 80048 BASIC METABOLIC PNL TOTAL CA: CPT | Performed by: STUDENT IN AN ORGANIZED HEALTH CARE EDUCATION/TRAINING PROGRAM

## 2021-04-26 PROCEDURE — 83735 ASSAY OF MAGNESIUM: CPT | Mod: 91 | Performed by: STUDENT IN AN ORGANIZED HEALTH CARE EDUCATION/TRAINING PROGRAM

## 2021-04-26 PROCEDURE — 36556 INSERT NON-TUNNEL CV CATH: CPT | Mod: 59,,, | Performed by: ANESTHESIOLOGY

## 2021-04-26 PROCEDURE — 25000003 PHARM REV CODE 250: Performed by: STUDENT IN AN ORGANIZED HEALTH CARE EDUCATION/TRAINING PROGRAM

## 2021-04-26 PROCEDURE — 85014 HEMATOCRIT: CPT

## 2021-04-26 PROCEDURE — 36620 INSERTION CATHETER ARTERY: CPT | Performed by: STUDENT IN AN ORGANIZED HEALTH CARE EDUCATION/TRAINING PROGRAM

## 2021-04-26 PROCEDURE — 99900017 HC EXTUBATION W/PARAMETERS (STAT)

## 2021-04-26 PROCEDURE — 27202608 HC CANNULA, MISC

## 2021-04-26 PROCEDURE — 86920 COMPATIBILITY TEST SPIN: CPT | Performed by: NURSE PRACTITIONER

## 2021-04-26 PROCEDURE — 88304 PR  SURG PATH,LEVEL III: ICD-10-PCS | Mod: 26,,, | Performed by: STUDENT IN AN ORGANIZED HEALTH CARE EDUCATION/TRAINING PROGRAM

## 2021-04-26 PROCEDURE — 37799 UNLISTED PX VASCULAR SURGERY: CPT

## 2021-04-26 PROCEDURE — 33534 PR CABG, ARTERIAL, TWO: ICD-10-PCS | Mod: GC,,, | Performed by: THORACIC SURGERY (CARDIOTHORACIC VASCULAR SURGERY)

## 2021-04-26 PROCEDURE — 99900026 HC AIRWAY MAINTENANCE (STAT)

## 2021-04-26 PROCEDURE — 36592 COLLECT BLOOD FROM PICC: CPT

## 2021-04-26 PROCEDURE — 27100088 HC CELL SAVER

## 2021-04-26 PROCEDURE — 27201423 OPTIME MED/SURG SUP & DEVICES STERILE SUPPLY: Performed by: THORACIC SURGERY (CARDIOTHORACIC VASCULAR SURGERY)

## 2021-04-26 PROCEDURE — 86900 BLOOD TYPING SEROLOGIC ABO: CPT | Performed by: THORACIC SURGERY (CARDIOTHORACIC VASCULAR SURGERY)

## 2021-04-26 PROCEDURE — 85025 COMPLETE CBC W/AUTO DIFF WBC: CPT | Performed by: STUDENT IN AN ORGANIZED HEALTH CARE EDUCATION/TRAINING PROGRAM

## 2021-04-26 RX ORDER — TRANEXAMIC ACID 100 MG/ML
INJECTION, SOLUTION INTRAVENOUS
Status: DISCONTINUED | OUTPATIENT
Start: 2021-04-26 | End: 2021-04-26

## 2021-04-26 RX ORDER — DOCUSATE SODIUM 100 MG/1
100 CAPSULE, LIQUID FILLED ORAL 2 TIMES DAILY
Status: DISCONTINUED | OUTPATIENT
Start: 2021-04-26 | End: 2021-05-01 | Stop reason: HOSPADM

## 2021-04-26 RX ORDER — MAGNESIUM SULFATE HEPTAHYDRATE 40 MG/ML
4 INJECTION, SOLUTION INTRAVENOUS
Status: DISCONTINUED | OUTPATIENT
Start: 2021-04-26 | End: 2021-04-29

## 2021-04-26 RX ORDER — ALBUMIN HUMAN 50 G/1000ML
25 SOLUTION INTRAVENOUS ONCE
Status: COMPLETED | OUTPATIENT
Start: 2021-04-26 | End: 2021-04-26

## 2021-04-26 RX ORDER — NOREPINEPHRINE BITARTRATE 1 MG/ML
INJECTION, SOLUTION INTRAVENOUS
Status: DISCONTINUED | OUTPATIENT
Start: 2021-04-26 | End: 2021-04-26

## 2021-04-26 RX ORDER — MAGNESIUM SULFATE HEPTAHYDRATE 40 MG/ML
2 INJECTION, SOLUTION INTRAVENOUS
Status: DISCONTINUED | OUTPATIENT
Start: 2021-04-26 | End: 2021-04-29

## 2021-04-26 RX ORDER — NOREPINEPHRINE BITARTRATE/D5W 4MG/250ML
0-3 PLASTIC BAG, INJECTION (ML) INTRAVENOUS CONTINUOUS
Status: DISCONTINUED | OUTPATIENT
Start: 2021-04-26 | End: 2021-04-27

## 2021-04-26 RX ORDER — ALBUMIN HUMAN 50 G/1000ML
SOLUTION INTRAVENOUS
Status: COMPLETED
Start: 2021-04-26 | End: 2021-04-26

## 2021-04-26 RX ORDER — FENTANYL CITRATE 50 UG/ML
INJECTION, SOLUTION INTRAMUSCULAR; INTRAVENOUS
Status: COMPLETED
Start: 2021-04-26 | End: 2021-04-26

## 2021-04-26 RX ORDER — HEPARIN SOD,PORCINE/0.9 % NACL 1000/500ML
INTRAVENOUS SOLUTION INTRAVENOUS
Status: DISCONTINUED | OUTPATIENT
Start: 2021-04-26 | End: 2021-04-26 | Stop reason: HOSPADM

## 2021-04-26 RX ORDER — POLYETHYLENE GLYCOL 3350 17 G/17G
17 POWDER, FOR SOLUTION ORAL DAILY
Status: DISCONTINUED | OUTPATIENT
Start: 2021-04-27 | End: 2021-05-01 | Stop reason: HOSPADM

## 2021-04-26 RX ORDER — HYDROMORPHONE HYDROCHLORIDE 1 MG/ML
0.5 INJECTION, SOLUTION INTRAMUSCULAR; INTRAVENOUS; SUBCUTANEOUS
Status: DISCONTINUED | OUTPATIENT
Start: 2021-04-26 | End: 2021-04-27

## 2021-04-26 RX ORDER — MIDAZOLAM HYDROCHLORIDE 1 MG/ML
INJECTION INTRAMUSCULAR; INTRAVENOUS
Status: DISCONTINUED | OUTPATIENT
Start: 2021-04-26 | End: 2021-04-26

## 2021-04-26 RX ORDER — NITROGLYCERIN 5 MG/ML
INJECTION, SOLUTION INTRAVENOUS
Status: DISCONTINUED | OUTPATIENT
Start: 2021-04-26 | End: 2021-04-26

## 2021-04-26 RX ORDER — FENTANYL CITRATE 50 UG/ML
25 INJECTION, SOLUTION INTRAMUSCULAR; INTRAVENOUS ONCE
Status: COMPLETED | OUTPATIENT
Start: 2021-04-26 | End: 2021-04-26

## 2021-04-26 RX ORDER — METOPROLOL TARTRATE 25 MG/1
25 TABLET, FILM COATED ORAL
Status: COMPLETED | OUTPATIENT
Start: 2021-04-26 | End: 2021-04-26

## 2021-04-26 RX ORDER — PROTAMINE SULFATE 10 MG/ML
INJECTION, SOLUTION INTRAVENOUS
Status: DISCONTINUED | OUTPATIENT
Start: 2021-04-26 | End: 2021-04-26

## 2021-04-26 RX ORDER — TRANEXAMIC ACID 100 MG/ML
INJECTION, SOLUTION INTRAVENOUS CONTINUOUS PRN
Status: DISCONTINUED | OUTPATIENT
Start: 2021-04-26 | End: 2021-04-26

## 2021-04-26 RX ORDER — PROPOFOL 10 MG/ML
0-50 INJECTION, EMULSION INTRAVENOUS CONTINUOUS
Status: DISCONTINUED | OUTPATIENT
Start: 2021-04-26 | End: 2021-04-26

## 2021-04-26 RX ORDER — LIDOCAINE HCL/PF 100 MG/5ML
SYRINGE (ML) INTRAVENOUS
Status: DISCONTINUED | OUTPATIENT
Start: 2021-04-26 | End: 2021-04-26

## 2021-04-26 RX ORDER — LIDOCAINE HYDROCHLORIDE 20 MG/ML
INJECTION, SOLUTION EPIDURAL; INFILTRATION; INTRACAUDAL; PERINEURAL
Status: DISCONTINUED | OUTPATIENT
Start: 2021-04-26 | End: 2021-04-26

## 2021-04-26 RX ORDER — HEPARIN SODIUM 1000 [USP'U]/ML
INJECTION, SOLUTION INTRAVENOUS; SUBCUTANEOUS
Status: DISCONTINUED | OUTPATIENT
Start: 2021-04-26 | End: 2021-04-26

## 2021-04-26 RX ORDER — CEFAZOLIN SODIUM 1 G/3ML
2 INJECTION, POWDER, FOR SOLUTION INTRAMUSCULAR; INTRAVENOUS
Status: COMPLETED | OUTPATIENT
Start: 2021-04-26 | End: 2021-04-27

## 2021-04-26 RX ORDER — ALBUMIN HUMAN 50 G/1000ML
25 SOLUTION INTRAVENOUS ONCE
Status: COMPLETED | OUTPATIENT
Start: 2021-04-27 | End: 2021-04-27

## 2021-04-26 RX ORDER — ONDANSETRON 2 MG/ML
INJECTION INTRAMUSCULAR; INTRAVENOUS
Status: DISCONTINUED | OUTPATIENT
Start: 2021-04-26 | End: 2021-04-26

## 2021-04-26 RX ORDER — FENTANYL CITRATE 50 UG/ML
INJECTION, SOLUTION INTRAMUSCULAR; INTRAVENOUS
Status: DISCONTINUED | OUTPATIENT
Start: 2021-04-26 | End: 2021-04-26

## 2021-04-26 RX ORDER — POTASSIUM CHLORIDE 29.8 MG/ML
40 INJECTION INTRAVENOUS
Status: DISCONTINUED | OUTPATIENT
Start: 2021-04-26 | End: 2021-04-29

## 2021-04-26 RX ORDER — OXYCODONE HYDROCHLORIDE 5 MG/1
5 TABLET ORAL EVERY 4 HOURS PRN
Status: DISCONTINUED | OUTPATIENT
Start: 2021-04-26 | End: 2021-04-27

## 2021-04-26 RX ORDER — INDOMETHACIN 25 MG/1
CAPSULE ORAL
Status: COMPLETED
Start: 2021-04-26 | End: 2021-04-26

## 2021-04-26 RX ORDER — PROPOFOL 10 MG/ML
VIAL (ML) INTRAVENOUS
Status: DISCONTINUED | OUTPATIENT
Start: 2021-04-26 | End: 2021-04-26

## 2021-04-26 RX ORDER — ASPIRIN 325 MG
325 TABLET ORAL DAILY
Status: DISCONTINUED | OUTPATIENT
Start: 2021-04-27 | End: 2021-05-01 | Stop reason: HOSPADM

## 2021-04-26 RX ORDER — PHENYLEPHRINE HYDROCHLORIDE 10 MG/ML
INJECTION INTRAVENOUS
Status: DISCONTINUED | OUTPATIENT
Start: 2021-04-26 | End: 2021-04-26

## 2021-04-26 RX ORDER — POTASSIUM CHLORIDE 14.9 MG/ML
60 INJECTION INTRAVENOUS
Status: DISCONTINUED | OUTPATIENT
Start: 2021-04-26 | End: 2021-04-29

## 2021-04-26 RX ORDER — CEFAZOLIN SODIUM 1 G/3ML
2 INJECTION, POWDER, FOR SOLUTION INTRAMUSCULAR; INTRAVENOUS
Status: COMPLETED | OUTPATIENT
Start: 2021-04-26 | End: 2021-04-26

## 2021-04-26 RX ORDER — SODIUM CHLORIDE 9 MG/ML
INJECTION, SOLUTION INTRAVENOUS CONTINUOUS
Status: DISCONTINUED | OUTPATIENT
Start: 2021-04-26 | End: 2021-04-29

## 2021-04-26 RX ORDER — MUPIROCIN 20 MG/G
OINTMENT TOPICAL
Status: DISCONTINUED | OUTPATIENT
Start: 2021-04-26 | End: 2021-04-26 | Stop reason: HOSPADM

## 2021-04-26 RX ORDER — ROCURONIUM BROMIDE 10 MG/ML
INJECTION, SOLUTION INTRAVENOUS
Status: DISCONTINUED | OUTPATIENT
Start: 2021-04-26 | End: 2021-04-26

## 2021-04-26 RX ORDER — SODIUM BICARBONATE 1 MEQ/ML
100 SYRINGE (ML) INTRAVENOUS ONCE
Status: COMPLETED | OUTPATIENT
Start: 2021-04-26 | End: 2021-04-26

## 2021-04-26 RX ORDER — ONDANSETRON 2 MG/ML
4 INJECTION INTRAMUSCULAR; INTRAVENOUS EVERY 6 HOURS PRN
Status: DISCONTINUED | OUTPATIENT
Start: 2021-04-26 | End: 2021-05-01 | Stop reason: HOSPADM

## 2021-04-26 RX ORDER — LIDOCAINE HYDROCHLORIDE 10 MG/ML
1 INJECTION, SOLUTION EPIDURAL; INFILTRATION; INTRACAUDAL; PERINEURAL ONCE
Status: COMPLETED | OUTPATIENT
Start: 2021-04-26 | End: 2021-04-26

## 2021-04-26 RX ORDER — POTASSIUM CHLORIDE 14.9 MG/ML
20 INJECTION INTRAVENOUS
Status: DISCONTINUED | OUTPATIENT
Start: 2021-04-26 | End: 2021-04-29

## 2021-04-26 RX ORDER — FENTANYL CITRATE-0.9 % NACL/PF 10 MCG/ML
0-250 PLASTIC BAG, INJECTION (ML) INTRAVENOUS CONTINUOUS
Status: DISCONTINUED | OUTPATIENT
Start: 2021-04-26 | End: 2021-04-26

## 2021-04-26 RX ORDER — PAPAVERINE HYDROCHLORIDE 30 MG/ML
INJECTION INTRAMUSCULAR; INTRAVENOUS
Status: DISCONTINUED | OUTPATIENT
Start: 2021-04-26 | End: 2021-04-26 | Stop reason: HOSPADM

## 2021-04-26 RX ORDER — ESMOLOL HYDROCHLORIDE 10 MG/ML
INJECTION INTRAVENOUS
Status: DISCONTINUED | OUTPATIENT
Start: 2021-04-26 | End: 2021-04-26

## 2021-04-26 RX ORDER — SODIUM CHLORIDE 0.9 % (FLUSH) 0.9 %
10 SYRINGE (ML) INJECTION
Status: DISCONTINUED | OUTPATIENT
Start: 2021-04-26 | End: 2021-04-29

## 2021-04-26 RX ORDER — DEXMEDETOMIDINE HYDROCHLORIDE 4 UG/ML
0-1.4 INJECTION, SOLUTION INTRAVENOUS CONTINUOUS
Status: DISCONTINUED | OUTPATIENT
Start: 2021-04-26 | End: 2021-04-27

## 2021-04-26 RX ORDER — MUPIROCIN 20 MG/G
OINTMENT TOPICAL 2 TIMES DAILY
Status: DISCONTINUED | OUTPATIENT
Start: 2021-04-26 | End: 2021-05-01 | Stop reason: HOSPADM

## 2021-04-26 RX ADMIN — CEFAZOLIN 2 G: 330 INJECTION, POWDER, FOR SOLUTION INTRAMUSCULAR; INTRAVENOUS at 03:04

## 2021-04-26 RX ADMIN — NITROGLYCERIN 50 MCG: 5 INJECTION, SOLUTION INTRAVENOUS at 11:04

## 2021-04-26 RX ADMIN — LIDOCAINE HYDROCHLORIDE 1 MG: 10 INJECTION, SOLUTION EPIDURAL; INFILTRATION; INTRACAUDAL at 09:04

## 2021-04-26 RX ADMIN — ROCURONIUM BROMIDE 50 MG: 10 INJECTION, SOLUTION INTRAVENOUS at 03:04

## 2021-04-26 RX ADMIN — Medication 100 MEQ: at 07:04

## 2021-04-26 RX ADMIN — PHENYLEPHRINE HYDROCHLORIDE 100 MCG: 10 INJECTION INTRAVENOUS at 12:04

## 2021-04-26 RX ADMIN — Medication 25 MCG/HR: at 05:04

## 2021-04-26 RX ADMIN — CLEVIPIDINE 2 MG/HR: 0.5 EMULSION INTRAVENOUS at 08:04

## 2021-04-26 RX ADMIN — ALBUMIN (HUMAN) 25 G: 12.5 SOLUTION INTRAVENOUS at 07:04

## 2021-04-26 RX ADMIN — ALBUMIN (HUMAN) 25 G: 12.5 SOLUTION INTRAVENOUS at 05:04

## 2021-04-26 RX ADMIN — FENTANYL CITRATE 25 MCG: 50 INJECTION, SOLUTION INTRAMUSCULAR; INTRAVENOUS at 05:04

## 2021-04-26 RX ADMIN — DEXMEDETOMIDINE HYDROCHLORIDE 0.4 MCG/KG/HR: 4 INJECTION, SOLUTION INTRAVENOUS at 08:04

## 2021-04-26 RX ADMIN — PROTAMINE SULFATE 250 MG: 10 INJECTION, SOLUTION INTRAVENOUS at 03:04

## 2021-04-26 RX ADMIN — CALCIUM CHLORIDE 500 MG: 100 INJECTION, SOLUTION INTRAVENOUS at 03:04

## 2021-04-26 RX ADMIN — NITROGLYCERIN 25 MCG: 5 INJECTION, SOLUTION INTRAVENOUS at 11:04

## 2021-04-26 RX ADMIN — HYDROMORPHONE HYDROCHLORIDE 0.5 MG: 1 INJECTION, SOLUTION INTRAMUSCULAR; INTRAVENOUS; SUBCUTANEOUS at 11:04

## 2021-04-26 RX ADMIN — FENTANYL CITRATE 50 MCG: 50 INJECTION, SOLUTION INTRAMUSCULAR; INTRAVENOUS at 11:04

## 2021-04-26 RX ADMIN — MUPIROCIN: 20 OINTMENT TOPICAL at 09:04

## 2021-04-26 RX ADMIN — ESMOLOL HYDROCHLORIDE 10 MCG: 10 INJECTION INTRAVENOUS at 11:04

## 2021-04-26 RX ADMIN — NITROGLYCERIN 200 MCG: 5 INJECTION, SOLUTION INTRAVENOUS at 11:04

## 2021-04-26 RX ADMIN — NOREPINEPHRINE BITARTRATE 8 MCG: 1 INJECTION, SOLUTION, CONCENTRATE INTRAVENOUS at 01:04

## 2021-04-26 RX ADMIN — ALBUMIN HUMAN 25 G: 50 SOLUTION INTRAVENOUS at 05:04

## 2021-04-26 RX ADMIN — MUPIROCIN: 20 OINTMENT TOPICAL at 08:04

## 2021-04-26 RX ADMIN — EPINEPHRINE 0.02 MCG/KG/MIN: 1 INJECTION INTRAMUSCULAR; INTRAVENOUS; SUBCUTANEOUS at 09:04

## 2021-04-26 RX ADMIN — LIDOCAINE HYDROCHLORIDE 100 MG: 20 INJECTION, SOLUTION INTRAVENOUS at 11:04

## 2021-04-26 RX ADMIN — PROPOFOL 50 MG: 10 INJECTION, EMULSION INTRAVENOUS at 11:04

## 2021-04-26 RX ADMIN — SODIUM BICARBONATE 100 MEQ: 84 INJECTION, SOLUTION INTRAVENOUS at 07:04

## 2021-04-26 RX ADMIN — NOREPINEPHRINE BITARTRATE 16 MCG: 1 INJECTION, SOLUTION, CONCENTRATE INTRAVENOUS at 12:04

## 2021-04-26 RX ADMIN — SODIUM CHLORIDE 1 MG/KG/HR: 9 INJECTION, SOLUTION INTRAVENOUS at 11:04

## 2021-04-26 RX ADMIN — ALBUMIN HUMAN 25 G: 50 SOLUTION INTRAVENOUS at 09:04

## 2021-04-26 RX ADMIN — CEFAZOLIN 2 G: 330 INJECTION, POWDER, FOR SOLUTION INTRAMUSCULAR; INTRAVENOUS at 11:04

## 2021-04-26 RX ADMIN — ALBUMIN HUMAN 25 G: 50 SOLUTION INTRAVENOUS at 07:04

## 2021-04-26 RX ADMIN — PROPOFOL 100 MG: 10 INJECTION, EMULSION INTRAVENOUS at 11:04

## 2021-04-26 RX ADMIN — ROCURONIUM BROMIDE 100 MG: 10 INJECTION, SOLUTION INTRAVENOUS at 11:04

## 2021-04-26 RX ADMIN — SODIUM CHLORIDE 1 UNITS/HR: 9 INJECTION, SOLUTION INTRAVENOUS at 08:04

## 2021-04-26 RX ADMIN — ALBUMIN (HUMAN) 25 G: 12.5 SOLUTION INTRAVENOUS at 09:04

## 2021-04-26 RX ADMIN — DOCUSATE SODIUM 100 MG: 100 CAPSULE, LIQUID FILLED ORAL at 08:04

## 2021-04-26 RX ADMIN — TRANEXAMIC ACID 800 MG: 1 INJECTION, SOLUTION INTRAVENOUS at 11:04

## 2021-04-26 RX ADMIN — HEPARIN SODIUM 26000 UNITS: 1000 INJECTION, SOLUTION INTRAVENOUS; SUBCUTANEOUS at 01:04

## 2021-04-26 RX ADMIN — LIDOCAINE HYDROCHLORIDE 100 MG: 20 INJECTION, SOLUTION EPIDURAL; INFILTRATION; INTRACAUDAL at 03:04

## 2021-04-26 RX ADMIN — CLEVIPIDINE 1 MG/HR: 0.5 EMULSION INTRAVENOUS at 11:04

## 2021-04-26 RX ADMIN — HEPARIN SODIUM 2500 UNITS: 1000 INJECTION, SOLUTION INTRAVENOUS; SUBCUTANEOUS at 12:04

## 2021-04-26 RX ADMIN — CALCIUM GLUCONATE 1 G: 94 INJECTION, SOLUTION INTRAVENOUS at 11:04

## 2021-04-26 RX ADMIN — METOPROLOL TARTRATE 25 MG: 25 TABLET, FILM COATED ORAL at 09:04

## 2021-04-26 RX ADMIN — SODIUM CHLORIDE, SODIUM GLUCONATE, SODIUM ACETATE, POTASSIUM CHLORIDE, MAGNESIUM CHLORIDE, SODIUM PHOSPHATE, DIBASIC, AND POTASSIUM PHOSPHATE: .53; .5; .37; .037; .03; .012; .00082 INJECTION, SOLUTION INTRAVENOUS at 11:04

## 2021-04-26 RX ADMIN — MIDAZOLAM HYDROCHLORIDE 1 MG: 1 INJECTION, SOLUTION INTRAMUSCULAR; INTRAVENOUS at 10:04

## 2021-04-26 RX ADMIN — NOREPINEPHRINE BITARTRATE 0.02 MCG/KG/MIN: 1 INJECTION, SOLUTION, CONCENTRATE INTRAVENOUS at 12:04

## 2021-04-26 RX ADMIN — FENTANYL CITRATE 150 MCG: 50 INJECTION, SOLUTION INTRAMUSCULAR; INTRAVENOUS at 11:04

## 2021-04-26 RX ADMIN — OXYCODONE HYDROCHLORIDE 5 MG: 5 TABLET ORAL at 08:04

## 2021-04-27 LAB
ALBUMIN SERPL BCP-MCNC: 3.9 G/DL (ref 3.5–5.2)
ALLENS TEST: ABNORMAL
ALLENS TEST: NORMAL
ALP SERPL-CCNC: 28 U/L (ref 55–135)
ALT SERPL W/O P-5'-P-CCNC: 20 U/L (ref 10–44)
ANION GAP SERPL CALC-SCNC: 10 MMOL/L (ref 8–16)
ANION GAP SERPL CALC-SCNC: 10 MMOL/L (ref 8–16)
ANION GAP SERPL CALC-SCNC: 7 MMOL/L (ref 8–16)
ANISOCYTOSIS BLD QL SMEAR: SLIGHT
ANISOCYTOSIS BLD QL SMEAR: SLIGHT
APTT BLDCRRT: 27 SEC (ref 21–32)
APTT BLDCRRT: 28 SEC (ref 21–32)
AST SERPL-CCNC: 49 U/L (ref 10–40)
BASOPHILS # BLD AUTO: 0.01 K/UL (ref 0–0.2)
BASOPHILS # BLD AUTO: 0.01 K/UL (ref 0–0.2)
BASOPHILS NFR BLD: 0.1 % (ref 0–1.9)
BASOPHILS NFR BLD: 0.1 % (ref 0–1.9)
BILIRUB SERPL-MCNC: 1 MG/DL (ref 0.1–1)
BLD PROD TYP BPU: NORMAL
BLD PROD TYP BPU: NORMAL
BLOOD UNIT EXPIRATION DATE: NORMAL
BLOOD UNIT EXPIRATION DATE: NORMAL
BLOOD UNIT TYPE CODE: 6200
BLOOD UNIT TYPE CODE: 6200
BLOOD UNIT TYPE: NORMAL
BLOOD UNIT TYPE: NORMAL
BUN SERPL-MCNC: 10 MG/DL (ref 8–23)
BUN SERPL-MCNC: 10 MG/DL (ref 8–23)
BUN SERPL-MCNC: 11 MG/DL (ref 8–23)
CALCIUM SERPL-MCNC: 7.6 MG/DL (ref 8.7–10.5)
CALCIUM SERPL-MCNC: 7.8 MG/DL (ref 8.7–10.5)
CALCIUM SERPL-MCNC: 8.2 MG/DL (ref 8.7–10.5)
CHLORIDE SERPL-SCNC: 110 MMOL/L (ref 95–110)
CHLORIDE SERPL-SCNC: 111 MMOL/L (ref 95–110)
CHLORIDE SERPL-SCNC: 112 MMOL/L (ref 95–110)
CO2 SERPL-SCNC: 19 MMOL/L (ref 23–29)
CO2 SERPL-SCNC: 20 MMOL/L (ref 23–29)
CO2 SERPL-SCNC: 23 MMOL/L (ref 23–29)
CODING SYSTEM: NORMAL
CODING SYSTEM: NORMAL
CREAT SERPL-MCNC: 0.9 MG/DL (ref 0.5–1.4)
CREAT SERPL-MCNC: 0.9 MG/DL (ref 0.5–1.4)
CREAT SERPL-MCNC: 1.1 MG/DL (ref 0.5–1.4)
DELSYS: ABNORMAL
DELSYS: NORMAL
DIFFERENTIAL METHOD: ABNORMAL
DIFFERENTIAL METHOD: ABNORMAL
DISPENSE STATUS: NORMAL
DISPENSE STATUS: NORMAL
EOSINOPHIL # BLD AUTO: 0 K/UL (ref 0–0.5)
EOSINOPHIL # BLD AUTO: 0 K/UL (ref 0–0.5)
EOSINOPHIL NFR BLD: 0 % (ref 0–8)
EOSINOPHIL NFR BLD: 0 % (ref 0–8)
ERYTHROCYTE [DISTWIDTH] IN BLOOD BY AUTOMATED COUNT: 15.7 % (ref 11.5–14.5)
ERYTHROCYTE [DISTWIDTH] IN BLOOD BY AUTOMATED COUNT: 20 % (ref 11.5–14.5)
EST. GFR  (AFRICAN AMERICAN): >60 ML/MIN/1.73 M^2
EST. GFR  (NON AFRICAN AMERICAN): >60 ML/MIN/1.73 M^2
FIO2: 32
FIO2: 40
FIO2: 50
FIO2: 50
FLOW: 3
FLOW: 4
FLOW: 4
GLUCOSE SERPL-MCNC: 122 MG/DL (ref 70–110)
GLUCOSE SERPL-MCNC: 125 MG/DL (ref 70–110)
GLUCOSE SERPL-MCNC: 148 MG/DL (ref 70–110)
GLUCOSE SERPL-MCNC: 155 MG/DL (ref 70–110)
GLUCOSE SERPL-MCNC: 162 MG/DL (ref 70–110)
HCO3 UR-SCNC: 22.3 MMOL/L (ref 24–28)
HCO3 UR-SCNC: 22.3 MMOL/L (ref 24–28)
HCO3 UR-SCNC: 23.8 MMOL/L (ref 24–28)
HCO3 UR-SCNC: 25 MMOL/L (ref 24–28)
HCO3 UR-SCNC: 25.6 MMOL/L (ref 24–28)
HCT VFR BLD AUTO: 20.9 % (ref 40–54)
HCT VFR BLD AUTO: 27.5 % (ref 40–54)
HCT VFR BLD CALC: 18 %PCV (ref 36–54)
HCT VFR BLD CALC: 19 %PCV (ref 36–54)
HCT VFR BLD CALC: 21 %PCV (ref 36–54)
HCT VFR BLD CALC: 23 %PCV (ref 36–54)
HCT VFR BLD CALC: 34 %PCV (ref 36–54)
HGB BLD-MCNC: 6.8 G/DL (ref 14–18)
HGB BLD-MCNC: 9.4 G/DL (ref 14–18)
HYPOCHROMIA BLD QL SMEAR: ABNORMAL
HYPOCHROMIA BLD QL SMEAR: ABNORMAL
IMM GRANULOCYTES # BLD AUTO: 0.03 K/UL (ref 0–0.04)
IMM GRANULOCYTES # BLD AUTO: 0.04 K/UL (ref 0–0.04)
IMM GRANULOCYTES NFR BLD AUTO: 0.3 % (ref 0–0.5)
IMM GRANULOCYTES NFR BLD AUTO: 0.5 % (ref 0–0.5)
INR PPP: 1.1 (ref 0.8–1.2)
INR PPP: 1.1 (ref 0.8–1.2)
LDH SERPL L TO P-CCNC: 0.97 MMOL/L (ref 0.36–1.25)
LDH SERPL L TO P-CCNC: 1.04 MMOL/L (ref 0.36–1.25)
LDH SERPL L TO P-CCNC: 1.11 MMOL/L (ref 0.36–1.25)
LDH SERPL L TO P-CCNC: 2.78 MMOL/L (ref 0.36–1.25)
LDH SERPL L TO P-CCNC: 3.47 MMOL/L (ref 0.36–1.25)
LYMPHOCYTES # BLD AUTO: 0.8 K/UL (ref 1–4.8)
LYMPHOCYTES # BLD AUTO: 1.1 K/UL (ref 1–4.8)
LYMPHOCYTES NFR BLD: 12.6 % (ref 18–48)
LYMPHOCYTES NFR BLD: 9.7 % (ref 18–48)
MAGNESIUM SERPL-MCNC: 1.9 MG/DL (ref 1.6–2.6)
MAGNESIUM SERPL-MCNC: 2 MG/DL (ref 1.6–2.6)
MAGNESIUM SERPL-MCNC: 2.1 MG/DL (ref 1.6–2.6)
MCH RBC QN AUTO: 22.1 PG (ref 27–31)
MCH RBC QN AUTO: 24.5 PG (ref 27–31)
MCHC RBC AUTO-ENTMCNC: 32.5 G/DL (ref 32–36)
MCHC RBC AUTO-ENTMCNC: 34.2 G/DL (ref 32–36)
MCV RBC AUTO: 68 FL (ref 82–98)
MCV RBC AUTO: 72 FL (ref 82–98)
MODE: ABNORMAL
MODE: NORMAL
MONOCYTES # BLD AUTO: 0.6 K/UL (ref 0.3–1)
MONOCYTES # BLD AUTO: 0.7 K/UL (ref 0.3–1)
MONOCYTES NFR BLD: 7.8 % (ref 4–15)
MONOCYTES NFR BLD: 8.2 % (ref 4–15)
NEUTROPHILS # BLD AUTO: 6.6 K/UL (ref 1.8–7.7)
NEUTROPHILS # BLD AUTO: 7 K/UL (ref 1.8–7.7)
NEUTROPHILS NFR BLD: 78.8 % (ref 38–73)
NEUTROPHILS NFR BLD: 81.9 % (ref 38–73)
NRBC BLD-RTO: 0 /100 WBC
NRBC BLD-RTO: 0 /100 WBC
NUM UNITS TRANS PACKED RBC: NORMAL
NUM UNITS TRANS PACKED RBC: NORMAL
OVALOCYTES BLD QL SMEAR: ABNORMAL
OVALOCYTES BLD QL SMEAR: ABNORMAL
PCO2 BLDA: 36.7 MMHG (ref 35–45)
PCO2 BLDA: 38.2 MMHG (ref 35–45)
PCO2 BLDA: 38.5 MMHG (ref 35–45)
PCO2 BLDA: 39 MMHG (ref 35–45)
PCO2 BLDA: 47.4 MMHG (ref 35–45)
PEEP: 5
PH SMN: 7.33 [PH] (ref 7.35–7.45)
PH SMN: 7.37 [PH] (ref 7.35–7.45)
PH SMN: 7.39 [PH] (ref 7.35–7.45)
PH SMN: 7.4 [PH] (ref 7.35–7.45)
PH SMN: 7.43 [PH] (ref 7.35–7.45)
PHOSPHATE SERPL-MCNC: 2.4 MG/DL (ref 2.7–4.5)
PHOSPHATE SERPL-MCNC: 2.5 MG/DL (ref 2.7–4.5)
PLATELET # BLD AUTO: 106 K/UL (ref 150–450)
PLATELET # BLD AUTO: 92 K/UL (ref 150–450)
PLATELET BLD QL SMEAR: ABNORMAL
PLATELET BLD QL SMEAR: ABNORMAL
PMV BLD AUTO: 11 FL (ref 9.2–12.9)
PMV BLD AUTO: 11.1 FL (ref 9.2–12.9)
PO2 BLDA: 102 MMHG (ref 80–100)
PO2 BLDA: 162 MMHG (ref 80–100)
PO2 BLDA: 345 MMHG (ref 80–100)
PO2 BLDA: 369 MMHG (ref 80–100)
PO2 BLDA: 51 MMHG (ref 80–100)
POC BE: -1 MMOL/L
POC BE: -1 MMOL/L
POC BE: -3 MMOL/L
POC BE: -3 MMOL/L
POC BE: 1 MMOL/L
POC IONIZED CALCIUM: 1.11 MMOL/L (ref 1.06–1.42)
POC IONIZED CALCIUM: 1.12 MMOL/L (ref 1.06–1.42)
POC IONIZED CALCIUM: 1.17 MMOL/L (ref 1.06–1.42)
POC IONIZED CALCIUM: 1.17 MMOL/L (ref 1.06–1.42)
POC IONIZED CALCIUM: 1.19 MMOL/L (ref 1.06–1.42)
POC SATURATED O2: 100 % (ref 95–100)
POC SATURATED O2: 100 % (ref 95–100)
POC SATURATED O2: 85 % (ref 95–100)
POC SATURATED O2: 97 % (ref 95–100)
POC SATURATED O2: 99 % (ref 95–100)
POC TCO2: 23 MMOL/L (ref 23–27)
POC TCO2: 23 MMOL/L (ref 23–27)
POC TCO2: 25 MMOL/L (ref 23–27)
POC TCO2: 26 MMOL/L (ref 23–27)
POC TCO2: 27 MMOL/L (ref 23–27)
POCT GLUCOSE: 110 MG/DL (ref 70–110)
POCT GLUCOSE: 114 MG/DL (ref 70–110)
POCT GLUCOSE: 114 MG/DL (ref 70–110)
POCT GLUCOSE: 117 MG/DL (ref 70–110)
POCT GLUCOSE: 118 MG/DL (ref 70–110)
POCT GLUCOSE: 129 MG/DL (ref 70–110)
POCT GLUCOSE: 131 MG/DL (ref 70–110)
POCT GLUCOSE: 133 MG/DL (ref 70–110)
POCT GLUCOSE: 135 MG/DL (ref 70–110)
POCT GLUCOSE: 152 MG/DL (ref 70–110)
POCT GLUCOSE: 165 MG/DL (ref 70–110)
POCT GLUCOSE: 96 MG/DL (ref 70–110)
POCT GLUCOSE: 97 MG/DL (ref 70–110)
POCT GLUCOSE: 97 MG/DL (ref 70–110)
POIKILOCYTOSIS BLD QL SMEAR: SLIGHT
POIKILOCYTOSIS BLD QL SMEAR: SLIGHT
POLYCHROMASIA BLD QL SMEAR: ABNORMAL
POLYCHROMASIA BLD QL SMEAR: ABNORMAL
POTASSIUM BLD-SCNC: 3.8 MMOL/L (ref 3.5–5.1)
POTASSIUM BLD-SCNC: 4 MMOL/L (ref 3.5–5.1)
POTASSIUM BLD-SCNC: 4.3 MMOL/L (ref 3.5–5.1)
POTASSIUM BLD-SCNC: 4.3 MMOL/L (ref 3.5–5.1)
POTASSIUM BLD-SCNC: 4.8 MMOL/L (ref 3.5–5.1)
POTASSIUM SERPL-SCNC: 3.9 MMOL/L (ref 3.5–5.1)
POTASSIUM SERPL-SCNC: 4.2 MMOL/L (ref 3.5–5.1)
POTASSIUM SERPL-SCNC: 4.3 MMOL/L (ref 3.5–5.1)
POTASSIUM SERPL-SCNC: 4.3 MMOL/L (ref 3.5–5.1)
POTASSIUM SERPL-SCNC: 4.5 MMOL/L (ref 3.5–5.1)
PROT SERPL-MCNC: 5.4 G/DL (ref 6–8.4)
PROTHROMBIN TIME: 11.7 SEC (ref 9–12.5)
PROTHROMBIN TIME: 11.9 SEC (ref 9–12.5)
PS: 10
PS: 10
PS: 7
RBC # BLD AUTO: 3.08 M/UL (ref 4.6–6.2)
RBC # BLD AUTO: 3.83 M/UL (ref 4.6–6.2)
SAMPLE: ABNORMAL
SAMPLE: NORMAL
SITE: ABNORMAL
SITE: NORMAL
SODIUM BLD-SCNC: 140 MMOL/L (ref 136–145)
SODIUM BLD-SCNC: 141 MMOL/L (ref 136–145)
SODIUM BLD-SCNC: 144 MMOL/L (ref 136–145)
SODIUM SERPL-SCNC: 140 MMOL/L (ref 136–145)
SODIUM SERPL-SCNC: 141 MMOL/L (ref 136–145)
SODIUM SERPL-SCNC: 141 MMOL/L (ref 136–145)
SP02: 98
SP02: 98
SPHEROCYTES BLD QL SMEAR: ABNORMAL
TARGETS BLD QL SMEAR: ABNORMAL
TARGETS BLD QL SMEAR: ABNORMAL
TIME NOTIFIED: 700
TIME NOTIFIED: 700
WBC # BLD AUTO: 8.07 K/UL (ref 3.9–12.7)
WBC # BLD AUTO: 8.9 K/UL (ref 3.9–12.7)

## 2021-04-27 PROCEDURE — P9016 RBC LEUKOCYTES REDUCED: HCPCS | Performed by: NURSE PRACTITIONER

## 2021-04-27 PROCEDURE — P9045 ALBUMIN (HUMAN), 5%, 250 ML: HCPCS | Mod: JG | Performed by: STUDENT IN AN ORGANIZED HEALTH CARE EDUCATION/TRAINING PROGRAM

## 2021-04-27 PROCEDURE — 25000003 PHARM REV CODE 250: Performed by: STUDENT IN AN ORGANIZED HEALTH CARE EDUCATION/TRAINING PROGRAM

## 2021-04-27 PROCEDURE — 85014 HEMATOCRIT: CPT

## 2021-04-27 PROCEDURE — 82330 ASSAY OF CALCIUM: CPT

## 2021-04-27 PROCEDURE — 99900035 HC TECH TIME PER 15 MIN (STAT)

## 2021-04-27 PROCEDURE — 83735 ASSAY OF MAGNESIUM: CPT | Mod: 91 | Performed by: STUDENT IN AN ORGANIZED HEALTH CARE EDUCATION/TRAINING PROGRAM

## 2021-04-27 PROCEDURE — 80048 BASIC METABOLIC PNL TOTAL CA: CPT | Performed by: STUDENT IN AN ORGANIZED HEALTH CARE EDUCATION/TRAINING PROGRAM

## 2021-04-27 PROCEDURE — 97535 SELF CARE MNGMENT TRAINING: CPT

## 2021-04-27 PROCEDURE — 80053 COMPREHEN METABOLIC PANEL: CPT | Performed by: STUDENT IN AN ORGANIZED HEALTH CARE EDUCATION/TRAINING PROGRAM

## 2021-04-27 PROCEDURE — 25000003 PHARM REV CODE 250: Performed by: THORACIC SURGERY (CARDIOTHORACIC VASCULAR SURGERY)

## 2021-04-27 PROCEDURE — C9248 INJ, CLEVIDIPINE BUTYRATE: HCPCS | Performed by: STUDENT IN AN ORGANIZED HEALTH CARE EDUCATION/TRAINING PROGRAM

## 2021-04-27 PROCEDURE — 83605 ASSAY OF LACTIC ACID: CPT

## 2021-04-27 PROCEDURE — 99222 1ST HOSP IP/OBS MODERATE 55: CPT | Mod: ,,, | Performed by: NURSE PRACTITIONER

## 2021-04-27 PROCEDURE — 99291 CRITICAL CARE FIRST HOUR: CPT | Mod: GC,,, | Performed by: SURGERY

## 2021-04-27 PROCEDURE — 94761 N-INVAS EAR/PLS OXIMETRY MLT: CPT

## 2021-04-27 PROCEDURE — 97161 PT EVAL LOW COMPLEX 20 MIN: CPT

## 2021-04-27 PROCEDURE — 84132 ASSAY OF SERUM POTASSIUM: CPT

## 2021-04-27 PROCEDURE — 84295 ASSAY OF SERUM SODIUM: CPT

## 2021-04-27 PROCEDURE — 27000190 HC CPAP FULL FACE MASK W/VALVE

## 2021-04-27 PROCEDURE — 99291 PR CRITICAL CARE, E/M 30-74 MINUTES: ICD-10-PCS | Mod: GC,,, | Performed by: SURGERY

## 2021-04-27 PROCEDURE — C9248 INJ, CLEVIDIPINE BUTYRATE: HCPCS | Performed by: THORACIC SURGERY (CARDIOTHORACIC VASCULAR SURGERY)

## 2021-04-27 PROCEDURE — 85610 PROTHROMBIN TIME: CPT | Performed by: STUDENT IN AN ORGANIZED HEALTH CARE EDUCATION/TRAINING PROGRAM

## 2021-04-27 PROCEDURE — 27000221 HC OXYGEN, UP TO 24 HOURS

## 2021-04-27 PROCEDURE — 84100 ASSAY OF PHOSPHORUS: CPT | Performed by: STUDENT IN AN ORGANIZED HEALTH CARE EDUCATION/TRAINING PROGRAM

## 2021-04-27 PROCEDURE — 37799 UNLISTED PX VASCULAR SURGERY: CPT

## 2021-04-27 PROCEDURE — 25000242 PHARM REV CODE 250 ALT 637 W/ HCPCS: Performed by: STUDENT IN AN ORGANIZED HEALTH CARE EDUCATION/TRAINING PROGRAM

## 2021-04-27 PROCEDURE — 63600175 PHARM REV CODE 636 W HCPCS: Mod: JG | Performed by: STUDENT IN AN ORGANIZED HEALTH CARE EDUCATION/TRAINING PROGRAM

## 2021-04-27 PROCEDURE — 82803 BLOOD GASES ANY COMBINATION: CPT

## 2021-04-27 PROCEDURE — 80048 BASIC METABOLIC PNL TOTAL CA: CPT | Mod: 91 | Performed by: STUDENT IN AN ORGANIZED HEALTH CARE EDUCATION/TRAINING PROGRAM

## 2021-04-27 PROCEDURE — 63600175 PHARM REV CODE 636 W HCPCS: Performed by: THORACIC SURGERY (CARDIOTHORACIC VASCULAR SURGERY)

## 2021-04-27 PROCEDURE — 63600175 PHARM REV CODE 636 W HCPCS: Performed by: STUDENT IN AN ORGANIZED HEALTH CARE EDUCATION/TRAINING PROGRAM

## 2021-04-27 PROCEDURE — 94640 AIRWAY INHALATION TREATMENT: CPT

## 2021-04-27 PROCEDURE — 97165 OT EVAL LOW COMPLEX 30 MIN: CPT

## 2021-04-27 PROCEDURE — 97110 THERAPEUTIC EXERCISES: CPT

## 2021-04-27 PROCEDURE — 20000000 HC ICU ROOM

## 2021-04-27 PROCEDURE — 99222 PR INITIAL HOSPITAL CARE,LEVL II: ICD-10-PCS | Mod: ,,, | Performed by: NURSE PRACTITIONER

## 2021-04-27 PROCEDURE — 84132 ASSAY OF SERUM POTASSIUM: CPT | Performed by: STUDENT IN AN ORGANIZED HEALTH CARE EDUCATION/TRAINING PROGRAM

## 2021-04-27 PROCEDURE — 94660 CPAP INITIATION&MGMT: CPT

## 2021-04-27 PROCEDURE — 85730 THROMBOPLASTIN TIME PARTIAL: CPT | Performed by: STUDENT IN AN ORGANIZED HEALTH CARE EDUCATION/TRAINING PROGRAM

## 2021-04-27 PROCEDURE — 85025 COMPLETE CBC W/AUTO DIFF WBC: CPT | Mod: 91 | Performed by: STUDENT IN AN ORGANIZED HEALTH CARE EDUCATION/TRAINING PROGRAM

## 2021-04-27 PROCEDURE — 36430 TRANSFUSION BLD/BLD COMPNT: CPT

## 2021-04-27 RX ORDER — NALOXONE HCL 0.4 MG/ML
0.02 VIAL (ML) INJECTION
Status: DISCONTINUED | OUTPATIENT
Start: 2021-04-27 | End: 2021-04-27

## 2021-04-27 RX ORDER — OXYCODONE HYDROCHLORIDE 5 MG/1
5 TABLET ORAL
Status: DISCONTINUED | OUTPATIENT
Start: 2021-04-27 | End: 2021-05-01 | Stop reason: HOSPADM

## 2021-04-27 RX ORDER — GLUCAGON 1 MG
1 KIT INJECTION
Status: DISCONTINUED | OUTPATIENT
Start: 2021-04-27 | End: 2021-04-29

## 2021-04-27 RX ORDER — ACETAMINOPHEN 10 MG/ML
1000 INJECTION, SOLUTION INTRAVENOUS ONCE
Status: COMPLETED | OUTPATIENT
Start: 2021-04-27 | End: 2021-04-27

## 2021-04-27 RX ORDER — HYDROMORPHONE HCL IN 0.9% NACL 6 MG/30 ML
PATIENT CONTROLLED ANALGESIA SYRINGE INTRAVENOUS CONTINUOUS
Status: DISCONTINUED | OUTPATIENT
Start: 2021-04-27 | End: 2021-04-27

## 2021-04-27 RX ORDER — FAMOTIDINE 20 MG/1
20 TABLET, FILM COATED ORAL 2 TIMES DAILY
Status: DISCONTINUED | OUTPATIENT
Start: 2021-04-27 | End: 2021-05-01 | Stop reason: HOSPADM

## 2021-04-27 RX ORDER — IBUPROFEN 200 MG
24 TABLET ORAL
Status: DISCONTINUED | OUTPATIENT
Start: 2021-04-27 | End: 2021-04-29

## 2021-04-27 RX ORDER — IPRATROPIUM BROMIDE AND ALBUTEROL SULFATE 2.5; .5 MG/3ML; MG/3ML
3 SOLUTION RESPIRATORY (INHALATION) EVERY 4 HOURS PRN
Status: DISCONTINUED | OUTPATIENT
Start: 2021-04-27 | End: 2021-05-01 | Stop reason: HOSPADM

## 2021-04-27 RX ORDER — HYDROCODONE BITARTRATE AND ACETAMINOPHEN 500; 5 MG/1; MG/1
TABLET ORAL
Status: DISCONTINUED | OUTPATIENT
Start: 2021-04-27 | End: 2021-04-29

## 2021-04-27 RX ORDER — AMLODIPINE BESYLATE 5 MG/1
5 TABLET ORAL ONCE
Status: COMPLETED | OUTPATIENT
Start: 2021-04-27 | End: 2021-04-27

## 2021-04-27 RX ORDER — FUROSEMIDE 10 MG/ML
40 INJECTION INTRAMUSCULAR; INTRAVENOUS ONCE
Status: COMPLETED | OUTPATIENT
Start: 2021-04-28 | End: 2021-04-28

## 2021-04-27 RX ORDER — ACETAMINOPHEN 500 MG
1000 TABLET ORAL EVERY 8 HOURS
Status: DISCONTINUED | OUTPATIENT
Start: 2021-04-27 | End: 2021-05-01 | Stop reason: HOSPADM

## 2021-04-27 RX ORDER — HYDROMORPHONE HYDROCHLORIDE 1 MG/ML
0.5 INJECTION, SOLUTION INTRAMUSCULAR; INTRAVENOUS; SUBCUTANEOUS
Status: DISCONTINUED | OUTPATIENT
Start: 2021-04-27 | End: 2021-04-27

## 2021-04-27 RX ORDER — OXYCODONE HYDROCHLORIDE 10 MG/1
10 TABLET ORAL
Status: DISCONTINUED | OUTPATIENT
Start: 2021-04-27 | End: 2021-05-01 | Stop reason: HOSPADM

## 2021-04-27 RX ORDER — HYDROMORPHONE HYDROCHLORIDE 1 MG/ML
0.5 INJECTION, SOLUTION INTRAMUSCULAR; INTRAVENOUS; SUBCUTANEOUS EVERY 6 HOURS PRN
Status: DISCONTINUED | OUTPATIENT
Start: 2021-04-27 | End: 2021-05-01 | Stop reason: HOSPADM

## 2021-04-27 RX ORDER — INSULIN ASPART 100 [IU]/ML
0-5 INJECTION, SOLUTION INTRAVENOUS; SUBCUTANEOUS
Status: DISCONTINUED | OUTPATIENT
Start: 2021-04-27 | End: 2021-04-29

## 2021-04-27 RX ORDER — ATORVASTATIN CALCIUM 20 MG/1
40 TABLET, FILM COATED ORAL NIGHTLY
Status: DISCONTINUED | OUTPATIENT
Start: 2021-04-27 | End: 2021-05-01 | Stop reason: HOSPADM

## 2021-04-27 RX ORDER — IBUPROFEN 200 MG
16 TABLET ORAL
Status: DISCONTINUED | OUTPATIENT
Start: 2021-04-27 | End: 2021-04-29

## 2021-04-27 RX ORDER — FUROSEMIDE 10 MG/ML
20 INJECTION INTRAMUSCULAR; INTRAVENOUS ONCE
Status: COMPLETED | OUTPATIENT
Start: 2021-04-27 | End: 2021-04-27

## 2021-04-27 RX ORDER — ONDANSETRON 2 MG/ML
4 INJECTION INTRAMUSCULAR; INTRAVENOUS ONCE
Status: COMPLETED | OUTPATIENT
Start: 2021-04-27 | End: 2021-04-27

## 2021-04-27 RX ORDER — ISOSORBIDE MONONITRATE 30 MG/1
30 TABLET, EXTENDED RELEASE ORAL ONCE
Status: COMPLETED | OUTPATIENT
Start: 2021-04-27 | End: 2021-04-27

## 2021-04-27 RX ORDER — KETOROLAC TROMETHAMINE 15 MG/ML
15 INJECTION, SOLUTION INTRAMUSCULAR; INTRAVENOUS ONCE
Status: COMPLETED | OUTPATIENT
Start: 2021-04-27 | End: 2021-04-27

## 2021-04-27 RX ADMIN — POTASSIUM CHLORIDE 20 MEQ: 200 INJECTION, SOLUTION INTRAVENOUS at 10:04

## 2021-04-27 RX ADMIN — HYDROMORPHONE HYDROCHLORIDE 0.5 MG: 1 INJECTION, SOLUTION INTRAMUSCULAR; INTRAVENOUS; SUBCUTANEOUS at 03:04

## 2021-04-27 RX ADMIN — CLEVIPIDINE 1 MG/HR: 0.5 EMULSION INTRAVENOUS at 07:04

## 2021-04-27 RX ADMIN — AMLODIPINE BESYLATE 5 MG: 5 TABLET ORAL at 09:04

## 2021-04-27 RX ADMIN — PROMETHAZINE HYDROCHLORIDE 6.25 MG: 25 INJECTION INTRAMUSCULAR; INTRAVENOUS at 05:04

## 2021-04-27 RX ADMIN — ALBUMIN (HUMAN) 25 G: 12.5 SOLUTION INTRAVENOUS at 12:04

## 2021-04-27 RX ADMIN — CLEVIPIDINE 14 MG/HR: 0.5 EMULSION INTRAVENOUS at 08:04

## 2021-04-27 RX ADMIN — PROMETHAZINE HYDROCHLORIDE 6.25 MG: 25 INJECTION INTRAMUSCULAR; INTRAVENOUS at 09:04

## 2021-04-27 RX ADMIN — IPRATROPIUM BROMIDE AND ALBUTEROL SULFATE 3 ML: .5; 2.5 SOLUTION RESPIRATORY (INHALATION) at 04:04

## 2021-04-27 RX ADMIN — DOCUSATE SODIUM 100 MG: 100 CAPSULE, LIQUID FILLED ORAL at 08:04

## 2021-04-27 RX ADMIN — ONDANSETRON 4 MG: 2 INJECTION INTRAMUSCULAR; INTRAVENOUS at 09:04

## 2021-04-27 RX ADMIN — Medication: at 05:04

## 2021-04-27 RX ADMIN — CEFAZOLIN 2 G: 330 INJECTION, POWDER, FOR SOLUTION INTRAMUSCULAR; INTRAVENOUS at 02:04

## 2021-04-27 RX ADMIN — OXYCODONE HYDROCHLORIDE 10 MG: 10 TABLET ORAL at 10:04

## 2021-04-27 RX ADMIN — SODIUM PHOSPHATE, MONOBASIC, MONOHYDRATE 15 MMOL: 276; 142 INJECTION, SOLUTION INTRAVENOUS at 11:04

## 2021-04-27 RX ADMIN — ISOSORBIDE MONONITRATE 30 MG: 30 TABLET, EXTENDED RELEASE ORAL at 04:04

## 2021-04-27 RX ADMIN — SODIUM PHOSPHATE, MONOBASIC, MONOHYDRATE 20.01 MMOL: 276; 142 INJECTION, SOLUTION INTRAVENOUS at 02:04

## 2021-04-27 RX ADMIN — ONDANSETRON 4 MG: 2 INJECTION INTRAMUSCULAR; INTRAVENOUS at 05:04

## 2021-04-27 RX ADMIN — DOCUSATE SODIUM 100 MG: 100 CAPSULE, LIQUID FILLED ORAL at 09:04

## 2021-04-27 RX ADMIN — CLEVIPIDINE 15 MG/HR: 0.5 EMULSION INTRAVENOUS at 10:04

## 2021-04-27 RX ADMIN — CEFAZOLIN 2 G: 330 INJECTION, POWDER, FOR SOLUTION INTRAMUSCULAR; INTRAVENOUS at 08:04

## 2021-04-27 RX ADMIN — CLEVIPIDINE 6 MG/HR: 0.5 EMULSION INTRAVENOUS at 05:04

## 2021-04-27 RX ADMIN — OXYCODONE 5 MG: 5 TABLET ORAL at 06:04

## 2021-04-27 RX ADMIN — CLEVIPIDINE 14 MG/HR: 0.5 EMULSION INTRAVENOUS at 07:04

## 2021-04-27 RX ADMIN — MUPIROCIN: 20 OINTMENT TOPICAL at 09:04

## 2021-04-27 RX ADMIN — KETOROLAC TROMETHAMINE 15 MG: 15 INJECTION, SOLUTION INTRAMUSCULAR; INTRAVENOUS at 04:04

## 2021-04-27 RX ADMIN — HYDROMORPHONE HYDROCHLORIDE 0.5 MG: 1 INJECTION, SOLUTION INTRAMUSCULAR; INTRAVENOUS; SUBCUTANEOUS at 05:04

## 2021-04-27 RX ADMIN — OXYCODONE HYDROCHLORIDE 10 MG: 10 TABLET ORAL at 06:04

## 2021-04-27 RX ADMIN — FAMOTIDINE 20 MG: 20 TABLET ORAL at 08:04

## 2021-04-27 RX ADMIN — FAMOTIDINE 20 MG: 20 TABLET ORAL at 09:04

## 2021-04-27 RX ADMIN — CLEVIPIDINE 8 MG/HR: 0.5 EMULSION INTRAVENOUS at 02:04

## 2021-04-27 RX ADMIN — HYDROMORPHONE HYDROCHLORIDE 0.5 MG: 1 INJECTION, SOLUTION INTRAMUSCULAR; INTRAVENOUS; SUBCUTANEOUS at 01:04

## 2021-04-27 RX ADMIN — ATORVASTATIN CALCIUM 40 MG: 20 TABLET, FILM COATED ORAL at 09:04

## 2021-04-27 RX ADMIN — FUROSEMIDE 20 MG: 10 INJECTION, SOLUTION INTRAMUSCULAR; INTRAVENOUS at 05:04

## 2021-04-27 RX ADMIN — MUPIROCIN: 20 OINTMENT TOPICAL at 08:04

## 2021-04-27 RX ADMIN — POLYETHYLENE GLYCOL 3350 17 G: 17 POWDER, FOR SOLUTION ORAL at 08:04

## 2021-04-27 RX ADMIN — SODIUM CHLORIDE 5 ML/HR: 0.9 INJECTION, SOLUTION INTRAVENOUS at 02:04

## 2021-04-27 RX ADMIN — ACETAMINOPHEN 1000 MG: 500 TABLET, FILM COATED ORAL at 11:04

## 2021-04-27 RX ADMIN — SODIUM PHOSPHATE, MONOBASIC, MONOHYDRATE 15 MMOL: 276; 142 INJECTION, SOLUTION INTRAVENOUS at 03:04

## 2021-04-27 RX ADMIN — ACETAMINOPHEN 1000 MG: 10 INJECTION INTRAVENOUS at 04:04

## 2021-04-27 RX ADMIN — METHOCARBAMOL 1000 MG: 100 INJECTION INTRAMUSCULAR; INTRAVENOUS at 10:04

## 2021-04-27 RX ADMIN — OXYCODONE 5 MG: 5 TABLET ORAL at 03:04

## 2021-04-27 RX ADMIN — METHOCARBAMOL 1000 MG: 100 INJECTION INTRAMUSCULAR; INTRAVENOUS at 09:04

## 2021-04-27 RX ADMIN — ASPIRIN 325 MG ORAL TABLET 325 MG: 325 PILL ORAL at 08:04

## 2021-04-27 RX ADMIN — ACETAMINOPHEN 1000 MG: 500 TABLET, FILM COATED ORAL at 09:04

## 2021-04-28 LAB
ALBUMIN SERPL BCP-MCNC: 3.7 G/DL (ref 3.5–5.2)
ALP SERPL-CCNC: 38 U/L (ref 55–135)
ALT SERPL W/O P-5'-P-CCNC: 21 U/L (ref 10–44)
ANION GAP SERPL CALC-SCNC: 10 MMOL/L (ref 8–16)
APTT BLDCRRT: 29.1 SEC (ref 21–32)
AST SERPL-CCNC: 63 U/L (ref 10–40)
BASOPHILS # BLD AUTO: 0.03 K/UL (ref 0–0.2)
BASOPHILS NFR BLD: 0.3 % (ref 0–1.9)
BILIRUB SERPL-MCNC: 0.9 MG/DL (ref 0.1–1)
BUN SERPL-MCNC: 8 MG/DL (ref 8–23)
CALCIUM SERPL-MCNC: 8 MG/DL (ref 8.7–10.5)
CHLORIDE SERPL-SCNC: 105 MMOL/L (ref 95–110)
CO2 SERPL-SCNC: 23 MMOL/L (ref 23–29)
CREAT SERPL-MCNC: 1.1 MG/DL (ref 0.5–1.4)
DIFFERENTIAL METHOD: ABNORMAL
EOSINOPHIL # BLD AUTO: 0 K/UL (ref 0–0.5)
EOSINOPHIL NFR BLD: 0.3 % (ref 0–8)
ERYTHROCYTE [DISTWIDTH] IN BLOOD BY AUTOMATED COUNT: 20.4 % (ref 11.5–14.5)
EST. GFR  (AFRICAN AMERICAN): >60 ML/MIN/1.73 M^2
EST. GFR  (NON AFRICAN AMERICAN): >60 ML/MIN/1.73 M^2
GLUCOSE SERPL-MCNC: 104 MG/DL (ref 70–110)
HCT VFR BLD AUTO: 26.9 % (ref 40–54)
HGB BLD-MCNC: 9.2 G/DL (ref 14–18)
IMM GRANULOCYTES # BLD AUTO: 0.04 K/UL (ref 0–0.04)
IMM GRANULOCYTES NFR BLD AUTO: 0.4 % (ref 0–0.5)
INR PPP: 1 (ref 0.8–1.2)
LYMPHOCYTES # BLD AUTO: 1.7 K/UL (ref 1–4.8)
LYMPHOCYTES NFR BLD: 17.3 % (ref 18–48)
MAGNESIUM SERPL-MCNC: 1.8 MG/DL (ref 1.6–2.6)
MCH RBC QN AUTO: 24.9 PG (ref 27–31)
MCHC RBC AUTO-ENTMCNC: 34.2 G/DL (ref 32–36)
MCV RBC AUTO: 73 FL (ref 82–98)
MONOCYTES # BLD AUTO: 0.6 K/UL (ref 0.3–1)
MONOCYTES NFR BLD: 6.2 % (ref 4–15)
NEUTROPHILS # BLD AUTO: 7.3 K/UL (ref 1.8–7.7)
NEUTROPHILS NFR BLD: 75.5 % (ref 38–73)
NRBC BLD-RTO: 0 /100 WBC
PHOSPHATE SERPL-MCNC: 3.1 MG/DL (ref 2.7–4.5)
PLATELET # BLD AUTO: 93 K/UL (ref 150–450)
PMV BLD AUTO: 11.5 FL (ref 9.2–12.9)
POCT GLUCOSE: 100 MG/DL (ref 70–110)
POCT GLUCOSE: 107 MG/DL (ref 70–110)
POCT GLUCOSE: 108 MG/DL (ref 70–110)
POCT GLUCOSE: 85 MG/DL (ref 70–110)
POTASSIUM SERPL-SCNC: 4.3 MMOL/L (ref 3.5–5.1)
PROT SERPL-MCNC: 5.9 G/DL (ref 6–8.4)
PROTHROMBIN TIME: 11 SEC (ref 9–12.5)
RBC # BLD AUTO: 3.7 M/UL (ref 4.6–6.2)
SODIUM SERPL-SCNC: 138 MMOL/L (ref 136–145)
WBC # BLD AUTO: 9.64 K/UL (ref 3.9–12.7)

## 2021-04-28 PROCEDURE — 25000003 PHARM REV CODE 250: Performed by: STUDENT IN AN ORGANIZED HEALTH CARE EDUCATION/TRAINING PROGRAM

## 2021-04-28 PROCEDURE — 63600175 PHARM REV CODE 636 W HCPCS: Performed by: STUDENT IN AN ORGANIZED HEALTH CARE EDUCATION/TRAINING PROGRAM

## 2021-04-28 PROCEDURE — 83735 ASSAY OF MAGNESIUM: CPT | Performed by: STUDENT IN AN ORGANIZED HEALTH CARE EDUCATION/TRAINING PROGRAM

## 2021-04-28 PROCEDURE — 25000003 PHARM REV CODE 250: Performed by: THORACIC SURGERY (CARDIOTHORACIC VASCULAR SURGERY)

## 2021-04-28 PROCEDURE — 97116 GAIT TRAINING THERAPY: CPT

## 2021-04-28 PROCEDURE — 27000221 HC OXYGEN, UP TO 24 HOURS

## 2021-04-28 PROCEDURE — 85730 THROMBOPLASTIN TIME PARTIAL: CPT | Performed by: STUDENT IN AN ORGANIZED HEALTH CARE EDUCATION/TRAINING PROGRAM

## 2021-04-28 PROCEDURE — 84100 ASSAY OF PHOSPHORUS: CPT | Mod: 91 | Performed by: STUDENT IN AN ORGANIZED HEALTH CARE EDUCATION/TRAINING PROGRAM

## 2021-04-28 PROCEDURE — 97110 THERAPEUTIC EXERCISES: CPT

## 2021-04-28 PROCEDURE — 99900035 HC TECH TIME PER 15 MIN (STAT)

## 2021-04-28 PROCEDURE — 85025 COMPLETE CBC W/AUTO DIFF WBC: CPT | Performed by: STUDENT IN AN ORGANIZED HEALTH CARE EDUCATION/TRAINING PROGRAM

## 2021-04-28 PROCEDURE — 80048 BASIC METABOLIC PNL TOTAL CA: CPT | Performed by: STUDENT IN AN ORGANIZED HEALTH CARE EDUCATION/TRAINING PROGRAM

## 2021-04-28 PROCEDURE — 20600001 HC STEP DOWN PRIVATE ROOM

## 2021-04-28 PROCEDURE — 83735 ASSAY OF MAGNESIUM: CPT | Mod: 91 | Performed by: STUDENT IN AN ORGANIZED HEALTH CARE EDUCATION/TRAINING PROGRAM

## 2021-04-28 PROCEDURE — 97535 SELF CARE MNGMENT TRAINING: CPT

## 2021-04-28 PROCEDURE — 97530 THERAPEUTIC ACTIVITIES: CPT

## 2021-04-28 PROCEDURE — 80053 COMPREHEN METABOLIC PANEL: CPT | Performed by: STUDENT IN AN ORGANIZED HEALTH CARE EDUCATION/TRAINING PROGRAM

## 2021-04-28 PROCEDURE — 99231 PR SUBSEQUENT HOSPITAL CARE,LEVL I: ICD-10-PCS | Mod: GC,,, | Performed by: SURGERY

## 2021-04-28 PROCEDURE — 36415 COLL VENOUS BLD VENIPUNCTURE: CPT | Performed by: STUDENT IN AN ORGANIZED HEALTH CARE EDUCATION/TRAINING PROGRAM

## 2021-04-28 PROCEDURE — 94761 N-INVAS EAR/PLS OXIMETRY MLT: CPT

## 2021-04-28 PROCEDURE — 99231 SBSQ HOSP IP/OBS SF/LOW 25: CPT | Mod: GC,,, | Performed by: SURGERY

## 2021-04-28 PROCEDURE — 63600175 PHARM REV CODE 636 W HCPCS: Performed by: THORACIC SURGERY (CARDIOTHORACIC VASCULAR SURGERY)

## 2021-04-28 PROCEDURE — 85610 PROTHROMBIN TIME: CPT | Performed by: STUDENT IN AN ORGANIZED HEALTH CARE EDUCATION/TRAINING PROGRAM

## 2021-04-28 PROCEDURE — 84100 ASSAY OF PHOSPHORUS: CPT | Performed by: STUDENT IN AN ORGANIZED HEALTH CARE EDUCATION/TRAINING PROGRAM

## 2021-04-28 RX ORDER — ISOSORBIDE MONONITRATE 60 MG/1
60 TABLET, EXTENDED RELEASE ORAL DAILY
Status: DISCONTINUED | OUTPATIENT
Start: 2021-04-28 | End: 2021-05-01 | Stop reason: HOSPADM

## 2021-04-28 RX ORDER — METOPROLOL TARTRATE 1 MG/ML
INJECTION, SOLUTION INTRAVENOUS
Status: COMPLETED
Start: 2021-04-28 | End: 2021-04-28

## 2021-04-28 RX ORDER — DILTIAZEM HYDROCHLORIDE 120 MG/1
120 CAPSULE, COATED, EXTENDED RELEASE ORAL DAILY
Status: DISCONTINUED | OUTPATIENT
Start: 2021-04-28 | End: 2021-04-29

## 2021-04-28 RX ORDER — FUROSEMIDE 10 MG/ML
40 INJECTION INTRAMUSCULAR; INTRAVENOUS 2 TIMES DAILY
Status: DISCONTINUED | OUTPATIENT
Start: 2021-04-28 | End: 2021-04-29

## 2021-04-28 RX ORDER — DIPHENHYDRAMINE HCL 25 MG
25 CAPSULE ORAL EVERY 6 HOURS PRN
Status: DISCONTINUED | OUTPATIENT
Start: 2021-04-28 | End: 2021-05-01 | Stop reason: HOSPADM

## 2021-04-28 RX ADMIN — HYDROMORPHONE HYDROCHLORIDE 0.5 MG: 1 INJECTION, SOLUTION INTRAMUSCULAR; INTRAVENOUS; SUBCUTANEOUS at 06:04

## 2021-04-28 RX ADMIN — DOCUSATE SODIUM 100 MG: 100 CAPSULE, LIQUID FILLED ORAL at 08:04

## 2021-04-28 RX ADMIN — ISOSORBIDE MONONITRATE 60 MG: 60 TABLET, EXTENDED RELEASE ORAL at 08:04

## 2021-04-28 RX ADMIN — ATORVASTATIN CALCIUM 40 MG: 20 TABLET, FILM COATED ORAL at 09:04

## 2021-04-28 RX ADMIN — OXYCODONE HYDROCHLORIDE 10 MG: 10 TABLET ORAL at 01:04

## 2021-04-28 RX ADMIN — ACETAMINOPHEN 1000 MG: 500 TABLET, FILM COATED ORAL at 01:04

## 2021-04-28 RX ADMIN — OXYCODONE HYDROCHLORIDE 10 MG: 10 TABLET ORAL at 10:04

## 2021-04-28 RX ADMIN — DILTIAZEM HYDROCHLORIDE 120 MG: 120 CAPSULE, COATED, EXTENDED RELEASE ORAL at 06:04

## 2021-04-28 RX ADMIN — OXYCODONE HYDROCHLORIDE 10 MG: 10 TABLET ORAL at 05:04

## 2021-04-28 RX ADMIN — ONDANSETRON 4 MG: 2 INJECTION INTRAMUSCULAR; INTRAVENOUS at 09:04

## 2021-04-28 RX ADMIN — DOCUSATE SODIUM 100 MG: 100 CAPSULE, LIQUID FILLED ORAL at 09:04

## 2021-04-28 RX ADMIN — ACETAMINOPHEN 1000 MG: 500 TABLET, FILM COATED ORAL at 09:04

## 2021-04-28 RX ADMIN — FUROSEMIDE 40 MG: 10 INJECTION, SOLUTION INTRAMUSCULAR; INTRAVENOUS at 01:04

## 2021-04-28 RX ADMIN — POLYETHYLENE GLYCOL 3350 17 G: 17 POWDER, FOR SOLUTION ORAL at 08:04

## 2021-04-28 RX ADMIN — FAMOTIDINE 20 MG: 20 TABLET ORAL at 09:04

## 2021-04-28 RX ADMIN — METHOCARBAMOL 1000 MG: 100 INJECTION INTRAMUSCULAR; INTRAVENOUS at 05:04

## 2021-04-28 RX ADMIN — OXYCODONE HYDROCHLORIDE 10 MG: 10 TABLET ORAL at 02:04

## 2021-04-28 RX ADMIN — MAGNESIUM SULFATE 2 G: 2 INJECTION INTRAVENOUS at 05:04

## 2021-04-28 RX ADMIN — OXYCODONE 5 MG: 5 TABLET ORAL at 10:04

## 2021-04-28 RX ADMIN — ACETAMINOPHEN 1000 MG: 500 TABLET, FILM COATED ORAL at 05:04

## 2021-04-28 RX ADMIN — FUROSEMIDE 40 MG: 10 INJECTION, SOLUTION INTRAMUSCULAR; INTRAVENOUS at 08:04

## 2021-04-28 RX ADMIN — ASPIRIN 325 MG ORAL TABLET 325 MG: 325 PILL ORAL at 08:04

## 2021-04-28 RX ADMIN — MUPIROCIN: 20 OINTMENT TOPICAL at 08:04

## 2021-04-28 RX ADMIN — PROMETHAZINE HYDROCHLORIDE 6.25 MG: 25 INJECTION INTRAMUSCULAR; INTRAVENOUS at 09:04

## 2021-04-28 RX ADMIN — FAMOTIDINE 20 MG: 20 TABLET ORAL at 08:04

## 2021-04-28 RX ADMIN — FUROSEMIDE 40 MG: 10 INJECTION, SOLUTION INTRAMUSCULAR; INTRAVENOUS at 05:04

## 2021-04-29 ENCOUNTER — RESEARCH ENCOUNTER (OUTPATIENT)
Dept: RESEARCH | Facility: HOSPITAL | Age: 66
End: 2021-04-29

## 2021-04-29 DIAGNOSIS — Z95.1 S/P CABG X 3: Primary | ICD-10-CM

## 2021-04-29 PROBLEM — I48.0 PAROXYSMAL ATRIAL FIBRILLATION: Status: ACTIVE | Noted: 2021-04-29

## 2021-04-29 PROBLEM — D62 ACUTE BLOOD LOSS ANEMIA: Status: ACTIVE | Noted: 2021-04-29

## 2021-04-29 PROBLEM — E83.39 HYPOPHOSPHATEMIA: Status: ACTIVE | Noted: 2021-04-29

## 2021-04-29 LAB
ALBUMIN SERPL BCP-MCNC: 3.4 G/DL (ref 3.5–5.2)
ALP SERPL-CCNC: 60 U/L (ref 55–135)
ALT SERPL W/O P-5'-P-CCNC: 23 U/L (ref 10–44)
ANION GAP SERPL CALC-SCNC: 11 MMOL/L (ref 8–16)
ANION GAP SERPL CALC-SCNC: 12 MMOL/L (ref 8–16)
APTT BLDCRRT: 26.2 SEC (ref 21–32)
AST SERPL-CCNC: 62 U/L (ref 10–40)
BASOPHILS # BLD AUTO: 0.03 K/UL (ref 0–0.2)
BASOPHILS NFR BLD: 0.3 % (ref 0–1.9)
BILIRUB SERPL-MCNC: 1.7 MG/DL (ref 0.1–1)
BUN SERPL-MCNC: 12 MG/DL (ref 8–23)
BUN SERPL-MCNC: 13 MG/DL (ref 8–23)
CALCIUM SERPL-MCNC: 8 MG/DL (ref 8.7–10.5)
CALCIUM SERPL-MCNC: 8.4 MG/DL (ref 8.7–10.5)
CHLORIDE SERPL-SCNC: 100 MMOL/L (ref 95–110)
CHLORIDE SERPL-SCNC: 101 MMOL/L (ref 95–110)
CO2 SERPL-SCNC: 25 MMOL/L (ref 23–29)
CO2 SERPL-SCNC: 26 MMOL/L (ref 23–29)
CREAT SERPL-MCNC: 1.3 MG/DL (ref 0.5–1.4)
CREAT SERPL-MCNC: 1.3 MG/DL (ref 0.5–1.4)
DIFFERENTIAL METHOD: ABNORMAL
EOSINOPHIL # BLD AUTO: 0.1 K/UL (ref 0–0.5)
EOSINOPHIL NFR BLD: 1.4 % (ref 0–8)
ERYTHROCYTE [DISTWIDTH] IN BLOOD BY AUTOMATED COUNT: 20.5 % (ref 11.5–14.5)
EST. GFR  (AFRICAN AMERICAN): >60 ML/MIN/1.73 M^2
EST. GFR  (AFRICAN AMERICAN): >60 ML/MIN/1.73 M^2
EST. GFR  (NON AFRICAN AMERICAN): 56.5 ML/MIN/1.73 M^2
EST. GFR  (NON AFRICAN AMERICAN): 56.5 ML/MIN/1.73 M^2
FINAL PATHOLOGIC DIAGNOSIS: NORMAL
GLUCOSE SERPL-MCNC: 89 MG/DL (ref 70–110)
GLUCOSE SERPL-MCNC: 95 MG/DL (ref 70–110)
GROSS: NORMAL
HCT VFR BLD AUTO: 25.7 % (ref 40–54)
HGB BLD-MCNC: 8.7 G/DL (ref 14–18)
IMM GRANULOCYTES # BLD AUTO: 0.03 K/UL (ref 0–0.04)
IMM GRANULOCYTES NFR BLD AUTO: 0.3 % (ref 0–0.5)
INR PPP: 0.9 (ref 0.8–1.2)
LYMPHOCYTES # BLD AUTO: 1.8 K/UL (ref 1–4.8)
LYMPHOCYTES NFR BLD: 19.8 % (ref 18–48)
Lab: NORMAL
MAGNESIUM SERPL-MCNC: 2.1 MG/DL (ref 1.6–2.6)
MCH RBC QN AUTO: 24.1 PG (ref 27–31)
MCHC RBC AUTO-ENTMCNC: 33.9 G/DL (ref 32–36)
MCV RBC AUTO: 71 FL (ref 82–98)
MICROSCOPIC EXAM: NORMAL
MONOCYTES # BLD AUTO: 0.6 K/UL (ref 0.3–1)
MONOCYTES NFR BLD: 6.8 % (ref 4–15)
NEUTROPHILS # BLD AUTO: 6.6 K/UL (ref 1.8–7.7)
NEUTROPHILS NFR BLD: 71.4 % (ref 38–73)
NRBC BLD-RTO: 0 /100 WBC
PHOSPHATE SERPL-MCNC: 2.5 MG/DL (ref 2.7–4.5)
PLATELET # BLD AUTO: 109 K/UL (ref 150–450)
PMV BLD AUTO: 11.5 FL (ref 9.2–12.9)
POCT GLUCOSE: 110 MG/DL (ref 70–110)
POCT GLUCOSE: 110 MG/DL (ref 70–110)
POCT GLUCOSE: 112 MG/DL (ref 70–110)
POTASSIUM SERPL-SCNC: 3.8 MMOL/L (ref 3.5–5.1)
POTASSIUM SERPL-SCNC: 4.1 MMOL/L (ref 3.5–5.1)
PROT SERPL-MCNC: 6.1 G/DL (ref 6–8.4)
PROTHROMBIN TIME: 10.3 SEC (ref 9–12.5)
RBC # BLD AUTO: 3.61 M/UL (ref 4.6–6.2)
SODIUM SERPL-SCNC: 137 MMOL/L (ref 136–145)
SODIUM SERPL-SCNC: 138 MMOL/L (ref 136–145)
WBC # BLD AUTO: 9.25 K/UL (ref 3.9–12.7)

## 2021-04-29 PROCEDURE — 25000003 PHARM REV CODE 250

## 2021-04-29 PROCEDURE — 25000003 PHARM REV CODE 250: Performed by: STUDENT IN AN ORGANIZED HEALTH CARE EDUCATION/TRAINING PROGRAM

## 2021-04-29 PROCEDURE — 85025 COMPLETE CBC W/AUTO DIFF WBC: CPT | Performed by: STUDENT IN AN ORGANIZED HEALTH CARE EDUCATION/TRAINING PROGRAM

## 2021-04-29 PROCEDURE — 93005 ELECTROCARDIOGRAM TRACING: CPT

## 2021-04-29 PROCEDURE — 93010 ELECTROCARDIOGRAM REPORT: CPT | Mod: ,,, | Performed by: INTERNAL MEDICINE

## 2021-04-29 PROCEDURE — 85610 PROTHROMBIN TIME: CPT | Performed by: STUDENT IN AN ORGANIZED HEALTH CARE EDUCATION/TRAINING PROGRAM

## 2021-04-29 PROCEDURE — 63600175 PHARM REV CODE 636 W HCPCS: Performed by: THORACIC SURGERY (CARDIOTHORACIC VASCULAR SURGERY)

## 2021-04-29 PROCEDURE — 99900035 HC TECH TIME PER 15 MIN (STAT)

## 2021-04-29 PROCEDURE — 63600175 PHARM REV CODE 636 W HCPCS: Performed by: STUDENT IN AN ORGANIZED HEALTH CARE EDUCATION/TRAINING PROGRAM

## 2021-04-29 PROCEDURE — 85730 THROMBOPLASTIN TIME PARTIAL: CPT | Performed by: STUDENT IN AN ORGANIZED HEALTH CARE EDUCATION/TRAINING PROGRAM

## 2021-04-29 PROCEDURE — 20600001 HC STEP DOWN PRIVATE ROOM

## 2021-04-29 PROCEDURE — 27000221 HC OXYGEN, UP TO 24 HOURS

## 2021-04-29 PROCEDURE — 36415 COLL VENOUS BLD VENIPUNCTURE: CPT | Performed by: STUDENT IN AN ORGANIZED HEALTH CARE EDUCATION/TRAINING PROGRAM

## 2021-04-29 PROCEDURE — 93010 EKG 12-LEAD: ICD-10-PCS | Mod: ,,, | Performed by: INTERNAL MEDICINE

## 2021-04-29 PROCEDURE — 25000003 PHARM REV CODE 250: Performed by: THORACIC SURGERY (CARDIOTHORACIC VASCULAR SURGERY)

## 2021-04-29 PROCEDURE — 94761 N-INVAS EAR/PLS OXIMETRY MLT: CPT

## 2021-04-29 PROCEDURE — 80053 COMPREHEN METABOLIC PANEL: CPT | Performed by: STUDENT IN AN ORGANIZED HEALTH CARE EDUCATION/TRAINING PROGRAM

## 2021-04-29 PROCEDURE — 97535 SELF CARE MNGMENT TRAINING: CPT

## 2021-04-29 RX ORDER — METOPROLOL TARTRATE 1 MG/ML
INJECTION, SOLUTION INTRAVENOUS
Status: DISCONTINUED
Start: 2021-04-29 | End: 2021-04-29 | Stop reason: WASHOUT

## 2021-04-29 RX ORDER — FUROSEMIDE 10 MG/ML
80 INJECTION INTRAMUSCULAR; INTRAVENOUS 2 TIMES DAILY
Status: DISCONTINUED | OUTPATIENT
Start: 2021-04-29 | End: 2021-05-01 | Stop reason: HOSPADM

## 2021-04-29 RX ORDER — AMIODARONE HYDROCHLORIDE 200 MG/1
400 TABLET ORAL 3 TIMES DAILY
Status: DISCONTINUED | OUTPATIENT
Start: 2021-04-29 | End: 2021-05-01 | Stop reason: HOSPADM

## 2021-04-29 RX ORDER — POTASSIUM CHLORIDE 20 MEQ/1
20 TABLET, EXTENDED RELEASE ORAL DAILY
Status: DISCONTINUED | OUTPATIENT
Start: 2021-04-29 | End: 2021-05-01 | Stop reason: HOSPADM

## 2021-04-29 RX ORDER — METOPROLOL TARTRATE 1 MG/ML
INJECTION, SOLUTION INTRAVENOUS
Status: COMPLETED
Start: 2021-04-29 | End: 2021-04-29

## 2021-04-29 RX ORDER — DILTIAZEM HYDROCHLORIDE 180 MG/1
180 CAPSULE, COATED, EXTENDED RELEASE ORAL DAILY
Status: DISCONTINUED | OUTPATIENT
Start: 2021-04-29 | End: 2021-05-01 | Stop reason: HOSPADM

## 2021-04-29 RX ORDER — SODIUM,POTASSIUM PHOSPHATES 280-250MG
2 POWDER IN PACKET (EA) ORAL ONCE
Status: DISCONTINUED | OUTPATIENT
Start: 2021-04-29 | End: 2021-04-29

## 2021-04-29 RX ADMIN — OXYCODONE 5 MG: 5 TABLET ORAL at 09:04

## 2021-04-29 RX ADMIN — ASPIRIN 325 MG ORAL TABLET 325 MG: 325 PILL ORAL at 09:04

## 2021-04-29 RX ADMIN — AMIODARONE HYDROCHLORIDE 400 MG: 200 TABLET ORAL at 09:04

## 2021-04-29 RX ADMIN — FUROSEMIDE 80 MG: 10 INJECTION, SOLUTION INTRAMUSCULAR; INTRAVENOUS at 06:04

## 2021-04-29 RX ADMIN — ACETAMINOPHEN 1000 MG: 500 TABLET, FILM COATED ORAL at 02:04

## 2021-04-29 RX ADMIN — POTASSIUM CHLORIDE 20 MEQ: 1500 TABLET, EXTENDED RELEASE ORAL at 09:04

## 2021-04-29 RX ADMIN — ONDANSETRON 4 MG: 2 INJECTION INTRAMUSCULAR; INTRAVENOUS at 08:04

## 2021-04-29 RX ADMIN — AMIODARONE HYDROCHLORIDE 400 MG: 200 TABLET ORAL at 06:04

## 2021-04-29 RX ADMIN — MUPIROCIN: 20 OINTMENT TOPICAL at 09:04

## 2021-04-29 RX ADMIN — ACETAMINOPHEN 1000 MG: 500 TABLET, FILM COATED ORAL at 09:04

## 2021-04-29 RX ADMIN — DOCUSATE SODIUM 100 MG: 100 CAPSULE, LIQUID FILLED ORAL at 09:04

## 2021-04-29 RX ADMIN — SODIUM PHOSPHATE, MONOBASIC, MONOHYDRATE AND SODIUM PHOSPHATE, DIBASIC, ANHYDROUS 15 MMOL: 276; 142 INJECTION, SOLUTION INTRAVENOUS at 04:04

## 2021-04-29 RX ADMIN — FAMOTIDINE 20 MG: 20 TABLET ORAL at 09:04

## 2021-04-29 RX ADMIN — AMIODARONE HYDROCHLORIDE 1 MG/MIN: 1.8 INJECTION, SOLUTION INTRAVENOUS at 02:04

## 2021-04-29 RX ADMIN — POLYETHYLENE GLYCOL 3350 17 G: 17 POWDER, FOR SOLUTION ORAL at 09:04

## 2021-04-29 RX ADMIN — DILTIAZEM HYDROCHLORIDE 180 MG: 180 CAPSULE, COATED, EXTENDED RELEASE ORAL at 09:04

## 2021-04-29 RX ADMIN — ATORVASTATIN CALCIUM 40 MG: 20 TABLET, FILM COATED ORAL at 09:04

## 2021-04-29 RX ADMIN — ACETAMINOPHEN 1000 MG: 500 TABLET, FILM COATED ORAL at 05:04

## 2021-04-29 RX ADMIN — FUROSEMIDE 80 MG: 10 INJECTION, SOLUTION INTRAMUSCULAR; INTRAVENOUS at 08:04

## 2021-04-29 RX ADMIN — ISOSORBIDE MONONITRATE 60 MG: 60 TABLET, EXTENDED RELEASE ORAL at 09:04

## 2021-04-29 RX ADMIN — PROMETHAZINE HYDROCHLORIDE 6.25 MG: 25 INJECTION INTRAMUSCULAR; INTRAVENOUS at 10:04

## 2021-04-29 RX ADMIN — AMIODARONE HYDROCHLORIDE 400 MG: 200 TABLET ORAL at 02:04

## 2021-04-29 RX ADMIN — METOPROLOL TARTRATE: 5 INJECTION, SOLUTION INTRAVENOUS at 09:04

## 2021-04-29 RX ADMIN — FAMOTIDINE 20 MG: 20 TABLET ORAL at 08:04

## 2021-04-30 LAB
ALBUMIN SERPL BCP-MCNC: 3.4 G/DL (ref 3.5–5.2)
ALP SERPL-CCNC: 52 U/L (ref 55–135)
ALT SERPL W/O P-5'-P-CCNC: 30 U/L (ref 10–44)
ANION GAP SERPL CALC-SCNC: 16 MMOL/L (ref 8–16)
AST SERPL-CCNC: 82 U/L (ref 10–40)
BASOPHILS # BLD AUTO: 0.03 K/UL (ref 0–0.2)
BASOPHILS NFR BLD: 0.4 % (ref 0–1.9)
BILIRUB SERPL-MCNC: 1.2 MG/DL (ref 0.1–1)
BUN SERPL-MCNC: 17 MG/DL (ref 8–23)
CALCIUM SERPL-MCNC: 8.6 MG/DL (ref 8.7–10.5)
CHLORIDE SERPL-SCNC: 99 MMOL/L (ref 95–110)
CO2 SERPL-SCNC: 22 MMOL/L (ref 23–29)
CREAT SERPL-MCNC: 1.4 MG/DL (ref 0.5–1.4)
DIFFERENTIAL METHOD: ABNORMAL
EOSINOPHIL # BLD AUTO: 0.1 K/UL (ref 0–0.5)
EOSINOPHIL NFR BLD: 1.7 % (ref 0–8)
ERYTHROCYTE [DISTWIDTH] IN BLOOD BY AUTOMATED COUNT: 20.7 % (ref 11.5–14.5)
EST. GFR  (AFRICAN AMERICAN): 59.7 ML/MIN/1.73 M^2
EST. GFR  (NON AFRICAN AMERICAN): 51.6 ML/MIN/1.73 M^2
GLUCOSE SERPL-MCNC: 87 MG/DL (ref 70–110)
HCT VFR BLD AUTO: 26.3 % (ref 40–54)
HGB BLD-MCNC: 8.8 G/DL (ref 14–18)
IMM GRANULOCYTES # BLD AUTO: 0.05 K/UL (ref 0–0.04)
IMM GRANULOCYTES NFR BLD AUTO: 0.6 % (ref 0–0.5)
LYMPHOCYTES # BLD AUTO: 1.4 K/UL (ref 1–4.8)
LYMPHOCYTES NFR BLD: 17.2 % (ref 18–48)
MAGNESIUM SERPL-MCNC: 2.1 MG/DL (ref 1.6–2.6)
MCH RBC QN AUTO: 24.1 PG (ref 27–31)
MCHC RBC AUTO-ENTMCNC: 33.5 G/DL (ref 32–36)
MCV RBC AUTO: 72 FL (ref 82–98)
MONOCYTES # BLD AUTO: 0.7 K/UL (ref 0.3–1)
MONOCYTES NFR BLD: 8.5 % (ref 4–15)
NEUTROPHILS # BLD AUTO: 5.8 K/UL (ref 1.8–7.7)
NEUTROPHILS NFR BLD: 71.6 % (ref 38–73)
NRBC BLD-RTO: 0 /100 WBC
PHOSPHATE SERPL-MCNC: 3.1 MG/DL (ref 2.7–4.5)
PLATELET # BLD AUTO: 129 K/UL (ref 150–450)
PMV BLD AUTO: 10.7 FL (ref 9.2–12.9)
POTASSIUM SERPL-SCNC: 3.6 MMOL/L (ref 3.5–5.1)
PROT SERPL-MCNC: 6.6 G/DL (ref 6–8.4)
RBC # BLD AUTO: 3.65 M/UL (ref 4.6–6.2)
SODIUM SERPL-SCNC: 137 MMOL/L (ref 136–145)
WBC # BLD AUTO: 8.12 K/UL (ref 3.9–12.7)

## 2021-04-30 PROCEDURE — 85025 COMPLETE CBC W/AUTO DIFF WBC: CPT | Performed by: NURSE PRACTITIONER

## 2021-04-30 PROCEDURE — 80053 COMPREHEN METABOLIC PANEL: CPT | Performed by: STUDENT IN AN ORGANIZED HEALTH CARE EDUCATION/TRAINING PROGRAM

## 2021-04-30 PROCEDURE — 97530 THERAPEUTIC ACTIVITIES: CPT

## 2021-04-30 PROCEDURE — 20600001 HC STEP DOWN PRIVATE ROOM

## 2021-04-30 PROCEDURE — 63600175 PHARM REV CODE 636 W HCPCS: Performed by: THORACIC SURGERY (CARDIOTHORACIC VASCULAR SURGERY)

## 2021-04-30 PROCEDURE — 83735 ASSAY OF MAGNESIUM: CPT | Performed by: STUDENT IN AN ORGANIZED HEALTH CARE EDUCATION/TRAINING PROGRAM

## 2021-04-30 PROCEDURE — 25000003 PHARM REV CODE 250

## 2021-04-30 PROCEDURE — 25000003 PHARM REV CODE 250: Performed by: STUDENT IN AN ORGANIZED HEALTH CARE EDUCATION/TRAINING PROGRAM

## 2021-04-30 PROCEDURE — 63600175 PHARM REV CODE 636 W HCPCS: Performed by: STUDENT IN AN ORGANIZED HEALTH CARE EDUCATION/TRAINING PROGRAM

## 2021-04-30 PROCEDURE — 25000003 PHARM REV CODE 250: Performed by: THORACIC SURGERY (CARDIOTHORACIC VASCULAR SURGERY)

## 2021-04-30 PROCEDURE — 84100 ASSAY OF PHOSPHORUS: CPT | Performed by: STUDENT IN AN ORGANIZED HEALTH CARE EDUCATION/TRAINING PROGRAM

## 2021-04-30 PROCEDURE — 25000003 PHARM REV CODE 250: Performed by: NURSE PRACTITIONER

## 2021-04-30 RX ORDER — METOPROLOL TARTRATE 1 MG/ML
5 INJECTION, SOLUTION INTRAVENOUS EVERY 5 MIN PRN
Status: DISCONTINUED | OUTPATIENT
Start: 2021-04-30 | End: 2021-05-01

## 2021-04-30 RX ORDER — METOPROLOL TARTRATE 1 MG/ML
INJECTION, SOLUTION INTRAVENOUS
Status: COMPLETED
Start: 2021-04-30 | End: 2021-04-30

## 2021-04-30 RX ORDER — BISACODYL 10 MG
10 SUPPOSITORY, RECTAL RECTAL DAILY PRN
Status: DISCONTINUED | OUTPATIENT
Start: 2021-04-30 | End: 2021-05-01 | Stop reason: HOSPADM

## 2021-04-30 RX ORDER — POTASSIUM CHLORIDE 20 MEQ/1
20 TABLET, EXTENDED RELEASE ORAL ONCE
Status: COMPLETED | OUTPATIENT
Start: 2021-04-30 | End: 2021-04-30

## 2021-04-30 RX ADMIN — AMIODARONE HYDROCHLORIDE 400 MG: 200 TABLET ORAL at 08:04

## 2021-04-30 RX ADMIN — DILTIAZEM HYDROCHLORIDE 180 MG: 180 CAPSULE, COATED, EXTENDED RELEASE ORAL at 08:04

## 2021-04-30 RX ADMIN — MUPIROCIN: 20 OINTMENT TOPICAL at 09:04

## 2021-04-30 RX ADMIN — ISOSORBIDE MONONITRATE 60 MG: 60 TABLET, EXTENDED RELEASE ORAL at 08:04

## 2021-04-30 RX ADMIN — METOROPROLOL TARTRATE 5 MG: 5 INJECTION, SOLUTION INTRAVENOUS at 10:04

## 2021-04-30 RX ADMIN — METOPROLOL TARTRATE 5 MG: 1 INJECTION, SOLUTION INTRAVENOUS at 10:04

## 2021-04-30 RX ADMIN — ONDANSETRON 4 MG: 2 INJECTION INTRAMUSCULAR; INTRAVENOUS at 05:04

## 2021-04-30 RX ADMIN — ONDANSETRON 4 MG: 2 INJECTION INTRAMUSCULAR; INTRAVENOUS at 11:04

## 2021-04-30 RX ADMIN — DOCUSATE SODIUM 100 MG: 100 CAPSULE, LIQUID FILLED ORAL at 08:04

## 2021-04-30 RX ADMIN — FAMOTIDINE 20 MG: 20 TABLET ORAL at 08:04

## 2021-04-30 RX ADMIN — FUROSEMIDE 80 MG: 10 INJECTION, SOLUTION INTRAMUSCULAR; INTRAVENOUS at 08:04

## 2021-04-30 RX ADMIN — POTASSIUM CHLORIDE 20 MEQ: 1500 TABLET, EXTENDED RELEASE ORAL at 08:04

## 2021-04-30 RX ADMIN — ACETAMINOPHEN 1000 MG: 500 TABLET, FILM COATED ORAL at 09:04

## 2021-04-30 RX ADMIN — ASPIRIN 325 MG ORAL TABLET 325 MG: 325 PILL ORAL at 08:04

## 2021-04-30 RX ADMIN — ACETAMINOPHEN 1000 MG: 500 TABLET, FILM COATED ORAL at 02:04

## 2021-04-30 RX ADMIN — DOCUSATE SODIUM 100 MG: 100 CAPSULE, LIQUID FILLED ORAL at 09:04

## 2021-04-30 RX ADMIN — ATORVASTATIN CALCIUM 40 MG: 20 TABLET, FILM COATED ORAL at 09:04

## 2021-04-30 RX ADMIN — FAMOTIDINE 20 MG: 20 TABLET ORAL at 09:04

## 2021-04-30 RX ADMIN — DIPHENHYDRAMINE HYDROCHLORIDE 25 MG: 25 CAPSULE ORAL at 09:04

## 2021-04-30 RX ADMIN — ACETAMINOPHEN 1000 MG: 500 TABLET, FILM COATED ORAL at 06:04

## 2021-04-30 RX ADMIN — POLYETHYLENE GLYCOL 3350 17 G: 17 POWDER, FOR SOLUTION ORAL at 08:04

## 2021-04-30 RX ADMIN — AMIODARONE HYDROCHLORIDE 400 MG: 200 TABLET ORAL at 09:04

## 2021-04-30 RX ADMIN — AMIODARONE HYDROCHLORIDE 400 MG: 200 TABLET ORAL at 03:04

## 2021-04-30 RX ADMIN — FUROSEMIDE 80 MG: 10 INJECTION, SOLUTION INTRAMUSCULAR; INTRAVENOUS at 06:04

## 2021-05-01 VITALS
SYSTOLIC BLOOD PRESSURE: 129 MMHG | BODY MASS INDEX: 26.09 KG/M2 | OXYGEN SATURATION: 96 % | RESPIRATION RATE: 22 BRPM | HEIGHT: 67 IN | WEIGHT: 166.25 LBS | DIASTOLIC BLOOD PRESSURE: 64 MMHG | HEART RATE: 78 BPM | TEMPERATURE: 98 F

## 2021-05-01 LAB
ALBUMIN SERPL BCP-MCNC: 3.5 G/DL (ref 3.5–5.2)
ALP SERPL-CCNC: 59 U/L (ref 55–135)
ALT SERPL W/O P-5'-P-CCNC: 42 U/L (ref 10–44)
ANION GAP SERPL CALC-SCNC: 15 MMOL/L (ref 8–16)
AST SERPL-CCNC: 92 U/L (ref 10–40)
BASOPHILS # BLD AUTO: 0.03 K/UL (ref 0–0.2)
BASOPHILS NFR BLD: 0.4 % (ref 0–1.9)
BILIRUB SERPL-MCNC: 1.5 MG/DL (ref 0.1–1)
BUN SERPL-MCNC: 20 MG/DL (ref 8–23)
CALCIUM SERPL-MCNC: 9.7 MG/DL (ref 8.7–10.5)
CHLORIDE SERPL-SCNC: 95 MMOL/L (ref 95–110)
CO2 SERPL-SCNC: 27 MMOL/L (ref 23–29)
CREAT SERPL-MCNC: 1.5 MG/DL (ref 0.5–1.4)
DIFFERENTIAL METHOD: ABNORMAL
EOSINOPHIL # BLD AUTO: 0.1 K/UL (ref 0–0.5)
EOSINOPHIL NFR BLD: 1.6 % (ref 0–8)
ERYTHROCYTE [DISTWIDTH] IN BLOOD BY AUTOMATED COUNT: 20.9 % (ref 11.5–14.5)
EST. GFR  (AFRICAN AMERICAN): 54.9 ML/MIN/1.73 M^2
EST. GFR  (NON AFRICAN AMERICAN): 47.5 ML/MIN/1.73 M^2
GLUCOSE SERPL-MCNC: 99 MG/DL (ref 70–110)
HCT VFR BLD AUTO: 27.7 % (ref 40–54)
HGB BLD-MCNC: 9.5 G/DL (ref 14–18)
IMM GRANULOCYTES # BLD AUTO: 0.03 K/UL (ref 0–0.04)
IMM GRANULOCYTES NFR BLD AUTO: 0.4 % (ref 0–0.5)
LYMPHOCYTES # BLD AUTO: 1.7 K/UL (ref 1–4.8)
LYMPHOCYTES NFR BLD: 25 % (ref 18–48)
MAGNESIUM SERPL-MCNC: 2.1 MG/DL (ref 1.6–2.6)
MCH RBC QN AUTO: 24.1 PG (ref 27–31)
MCHC RBC AUTO-ENTMCNC: 34.3 G/DL (ref 32–36)
MCV RBC AUTO: 70 FL (ref 82–98)
MONOCYTES # BLD AUTO: 0.6 K/UL (ref 0.3–1)
MONOCYTES NFR BLD: 8.9 % (ref 4–15)
NEUTROPHILS # BLD AUTO: 4.3 K/UL (ref 1.8–7.7)
NEUTROPHILS NFR BLD: 63.7 % (ref 38–73)
NRBC BLD-RTO: 0 /100 WBC
PHOSPHATE SERPL-MCNC: 2.6 MG/DL (ref 2.7–4.5)
PLATELET # BLD AUTO: 199 K/UL (ref 150–450)
PMV BLD AUTO: 10.6 FL (ref 9.2–12.9)
POTASSIUM SERPL-SCNC: 3.5 MMOL/L (ref 3.5–5.1)
PROT SERPL-MCNC: 7.2 G/DL (ref 6–8.4)
RBC # BLD AUTO: 3.95 M/UL (ref 4.6–6.2)
SODIUM SERPL-SCNC: 137 MMOL/L (ref 136–145)
WBC # BLD AUTO: 6.76 K/UL (ref 3.9–12.7)

## 2021-05-01 PROCEDURE — 25000003 PHARM REV CODE 250: Performed by: NURSE PRACTITIONER

## 2021-05-01 PROCEDURE — 80053 COMPREHEN METABOLIC PANEL: CPT | Performed by: STUDENT IN AN ORGANIZED HEALTH CARE EDUCATION/TRAINING PROGRAM

## 2021-05-01 PROCEDURE — 83735 ASSAY OF MAGNESIUM: CPT | Performed by: NURSE PRACTITIONER

## 2021-05-01 PROCEDURE — 25000003 PHARM REV CODE 250: Performed by: STUDENT IN AN ORGANIZED HEALTH CARE EDUCATION/TRAINING PROGRAM

## 2021-05-01 PROCEDURE — 36415 COLL VENOUS BLD VENIPUNCTURE: CPT | Performed by: STUDENT IN AN ORGANIZED HEALTH CARE EDUCATION/TRAINING PROGRAM

## 2021-05-01 PROCEDURE — 84100 ASSAY OF PHOSPHORUS: CPT | Performed by: NURSE PRACTITIONER

## 2021-05-01 PROCEDURE — 85025 COMPLETE CBC W/AUTO DIFF WBC: CPT | Performed by: NURSE PRACTITIONER

## 2021-05-01 PROCEDURE — 25000003 PHARM REV CODE 250: Performed by: THORACIC SURGERY (CARDIOTHORACIC VASCULAR SURGERY)

## 2021-05-01 PROCEDURE — 63600175 PHARM REV CODE 636 W HCPCS: Performed by: THORACIC SURGERY (CARDIOTHORACIC VASCULAR SURGERY)

## 2021-05-01 RX ORDER — AMIODARONE HYDROCHLORIDE 200 MG/1
400 TABLET ORAL 2 TIMES DAILY
Qty: 84 TABLET | Refills: 0 | Status: SHIPPED | OUTPATIENT
Start: 2021-05-01 | End: 2021-06-09 | Stop reason: ALTCHOICE

## 2021-05-01 RX ORDER — DILTIAZEM HYDROCHLORIDE 180 MG/1
180 CAPSULE, COATED, EXTENDED RELEASE ORAL DAILY
Qty: 30 CAPSULE | Refills: 11 | Status: SHIPPED | OUTPATIENT
Start: 2021-05-01 | End: 2021-06-15

## 2021-05-01 RX ORDER — POTASSIUM CHLORIDE 20 MEQ/1
20 TABLET, EXTENDED RELEASE ORAL DAILY
Qty: 5 TABLET | Refills: 0 | Status: SHIPPED | OUTPATIENT
Start: 2021-05-01 | End: 2021-05-01

## 2021-05-01 RX ORDER — ASPIRIN 325 MG
325 TABLET ORAL DAILY
Qty: 360 TABLET | Refills: 0 | Status: SHIPPED | OUTPATIENT
Start: 2021-05-02 | End: 2022-05-16 | Stop reason: SDUPTHER

## 2021-05-01 RX ORDER — FUROSEMIDE 20 MG/1
40 TABLET ORAL DAILY
Qty: 10 TABLET | Refills: 0 | Status: SHIPPED | OUTPATIENT
Start: 2021-05-01 | End: 2021-06-08 | Stop reason: HOSPADM

## 2021-05-01 RX ORDER — OXYCODONE HYDROCHLORIDE 5 MG/1
5 TABLET ORAL
Qty: 42 TABLET | Refills: 0 | Status: SHIPPED | OUTPATIENT
Start: 2021-05-01 | End: 2021-05-01

## 2021-05-01 RX ORDER — ASPIRIN 325 MG
325 TABLET ORAL DAILY
Qty: 360 TABLET | Refills: 0 | Status: SHIPPED | OUTPATIENT
Start: 2021-05-02 | End: 2021-05-01

## 2021-05-01 RX ORDER — AMIODARONE HYDROCHLORIDE 200 MG/1
400 TABLET ORAL 2 TIMES DAILY
Qty: 84 TABLET | Refills: 0 | Status: SHIPPED | OUTPATIENT
Start: 2021-05-01 | End: 2021-05-01 | Stop reason: SDUPTHER

## 2021-05-01 RX ORDER — OXYCODONE HYDROCHLORIDE 5 MG/1
5 TABLET ORAL EVERY 4 HOURS PRN
Qty: 42 TABLET | Refills: 0 | Status: SHIPPED | OUTPATIENT
Start: 2021-05-01 | End: 2021-06-08 | Stop reason: HOSPADM

## 2021-05-01 RX ORDER — ISOSORBIDE MONONITRATE 60 MG/1
60 TABLET, EXTENDED RELEASE ORAL DAILY
Qty: 30 TABLET | Refills: 11 | Status: SHIPPED | OUTPATIENT
Start: 2021-05-01 | End: 2022-03-22 | Stop reason: ALTCHOICE

## 2021-05-01 RX ORDER — FUROSEMIDE 20 MG/1
40 TABLET ORAL DAILY
Qty: 10 TABLET | Refills: 0 | Status: SHIPPED | OUTPATIENT
Start: 2021-05-01 | End: 2021-05-01 | Stop reason: SDUPTHER

## 2021-05-01 RX ORDER — DILTIAZEM HYDROCHLORIDE 180 MG/1
180 CAPSULE, COATED, EXTENDED RELEASE ORAL DAILY
Qty: 30 CAPSULE | Refills: 11 | Status: SHIPPED | OUTPATIENT
Start: 2021-05-01 | End: 2021-05-01

## 2021-05-01 RX ORDER — ISOSORBIDE MONONITRATE 60 MG/1
60 TABLET, EXTENDED RELEASE ORAL DAILY
Qty: 30 TABLET | Refills: 11 | Status: SHIPPED | OUTPATIENT
Start: 2021-05-01 | End: 2021-05-01

## 2021-05-01 RX ORDER — ATORVASTATIN CALCIUM 40 MG/1
40 TABLET, FILM COATED ORAL NIGHTLY
Qty: 90 TABLET | Refills: 3 | Status: SHIPPED | OUTPATIENT
Start: 2021-05-01 | End: 2022-09-02 | Stop reason: SDUPTHER

## 2021-05-01 RX ORDER — OXYCODONE HYDROCHLORIDE 5 MG/1
5 TABLET ORAL EVERY 4 HOURS PRN
Qty: 42 TABLET | Refills: 0 | Status: SHIPPED | OUTPATIENT
Start: 2021-05-01 | End: 2021-05-01

## 2021-05-01 RX ORDER — POTASSIUM CHLORIDE 20 MEQ/1
20 TABLET, EXTENDED RELEASE ORAL DAILY
Qty: 5 TABLET | Refills: 0 | Status: SHIPPED | OUTPATIENT
Start: 2021-05-01 | End: 2021-06-08 | Stop reason: HOSPADM

## 2021-05-01 RX ORDER — SODIUM,POTASSIUM PHOSPHATES 280-250MG
2 POWDER IN PACKET (EA) ORAL ONCE
Status: COMPLETED | OUTPATIENT
Start: 2021-05-01 | End: 2021-05-01

## 2021-05-01 RX ORDER — ONDANSETRON 4 MG/1
4 TABLET, FILM COATED ORAL EVERY 6 HOURS PRN
Qty: 10 TABLET | Refills: 0 | Status: SHIPPED | OUTPATIENT
Start: 2021-05-01 | End: 2021-06-08 | Stop reason: HOSPADM

## 2021-05-01 RX ORDER — ATORVASTATIN CALCIUM 40 MG/1
40 TABLET, FILM COATED ORAL NIGHTLY
Qty: 90 TABLET | Refills: 3 | Status: SHIPPED | OUTPATIENT
Start: 2021-05-01 | End: 2021-05-01

## 2021-05-01 RX ADMIN — FAMOTIDINE 20 MG: 20 TABLET ORAL at 08:05

## 2021-05-01 RX ADMIN — DILTIAZEM HYDROCHLORIDE 180 MG: 180 CAPSULE, COATED, EXTENDED RELEASE ORAL at 08:05

## 2021-05-01 RX ADMIN — AMIODARONE HYDROCHLORIDE 400 MG: 200 TABLET ORAL at 08:05

## 2021-05-01 RX ADMIN — ACETAMINOPHEN 1000 MG: 500 TABLET, FILM COATED ORAL at 06:05

## 2021-05-01 RX ADMIN — ISOSORBIDE MONONITRATE 60 MG: 60 TABLET, EXTENDED RELEASE ORAL at 08:05

## 2021-05-01 RX ADMIN — FUROSEMIDE 80 MG: 10 INJECTION, SOLUTION INTRAMUSCULAR; INTRAVENOUS at 08:05

## 2021-05-01 RX ADMIN — POTASSIUM & SODIUM PHOSPHATES POWDER PACK 280-160-250 MG 2 PACKET: 280-160-250 PACK at 08:05

## 2021-05-01 RX ADMIN — ASPIRIN 325 MG ORAL TABLET 325 MG: 325 PILL ORAL at 08:05

## 2021-05-01 RX ADMIN — DOCUSATE SODIUM 100 MG: 100 CAPSULE, LIQUID FILLED ORAL at 08:05

## 2021-05-01 RX ADMIN — POTASSIUM CHLORIDE 20 MEQ: 1500 TABLET, EXTENDED RELEASE ORAL at 08:05

## 2021-05-03 ENCOUNTER — DOCUMENTATION ONLY (OUTPATIENT)
Dept: CARDIOTHORACIC SURGERY | Facility: CLINIC | Age: 66
End: 2021-05-03

## 2021-05-03 ENCOUNTER — TELEPHONE (OUTPATIENT)
Dept: CARDIOTHORACIC SURGERY | Facility: CLINIC | Age: 66
End: 2021-05-03

## 2021-05-03 PROCEDURE — G0180 MD CERTIFICATION HHA PATIENT: HCPCS | Mod: ,,, | Performed by: THORACIC SURGERY (CARDIOTHORACIC VASCULAR SURGERY)

## 2021-05-03 PROCEDURE — G0180 PR HOME HEALTH MD CERTIFICATION: ICD-10-PCS | Mod: ,,, | Performed by: THORACIC SURGERY (CARDIOTHORACIC VASCULAR SURGERY)

## 2021-05-05 ENCOUNTER — TELEPHONE (OUTPATIENT)
Dept: CARDIOTHORACIC SURGERY | Facility: CLINIC | Age: 66
End: 2021-05-05

## 2021-05-05 ENCOUNTER — HOSPITAL ENCOUNTER (EMERGENCY)
Facility: HOSPITAL | Age: 66
Discharge: HOME | End: 2021-05-05
Attending: EMERGENCY MEDICINE
Payer: MEDICARE

## 2021-05-05 VITALS
WEIGHT: 159 LBS | OXYGEN SATURATION: 98 % | SYSTOLIC BLOOD PRESSURE: 131 MMHG | HEART RATE: 77 BPM | TEMPERATURE: 99 F | DIASTOLIC BLOOD PRESSURE: 82 MMHG | RESPIRATION RATE: 18 BRPM | BODY MASS INDEX: 24.1 KG/M2 | HEIGHT: 68 IN

## 2021-05-05 DIAGNOSIS — T81.49XA SURGICAL SITE INFECTION: ICD-10-CM

## 2021-05-05 DIAGNOSIS — L76.82 POSTOPERATIVE SURGICAL COMPLICATION INVOLVING SKIN ASSOCIATED WITH NON-DERMATOLOGIC PROCEDURE, UNSPECIFIED COMPLICATION: Primary | ICD-10-CM

## 2021-05-05 PROBLEM — M79.652 PAIN OF LEFT THIGH: Status: ACTIVE | Noted: 2021-05-05

## 2021-05-05 LAB
ALBUMIN SERPL BCP-MCNC: 3.8 G/DL (ref 3.5–5.2)
ALP SERPL-CCNC: 66 U/L (ref 55–135)
ALT SERPL W/O P-5'-P-CCNC: 86 U/L (ref 10–44)
ANION GAP SERPL CALC-SCNC: 15 MMOL/L (ref 8–16)
AST SERPL-CCNC: 56 U/L (ref 10–40)
BASOPHILS # BLD AUTO: 0.03 K/UL (ref 0–0.2)
BASOPHILS NFR BLD: 0.4 % (ref 0–1.9)
BILIRUB SERPL-MCNC: 1.3 MG/DL (ref 0.1–1)
BUN SERPL-MCNC: 19 MG/DL (ref 8–23)
CALCIUM SERPL-MCNC: 9.7 MG/DL (ref 8.7–10.5)
CHLORIDE SERPL-SCNC: 102 MMOL/L (ref 95–110)
CO2 SERPL-SCNC: 23 MMOL/L (ref 23–29)
CREAT SERPL-MCNC: 1.5 MG/DL (ref 0.5–1.4)
DIFFERENTIAL METHOD: ABNORMAL
EOSINOPHIL # BLD AUTO: 0.1 K/UL (ref 0–0.5)
EOSINOPHIL NFR BLD: 1.9 % (ref 0–8)
ERYTHROCYTE [DISTWIDTH] IN BLOOD BY AUTOMATED COUNT: 20.7 % (ref 11.5–14.5)
EST. GFR  (AFRICAN AMERICAN): 54.9 ML/MIN/1.73 M^2
EST. GFR  (NON AFRICAN AMERICAN): 47.5 ML/MIN/1.73 M^2
GLUCOSE SERPL-MCNC: 90 MG/DL (ref 70–110)
HCT VFR BLD AUTO: 31.2 % (ref 40–54)
HGB BLD-MCNC: 10 G/DL (ref 14–18)
IMM GRANULOCYTES # BLD AUTO: 0.07 K/UL (ref 0–0.04)
IMM GRANULOCYTES NFR BLD AUTO: 1 % (ref 0–0.5)
INR PPP: 1 (ref 0.8–1.2)
LACTATE SERPL-SCNC: 1 MMOL/L (ref 0.5–2.2)
LYMPHOCYTES # BLD AUTO: 1.8 K/UL (ref 1–4.8)
LYMPHOCYTES NFR BLD: 26.7 % (ref 18–48)
MCH RBC QN AUTO: 23.8 PG (ref 27–31)
MCHC RBC AUTO-ENTMCNC: 32.1 G/DL (ref 32–36)
MCV RBC AUTO: 74 FL (ref 82–98)
MONOCYTES # BLD AUTO: 0.5 K/UL (ref 0.3–1)
MONOCYTES NFR BLD: 7.3 % (ref 4–15)
NEUTROPHILS # BLD AUTO: 4.2 K/UL (ref 1.8–7.7)
NEUTROPHILS NFR BLD: 62.7 % (ref 38–73)
NRBC BLD-RTO: 0 /100 WBC
PLATELET # BLD AUTO: 371 K/UL (ref 150–450)
PMV BLD AUTO: 9.4 FL (ref 9.2–12.9)
POTASSIUM SERPL-SCNC: 4.5 MMOL/L (ref 3.5–5.1)
PROT SERPL-MCNC: 8.3 G/DL (ref 6–8.4)
PROTHROMBIN TIME: 10.6 SEC (ref 9–12.5)
RBC # BLD AUTO: 4.2 M/UL (ref 4.6–6.2)
SODIUM SERPL-SCNC: 140 MMOL/L (ref 136–145)
WBC # BLD AUTO: 6.74 K/UL (ref 3.9–12.7)

## 2021-05-05 PROCEDURE — 86803 HEPATITIS C AB TEST: CPT | Performed by: EMERGENCY MEDICINE

## 2021-05-05 PROCEDURE — 99285 PR EMERGENCY DEPT VISIT,LEVEL V: ICD-10-PCS | Mod: GC,,, | Performed by: EMERGENCY MEDICINE

## 2021-05-05 PROCEDURE — 99285 EMERGENCY DEPT VISIT HI MDM: CPT | Mod: 25

## 2021-05-05 PROCEDURE — 96368 THER/DIAG CONCURRENT INF: CPT

## 2021-05-05 PROCEDURE — 93010 ELECTROCARDIOGRAM REPORT: CPT | Mod: ,,, | Performed by: INTERNAL MEDICINE

## 2021-05-05 PROCEDURE — 63600175 PHARM REV CODE 636 W HCPCS: Performed by: STUDENT IN AN ORGANIZED HEALTH CARE EDUCATION/TRAINING PROGRAM

## 2021-05-05 PROCEDURE — 87040 BLOOD CULTURE FOR BACTERIA: CPT | Mod: 59 | Performed by: STUDENT IN AN ORGANIZED HEALTH CARE EDUCATION/TRAINING PROGRAM

## 2021-05-05 PROCEDURE — 99285 EMERGENCY DEPT VISIT HI MDM: CPT | Mod: GC,,, | Performed by: EMERGENCY MEDICINE

## 2021-05-05 PROCEDURE — 85025 COMPLETE CBC W/AUTO DIFF WBC: CPT | Performed by: STUDENT IN AN ORGANIZED HEALTH CARE EDUCATION/TRAINING PROGRAM

## 2021-05-05 PROCEDURE — 83605 ASSAY OF LACTIC ACID: CPT | Performed by: STUDENT IN AN ORGANIZED HEALTH CARE EDUCATION/TRAINING PROGRAM

## 2021-05-05 PROCEDURE — 93010 EKG 12-LEAD: ICD-10-PCS | Mod: ,,, | Performed by: INTERNAL MEDICINE

## 2021-05-05 PROCEDURE — 96365 THER/PROPH/DIAG IV INF INIT: CPT

## 2021-05-05 PROCEDURE — 80053 COMPREHEN METABOLIC PANEL: CPT | Performed by: STUDENT IN AN ORGANIZED HEALTH CARE EDUCATION/TRAINING PROGRAM

## 2021-05-05 PROCEDURE — 25000003 PHARM REV CODE 250: Performed by: STUDENT IN AN ORGANIZED HEALTH CARE EDUCATION/TRAINING PROGRAM

## 2021-05-05 PROCEDURE — 85610 PROTHROMBIN TIME: CPT | Performed by: STUDENT IN AN ORGANIZED HEALTH CARE EDUCATION/TRAINING PROGRAM

## 2021-05-05 PROCEDURE — 93005 ELECTROCARDIOGRAM TRACING: CPT

## 2021-05-05 RX ORDER — DOXYCYCLINE 100 MG/1
100 CAPSULE ORAL EVERY 12 HOURS
Qty: 14 CAPSULE | Refills: 0 | Status: SHIPPED | OUTPATIENT
Start: 2021-05-05 | End: 2021-05-12

## 2021-05-05 RX ORDER — CLINDAMYCIN PHOSPHATE 600 MG/50ML
600 INJECTION, SOLUTION INTRAVENOUS
Status: COMPLETED | OUTPATIENT
Start: 2021-05-05 | End: 2021-05-05

## 2021-05-05 RX ADMIN — CLINDAMYCIN IN 5 PERCENT DEXTROSE 600 MG: 12 INJECTION, SOLUTION INTRAVENOUS at 10:05

## 2021-05-05 RX ADMIN — VANCOMYCIN HYDROCHLORIDE 1500 MG: 1.5 INJECTION, POWDER, LYOPHILIZED, FOR SOLUTION INTRAVENOUS at 10:05

## 2021-05-06 ENCOUNTER — TELEPHONE (OUTPATIENT)
Dept: EMERGENCY MEDICINE | Facility: HOSPITAL | Age: 66
End: 2021-05-06

## 2021-05-06 LAB — HCV AB SERPL QL IA: NEGATIVE

## 2021-05-07 RX ORDER — OMEPRAZOLE 20 MG/1
20 CAPSULE, DELAYED RELEASE ORAL
Qty: 90 CAPSULE | Refills: 3 | Status: SHIPPED | OUTPATIENT
Start: 2021-05-07 | End: 2022-05-16 | Stop reason: SDUPTHER

## 2021-05-10 ENCOUNTER — OFFICE VISIT (OUTPATIENT)
Dept: INTERNAL MEDICINE | Facility: CLINIC | Age: 66
End: 2021-05-10
Payer: MEDICARE

## 2021-05-10 VITALS
RESPIRATION RATE: 18 BRPM | SYSTOLIC BLOOD PRESSURE: 140 MMHG | DIASTOLIC BLOOD PRESSURE: 78 MMHG | BODY MASS INDEX: 24.46 KG/M2 | WEIGHT: 161.38 LBS | HEIGHT: 68 IN

## 2021-05-10 DIAGNOSIS — M79.89 INFLAMMATION OF SOFT TISSUE: ICD-10-CM

## 2021-05-10 DIAGNOSIS — S80.12XD LEG HEMATOMA, LEFT, SUBSEQUENT ENCOUNTER: Primary | ICD-10-CM

## 2021-05-10 LAB — BACTERIA BLD CULT: NORMAL

## 2021-05-10 PROCEDURE — 99999 PR PBB SHADOW E&M-EST. PATIENT-LVL IV: ICD-10-PCS | Mod: PBBFAC,GC,, | Performed by: STUDENT IN AN ORGANIZED HEALTH CARE EDUCATION/TRAINING PROGRAM

## 2021-05-10 PROCEDURE — 99214 OFFICE O/P EST MOD 30 MIN: CPT | Mod: S$PBB,GC,, | Performed by: STUDENT IN AN ORGANIZED HEALTH CARE EDUCATION/TRAINING PROGRAM

## 2021-05-10 PROCEDURE — 99214 PR OFFICE/OUTPT VISIT, EST, LEVL IV, 30-39 MIN: ICD-10-PCS | Mod: S$PBB,GC,, | Performed by: STUDENT IN AN ORGANIZED HEALTH CARE EDUCATION/TRAINING PROGRAM

## 2021-05-10 PROCEDURE — 99214 OFFICE O/P EST MOD 30 MIN: CPT | Mod: PBBFAC | Performed by: STUDENT IN AN ORGANIZED HEALTH CARE EDUCATION/TRAINING PROGRAM

## 2021-05-10 PROCEDURE — 99999 PR PBB SHADOW E&M-EST. PATIENT-LVL IV: CPT | Mod: PBBFAC,GC,, | Performed by: STUDENT IN AN ORGANIZED HEALTH CARE EDUCATION/TRAINING PROGRAM

## 2021-05-10 RX ORDER — DICLOXACILLIN SODIUM 500 MG/1
500 CAPSULE ORAL 4 TIMES DAILY
Qty: 28 CAPSULE | Refills: 0 | Status: SHIPPED | OUTPATIENT
Start: 2021-05-10 | End: 2021-05-17

## 2021-05-11 LAB — BACTERIA BLD CULT: NORMAL

## 2021-05-13 ENCOUNTER — TELEPHONE (OUTPATIENT)
Dept: CARDIOTHORACIC SURGERY | Facility: CLINIC | Age: 66
End: 2021-05-13

## 2021-05-21 ENCOUNTER — EXTERNAL HOME HEALTH (OUTPATIENT)
Dept: HOME HEALTH SERVICES | Facility: HOSPITAL | Age: 66
End: 2021-05-21
Payer: MEDICARE

## 2021-05-24 ENCOUNTER — DOCUMENT SCAN (OUTPATIENT)
Dept: HOME HEALTH SERVICES | Facility: HOSPITAL | Age: 66
End: 2021-05-24
Payer: MEDICARE

## 2021-05-25 ENCOUNTER — DOCUMENT SCAN (OUTPATIENT)
Dept: HOME HEALTH SERVICES | Facility: HOSPITAL | Age: 66
End: 2021-05-25
Payer: MEDICARE

## 2021-06-08 RX ORDER — HYDROCODONE BITARTRATE AND ACETAMINOPHEN 5; 325 MG/1; MG/1
1 TABLET ORAL EVERY 8 HOURS PRN
COMMUNITY
Start: 2021-01-21 | End: 2021-06-15

## 2021-06-08 RX ORDER — CHLORHEXIDINE GLUCONATE ORAL RINSE 1.2 MG/ML
SOLUTION DENTAL
COMMUNITY
Start: 2021-01-21 | End: 2021-06-15

## 2021-06-08 RX ORDER — AMOXICILLIN AND CLAVULANATE POTASSIUM 875; 125 MG/1; MG/1
TABLET, FILM COATED ORAL
COMMUNITY
Start: 2021-01-21 | End: 2021-06-15

## 2021-06-09 ENCOUNTER — HOSPITAL ENCOUNTER (OUTPATIENT)
Dept: CARDIOLOGY | Facility: CLINIC | Age: 66
Discharge: HOME | End: 2021-06-09
Payer: MEDICARE

## 2021-06-09 ENCOUNTER — OFFICE VISIT (OUTPATIENT)
Dept: CARDIOTHORACIC SURGERY | Facility: CLINIC | Age: 66
End: 2021-06-09
Payer: MEDICARE

## 2021-06-09 VITALS
BODY MASS INDEX: 24.09 KG/M2 | WEIGHT: 158.94 LBS | RESPIRATION RATE: 18 BRPM | OXYGEN SATURATION: 100 % | HEART RATE: 65 BPM | HEIGHT: 68 IN | SYSTOLIC BLOOD PRESSURE: 168 MMHG | DIASTOLIC BLOOD PRESSURE: 93 MMHG

## 2021-06-09 DIAGNOSIS — Z95.1 S/P CABG X 3: ICD-10-CM

## 2021-06-09 DIAGNOSIS — Z95.1 S/P CABG (CORONARY ARTERY BYPASS GRAFT): ICD-10-CM

## 2021-06-09 PROCEDURE — 99999 PR PBB SHADOW E&M-EST. PATIENT-LVL IV: ICD-10-PCS | Mod: PBBFAC,,, | Performed by: THORACIC SURGERY (CARDIOTHORACIC VASCULAR SURGERY)

## 2021-06-09 PROCEDURE — 93010 EKG 12-LEAD: ICD-10-PCS | Mod: S$PBB,,, | Performed by: INTERNAL MEDICINE

## 2021-06-09 PROCEDURE — 99024 PR POST-OP FOLLOW-UP VISIT: ICD-10-PCS | Mod: POP,,, | Performed by: THORACIC SURGERY (CARDIOTHORACIC VASCULAR SURGERY)

## 2021-06-09 PROCEDURE — 99024 POSTOP FOLLOW-UP VISIT: CPT | Mod: POP,,, | Performed by: THORACIC SURGERY (CARDIOTHORACIC VASCULAR SURGERY)

## 2021-06-09 PROCEDURE — 93005 ELECTROCARDIOGRAM TRACING: CPT | Mod: PBBFAC | Performed by: INTERNAL MEDICINE

## 2021-06-09 PROCEDURE — 99999 PR PBB SHADOW E&M-EST. PATIENT-LVL IV: CPT | Mod: PBBFAC,,, | Performed by: THORACIC SURGERY (CARDIOTHORACIC VASCULAR SURGERY)

## 2021-06-09 PROCEDURE — 99214 OFFICE O/P EST MOD 30 MIN: CPT | Mod: PBBFAC | Performed by: THORACIC SURGERY (CARDIOTHORACIC VASCULAR SURGERY)

## 2021-06-09 PROCEDURE — 93010 ELECTROCARDIOGRAM REPORT: CPT | Mod: S$PBB,,, | Performed by: INTERNAL MEDICINE

## 2021-06-09 RX ORDER — DOXYCYCLINE 100 MG/1
100 CAPSULE ORAL 2 TIMES DAILY
COMMUNITY
Start: 2021-05-13 | End: 2021-06-15

## 2021-06-09 RX ORDER — DICLOXACILLIN SODIUM 500 MG/1
CAPSULE ORAL
COMMUNITY
Start: 2021-05-10 | End: 2021-06-15

## 2021-06-15 ENCOUNTER — TELEPHONE (OUTPATIENT)
Dept: CARDIAC REHAB | Facility: CLINIC | Age: 66
End: 2021-06-15

## 2021-06-15 ENCOUNTER — OFFICE VISIT (OUTPATIENT)
Dept: CARDIOLOGY | Facility: CLINIC | Age: 66
End: 2021-06-15
Payer: MEDICARE

## 2021-06-15 VITALS
OXYGEN SATURATION: 97 % | HEIGHT: 68 IN | BODY MASS INDEX: 24.09 KG/M2 | WEIGHT: 158.94 LBS | DIASTOLIC BLOOD PRESSURE: 72 MMHG | SYSTOLIC BLOOD PRESSURE: 129 MMHG | HEART RATE: 61 BPM

## 2021-06-15 DIAGNOSIS — E78.49 OTHER HYPERLIPIDEMIA: ICD-10-CM

## 2021-06-15 DIAGNOSIS — I25.10 CORONARY ARTERY CALCIFICATION SEEN ON CAT SCAN: ICD-10-CM

## 2021-06-15 DIAGNOSIS — I27.20 PULMONARY HYPERTENSION: ICD-10-CM

## 2021-06-15 DIAGNOSIS — R42 DIZZINESS: ICD-10-CM

## 2021-06-15 DIAGNOSIS — I70.0 AORTIC ATHEROSCLEROSIS: ICD-10-CM

## 2021-06-15 DIAGNOSIS — Z95.1 S/P CABG X 3: ICD-10-CM

## 2021-06-15 DIAGNOSIS — I48.0 PAROXYSMAL ATRIAL FIBRILLATION: ICD-10-CM

## 2021-06-15 DIAGNOSIS — I10 ESSENTIAL HYPERTENSION: ICD-10-CM

## 2021-06-15 DIAGNOSIS — I25.10 CORONARY ARTERY DISEASE INVOLVING NATIVE CORONARY ARTERY OF NATIVE HEART WITHOUT ANGINA PECTORIS: Primary | ICD-10-CM

## 2021-06-15 PROCEDURE — 99204 OFFICE O/P NEW MOD 45 MIN: CPT | Mod: S$PBB,,, | Performed by: INTERNAL MEDICINE

## 2021-06-15 PROCEDURE — 99999 PR PBB SHADOW E&M-EST. PATIENT-LVL IV: CPT | Mod: PBBFAC,,, | Performed by: INTERNAL MEDICINE

## 2021-06-15 PROCEDURE — 99214 OFFICE O/P EST MOD 30 MIN: CPT | Mod: PBBFAC,PO | Performed by: INTERNAL MEDICINE

## 2021-06-15 PROCEDURE — 99204 PR OFFICE/OUTPT VISIT, NEW, LEVL IV, 45-59 MIN: ICD-10-PCS | Mod: S$PBB,,, | Performed by: INTERNAL MEDICINE

## 2021-06-15 PROCEDURE — 99999 PR PBB SHADOW E&M-EST. PATIENT-LVL IV: ICD-10-PCS | Mod: PBBFAC,,, | Performed by: INTERNAL MEDICINE

## 2021-06-15 RX ORDER — DILTIAZEM HYDROCHLORIDE 120 MG/1
120 CAPSULE, COATED, EXTENDED RELEASE ORAL DAILY
Qty: 90 CAPSULE | Refills: 3 | Status: SHIPPED | OUTPATIENT
Start: 2021-06-15 | End: 2021-12-20 | Stop reason: SDUPTHER

## 2021-06-17 ENCOUNTER — PATIENT MESSAGE (OUTPATIENT)
Dept: CARDIOLOGY | Facility: CLINIC | Age: 66
End: 2021-06-17

## 2021-06-28 ENCOUNTER — TELEPHONE (OUTPATIENT)
Dept: CARDIAC REHAB | Facility: CLINIC | Age: 66
End: 2021-06-28

## 2021-06-28 DIAGNOSIS — I10 ESSENTIAL HYPERTENSION: ICD-10-CM

## 2021-06-28 DIAGNOSIS — I25.10 CORONARY ARTERY DISEASE INVOLVING NATIVE CORONARY ARTERY OF NATIVE HEART WITHOUT ANGINA PECTORIS: ICD-10-CM

## 2021-06-28 DIAGNOSIS — Z95.1 POSTSURGICAL AORTOCORONARY BYPASS STATUS: Primary | ICD-10-CM

## 2021-06-29 ENCOUNTER — PATIENT MESSAGE (OUTPATIENT)
Dept: INTERNAL MEDICINE | Facility: CLINIC | Age: 66
End: 2021-06-29

## 2021-06-29 ENCOUNTER — PATIENT MESSAGE (OUTPATIENT)
Dept: CARDIOTHORACIC SURGERY | Facility: CLINIC | Age: 66
End: 2021-06-29

## 2021-07-08 ENCOUNTER — PATIENT MESSAGE (OUTPATIENT)
Dept: CARDIOLOGY | Facility: CLINIC | Age: 66
End: 2021-07-08

## 2021-07-12 ENCOUNTER — HOSPITAL ENCOUNTER (OUTPATIENT)
Dept: CARDIOLOGY | Facility: HOSPITAL | Age: 66
Discharge: HOME | End: 2021-07-12
Attending: THORACIC SURGERY (CARDIOTHORACIC VASCULAR SURGERY)
Payer: MEDICARE

## 2021-07-12 VITALS
DIASTOLIC BLOOD PRESSURE: 85 MMHG | HEIGHT: 68 IN | BODY MASS INDEX: 23.79 KG/M2 | HEART RATE: 65 BPM | SYSTOLIC BLOOD PRESSURE: 151 MMHG | WEIGHT: 157 LBS

## 2021-07-12 DIAGNOSIS — I10 ESSENTIAL HYPERTENSION: ICD-10-CM

## 2021-07-12 DIAGNOSIS — Z95.1 POSTSURGICAL AORTOCORONARY BYPASS STATUS: ICD-10-CM

## 2021-07-12 LAB
CV STRESS BASE HR: 65 BPM
DIASTOLIC BLOOD PRESSURE: 85 MMHG
OHS CV CPX 1 MINUTE RECOVERY HEART RATE: 137 BPM
OHS CV CPX 85 PERCENT MAX PREDICTED HEART RATE MALE: 130
OHS CV CPX ABDOMINAL GIRTH: 36.5 CM
OHS CV CPX ANAEROBIC THRESHOLD DIASTOLIC BLOOD PRESSURE: 94 MMHG
OHS CV CPX ANAEROBIC THRESHOLD HEART RATE: 132
OHS CV CPX ANAEROBIC THRESHOLD RATE PRESSURE PRODUCT: NORMAL
OHS CV CPX ANAEROBIC THRESHOLD SYSTOLIC BLOOD PRESSURE: 153
OHS CV CPX DATA GRADE - AT: 16.9
OHS CV CPX DATA GRADE - PEAK: 18
OHS CV CPX DATA O2 SAT - PEAK: 96
OHS CV CPX DATA O2 SAT - REST: 99
OHS CV CPX DATA SPEED - AT: 4.3
OHS CV CPX DATA SPEED - PEAK: 5
OHS CV CPX DATA TIME - AT: 8.2
OHS CV CPX DATA TIME - PEAK: 8.58
OHS CV CPX DATA VE/VCO2 - AT: 29
OHS CV CPX DATA VE/VCO2 - PEAK: 27
OHS CV CPX DATA VE/VO2 - AT: 33
OHS CV CPX DATA VE/VO2 - PEAK: 34
OHS CV CPX DATA VO2 - AT: 24.4
OHS CV CPX DATA VO2 - PEAK: 25.6
OHS CV CPX DATA VO2 - REST: 4.7
OHS CV CPX ESTIMATED METS: 15
OHS CV CPX FEV1/FVC: 0.84
OHS CV CPX FORCED EXPIRATORY VOLUME: 2.75
OHS CV CPX FORCED VITAL CAPACITY (FVC): 3.26
OHS CV CPX HIGHEST VO: 33.1
OHS CV CPX MAX PREDICTED HEART RATE: 153
OHS CV CPX MAXIMAL VOLUNTARY VENTILATION (MVV) PREDICTED: 110
OHS CV CPX MAXIMAL VOLUNTARY VENTILATION (MVV): 99
OHS CV CPX MAXIUMUM EXERCISE VENTILATION (VE MAX): 58.4
OHS CV CPX PATIENT AGE: 67
OHS CV CPX PATIENT HEIGHT IN: 68
OHS CV CPX PATIENT IS FEMALE AGE 11-19: 0
OHS CV CPX PATIENT IS FEMALE AGE GREATER THAN 19: 0
OHS CV CPX PATIENT IS FEMALE AGE LESS THAN 11: 0
OHS CV CPX PATIENT IS FEMALE: 0
OHS CV CPX PATIENT IS MALE AGE 11-25: 0
OHS CV CPX PATIENT IS MALE AGE GREATER THAN 25: 1
OHS CV CPX PATIENT IS MALE AGE LESS THAN 11: 0
OHS CV CPX PATIENT IS MALE GREATER THAN 18: 1
OHS CV CPX PATIENT IS MALE LESS THAN OR EQUAL TO 18: 0
OHS CV CPX PATIENT IS MALE: 1
OHS CV CPX PATIENT WEIGHT RETURNED IN OZ: 2512
OHS CV CPX PEAK DIASTOLIC BLOOD PRESSURE: 105 MMHG
OHS CV CPX PEAK HEAR RATE: 144 BPM
OHS CV CPX PEAK RATE PRESSURE PRODUCT: NORMAL
OHS CV CPX PEAK SYSTOLIC BLOOD PRESSURE: 168 MMHG
OHS CV CPX PERCENT BODY FAT: 16.3
OHS CV CPX PERCENT MAX PREDICTED HEART RATE ACHIEVED: 94
OHS CV CPX PREDICTED VO2: 33.1 ML/KG/MIN
OHS CV CPX RATE PRESSURE PRODUCT PRESENTING: 9815
OHS CV CPX REST PET CO2: 29
OHS CV CPX VE/VCO2 SLOPE: 26.9
STRESS ECHO POST EXERCISE DUR MIN: 8 MINUTES
STRESS ECHO POST EXERCISE DUR SEC: 35 SECONDS
STRESS ST DEPRESSION: 1 MM
SYSTOLIC BLOOD PRESSURE: 151 MMHG

## 2021-07-12 PROCEDURE — 94621 CARDIOPULM EXERCISE TESTING: CPT

## 2021-07-12 PROCEDURE — 94621 CARDIOPULM EXERCISE TESTING: CPT | Mod: 26,,, | Performed by: INTERNAL MEDICINE

## 2021-07-12 PROCEDURE — 94621 CARDIOPULMONARY EXERCISE TESTING (CUPID ONLY): ICD-10-PCS | Mod: 26,,, | Performed by: INTERNAL MEDICINE

## 2021-07-13 ENCOUNTER — TELEPHONE (OUTPATIENT)
Dept: CARDIOLOGY | Facility: HOSPITAL | Age: 66
End: 2021-07-13

## 2021-07-21 ENCOUNTER — TELEPHONE (OUTPATIENT)
Dept: CARDIAC REHAB | Facility: CLINIC | Age: 66
End: 2021-07-21

## 2021-07-22 ENCOUNTER — CLINICAL SUPPORT (OUTPATIENT)
Dept: CARDIAC REHAB | Facility: CLINIC | Age: 66
End: 2021-07-22
Payer: MEDICARE

## 2021-07-22 VITALS
OXYGEN SATURATION: 99 % | DIASTOLIC BLOOD PRESSURE: 92 MMHG | RESPIRATION RATE: 18 BRPM | HEART RATE: 59 BPM | SYSTOLIC BLOOD PRESSURE: 148 MMHG

## 2021-07-22 DIAGNOSIS — I25.10 ATHEROSCLEROSIS OF NATIVE CORONARY ARTERY OF NATIVE HEART WITHOUT ANGINA PECTORIS: ICD-10-CM

## 2021-07-22 DIAGNOSIS — Z95.1 S/P CABG (CORONARY ARTERY BYPASS GRAFT): ICD-10-CM

## 2021-07-22 DIAGNOSIS — I25.10 CORONARY ARTERY DISEASE INVOLVING NATIVE CORONARY ARTERY OF NATIVE HEART WITHOUT ANGINA PECTORIS: ICD-10-CM

## 2021-07-22 PROCEDURE — 93798 PHYS/QHP OP CAR RHAB W/ECG: CPT | Mod: S$PBB,,, | Performed by: INTERNAL MEDICINE

## 2021-07-22 PROCEDURE — 93798 PHYS/QHP OP CAR RHAB W/ECG: CPT | Mod: PBBFAC,PO

## 2021-07-22 PROCEDURE — 99999 PR PBB SHADOW E&M-EST. PATIENT-LVL III: CPT | Mod: PBBFAC,,,

## 2021-07-22 PROCEDURE — 99999 PR PBB SHADOW E&M-EST. PATIENT-LVL III: ICD-10-PCS | Mod: PBBFAC,,,

## 2021-07-22 PROCEDURE — 93798 PR CARDIAC REHAB/MONITOR: ICD-10-PCS | Mod: S$PBB,,, | Performed by: INTERNAL MEDICINE

## 2021-07-22 PROCEDURE — 99213 OFFICE O/P EST LOW 20 MIN: CPT | Mod: PBBFAC,PO,25

## 2021-07-26 ENCOUNTER — CLINICAL SUPPORT (OUTPATIENT)
Dept: CARDIAC REHAB | Facility: CLINIC | Age: 66
End: 2021-07-26
Payer: MEDICARE

## 2021-07-26 DIAGNOSIS — Z95.1 POSTSURGICAL AORTOCORONARY BYPASS STATUS: ICD-10-CM

## 2021-07-26 DIAGNOSIS — I25.10 ATHEROSCLEROSIS OF NATIVE CORONARY ARTERY OF NATIVE HEART WITHOUT ANGINA PECTORIS: ICD-10-CM

## 2021-07-26 PROCEDURE — 93798 PR CARDIAC REHAB/MONITOR: ICD-10-PCS | Mod: S$PBB,,,

## 2021-07-26 PROCEDURE — 93798 PHYS/QHP OP CAR RHAB W/ECG: CPT | Mod: S$PBB,,,

## 2021-07-26 PROCEDURE — 93798 PHYS/QHP OP CAR RHAB W/ECG: CPT | Mod: PBBFAC,PO

## 2021-07-28 ENCOUNTER — CLINICAL SUPPORT (OUTPATIENT)
Dept: CARDIAC REHAB | Facility: CLINIC | Age: 66
End: 2021-07-28
Payer: MEDICARE

## 2021-07-28 ENCOUNTER — OFFICE VISIT (OUTPATIENT)
Dept: CARDIOLOGY | Facility: CLINIC | Age: 66
End: 2021-07-28
Payer: MEDICARE

## 2021-07-28 VITALS
HEART RATE: 62 BPM | WEIGHT: 157.75 LBS | BODY MASS INDEX: 23.91 KG/M2 | HEIGHT: 68 IN | SYSTOLIC BLOOD PRESSURE: 129 MMHG | OXYGEN SATURATION: 97 % | DIASTOLIC BLOOD PRESSURE: 78 MMHG

## 2021-07-28 DIAGNOSIS — I27.20 PULMONARY HYPERTENSION: ICD-10-CM

## 2021-07-28 DIAGNOSIS — I10 ESSENTIAL HYPERTENSION: ICD-10-CM

## 2021-07-28 DIAGNOSIS — I48.0 PAROXYSMAL ATRIAL FIBRILLATION: ICD-10-CM

## 2021-07-28 DIAGNOSIS — Z95.1 S/P CABG X 3: ICD-10-CM

## 2021-07-28 DIAGNOSIS — E78.49 OTHER HYPERLIPIDEMIA: ICD-10-CM

## 2021-07-28 DIAGNOSIS — I70.0 AORTIC ATHEROSCLEROSIS: ICD-10-CM

## 2021-07-28 DIAGNOSIS — I25.10 CORONARY ARTERY DISEASE INVOLVING NATIVE CORONARY ARTERY OF NATIVE HEART WITHOUT ANGINA PECTORIS: ICD-10-CM

## 2021-07-28 DIAGNOSIS — I25.10 CORONARY ARTERY CALCIFICATION SEEN ON CAT SCAN: ICD-10-CM

## 2021-07-28 DIAGNOSIS — Z95.1 POSTSURGICAL AORTOCORONARY BYPASS STATUS: ICD-10-CM

## 2021-07-28 DIAGNOSIS — R42 DIZZINESS: Primary | ICD-10-CM

## 2021-07-28 PROCEDURE — 99999 PR PBB SHADOW E&M-EST. PATIENT-LVL III: CPT | Mod: PBBFAC,,, | Performed by: INTERNAL MEDICINE

## 2021-07-28 PROCEDURE — 93798 PHYS/QHP OP CAR RHAB W/ECG: CPT | Mod: PBBFAC,PO

## 2021-07-28 PROCEDURE — 99999 PR PBB SHADOW E&M-EST. PATIENT-LVL III: ICD-10-PCS | Mod: PBBFAC,,, | Performed by: INTERNAL MEDICINE

## 2021-07-28 PROCEDURE — 93798 PR CARDIAC REHAB/MONITOR: ICD-10-PCS | Mod: S$PBB,,, | Performed by: INTERNAL MEDICINE

## 2021-07-28 PROCEDURE — 99214 PR OFFICE/OUTPT VISIT, EST, LEVL IV, 30-39 MIN: ICD-10-PCS | Mod: S$PBB,,, | Performed by: INTERNAL MEDICINE

## 2021-07-28 PROCEDURE — 99214 OFFICE O/P EST MOD 30 MIN: CPT | Mod: S$PBB,,, | Performed by: INTERNAL MEDICINE

## 2021-07-28 PROCEDURE — 99213 OFFICE O/P EST LOW 20 MIN: CPT | Mod: PBBFAC,PO,25 | Performed by: INTERNAL MEDICINE

## 2021-07-28 PROCEDURE — 93798 PHYS/QHP OP CAR RHAB W/ECG: CPT | Mod: S$PBB,,, | Performed by: INTERNAL MEDICINE

## 2021-07-30 ENCOUNTER — CLINICAL SUPPORT (OUTPATIENT)
Dept: CARDIAC REHAB | Facility: CLINIC | Age: 66
End: 2021-07-30
Payer: MEDICARE

## 2021-07-30 DIAGNOSIS — I25.10 ATHEROSCLEROSIS OF NATIVE CORONARY ARTERY OF NATIVE HEART WITHOUT ANGINA PECTORIS: ICD-10-CM

## 2021-07-30 DIAGNOSIS — Z95.1 POSTSURGICAL AORTOCORONARY BYPASS STATUS: ICD-10-CM

## 2021-07-30 PROCEDURE — 93798 PHYS/QHP OP CAR RHAB W/ECG: CPT | Mod: S$PBB,,,

## 2021-07-30 PROCEDURE — 93798 PR CARDIAC REHAB/MONITOR: ICD-10-PCS | Mod: S$PBB,,,

## 2021-07-30 PROCEDURE — 93798 PHYS/QHP OP CAR RHAB W/ECG: CPT | Mod: PBBFAC,PO

## 2021-08-02 ENCOUNTER — CLINICAL SUPPORT (OUTPATIENT)
Dept: CARDIAC REHAB | Facility: CLINIC | Age: 66
End: 2021-08-02
Payer: MEDICARE

## 2021-08-02 DIAGNOSIS — I25.10 ATHEROSCLEROSIS OF NATIVE CORONARY ARTERY OF NATIVE HEART, ANGINA PRESENCE UNSPECIFIED: ICD-10-CM

## 2021-08-02 DIAGNOSIS — Z95.1 POSTSURGICAL AORTOCORONARY BYPASS STATUS: ICD-10-CM

## 2021-08-02 PROCEDURE — 93798 PHYS/QHP OP CAR RHAB W/ECG: CPT | Mod: S$PBB,,,

## 2021-08-02 PROCEDURE — 93798 PR CARDIAC REHAB/MONITOR: ICD-10-PCS | Mod: S$PBB,,,

## 2021-08-02 PROCEDURE — 93798 PHYS/QHP OP CAR RHAB W/ECG: CPT | Mod: PBBFAC,PO

## 2021-08-04 ENCOUNTER — CLINICAL SUPPORT (OUTPATIENT)
Dept: CARDIAC REHAB | Facility: CLINIC | Age: 66
End: 2021-08-04
Payer: MEDICARE

## 2021-08-04 DIAGNOSIS — I25.10 ATHEROSCLEROSIS OF NATIVE CORONARY ARTERY OF NATIVE HEART, ANGINA PRESENCE UNSPECIFIED: ICD-10-CM

## 2021-08-04 DIAGNOSIS — Z95.1 POSTSURGICAL AORTOCORONARY BYPASS STATUS: ICD-10-CM

## 2021-08-04 PROCEDURE — 93798 PHYS/QHP OP CAR RHAB W/ECG: CPT | Mod: S$PBB,,,

## 2021-08-04 PROCEDURE — 93798 PR CARDIAC REHAB/MONITOR: ICD-10-PCS | Mod: S$PBB,,,

## 2021-08-04 PROCEDURE — 93798 PHYS/QHP OP CAR RHAB W/ECG: CPT | Mod: PBBFAC,PO

## 2021-08-06 ENCOUNTER — CLINICAL SUPPORT (OUTPATIENT)
Dept: CARDIAC REHAB | Facility: CLINIC | Age: 66
End: 2021-08-06
Payer: MEDICARE

## 2021-08-06 DIAGNOSIS — Z95.1 POSTSURGICAL AORTOCORONARY BYPASS STATUS: ICD-10-CM

## 2021-08-06 DIAGNOSIS — I25.10 ATHEROSCLEROSIS OF NATIVE CORONARY ARTERY OF NATIVE HEART, ANGINA PRESENCE UNSPECIFIED: ICD-10-CM

## 2021-08-06 PROCEDURE — 93798 PHYS/QHP OP CAR RHAB W/ECG: CPT | Mod: S$PBB,,,

## 2021-08-06 PROCEDURE — 93798 PHYS/QHP OP CAR RHAB W/ECG: CPT | Mod: PBBFAC,PO

## 2021-08-06 PROCEDURE — 93798 PR CARDIAC REHAB/MONITOR: ICD-10-PCS | Mod: S$PBB,,,

## 2021-08-10 ENCOUNTER — DOCUMENTATION ONLY (OUTPATIENT)
Dept: CARDIAC REHAB | Facility: CLINIC | Age: 66
End: 2021-08-10

## 2021-08-11 ENCOUNTER — CLINICAL SUPPORT (OUTPATIENT)
Dept: CARDIAC REHAB | Facility: CLINIC | Age: 66
End: 2021-08-11
Payer: MEDICARE

## 2021-08-11 ENCOUNTER — OFFICE VISIT (OUTPATIENT)
Dept: INTERNAL MEDICINE | Facility: CLINIC | Age: 66
End: 2021-08-11
Payer: MEDICARE

## 2021-08-11 VITALS
WEIGHT: 158.94 LBS | HEIGHT: 68 IN | HEART RATE: 64 BPM | DIASTOLIC BLOOD PRESSURE: 70 MMHG | BODY MASS INDEX: 24.09 KG/M2 | SYSTOLIC BLOOD PRESSURE: 118 MMHG

## 2021-08-11 DIAGNOSIS — I10 ESSENTIAL HYPERTENSION: Primary | ICD-10-CM

## 2021-08-11 DIAGNOSIS — I25.10 CORONARY ARTERY DISEASE INVOLVING NATIVE CORONARY ARTERY OF NATIVE HEART WITHOUT ANGINA PECTORIS: ICD-10-CM

## 2021-08-11 DIAGNOSIS — I25.10 ATHEROSCLEROSIS OF NATIVE CORONARY ARTERY OF NATIVE HEART, ANGINA PRESENCE UNSPECIFIED: ICD-10-CM

## 2021-08-11 DIAGNOSIS — N52.9 ERECTILE DYSFUNCTION, UNSPECIFIED ERECTILE DYSFUNCTION TYPE: ICD-10-CM

## 2021-08-11 DIAGNOSIS — Z12.5 SCREENING FOR PROSTATE CANCER: ICD-10-CM

## 2021-08-11 DIAGNOSIS — Z95.1 POSTSURGICAL AORTOCORONARY BYPASS STATUS: ICD-10-CM

## 2021-08-11 DIAGNOSIS — Z95.1 S/P CABG (CORONARY ARTERY BYPASS GRAFT): ICD-10-CM

## 2021-08-11 PROCEDURE — 99214 OFFICE O/P EST MOD 30 MIN: CPT | Mod: S$PBB,,, | Performed by: INTERNAL MEDICINE

## 2021-08-11 PROCEDURE — 99999 PR PBB SHADOW E&M-EST. PATIENT-LVL III: CPT | Mod: PBBFAC,,, | Performed by: INTERNAL MEDICINE

## 2021-08-11 PROCEDURE — 99214 PR OFFICE/OUTPT VISIT, EST, LEVL IV, 30-39 MIN: ICD-10-PCS | Mod: S$PBB,,, | Performed by: INTERNAL MEDICINE

## 2021-08-11 PROCEDURE — 93798 PHYS/QHP OP CAR RHAB W/ECG: CPT | Mod: PBBFAC,PO

## 2021-08-11 PROCEDURE — 93798 PR CARDIAC REHAB/MONITOR: ICD-10-PCS | Mod: S$PBB,,,

## 2021-08-11 PROCEDURE — 99213 OFFICE O/P EST LOW 20 MIN: CPT | Mod: PBBFAC,25 | Performed by: INTERNAL MEDICINE

## 2021-08-11 PROCEDURE — 93798 PHYS/QHP OP CAR RHAB W/ECG: CPT | Mod: S$PBB,,,

## 2021-08-11 PROCEDURE — 99999 PR PBB SHADOW E&M-EST. PATIENT-LVL III: ICD-10-PCS | Mod: PBBFAC,,, | Performed by: INTERNAL MEDICINE

## 2021-08-11 RX ORDER — SILDENAFIL 100 MG/1
TABLET, FILM COATED ORAL
COMMUNITY
End: 2022-03-22 | Stop reason: SDUPTHER

## 2021-08-13 ENCOUNTER — CLINICAL SUPPORT (OUTPATIENT)
Dept: CARDIAC REHAB | Facility: CLINIC | Age: 66
End: 2021-08-13
Payer: MEDICARE

## 2021-08-13 DIAGNOSIS — Z95.1 POSTSURGICAL AORTOCORONARY BYPASS STATUS: ICD-10-CM

## 2021-08-13 DIAGNOSIS — I25.10 ATHEROSCLEROSIS OF NATIVE CORONARY ARTERY OF NATIVE HEART, ANGINA PRESENCE UNSPECIFIED: ICD-10-CM

## 2021-08-13 PROCEDURE — 93798 PHYS/QHP OP CAR RHAB W/ECG: CPT | Mod: S$PBB,,,

## 2021-08-13 PROCEDURE — 93798 PR CARDIAC REHAB/MONITOR: ICD-10-PCS | Mod: S$PBB,,,

## 2021-08-13 PROCEDURE — 93798 PHYS/QHP OP CAR RHAB W/ECG: CPT | Mod: PBBFAC,PO

## 2021-08-16 ENCOUNTER — CLINICAL SUPPORT (OUTPATIENT)
Dept: CARDIAC REHAB | Facility: CLINIC | Age: 66
End: 2021-08-16
Payer: MEDICARE

## 2021-08-16 DIAGNOSIS — I25.10 ATHEROSCLEROSIS OF NATIVE CORONARY ARTERY OF NATIVE HEART WITHOUT ANGINA PECTORIS: ICD-10-CM

## 2021-08-16 DIAGNOSIS — Z95.1 POSTSURGICAL AORTOCORONARY BYPASS STATUS: ICD-10-CM

## 2021-08-16 PROCEDURE — 93798 PHYS/QHP OP CAR RHAB W/ECG: CPT | Mod: PBBFAC,PO

## 2021-08-16 PROCEDURE — 93798 PR CARDIAC REHAB/MONITOR: ICD-10-PCS | Mod: S$PBB,,, | Performed by: INTERNAL MEDICINE

## 2021-08-16 PROCEDURE — 93798 PHYS/QHP OP CAR RHAB W/ECG: CPT | Mod: S$PBB,,, | Performed by: INTERNAL MEDICINE

## 2021-08-18 ENCOUNTER — CLINICAL SUPPORT (OUTPATIENT)
Dept: CARDIAC REHAB | Facility: CLINIC | Age: 66
End: 2021-08-18
Payer: MEDICARE

## 2021-08-18 DIAGNOSIS — I25.10 ATHEROSCLEROSIS OF NATIVE CORONARY ARTERY OF NATIVE HEART, ANGINA PRESENCE UNSPECIFIED: ICD-10-CM

## 2021-08-18 DIAGNOSIS — Z95.1 POSTSURGICAL AORTOCORONARY BYPASS STATUS: ICD-10-CM

## 2021-08-18 PROCEDURE — 93798 PR CARDIAC REHAB/MONITOR: ICD-10-PCS | Mod: S$PBB,,,

## 2021-08-18 PROCEDURE — 93798 PHYS/QHP OP CAR RHAB W/ECG: CPT | Mod: PBBFAC,PO

## 2021-08-18 PROCEDURE — 93798 PHYS/QHP OP CAR RHAB W/ECG: CPT | Mod: S$PBB,,,

## 2021-08-20 ENCOUNTER — LAB VISIT (OUTPATIENT)
Dept: INTERNAL MEDICINE | Facility: CLINIC | Age: 66
End: 2021-08-20
Payer: MEDICARE

## 2021-08-20 ENCOUNTER — TELEPHONE (OUTPATIENT)
Dept: INTERNAL MEDICINE | Facility: CLINIC | Age: 66
End: 2021-08-20

## 2021-08-20 ENCOUNTER — PATIENT MESSAGE (OUTPATIENT)
Dept: INTERNAL MEDICINE | Facility: CLINIC | Age: 66
End: 2021-08-20

## 2021-08-20 DIAGNOSIS — R05.9 COUGH: ICD-10-CM

## 2021-08-20 DIAGNOSIS — U07.1 COVID-19 VIRUS INFECTION: Primary | ICD-10-CM

## 2021-08-20 PROCEDURE — U0005 INFEC AGEN DETEC AMPLI PROBE: HCPCS | Performed by: INTERNAL MEDICINE

## 2021-08-20 PROCEDURE — U0003 INFECTIOUS AGENT DETECTION BY NUCLEIC ACID (DNA OR RNA); SEVERE ACUTE RESPIRATORY SYNDROME CORONAVIRUS 2 (SARS-COV-2) (CORONAVIRUS DISEASE [COVID-19]), AMPLIFIED PROBE TECHNIQUE, MAKING USE OF HIGH THROUGHPUT TECHNOLOGIES AS DESCRIBED BY CMS-2020-01-R: HCPCS | Performed by: INTERNAL MEDICINE

## 2021-08-22 ENCOUNTER — PATIENT MESSAGE (OUTPATIENT)
Dept: CARDIOLOGY | Facility: CLINIC | Age: 66
End: 2021-08-22

## 2021-08-22 DIAGNOSIS — U07.1 COVID-19 VIRUS DETECTED: ICD-10-CM

## 2021-08-22 LAB
SARS-COV-2 RNA RESP QL NAA+PROBE: DETECTED
SARS-COV-2- CYCLE NUMBER: 39.38

## 2021-08-23 ENCOUNTER — INFUSION (OUTPATIENT)
Dept: INFECTIOUS DISEASES | Facility: HOSPITAL | Age: 66
End: 2021-08-23
Attending: INTERNAL MEDICINE
Payer: MEDICARE

## 2021-08-23 VITALS
HEART RATE: 50 BPM | TEMPERATURE: 99 F | SYSTOLIC BLOOD PRESSURE: 125 MMHG | BODY MASS INDEX: 23.19 KG/M2 | OXYGEN SATURATION: 100 % | RESPIRATION RATE: 16 BRPM | WEIGHT: 153 LBS | DIASTOLIC BLOOD PRESSURE: 73 MMHG | HEIGHT: 68 IN

## 2021-08-23 DIAGNOSIS — U07.1 COVID-19: Primary | ICD-10-CM

## 2021-08-23 PROCEDURE — 25000003 PHARM REV CODE 250: Performed by: INTERNAL MEDICINE

## 2021-08-23 PROCEDURE — M0243 CASIRIVI AND IMDEVI INFUSION: HCPCS | Performed by: INTERNAL MEDICINE

## 2021-08-23 PROCEDURE — 63600175 PHARM REV CODE 636 W HCPCS: Performed by: INTERNAL MEDICINE

## 2021-08-23 RX ORDER — SODIUM CHLORIDE 0.9 % (FLUSH) 0.9 %
10 SYRINGE (ML) INJECTION
Status: DISCONTINUED | OUTPATIENT
Start: 2021-08-23 | End: 2021-10-20

## 2021-08-23 RX ORDER — EPINEPHRINE 0.3 MG/.3ML
0.3 INJECTION SUBCUTANEOUS
Status: DISCONTINUED | OUTPATIENT
Start: 2021-08-23 | End: 2023-09-06

## 2021-08-23 RX ORDER — ALBUTEROL SULFATE 90 UG/1
2 AEROSOL, METERED RESPIRATORY (INHALATION)
Status: DISCONTINUED | OUTPATIENT
Start: 2021-08-23 | End: 2023-09-06

## 2021-08-23 RX ORDER — DIPHENHYDRAMINE HYDROCHLORIDE 50 MG/ML
25 INJECTION INTRAMUSCULAR; INTRAVENOUS ONCE AS NEEDED
Status: DISCONTINUED | OUTPATIENT
Start: 2021-08-23 | End: 2023-09-06

## 2021-08-23 RX ORDER — ACETAMINOPHEN 325 MG/1
650 TABLET ORAL ONCE AS NEEDED
Status: DISCONTINUED | OUTPATIENT
Start: 2021-08-23 | End: 2023-09-06

## 2021-08-23 RX ORDER — ONDANSETRON 4 MG/1
4 TABLET, ORALLY DISINTEGRATING ORAL ONCE AS NEEDED
Status: DISCONTINUED | OUTPATIENT
Start: 2021-08-23 | End: 2023-09-06

## 2021-08-23 RX ADMIN — CASIRIVIMAB AND IMDEVIMAB 600 MG: 600; 600 INJECTION, SOLUTION, CONCENTRATE INTRAVENOUS at 01:08

## 2021-08-24 ENCOUNTER — PATIENT MESSAGE (OUTPATIENT)
Dept: INTERNAL MEDICINE | Facility: CLINIC | Age: 66
End: 2021-08-24

## 2021-09-13 ENCOUNTER — DOCUMENTATION ONLY (OUTPATIENT)
Dept: CARDIAC REHAB | Facility: CLINIC | Age: 66
End: 2021-09-13

## 2021-09-20 ENCOUNTER — TELEPHONE (OUTPATIENT)
Dept: CARDIAC REHAB | Facility: CLINIC | Age: 66
End: 2021-09-20

## 2021-09-22 ENCOUNTER — DOCUMENTATION ONLY (OUTPATIENT)
Dept: CARDIAC REHAB | Facility: CLINIC | Age: 66
End: 2021-09-22

## 2021-09-22 ENCOUNTER — CLINICAL SUPPORT (OUTPATIENT)
Dept: CARDIAC REHAB | Facility: CLINIC | Age: 66
End: 2021-09-22
Payer: MEDICARE

## 2021-09-22 DIAGNOSIS — I25.10 ATHEROSCLEROSIS OF NATIVE CORONARY ARTERY OF NATIVE HEART, ANGINA PRESENCE UNSPECIFIED: ICD-10-CM

## 2021-09-22 DIAGNOSIS — Z95.1 POSTSURGICAL AORTOCORONARY BYPASS STATUS: ICD-10-CM

## 2021-09-22 PROCEDURE — 93798 PHYS/QHP OP CAR RHAB W/ECG: CPT | Mod: S$PBB,,,

## 2021-09-22 PROCEDURE — 93798 PHYS/QHP OP CAR RHAB W/ECG: CPT | Mod: PBBFAC,PO

## 2021-09-22 PROCEDURE — 93798 PR CARDIAC REHAB/MONITOR: ICD-10-PCS | Mod: S$PBB,,,

## 2021-09-24 ENCOUNTER — DOCUMENTATION ONLY (OUTPATIENT)
Dept: CARDIAC REHAB | Facility: CLINIC | Age: 66
End: 2021-09-24

## 2021-09-24 ENCOUNTER — CLINICAL SUPPORT (OUTPATIENT)
Dept: CARDIAC REHAB | Facility: CLINIC | Age: 66
End: 2021-09-24
Payer: MEDICARE

## 2021-09-24 DIAGNOSIS — I25.10 CORONARY ATHEROSCLEROSIS OF NATIVE CORONARY ARTERY: ICD-10-CM

## 2021-09-24 DIAGNOSIS — Z95.1 POSTSURGICAL AORTOCORONARY BYPASS STATUS: ICD-10-CM

## 2021-09-24 PROCEDURE — 93798 PR CARDIAC REHAB/MONITOR: ICD-10-PCS | Mod: S$PBB,,, | Performed by: INTERNAL MEDICINE

## 2021-09-24 PROCEDURE — 93798 PHYS/QHP OP CAR RHAB W/ECG: CPT | Mod: S$PBB,,, | Performed by: INTERNAL MEDICINE

## 2021-09-24 PROCEDURE — 93798 PHYS/QHP OP CAR RHAB W/ECG: CPT | Mod: PBBFAC,PO

## 2021-09-27 ENCOUNTER — CLINICAL SUPPORT (OUTPATIENT)
Dept: CARDIAC REHAB | Facility: CLINIC | Age: 66
End: 2021-09-27
Payer: MEDICARE

## 2021-09-27 DIAGNOSIS — Z95.1 POSTSURGICAL AORTOCORONARY BYPASS STATUS: ICD-10-CM

## 2021-09-27 DIAGNOSIS — I25.10 ATHEROSCLEROSIS OF NATIVE CORONARY ARTERY OF NATIVE HEART, ANGINA PRESENCE UNSPECIFIED: ICD-10-CM

## 2021-09-27 PROCEDURE — 93798 PHYS/QHP OP CAR RHAB W/ECG: CPT | Mod: PBBFAC,PO

## 2021-09-27 PROCEDURE — 93798 PR CARDIAC REHAB/MONITOR: ICD-10-PCS | Mod: S$PBB,,,

## 2021-09-27 PROCEDURE — 93798 PHYS/QHP OP CAR RHAB W/ECG: CPT | Mod: S$PBB,,,

## 2021-09-29 ENCOUNTER — CLINICAL SUPPORT (OUTPATIENT)
Dept: CARDIAC REHAB | Facility: CLINIC | Age: 66
End: 2021-09-29
Payer: MEDICARE

## 2021-09-29 DIAGNOSIS — I25.10 ATHEROSCLEROSIS OF NATIVE CORONARY ARTERY OF NATIVE HEART, ANGINA PRESENCE UNSPECIFIED: ICD-10-CM

## 2021-09-29 DIAGNOSIS — Z95.1 POSTSURGICAL AORTOCORONARY BYPASS STATUS: ICD-10-CM

## 2021-09-29 PROCEDURE — 93798 PHYS/QHP OP CAR RHAB W/ECG: CPT | Mod: PBBFAC,PO

## 2021-09-29 PROCEDURE — 93798 PHYS/QHP OP CAR RHAB W/ECG: CPT | Mod: S$PBB,,,

## 2021-09-29 PROCEDURE — 93798 PR CARDIAC REHAB/MONITOR: ICD-10-PCS | Mod: S$PBB,,,

## 2021-10-01 ENCOUNTER — CLINICAL SUPPORT (OUTPATIENT)
Dept: CARDIAC REHAB | Facility: CLINIC | Age: 66
End: 2021-10-01
Payer: MEDICARE

## 2021-10-01 DIAGNOSIS — Z95.1 POSTSURGICAL AORTOCORONARY BYPASS STATUS: ICD-10-CM

## 2021-10-01 DIAGNOSIS — I25.10 CORONARY ATHEROSCLEROSIS OF NATIVE CORONARY ARTERY: ICD-10-CM

## 2021-10-01 PROCEDURE — 93798 PR CARDIAC REHAB/MONITOR: ICD-10-PCS | Mod: S$PBB,,, | Performed by: INTERNAL MEDICINE

## 2021-10-01 PROCEDURE — 93798 PHYS/QHP OP CAR RHAB W/ECG: CPT | Mod: PBBFAC,PO

## 2021-10-01 PROCEDURE — 93798 PHYS/QHP OP CAR RHAB W/ECG: CPT | Mod: S$PBB,,, | Performed by: INTERNAL MEDICINE

## 2021-10-04 ENCOUNTER — CLINICAL SUPPORT (OUTPATIENT)
Dept: CARDIAC REHAB | Facility: CLINIC | Age: 66
End: 2021-10-04
Payer: MEDICARE

## 2021-10-04 DIAGNOSIS — I25.10 ATHEROSCLEROSIS OF NATIVE CORONARY ARTERY OF NATIVE HEART WITHOUT ANGINA PECTORIS: ICD-10-CM

## 2021-10-04 DIAGNOSIS — Z95.1 POSTSURGICAL AORTOCORONARY BYPASS STATUS: ICD-10-CM

## 2021-10-04 PROCEDURE — 93798 PR CARDIAC REHAB/MONITOR: ICD-10-PCS | Mod: S$PBB,,, | Performed by: INTERNAL MEDICINE

## 2021-10-04 PROCEDURE — 93798 PHYS/QHP OP CAR RHAB W/ECG: CPT | Mod: S$PBB,,, | Performed by: INTERNAL MEDICINE

## 2021-10-04 PROCEDURE — 93798 PHYS/QHP OP CAR RHAB W/ECG: CPT | Mod: PBBFAC,PO

## 2021-10-06 ENCOUNTER — CLINICAL SUPPORT (OUTPATIENT)
Dept: CARDIAC REHAB | Facility: CLINIC | Age: 66
End: 2021-10-06
Payer: MEDICARE

## 2021-10-06 DIAGNOSIS — Z95.1 POSTSURGICAL AORTOCORONARY BYPASS STATUS: ICD-10-CM

## 2021-10-06 DIAGNOSIS — I25.10 ATHEROSCLEROSIS OF NATIVE CORONARY ARTERY OF NATIVE HEART, UNSPECIFIED WHETHER ANGINA PRESENT: ICD-10-CM

## 2021-10-06 PROCEDURE — 93798 PHYS/QHP OP CAR RHAB W/ECG: CPT | Mod: S$PBB,,,

## 2021-10-06 PROCEDURE — 93798 PHYS/QHP OP CAR RHAB W/ECG: CPT | Mod: PBBFAC,PO

## 2021-10-06 PROCEDURE — 93798 PR CARDIAC REHAB/MONITOR: ICD-10-PCS | Mod: S$PBB,,,

## 2021-10-08 ENCOUNTER — PATIENT MESSAGE (OUTPATIENT)
Dept: INTERNAL MEDICINE | Facility: CLINIC | Age: 66
End: 2021-10-08

## 2021-10-08 ENCOUNTER — CLINICAL SUPPORT (OUTPATIENT)
Dept: CARDIAC REHAB | Facility: CLINIC | Age: 66
End: 2021-10-08
Payer: MEDICARE

## 2021-10-08 ENCOUNTER — TELEPHONE (OUTPATIENT)
Dept: INTERNAL MEDICINE | Facility: CLINIC | Age: 66
End: 2021-10-08

## 2021-10-08 DIAGNOSIS — Z95.1 POSTSURGICAL AORTOCORONARY BYPASS STATUS: ICD-10-CM

## 2021-10-08 DIAGNOSIS — I25.10 ATHEROSCLEROSIS OF NATIVE CORONARY ARTERY OF NATIVE HEART, UNSPECIFIED WHETHER ANGINA PRESENT: ICD-10-CM

## 2021-10-08 PROCEDURE — 93798 PHYS/QHP OP CAR RHAB W/ECG: CPT | Mod: PBBFAC,PO

## 2021-10-08 PROCEDURE — 93798 PHYS/QHP OP CAR RHAB W/ECG: CPT | Mod: S$PBB,,,

## 2021-10-08 PROCEDURE — 93798 PR CARDIAC REHAB/MONITOR: ICD-10-PCS | Mod: S$PBB,,,

## 2021-10-11 ENCOUNTER — CLINICAL SUPPORT (OUTPATIENT)
Dept: CARDIAC REHAB | Facility: CLINIC | Age: 66
End: 2021-10-11
Payer: MEDICARE

## 2021-10-11 DIAGNOSIS — I25.10 CORONARY ATHEROSCLEROSIS OF NATIVE CORONARY ARTERY: ICD-10-CM

## 2021-10-11 DIAGNOSIS — Z95.1 POSTSURGICAL AORTOCORONARY BYPASS STATUS: ICD-10-CM

## 2021-10-11 PROCEDURE — 93798 PHYS/QHP OP CAR RHAB W/ECG: CPT | Mod: PBBFAC,PO

## 2021-10-11 PROCEDURE — 93798 PHYS/QHP OP CAR RHAB W/ECG: CPT | Mod: S$PBB,,, | Performed by: INTERNAL MEDICINE

## 2021-10-11 PROCEDURE — 93798 PR CARDIAC REHAB/MONITOR: ICD-10-PCS | Mod: S$PBB,,, | Performed by: INTERNAL MEDICINE

## 2021-10-13 ENCOUNTER — CLINICAL SUPPORT (OUTPATIENT)
Dept: CARDIAC REHAB | Facility: CLINIC | Age: 66
End: 2021-10-13
Payer: MEDICARE

## 2021-10-13 DIAGNOSIS — Z95.1 POSTSURGICAL AORTOCORONARY BYPASS STATUS: ICD-10-CM

## 2021-10-13 DIAGNOSIS — I25.10 ATHEROSCLEROSIS OF NATIVE CORONARY ARTERY OF NATIVE HEART WITHOUT ANGINA PECTORIS: ICD-10-CM

## 2021-10-13 PROCEDURE — 93798 PHYS/QHP OP CAR RHAB W/ECG: CPT | Mod: S$PBB,,, | Performed by: INTERNAL MEDICINE

## 2021-10-13 PROCEDURE — 93798 PHYS/QHP OP CAR RHAB W/ECG: CPT | Mod: PBBFAC,PO

## 2021-10-13 PROCEDURE — 93798 PR CARDIAC REHAB/MONITOR: ICD-10-PCS | Mod: S$PBB,,, | Performed by: INTERNAL MEDICINE

## 2021-10-15 ENCOUNTER — CLINICAL SUPPORT (OUTPATIENT)
Dept: CARDIAC REHAB | Facility: CLINIC | Age: 66
End: 2021-10-15
Payer: MEDICARE

## 2021-10-15 ENCOUNTER — DOCUMENTATION ONLY (OUTPATIENT)
Dept: CARDIAC REHAB | Facility: CLINIC | Age: 66
End: 2021-10-15

## 2021-10-15 DIAGNOSIS — I25.10 ATHEROSCLEROSIS OF NATIVE CORONARY ARTERY OF NATIVE HEART, UNSPECIFIED WHETHER ANGINA PRESENT: ICD-10-CM

## 2021-10-15 DIAGNOSIS — Z95.1 POSTSURGICAL AORTOCORONARY BYPASS STATUS: ICD-10-CM

## 2021-10-15 PROCEDURE — 93798 PHYS/QHP OP CAR RHAB W/ECG: CPT | Mod: PBBFAC,PO

## 2021-10-15 PROCEDURE — 93798 PHYS/QHP OP CAR RHAB W/ECG: CPT | Mod: S$PBB,,,

## 2021-10-15 PROCEDURE — 93798 PR CARDIAC REHAB/MONITOR: ICD-10-PCS | Mod: S$PBB,,,

## 2021-10-18 ENCOUNTER — CLINICAL SUPPORT (OUTPATIENT)
Dept: CARDIAC REHAB | Facility: CLINIC | Age: 66
End: 2021-10-18
Payer: MEDICARE

## 2021-10-18 DIAGNOSIS — Z95.1 POSTSURGICAL AORTOCORONARY BYPASS STATUS: ICD-10-CM

## 2021-10-18 DIAGNOSIS — I25.10 CORONARY ATHEROSCLEROSIS OF NATIVE CORONARY ARTERY: ICD-10-CM

## 2021-10-18 PROCEDURE — 93798 PR CARDIAC REHAB/MONITOR: ICD-10-PCS | Mod: S$PBB,,, | Performed by: INTERNAL MEDICINE

## 2021-10-18 PROCEDURE — 93798 PHYS/QHP OP CAR RHAB W/ECG: CPT | Mod: PBBFAC,PO

## 2021-10-18 PROCEDURE — 93798 PHYS/QHP OP CAR RHAB W/ECG: CPT | Mod: S$PBB,,, | Performed by: INTERNAL MEDICINE

## 2021-10-20 ENCOUNTER — OFFICE VISIT (OUTPATIENT)
Dept: CARDIOLOGY | Facility: CLINIC | Age: 66
End: 2021-10-20
Payer: MEDICARE

## 2021-10-20 ENCOUNTER — DOCUMENTATION ONLY (OUTPATIENT)
Dept: CARDIAC REHAB | Facility: CLINIC | Age: 66
End: 2021-10-20

## 2021-10-20 ENCOUNTER — CLINICAL SUPPORT (OUTPATIENT)
Dept: CARDIAC REHAB | Facility: CLINIC | Age: 66
End: 2021-10-20
Payer: MEDICARE

## 2021-10-20 VITALS
WEIGHT: 159 LBS | DIASTOLIC BLOOD PRESSURE: 70 MMHG | SYSTOLIC BLOOD PRESSURE: 120 MMHG | BODY MASS INDEX: 24.1 KG/M2 | HEIGHT: 68 IN | HEART RATE: 63 BPM | OXYGEN SATURATION: 99 %

## 2021-10-20 DIAGNOSIS — I25.10 ATHEROSCLEROSIS OF NATIVE CORONARY ARTERY OF NATIVE HEART, UNSPECIFIED WHETHER ANGINA PRESENT: ICD-10-CM

## 2021-10-20 DIAGNOSIS — Z95.1 POSTSURGICAL AORTOCORONARY BYPASS STATUS: ICD-10-CM

## 2021-10-20 DIAGNOSIS — E78.49 OTHER HYPERLIPIDEMIA: ICD-10-CM

## 2021-10-20 DIAGNOSIS — I48.0 PAROXYSMAL ATRIAL FIBRILLATION: ICD-10-CM

## 2021-10-20 DIAGNOSIS — I10 ESSENTIAL HYPERTENSION: ICD-10-CM

## 2021-10-20 DIAGNOSIS — Z95.1 POSTSURGICAL AORTOCORONARY BYPASS STATUS: Primary | ICD-10-CM

## 2021-10-20 DIAGNOSIS — I25.10 CORONARY ARTERY DISEASE INVOLVING NATIVE CORONARY ARTERY OF NATIVE HEART WITHOUT ANGINA PECTORIS: Primary | ICD-10-CM

## 2021-10-20 DIAGNOSIS — I25.10 CORONARY ARTERY DISEASE INVOLVING NATIVE CORONARY ARTERY OF NATIVE HEART WITHOUT ANGINA PECTORIS: ICD-10-CM

## 2021-10-20 DIAGNOSIS — I70.0 AORTIC ATHEROSCLEROSIS: ICD-10-CM

## 2021-10-20 DIAGNOSIS — Z95.1 S/P CABG X 3: ICD-10-CM

## 2021-10-20 DIAGNOSIS — I27.20 PULMONARY HYPERTENSION: ICD-10-CM

## 2021-10-20 DIAGNOSIS — R00.2 PALPITATIONS: ICD-10-CM

## 2021-10-20 PROCEDURE — 99999 PR PBB SHADOW E&M-EST. PATIENT-LVL IV: ICD-10-PCS | Mod: PBBFAC,,, | Performed by: INTERNAL MEDICINE

## 2021-10-20 PROCEDURE — 93798 PHYS/QHP OP CAR RHAB W/ECG: CPT | Mod: S$PBB,,, | Performed by: INTERNAL MEDICINE

## 2021-10-20 PROCEDURE — 93798 PHYS/QHP OP CAR RHAB W/ECG: CPT | Mod: PBBFAC,PO

## 2021-10-20 PROCEDURE — 99214 OFFICE O/P EST MOD 30 MIN: CPT | Mod: S$PBB,,, | Performed by: INTERNAL MEDICINE

## 2021-10-20 PROCEDURE — 99214 OFFICE O/P EST MOD 30 MIN: CPT | Mod: PBBFAC,25 | Performed by: INTERNAL MEDICINE

## 2021-10-20 PROCEDURE — 93798 PR CARDIAC REHAB/MONITOR: ICD-10-PCS | Mod: S$PBB,,, | Performed by: INTERNAL MEDICINE

## 2021-10-20 PROCEDURE — 99999 PR PBB SHADOW E&M-EST. PATIENT-LVL IV: CPT | Mod: PBBFAC,,, | Performed by: INTERNAL MEDICINE

## 2021-10-20 PROCEDURE — 99214 PR OFFICE/OUTPT VISIT, EST, LEVL IV, 30-39 MIN: ICD-10-PCS | Mod: S$PBB,,, | Performed by: INTERNAL MEDICINE

## 2021-10-22 ENCOUNTER — CLINICAL SUPPORT (OUTPATIENT)
Dept: CARDIAC REHAB | Facility: CLINIC | Age: 66
End: 2021-10-22
Payer: MEDICARE

## 2021-10-22 DIAGNOSIS — I25.10 CORONARY ATHEROSCLEROSIS OF NATIVE CORONARY ARTERY: ICD-10-CM

## 2021-10-22 DIAGNOSIS — Z95.1 POSTSURGICAL AORTOCORONARY BYPASS STATUS: ICD-10-CM

## 2021-10-22 PROCEDURE — 93798 PR CARDIAC REHAB/MONITOR: ICD-10-PCS | Mod: S$PBB,,, | Performed by: INTERNAL MEDICINE

## 2021-10-22 PROCEDURE — 93798 PHYS/QHP OP CAR RHAB W/ECG: CPT | Mod: S$PBB,,, | Performed by: INTERNAL MEDICINE

## 2021-10-22 PROCEDURE — 93798 PHYS/QHP OP CAR RHAB W/ECG: CPT | Mod: PBBFAC,PO

## 2021-10-25 ENCOUNTER — CLINICAL SUPPORT (OUTPATIENT)
Dept: CARDIAC REHAB | Facility: CLINIC | Age: 66
End: 2021-10-25
Payer: MEDICARE

## 2021-10-25 DIAGNOSIS — I25.10 ATHEROSCLEROSIS OF NATIVE CORONARY ARTERY OF NATIVE HEART, UNSPECIFIED WHETHER ANGINA PRESENT: ICD-10-CM

## 2021-10-25 DIAGNOSIS — Z95.1 POSTSURGICAL AORTOCORONARY BYPASS STATUS: ICD-10-CM

## 2021-10-25 PROCEDURE — 93798 PR CARDIAC REHAB/MONITOR: ICD-10-PCS | Mod: S$PBB,,, | Performed by: INTERNAL MEDICINE

## 2021-10-25 PROCEDURE — 93798 PHYS/QHP OP CAR RHAB W/ECG: CPT | Mod: PBBFAC,PO

## 2021-10-25 PROCEDURE — 93798 PHYS/QHP OP CAR RHAB W/ECG: CPT | Mod: S$PBB,,, | Performed by: INTERNAL MEDICINE

## 2021-10-26 ENCOUNTER — HOSPITAL ENCOUNTER (OUTPATIENT)
Dept: CARDIOLOGY | Facility: HOSPITAL | Age: 66
Discharge: HOME | End: 2021-10-26
Attending: INTERNAL MEDICINE
Payer: MEDICARE

## 2021-10-26 DIAGNOSIS — I48.0 PAROXYSMAL ATRIAL FIBRILLATION: ICD-10-CM

## 2021-10-26 DIAGNOSIS — R00.2 PALPITATIONS: ICD-10-CM

## 2021-10-26 PROCEDURE — 93227 HOLTER MONITOR - 48 HOUR (CUPID ONLY): ICD-10-PCS | Mod: ,,, | Performed by: INTERNAL MEDICINE

## 2021-10-26 PROCEDURE — 93227 XTRNL ECG REC<48 HR R&I: CPT | Mod: ,,, | Performed by: INTERNAL MEDICINE

## 2021-10-26 PROCEDURE — 93226 XTRNL ECG REC<48 HR SCAN A/R: CPT

## 2021-10-27 ENCOUNTER — CLINICAL SUPPORT (OUTPATIENT)
Dept: CARDIAC REHAB | Facility: CLINIC | Age: 66
End: 2021-10-27
Payer: MEDICARE

## 2021-10-27 DIAGNOSIS — Z95.1 POSTSURGICAL AORTOCORONARY BYPASS STATUS: ICD-10-CM

## 2021-10-27 DIAGNOSIS — I25.10 ATHEROSCLEROSIS OF NATIVE CORONARY ARTERY OF NATIVE HEART, UNSPECIFIED WHETHER ANGINA PRESENT: ICD-10-CM

## 2021-10-27 PROCEDURE — 93798 PHYS/QHP OP CAR RHAB W/ECG: CPT | Mod: S$PBB,,, | Performed by: INTERNAL MEDICINE

## 2021-10-27 PROCEDURE — 93798 PR CARDIAC REHAB/MONITOR: ICD-10-PCS | Mod: S$PBB,,, | Performed by: INTERNAL MEDICINE

## 2021-10-27 PROCEDURE — 93798 PHYS/QHP OP CAR RHAB W/ECG: CPT | Mod: PBBFAC,PO

## 2021-10-28 LAB
OHS CV EVENT MONITOR DAY: 0
OHS CV HOLTER LENGTH DECIMAL HOURS: 7.98
OHS CV HOLTER LENGTH HOURS: 7
OHS CV HOLTER LENGTH MINUTES: 59
OHS CV HOLTER SINUS AVERAGE HR: 57
OHS CV HOLTER SINUS MAX HR: 124
OHS CV HOLTER SINUS MIN HR: 42

## 2021-10-29 ENCOUNTER — CLINICAL SUPPORT (OUTPATIENT)
Dept: CARDIAC REHAB | Facility: CLINIC | Age: 66
End: 2021-10-29
Payer: MEDICARE

## 2021-10-29 ENCOUNTER — PATIENT MESSAGE (OUTPATIENT)
Dept: INTERNAL MEDICINE | Facility: CLINIC | Age: 66
End: 2021-10-29
Payer: MEDICARE

## 2021-10-29 DIAGNOSIS — Z95.1 POSTSURGICAL AORTOCORONARY BYPASS STATUS: ICD-10-CM

## 2021-10-29 DIAGNOSIS — I25.10 ATHEROSCLEROSIS OF NATIVE CORONARY ARTERY OF NATIVE HEART, UNSPECIFIED WHETHER ANGINA PRESENT: ICD-10-CM

## 2021-10-29 PROCEDURE — 93798 PR CARDIAC REHAB/MONITOR: ICD-10-PCS | Mod: S$PBB,,, | Performed by: INTERNAL MEDICINE

## 2021-10-29 PROCEDURE — 93798 PHYS/QHP OP CAR RHAB W/ECG: CPT | Mod: S$PBB,,, | Performed by: INTERNAL MEDICINE

## 2021-10-29 PROCEDURE — 93798 PHYS/QHP OP CAR RHAB W/ECG: CPT | Mod: PBBFAC,PO

## 2021-11-01 ENCOUNTER — CLINICAL SUPPORT (OUTPATIENT)
Dept: CARDIAC REHAB | Facility: CLINIC | Age: 66
End: 2021-11-01
Payer: MEDICARE

## 2021-11-01 DIAGNOSIS — I25.10 CORONARY ATHEROSCLEROSIS OF NATIVE CORONARY ARTERY: ICD-10-CM

## 2021-11-01 DIAGNOSIS — Z95.1 POSTSURGICAL AORTOCORONARY BYPASS STATUS: ICD-10-CM

## 2021-11-01 PROCEDURE — 93798 PR CARDIAC REHAB/MONITOR: ICD-10-PCS | Mod: S$PBB,,, | Performed by: INTERNAL MEDICINE

## 2021-11-01 PROCEDURE — 93798 PHYS/QHP OP CAR RHAB W/ECG: CPT | Mod: S$PBB,,, | Performed by: INTERNAL MEDICINE

## 2021-11-01 PROCEDURE — 93798 PHYS/QHP OP CAR RHAB W/ECG: CPT | Mod: PBBFAC,PO

## 2021-11-03 ENCOUNTER — CLINICAL SUPPORT (OUTPATIENT)
Dept: CARDIAC REHAB | Facility: CLINIC | Age: 66
End: 2021-11-03
Payer: MEDICARE

## 2021-11-03 ENCOUNTER — OFFICE VISIT (OUTPATIENT)
Dept: CARDIOLOGY | Facility: CLINIC | Age: 66
End: 2021-11-03
Payer: MEDICARE

## 2021-11-03 VITALS
HEART RATE: 53 BPM | BODY MASS INDEX: 23.79 KG/M2 | OXYGEN SATURATION: 99 % | HEIGHT: 68 IN | DIASTOLIC BLOOD PRESSURE: 72 MMHG | SYSTOLIC BLOOD PRESSURE: 142 MMHG | WEIGHT: 157 LBS

## 2021-11-03 DIAGNOSIS — I25.10 CORONARY ARTERY DISEASE INVOLVING NATIVE CORONARY ARTERY OF NATIVE HEART WITHOUT ANGINA PECTORIS: ICD-10-CM

## 2021-11-03 DIAGNOSIS — Z95.1 S/P CABG X 3: ICD-10-CM

## 2021-11-03 DIAGNOSIS — R00.2 PALPITATIONS: Primary | ICD-10-CM

## 2021-11-03 DIAGNOSIS — E78.49 OTHER HYPERLIPIDEMIA: ICD-10-CM

## 2021-11-03 DIAGNOSIS — I10 ESSENTIAL HYPERTENSION: ICD-10-CM

## 2021-11-03 DIAGNOSIS — I25.10 ATHEROSCLEROSIS OF NATIVE CORONARY ARTERY OF NATIVE HEART WITHOUT ANGINA PECTORIS: ICD-10-CM

## 2021-11-03 DIAGNOSIS — I27.20 PULMONARY HYPERTENSION: ICD-10-CM

## 2021-11-03 DIAGNOSIS — I48.0 PAROXYSMAL ATRIAL FIBRILLATION: ICD-10-CM

## 2021-11-03 DIAGNOSIS — I70.0 AORTIC ATHEROSCLEROSIS: ICD-10-CM

## 2021-11-03 DIAGNOSIS — Z95.1 POSTSURGICAL AORTOCORONARY BYPASS STATUS: ICD-10-CM

## 2021-11-03 PROCEDURE — 93010 ELECTROCARDIOGRAM REPORT: CPT | Mod: S$PBB,,, | Performed by: INTERNAL MEDICINE

## 2021-11-03 PROCEDURE — 99214 PR OFFICE/OUTPT VISIT, EST, LEVL IV, 30-39 MIN: ICD-10-PCS | Mod: S$PBB,,, | Performed by: INTERNAL MEDICINE

## 2021-11-03 PROCEDURE — 99999 PR PBB SHADOW E&M-EST. PATIENT-LVL IV: CPT | Mod: PBBFAC,,, | Performed by: INTERNAL MEDICINE

## 2021-11-03 PROCEDURE — 93798 PR CARDIAC REHAB/MONITOR: ICD-10-PCS | Mod: S$PBB,,, | Performed by: INTERNAL MEDICINE

## 2021-11-03 PROCEDURE — 99214 OFFICE O/P EST MOD 30 MIN: CPT | Mod: PBBFAC,25 | Performed by: INTERNAL MEDICINE

## 2021-11-03 PROCEDURE — 93010 EKG 12-LEAD: ICD-10-PCS | Mod: S$PBB,,, | Performed by: INTERNAL MEDICINE

## 2021-11-03 PROCEDURE — 93005 ELECTROCARDIOGRAM TRACING: CPT | Mod: 59,PBBFAC | Performed by: INTERNAL MEDICINE

## 2021-11-03 PROCEDURE — 99999 PR PBB SHADOW E&M-EST. PATIENT-LVL IV: ICD-10-PCS | Mod: PBBFAC,,, | Performed by: INTERNAL MEDICINE

## 2021-11-03 PROCEDURE — 99214 OFFICE O/P EST MOD 30 MIN: CPT | Mod: S$PBB,,, | Performed by: INTERNAL MEDICINE

## 2021-11-03 PROCEDURE — 93798 PHYS/QHP OP CAR RHAB W/ECG: CPT | Mod: PBBFAC,PO

## 2021-11-03 PROCEDURE — 93798 PHYS/QHP OP CAR RHAB W/ECG: CPT | Mod: S$PBB,,, | Performed by: INTERNAL MEDICINE

## 2021-11-05 ENCOUNTER — CLINICAL SUPPORT (OUTPATIENT)
Dept: CARDIAC REHAB | Facility: CLINIC | Age: 66
End: 2021-11-05
Payer: MEDICARE

## 2021-11-05 ENCOUNTER — DOCUMENTATION ONLY (OUTPATIENT)
Dept: CARDIAC REHAB | Facility: CLINIC | Age: 66
End: 2021-11-05
Payer: MEDICARE

## 2021-11-05 DIAGNOSIS — Z95.1 POSTSURGICAL AORTOCORONARY BYPASS STATUS: ICD-10-CM

## 2021-11-05 DIAGNOSIS — I25.10 CORONARY ATHEROSCLEROSIS OF NATIVE CORONARY ARTERY: ICD-10-CM

## 2021-11-05 PROCEDURE — 93798 PHYS/QHP OP CAR RHAB W/ECG: CPT | Mod: PBBFAC,PO

## 2021-11-05 PROCEDURE — 93798 PR CARDIAC REHAB/MONITOR: ICD-10-PCS | Mod: S$PBB,,, | Performed by: INTERNAL MEDICINE

## 2021-11-05 PROCEDURE — 93798 PHYS/QHP OP CAR RHAB W/ECG: CPT | Mod: S$PBB,,, | Performed by: INTERNAL MEDICINE

## 2021-11-08 ENCOUNTER — CLINICAL SUPPORT (OUTPATIENT)
Dept: CARDIAC REHAB | Facility: CLINIC | Age: 66
End: 2021-11-08
Payer: MEDICARE

## 2021-11-08 DIAGNOSIS — Z95.1 POSTSURGICAL AORTOCORONARY BYPASS STATUS: ICD-10-CM

## 2021-11-08 DIAGNOSIS — I25.10 CORONARY ATHEROSCLEROSIS OF NATIVE CORONARY ARTERY: ICD-10-CM

## 2021-11-08 PROCEDURE — 93798 PR CARDIAC REHAB/MONITOR: ICD-10-PCS | Mod: S$PBB,,, | Performed by: INTERNAL MEDICINE

## 2021-11-08 PROCEDURE — 93798 PHYS/QHP OP CAR RHAB W/ECG: CPT | Mod: S$PBB,,, | Performed by: INTERNAL MEDICINE

## 2021-11-08 PROCEDURE — 93798 PHYS/QHP OP CAR RHAB W/ECG: CPT | Mod: PBBFAC,PO

## 2021-11-10 ENCOUNTER — CLINICAL SUPPORT (OUTPATIENT)
Dept: CARDIAC REHAB | Facility: CLINIC | Age: 66
End: 2021-11-10
Payer: MEDICARE

## 2021-11-10 DIAGNOSIS — Z95.1 POSTSURGICAL AORTOCORONARY BYPASS STATUS: ICD-10-CM

## 2021-11-10 DIAGNOSIS — I25.10 ATHEROSCLEROSIS OF NATIVE CORONARY ARTERY OF NATIVE HEART, UNSPECIFIED WHETHER ANGINA PRESENT: ICD-10-CM

## 2021-11-10 PROCEDURE — 93798 PHYS/QHP OP CAR RHAB W/ECG: CPT | Mod: PBBFAC,PO

## 2021-11-10 PROCEDURE — 93798 PHYS/QHP OP CAR RHAB W/ECG: CPT | Mod: S$PBB,,, | Performed by: INTERNAL MEDICINE

## 2021-11-10 PROCEDURE — 93798 PR CARDIAC REHAB/MONITOR: ICD-10-PCS | Mod: S$PBB,,, | Performed by: INTERNAL MEDICINE

## 2021-11-12 ENCOUNTER — CLINICAL SUPPORT (OUTPATIENT)
Dept: CARDIAC REHAB | Facility: CLINIC | Age: 66
End: 2021-11-12
Payer: MEDICARE

## 2021-11-12 DIAGNOSIS — I25.10 ATHEROSCLEROSIS OF NATIVE CORONARY ARTERY OF NATIVE HEART, UNSPECIFIED WHETHER ANGINA PRESENT: ICD-10-CM

## 2021-11-12 DIAGNOSIS — Z95.1 POSTSURGICAL AORTOCORONARY BYPASS STATUS: ICD-10-CM

## 2021-11-12 PROCEDURE — 93798 PHYS/QHP OP CAR RHAB W/ECG: CPT | Mod: S$PBB,,, | Performed by: INTERNAL MEDICINE

## 2021-11-12 PROCEDURE — 93798 PHYS/QHP OP CAR RHAB W/ECG: CPT | Mod: PBBFAC,PO

## 2021-11-12 PROCEDURE — 93798 PR CARDIAC REHAB/MONITOR: ICD-10-PCS | Mod: S$PBB,,, | Performed by: INTERNAL MEDICINE

## 2021-11-15 ENCOUNTER — CLINICAL SUPPORT (OUTPATIENT)
Dept: CARDIAC REHAB | Facility: CLINIC | Age: 66
End: 2021-11-15
Payer: MEDICARE

## 2021-11-15 ENCOUNTER — PATIENT MESSAGE (OUTPATIENT)
Dept: INTERNAL MEDICINE | Facility: CLINIC | Age: 66
End: 2021-11-15
Payer: MEDICARE

## 2021-11-15 DIAGNOSIS — I25.10 CORONARY ATHEROSCLEROSIS OF NATIVE CORONARY ARTERY: ICD-10-CM

## 2021-11-15 DIAGNOSIS — Z95.1 POSTSURGICAL AORTOCORONARY BYPASS STATUS: ICD-10-CM

## 2021-11-15 PROCEDURE — 93798 PHYS/QHP OP CAR RHAB W/ECG: CPT | Mod: PBBFAC,PO

## 2021-11-15 PROCEDURE — 93798 PHYS/QHP OP CAR RHAB W/ECG: CPT | Mod: S$PBB,,, | Performed by: INTERNAL MEDICINE

## 2021-11-15 PROCEDURE — 93798 PR CARDIAC REHAB/MONITOR: ICD-10-PCS | Mod: S$PBB,,, | Performed by: INTERNAL MEDICINE

## 2021-11-16 ENCOUNTER — PATIENT MESSAGE (OUTPATIENT)
Dept: CARDIOLOGY | Facility: CLINIC | Age: 66
End: 2021-11-16
Payer: MEDICARE

## 2021-11-16 ENCOUNTER — HOSPITAL ENCOUNTER (OUTPATIENT)
Dept: CARDIOLOGY | Facility: HOSPITAL | Age: 66
Discharge: HOME | End: 2021-11-16
Attending: INTERNAL MEDICINE
Payer: MEDICARE

## 2021-11-16 VITALS
HEART RATE: 59 BPM | DIASTOLIC BLOOD PRESSURE: 82 MMHG | SYSTOLIC BLOOD PRESSURE: 135 MMHG | HEIGHT: 68 IN | BODY MASS INDEX: 23.64 KG/M2 | WEIGHT: 156 LBS

## 2021-11-16 DIAGNOSIS — Z95.1 POSTSURGICAL AORTOCORONARY BYPASS STATUS: ICD-10-CM

## 2021-11-16 DIAGNOSIS — I25.10 CORONARY ARTERY DISEASE INVOLVING NATIVE CORONARY ARTERY OF NATIVE HEART WITHOUT ANGINA PECTORIS: ICD-10-CM

## 2021-11-16 LAB
CV STRESS BASE HR: 59 BPM
DIASTOLIC BLOOD PRESSURE: 82 MMHG
OHS CV CPX 1 MINUTE RECOVERY HEART RATE: 150 BPM
OHS CV CPX 85 PERCENT MAX PREDICTED HEART RATE MALE: 129
OHS CV CPX ABDOMINAL GIRTH: 36.5 CM
OHS CV CPX ANAEROBIC THRESHOLD DIASTOLIC BLOOD PRESSURE: 70 MMHG
OHS CV CPX ANAEROBIC THRESHOLD HEART RATE: 137
OHS CV CPX ANAEROBIC THRESHOLD RATE PRESSURE PRODUCT: NORMAL
OHS CV CPX ANAEROBIC THRESHOLD SYSTOLIC BLOOD PRESSURE: 185
OHS CV CPX DATA GRADE - AT: 14.4
OHS CV CPX DATA GRADE - PEAK: 20.9
OHS CV CPX DATA O2 SAT - PEAK: 99
OHS CV CPX DATA O2 SAT - REST: 99
OHS CV CPX DATA SPEED - AT: 3.8
OHS CV CPX DATA SPEED - PEAK: 5.4
OHS CV CPX DATA TIME - AT: 6.77
OHS CV CPX DATA TIME - PEAK: 10.17
OHS CV CPX DATA VE/VCO2 - AT: 34
OHS CV CPX DATA VE/VCO2 - PEAK: 35
OHS CV CPX DATA VE/VO2 - AT: 30
OHS CV CPX DATA VE/VO2 - PEAK: 45
OHS CV CPX DATA VO2 - AT: 20.9
OHS CV CPX DATA VO2 - PEAK: 28.8
OHS CV CPX DATA VO2 - REST: 4.8
OHS CV CPX ESTIMATED METS: 17
OHS CV CPX FEV1/FVC: 0.82
OHS CV CPX FORCED EXPIRATORY VOLUME: 2.61
OHS CV CPX FORCED VITAL CAPACITY (FVC): 3.17
OHS CV CPX HIGHEST VO: 33.1
OHS CV CPX MAX PREDICTED HEART RATE: 152
OHS CV CPX MAXIMAL VOLUNTARY VENTILATION (MVV) PREDICTED: 104.4
OHS CV CPX MAXIMAL VOLUNTARY VENTILATION (MVV): 131
OHS CV CPX MAXIUMUM EXERCISE VENTILATION (VE MAX): 90
OHS CV CPX PATIENT AGE: 68
OHS CV CPX PATIENT HEIGHT IN: 68
OHS CV CPX PATIENT IS FEMALE AGE 11-19: 0
OHS CV CPX PATIENT IS FEMALE AGE GREATER THAN 19: 0
OHS CV CPX PATIENT IS FEMALE AGE LESS THAN 11: 0
OHS CV CPX PATIENT IS FEMALE: 0
OHS CV CPX PATIENT IS MALE AGE 11-25: 0
OHS CV CPX PATIENT IS MALE AGE GREATER THAN 25: 1
OHS CV CPX PATIENT IS MALE AGE LESS THAN 11: 0
OHS CV CPX PATIENT IS MALE GREATER THAN 18: 1
OHS CV CPX PATIENT IS MALE LESS THAN OR EQUAL TO 18: 0
OHS CV CPX PATIENT IS MALE: 1
OHS CV CPX PATIENT WEIGHT RETURNED IN OZ: 2496
OHS CV CPX PEAK DIASTOLIC BLOOD PRESSURE: 74 MMHG
OHS CV CPX PEAK HEAR RATE: 157 BPM
OHS CV CPX PEAK RATE PRESSURE PRODUCT: NORMAL
OHS CV CPX PEAK SYSTOLIC BLOOD PRESSURE: 187 MMHG
OHS CV CPX PERCENT BODY FAT: 16.7
OHS CV CPX PERCENT MAX PREDICTED HEART RATE ACHIEVED: 103
OHS CV CPX PREDICTED VO2: 33.1 ML/KG/MIN
OHS CV CPX RATE PRESSURE PRODUCT PRESENTING: 7965
OHS CV CPX REST PET CO2: 31
OHS CV CPX VE/VCO2 SLOPE: 30.3
STRESS ECHO POST EXERCISE DUR MIN: 10 MINUTES
STRESS ECHO POST EXERCISE DUR SEC: 10 SECONDS
SYSTOLIC BLOOD PRESSURE: 135 MMHG

## 2021-11-16 PROCEDURE — 94621 CARDIOPULM EXERCISE TESTING: CPT | Mod: 26,,, | Performed by: INTERNAL MEDICINE

## 2021-11-16 PROCEDURE — 94621 CARDIOPULM EXERCISE TESTING: CPT

## 2021-11-16 PROCEDURE — 94621 CARDIOPULMONARY EXERCISE TESTING (CUPID ONLY): ICD-10-PCS | Mod: 26,,, | Performed by: INTERNAL MEDICINE

## 2021-11-17 ENCOUNTER — CLINICAL SUPPORT (OUTPATIENT)
Dept: CARDIAC REHAB | Facility: CLINIC | Age: 66
End: 2021-11-17
Payer: MEDICARE

## 2021-11-17 DIAGNOSIS — I25.10 CORONARY ARTERY DISEASE INVOLVING NATIVE CORONARY ARTERY OF NATIVE HEART WITHOUT ANGINA PECTORIS: ICD-10-CM

## 2021-11-17 DIAGNOSIS — Z95.1 POSTSURGICAL AORTOCORONARY BYPASS STATUS: ICD-10-CM

## 2021-11-17 PROCEDURE — 93798 PHYS/QHP OP CAR RHAB W/ECG: CPT | Mod: PBBFAC,PO

## 2021-11-17 PROCEDURE — 99211 OFF/OP EST MAY X REQ PHY/QHP: CPT | Mod: PBBFAC,PO

## 2021-11-17 PROCEDURE — 93798 PR CARDIAC REHAB/MONITOR: ICD-10-PCS | Mod: S$PBB,,, | Performed by: INTERNAL MEDICINE

## 2021-11-17 PROCEDURE — 93798 PHYS/QHP OP CAR RHAB W/ECG: CPT | Mod: S$PBB,,, | Performed by: INTERNAL MEDICINE

## 2021-11-17 PROCEDURE — 99999 PR PBB SHADOW E&M-EST. PATIENT-LVL I: ICD-10-PCS | Mod: PBBFAC,,,

## 2021-11-17 PROCEDURE — 99999 PR PBB SHADOW E&M-EST. PATIENT-LVL I: CPT | Mod: PBBFAC,,,

## 2021-11-19 ENCOUNTER — IMMUNIZATION (OUTPATIENT)
Dept: INTERNAL MEDICINE | Facility: CLINIC | Age: 66
End: 2021-11-19
Payer: MEDICARE

## 2021-11-19 DIAGNOSIS — Z23 NEED FOR VACCINATION: Primary | ICD-10-CM

## 2021-11-19 PROCEDURE — 0064A COVID-19, MRNA, LNP-S, PF, 100 MCG/0.25 ML DOSE VACCINE (MODERNA BOOSTER): CPT | Mod: PBBFAC,CV19

## 2021-12-06 ENCOUNTER — PATIENT MESSAGE (OUTPATIENT)
Dept: OTHER | Facility: OTHER | Age: 66
End: 2021-12-06
Payer: MEDICARE

## 2021-12-06 ENCOUNTER — PES CALL (OUTPATIENT)
Dept: ADMINISTRATIVE | Facility: CLINIC | Age: 66
End: 2021-12-06
Payer: MEDICARE

## 2021-12-20 DIAGNOSIS — I10 ESSENTIAL HYPERTENSION: ICD-10-CM

## 2021-12-21 RX ORDER — DILTIAZEM HYDROCHLORIDE 120 MG/1
120 CAPSULE, COATED, EXTENDED RELEASE ORAL DAILY
Qty: 90 CAPSULE | Refills: 3 | Status: SHIPPED | OUTPATIENT
Start: 2021-12-21 | End: 2022-11-28 | Stop reason: SDUPTHER

## 2022-03-08 ENCOUNTER — PATIENT MESSAGE (OUTPATIENT)
Dept: OTHER | Facility: OTHER | Age: 67
End: 2022-03-08
Payer: MEDICARE

## 2022-03-12 ENCOUNTER — HOSPITAL ENCOUNTER (EMERGENCY)
Facility: HOSPITAL | Age: 67
Discharge: HOME | End: 2022-03-12
Attending: EMERGENCY MEDICINE
Payer: MEDICARE

## 2022-03-12 VITALS
DIASTOLIC BLOOD PRESSURE: 70 MMHG | OXYGEN SATURATION: 99 % | TEMPERATURE: 98 F | RESPIRATION RATE: 18 BRPM | SYSTOLIC BLOOD PRESSURE: 147 MMHG | HEART RATE: 61 BPM

## 2022-03-12 DIAGNOSIS — K42.9 UMBILICAL HERNIA WITHOUT OBSTRUCTION AND WITHOUT GANGRENE: Primary | ICD-10-CM

## 2022-03-12 LAB
ALBUMIN SERPL BCP-MCNC: 4 G/DL (ref 3.5–5.2)
ALP SERPL-CCNC: 68 U/L (ref 55–135)
ALT SERPL W/O P-5'-P-CCNC: 26 U/L (ref 10–44)
ANION GAP SERPL CALC-SCNC: 11 MMOL/L (ref 8–16)
AST SERPL-CCNC: 25 U/L (ref 10–40)
BASOPHILS # BLD AUTO: 0.02 K/UL (ref 0–0.2)
BASOPHILS NFR BLD: 0.3 % (ref 0–1.9)
BILIRUB SERPL-MCNC: 0.9 MG/DL (ref 0.1–1)
BUN SERPL-MCNC: 16 MG/DL (ref 8–23)
CALCIUM SERPL-MCNC: 9.8 MG/DL (ref 8.7–10.5)
CHLORIDE SERPL-SCNC: 106 MMOL/L (ref 95–110)
CO2 SERPL-SCNC: 23 MMOL/L (ref 23–29)
CREAT SERPL-MCNC: 1.3 MG/DL (ref 0.5–1.4)
DIFFERENTIAL METHOD: ABNORMAL
EOSINOPHIL # BLD AUTO: 0.2 K/UL (ref 0–0.5)
EOSINOPHIL NFR BLD: 2.8 % (ref 0–8)
ERYTHROCYTE [DISTWIDTH] IN BLOOD BY AUTOMATED COUNT: 16 % (ref 11.5–14.5)
EST. GFR  (AFRICAN AMERICAN): >60 ML/MIN/1.73 M^2
EST. GFR  (NON AFRICAN AMERICAN): 56.1 ML/MIN/1.73 M^2
GLUCOSE SERPL-MCNC: 76 MG/DL (ref 70–110)
HCT VFR BLD AUTO: 36.6 % (ref 40–54)
HGB BLD-MCNC: 11.8 G/DL (ref 14–18)
IMM GRANULOCYTES # BLD AUTO: 0.02 K/UL (ref 0–0.04)
IMM GRANULOCYTES NFR BLD AUTO: 0.3 % (ref 0–0.5)
LYMPHOCYTES # BLD AUTO: 1.7 K/UL (ref 1–4.8)
LYMPHOCYTES NFR BLD: 24 % (ref 18–48)
MCH RBC QN AUTO: 22.4 PG (ref 27–31)
MCHC RBC AUTO-ENTMCNC: 32.2 G/DL (ref 32–36)
MCV RBC AUTO: 69 FL (ref 82–98)
MONOCYTES # BLD AUTO: 0.5 K/UL (ref 0.3–1)
MONOCYTES NFR BLD: 6.4 % (ref 4–15)
NEUTROPHILS # BLD AUTO: 4.8 K/UL (ref 1.8–7.7)
NEUTROPHILS NFR BLD: 66.2 % (ref 38–73)
NRBC BLD-RTO: 0 /100 WBC
PLATELET # BLD AUTO: 158 K/UL (ref 150–450)
PMV BLD AUTO: 12 FL (ref 9.2–12.9)
POTASSIUM SERPL-SCNC: 4.4 MMOL/L (ref 3.5–5.1)
PROT SERPL-MCNC: 7.4 G/DL (ref 6–8.4)
RBC # BLD AUTO: 5.27 M/UL (ref 4.6–6.2)
SODIUM SERPL-SCNC: 140 MMOL/L (ref 136–145)
WBC # BLD AUTO: 7.21 K/UL (ref 3.9–12.7)

## 2022-03-12 PROCEDURE — 85025 COMPLETE CBC W/AUTO DIFF WBC: CPT | Performed by: EMERGENCY MEDICINE

## 2022-03-12 PROCEDURE — 99285 EMERGENCY DEPT VISIT HI MDM: CPT | Mod: CR,,, | Performed by: EMERGENCY MEDICINE

## 2022-03-12 PROCEDURE — 99285 PR EMERGENCY DEPT VISIT,LEVEL V: ICD-10-PCS | Mod: CR,,, | Performed by: EMERGENCY MEDICINE

## 2022-03-12 PROCEDURE — 80053 COMPREHEN METABOLIC PANEL: CPT | Performed by: EMERGENCY MEDICINE

## 2022-03-12 PROCEDURE — 63600175 PHARM REV CODE 636 W HCPCS: Performed by: EMERGENCY MEDICINE

## 2022-03-12 PROCEDURE — 96374 THER/PROPH/DIAG INJ IV PUSH: CPT

## 2022-03-12 PROCEDURE — 99284 EMERGENCY DEPT VISIT MOD MDM: CPT | Mod: 25

## 2022-03-12 RX ORDER — MORPHINE SULFATE 4 MG/ML
4 INJECTION, SOLUTION INTRAMUSCULAR; INTRAVENOUS
Status: COMPLETED | OUTPATIENT
Start: 2022-03-12 | End: 2022-03-12

## 2022-03-12 RX ADMIN — MORPHINE SULFATE 4 MG: 4 INJECTION INTRAVENOUS at 06:03

## 2022-03-12 NOTE — ED TRIAGE NOTES
Umbilical hernia pain, started this am, pt reports he reduced it twice today, unable to after the third time approx 2 pm while walking.

## 2022-03-13 NOTE — ASSESSMENT & PLAN NOTE
66-year-old male with CAD s/p CABG presented with incarcerated umbilical hernia. Reduced in ED.     -okay for discharge  -refer to surgery clinic for follow up.

## 2022-03-13 NOTE — SUBJECTIVE & OBJECTIVE
Current Facility-Administered Medications on File Prior to Encounter   Medication    acetaminophen tablet 650 mg    albuterol inhaler 2 puff    diphenhydrAMINE injection 25 mg    EPINEPHrine (EPIPEN) 0.3 mg/0.3 mL pen injection 0.3 mg    ondansetron disintegrating tablet 4 mg     Current Outpatient Medications on File Prior to Encounter   Medication Sig    aspirin 325 MG tablet Take 1 tablet (325 mg total) by mouth once daily.    atorvastatin (LIPITOR) 40 MG tablet Take 1 tablet (40 mg total) by mouth every evening.    cetirizine (ZYRTEC) 10 MG tablet Take 1 tablet by mouth Daily.    diltiaZEM (CARDIZEM CD) 120 MG Cp24 Take 1 capsule (120 mg total) by mouth once daily.    fluticasone propionate (FLONASE) 50 mcg/actuation nasal spray     isosorbide mononitrate (IMDUR) 60 MG 24 hr tablet Take 1 tablet (60 mg total) by mouth once daily.    multivitamin (THERAGRAN) per tablet Take 1 tablet by mouth once daily.    omeprazole (PRILOSEC) 20 MG capsule Take 1 capsule (20 mg total) by mouth before breakfast.    sildenafiL (VIAGRA) 100 MG tablet        Review of patient's allergies indicates:   Allergen Reactions    Allegra-d 12 hour [fexofenadine-pseudoephedrine] Other (See Comments)     Trouble urinating    Mucinex d [pseudoephedrine-guaifenesin] Other (See Comments)     Trouble urinating    Losartan-hydrochlorothiazide Hives and Rash     Other reaction(s): Hives       Past Medical History:   Diagnosis Date    Anemia     Diverticulosis     Hypertension      Past Surgical History:   Procedure Laterality Date    APPENDECTOMY      COLONOSCOPY N/A 1/31/2017    Procedure: COLONOSCOPY;  Surgeon: BASILIO Winn MD;  Location: [facility] ([address] [place] [address]);  Service: Endoscopy;  Laterality: N/A;    COLONOSCOPY N/A 2/5/2020    Procedure: COLONOSCOPY;  Surgeon: Cody Maria MD;  [facility]  Service: Endoscopy;  Laterality: N/A;  OKay for Crow vivar. Needs to be done in Jan 2020    CORONARY ARTERY BYPASS GRAFT  (CABG) N/A 4/26/2021    Procedure: CORONARY ARTERY BYPASS GRAFT (CABG)X3 WITH VEIN HARVEST;  Surgeon: Fidencio Galindo MD;  Location: [facility] OR [address] [place] [address];  Service: Cardiovascular;  Laterality: N/A;    ENDOSCOPIC HARVEST OF VEIN Left 4/26/2021    Procedure: HARVEST-VEIN-ENDOVASCULAR FOR CABGX3;  Surgeon: Fidencio Galindo MD;  [facility];  Service: Cardiovascular;  Laterality: Left;  Vein [place] Start time: 1201pm  Vein [place] Stop time: 1226pm    Vein Prep Start time: 1227pm  Vein Prep Stop time: 1250pm    ESOPHAGOGASTRODUODENOSCOPY N/A 2/5/2020    Procedure: EGD (ESOPHAGOGASTRODUODENOSCOPY);  Surgeon: Cody Maria MD;  Location: [facility] (4TH FLR);  Service: Endoscopy;  Laterality: N/A;    ESOPHAGOGASTRODUODENOSCOPY N/A 4/30/2020    Procedure: EGD (ESOPHAGOGASTRODUODENOSCOPY);  Surgeon: Cody Maria MD;  Location: [facility] (2ND FLR);  Service: Endoscopy;  Laterality: N/A;  schedule 12 weeks from now  4/13/20 - removed from 4/29/20, needs to be rescheduled high priority - pg  4/28/20-scheduled from high priority list-BB  Covid screening test scheduled for 4/29/20-BB  instructions emailed to patient-BB    EXCLUSION OF LEFT ATRIAL APPENDAGE N/A 4/26/2021    Procedure: EXCLUSION, LEFT ATRIAL APPENDAGE;  Surgeon: Fidencio Galindo MD;  [facility];  Service: Cardiovascular;  Laterality: N/A;  Left Atrial Appendage Resection    LEFT HEART CATHETERIZATION Left 4/21/2021    Procedure: Left heart cath;  Surgeon: Aric Alvarado MD;  Location: [facility] CATH LAB;  Service: Cardiology;  Laterality: Left;     Family History       Problem Relation (Age of Onset)    Cancer Father    Colon polyps Father    Heart disease Mother          Tobacco Use    Smoking status: Never Smoker    Smokeless tobacco: Never Used   Substance and Sexual Activity    Alcohol use: Yes     Comment: few times a week     Drug use: No    Sexual activity: Not on file     Review of Systems   Constitutional:  Negative  for chills and fever.   HENT:  Negative for sore throat.    Eyes:  Negative for redness.   Respiratory:  Negative for shortness of breath.    Cardiovascular:  Negative for chest pain.   Gastrointestinal:  Positive for abdominal pain.   Endocrine: Negative for polyuria.   Genitourinary:  Negative for dysuria.   Musculoskeletal:  Negative for back pain.   Skin:  Negative for rash.   Neurological:  Negative for headaches.   Psychiatric/Behavioral:  Negative for agitation.    Objective:     Vital Signs (Most Recent):  Temp: 98.3 °F (36.8 °C) (03/12/22 1719)  Pulse: (!) 55 (03/12/22 1719)  Resp: 16 (03/12/22 1813)  BP: (!) 175/79 (03/12/22 1719)  SpO2: 100 % (03/12/22 1719)   Vital Signs (24h Range):  Temp:  [98.3 °F (36.8 °C)] 98.3 °F (36.8 °C)  Pulse:  [55] 55  Resp:  [16-18] 16  SpO2:  [100 %] 100 %  BP: (175)/(79) 175/79        There is no height or weight on file to calculate BMI.    Physical Exam  Vitals and nursing note reviewed.   Constitutional:       General: He is not in acute distress.     Appearance: He is well-developed.   HENT:      Head: Normocephalic and atraumatic.      Right Ear: External ear normal.      Left Ear: External ear normal.      Mouth/Throat:      Mouth: Mucous membranes are moist.   Eyes:      Conjunctiva/sclera: Conjunctivae normal.   Cardiovascular:      Rate and Rhythm: Normal rate.   Pulmonary:      Effort: Pulmonary effort is normal.   Abdominal:      Palpations: Abdomen is soft.      Tenderness: There is abdominal tenderness.      Comments: Tender umbilical hernia without skin changes. Reduced with gentle pressure. 2cm defect.    Musculoskeletal:         General: Normal range of motion.      Cervical back: Normal range of motion.   Skin:     General: Skin is warm and dry.   Neurological:      General: No focal deficit present.      Mental Status: He is alert.   Psychiatric:         Behavior: Behavior normal.       Significant Labs:  none    Significant Diagnostics:  none

## 2022-03-13 NOTE — HPI
66-year-old male with multiple medical co-morbidities including CAD s/p CABG 2021, hypertension, and hyperlipidemia presents with incarcerated umbilical hernia for 4 hours time. Patient states he's had this hernia for years and it usually reduces on its own. Today, he reduced it twice and it then became stuck, associated with pain to the area. Denies any obstructive symptoms including nausea or vomiting. Having bowel function. No skin changes. Denies chest pain, difficulty breathing, or any other issues.

## 2022-03-13 NOTE — SUBJECTIVE & OBJECTIVE
Current Facility-Administered Medications on File Prior to Encounter   Medication    acetaminophen tablet 650 mg    albuterol inhaler 2 puff    diphenhydrAMINE injection 25 mg    EPINEPHrine (EPIPEN) 0.3 mg/0.3 mL pen injection 0.3 mg    ondansetron disintegrating tablet 4 mg     Current Outpatient Medications on File Prior to Encounter   Medication Sig    aspirin 325 MG tablet Take 1 tablet (325 mg total) by mouth once daily.    atorvastatin (LIPITOR) 40 MG tablet Take 1 tablet (40 mg total) by mouth every evening.    cetirizine (ZYRTEC) 10 MG tablet Take 1 tablet by mouth Daily.    diltiaZEM (CARDIZEM CD) 120 MG Cp24 Take 1 capsule (120 mg total) by mouth once daily.    fluticasone propionate (FLONASE) 50 mcg/actuation nasal spray     isosorbide mononitrate (IMDUR) 60 MG 24 hr tablet Take 1 tablet (60 mg total) by mouth once daily.    multivitamin (THERAGRAN) per tablet Take 1 tablet by mouth once daily.    omeprazole (PRILOSEC) 20 MG capsule Take 1 capsule (20 mg total) by mouth before breakfast.    sildenafiL (VIAGRA) 100 MG tablet        Review of patient's allergies indicates:   Allergen Reactions    Allegra-d 12 hour [fexofenadine-pseudoephedrine] Other (See Comments)     Trouble urinating    Mucinex d [pseudoephedrine-guaifenesin] Other (See Comments)     Trouble urinating    Losartan-hydrochlorothiazide Hives and Rash     Other reaction(s): Hives       Past Medical History:   Diagnosis Date    Anemia     Diverticulosis     Hypertension      Past Surgical History:   Procedure Laterality Date    APPENDECTOMY      COLONOSCOPY N/A 1/31/2017    Procedure: COLONOSCOPY;  Surgeon: BASILIO Winn MD;  Location: [facility] ([address] [place] [address]);  Service: Endoscopy;  Laterality: N/A;    COLONOSCOPY N/A 2/5/2020    Procedure: COLONOSCOPY;  Surgeon: Cody Maria MD;  [facility]  Service: Endoscopy;  Laterality: N/A;  OKay for Crow vivar. Needs to be done in Jan 2020    CORONARY ARTERY BYPASS GRAFT  (CABG) N/A 4/26/2021    Procedure: CORONARY ARTERY BYPASS GRAFT (CABG)X3 WITH VEIN HARVEST;  Surgeon: Fidencio Galindo MD;  Location: [facility] OR [address] [place] [address];  Service: Cardiovascular;  Laterality: N/A;    ENDOSCOPIC HARVEST OF VEIN Left 4/26/2021    Procedure: HARVEST-VEIN-ENDOVASCULAR FOR CABGX3;  Surgeon: Fidencio Galindo MD;  [facility];  Service: Cardiovascular;  Laterality: Left;  Vein [place] Start time: 1201pm  Vein [place] Stop time: 1226pm    Vein Prep Start time: 1227pm  Vein Prep Stop time: 1250pm    ESOPHAGOGASTRODUODENOSCOPY N/A 2/5/2020    Procedure: EGD (ESOPHAGOGASTRODUODENOSCOPY);  Surgeon: Cody Maria MD;  Location: [facility] (4TH FLR);  Service: Endoscopy;  Laterality: N/A;    ESOPHAGOGASTRODUODENOSCOPY N/A 4/30/2020    Procedure: EGD (ESOPHAGOGASTRODUODENOSCOPY);  Surgeon: Cody Maria MD;  Location: [facility] (2ND FLR);  Service: Endoscopy;  Laterality: N/A;  schedule 12 weeks from now  4/13/20 - removed from 4/29/20, needs to be rescheduled high priority - pg  4/28/20-scheduled from high priority list-BB  Covid screening test scheduled for 4/29/20-BB  instructions emailed to patient-BB    EXCLUSION OF LEFT ATRIAL APPENDAGE N/A 4/26/2021    Procedure: EXCLUSION, LEFT ATRIAL APPENDAGE;  Surgeon: Fidencio Galindo MD;  [facility];  Service: Cardiovascular;  Laterality: N/A;  Left Atrial Appendage Resection    LEFT HEART CATHETERIZATION Left 4/21/2021    Procedure: Left heart cath;  Surgeon: Aric Alvarado MD;  Location: [facility] CATH LAB;  Service: Cardiology;  Laterality: Left;     Family History       Problem Relation (Age of Onset)    Cancer Father    Colon polyps Father    Heart disease Mother          Tobacco Use    Smoking status: Never Smoker    Smokeless tobacco: Never Used   Substance and Sexual Activity    Alcohol use: Yes     Comment: few times a week     Drug use: No    Sexual activity: Not on file     Review of Systems   Constitutional:  Negative  for chills and fever.   HENT:  Negative for sore throat.    Eyes:  Negative for redness.   Respiratory:  Negative for shortness of breath.    Cardiovascular:  Negative for chest pain.   Gastrointestinal:  Positive for abdominal pain.   Endocrine: Negative for polyuria.   Genitourinary:  Negative for dysuria.   Musculoskeletal:  Negative for back pain.   Skin:  Negative for rash.   Neurological:  Negative for headaches.   Psychiatric/Behavioral:  Negative for agitation.    Objective:     Vital Signs (Most Recent):  Temp: 98.3 °F (36.8 °C) (03/12/22 1719)  Pulse: (!) 55 (03/12/22 1719)  Resp: 16 (03/12/22 1813)  BP: (!) 175/79 (03/12/22 1719)  SpO2: 100 % (03/12/22 1719)   Vital Signs (24h Range):  Temp:  [98.3 °F (36.8 °C)] 98.3 °F (36.8 °C)  Pulse:  [55] 55  Resp:  [16-18] 16  SpO2:  [100 %] 100 %  BP: (175)/(79) 175/79        There is no height or weight on file to calculate BMI.    Physical Exam  Vitals and nursing note reviewed.   Constitutional:       General: He is not in acute distress.     Appearance: He is well-developed.   HENT:      Head: Normocephalic and atraumatic.   Cardiovascular:      Rate and Rhythm: Normal rate.   Pulmonary:      Effort: Pulmonary effort is normal.   Abdominal:      Palpations: Abdomen is soft.      Tenderness: There is abdominal tenderness.      Comments: Supraumbilical hernia, reduced with gentle pressure. 1-2cm defect.    Musculoskeletal:         General: Normal range of motion.      Cervical back: Normal range of motion.   Skin:     General: Skin is warm and dry.   Neurological:      Mental Status: He is alert.   Psychiatric:         Behavior: Behavior normal.       Significant Labs:  none    Significant Diagnostics:  none

## 2022-03-13 NOTE — CONSULTS
[facility] - Emergency Dept  General Surgery  Consult Note    Patient Name: Abdias Kim  MRN: [id number]  Code Status: Prior  Admission Date: 3/12/2022  Hospital Length of Stay: 0 days  Attending Physician: Carlton Gonzalez MD  Primary Care Provider: Melquiades Valdivia MD    Patient information was obtained from patient, spouse/SO, past medical records and ER records.     Inpatient consult to General surgery  Consult performed by: Nate Dominguez MD  Consult ordered by: Carlton Gonzalez MD        Subjective:     Principal Problem: umbilical hernia    History of Present Illness: 66-year-old male with multiple medical co-morbidities including CAD s/p CABG 2021, hypertension, and hyperlipidemia presents with incarcerated umbilical hernia for 4 hours time. Patient states he's had this hernia for years and it usually reduces on its own. Today, he reduced it twice and it then became stuck, associated with pain to the area. Denies any obstructive symptoms including nausea or vomiting. Having bowel function. No skin changes. Denies chest pain, difficulty breathing, or any other issues.       Current Facility-Administered Medications on File Prior to Encounter   Medication    acetaminophen tablet 650 mg    albuterol inhaler 2 puff    diphenhydrAMINE injection 25 mg    EPINEPHrine (EPIPEN) 0.3 mg/0.3 mL pen injection 0.3 mg    ondansetron disintegrating tablet 4 mg     Current Outpatient Medications on File Prior to Encounter   Medication Sig    aspirin 325 MG tablet Take 1 tablet (325 mg total) by mouth once daily.    atorvastatin (LIPITOR) 40 MG tablet Take 1 tablet (40 mg total) by mouth every evening.    cetirizine (ZYRTEC) 10 MG tablet Take 1 tablet by mouth Daily.    diltiaZEM (CARDIZEM CD) 120 MG Cp24 Take 1 capsule (120 mg total) by mouth once daily.    fluticasone propionate (FLONASE) 50 mcg/actuation nasal spray     isosorbide mononitrate (IMDUR) 60 MG 24 hr tablet Take 1 tablet (60 mg total) by mouth once daily.     multivitamin (THERAGRAN) per tablet Take 1 tablet by mouth once daily.    omeprazole (PRILOSEC) 20 MG capsule Take 1 capsule (20 mg total) by mouth before breakfast.    sildenafiL (VIAGRA) 100 MG tablet        Review of patient's allergies indicates:   Allergen Reactions    Allegra-d 12 hour [fexofenadine-pseudoephedrine] Other (See Comments)     Trouble urinating    Mucinex d [pseudoephedrine-guaifenesin] Other (See Comments)     Trouble urinating    Losartan-hydrochlorothiazide Hives and Rash     Other reaction(s): Hives       Past Medical History:   Diagnosis Date    Anemia     Diverticulosis     Hypertension      Past Surgical History:   Procedure Laterality Date    APPENDECTOMY      COLONOSCOPY N/A 1/31/2017    Procedure: COLONOSCOPY;  Surgeon: BASILIO Winn MD;  Location: [facility] ([address] [place] [address]);  Service: Endoscopy;  Laterality: N/A;    COLONOSCOPY N/A 2/5/2020    Procedure: COLONOSCOPY;  Surgeon: Cody Maria MD;  Location: [facility] ([address] [place] [address]);  Service: Endoscopy;  Laterality: N/A;  OKay fang Maria or ghazala. Needs to be done in Jan 2020    CORONARY ARTERY BYPASS GRAFT (CABG) N/A 4/26/2021    Procedure: CORONARY ARTERY BYPASS GRAFT (CABG)X3 WITH VEIN HARVEST;  Surgeon: Fidencio Galindo MD;  Location: [facility] OR [address] [place] [address];  Service: Cardiovascular;  Laterality: N/A;    ENDOSCOPIC HARVEST OF VEIN Left 4/26/2021    Procedure: HARVEST-VEIN-ENDOVASCULAR FOR CABGX3;  Surgeon: Fidencio Galindo MD;  Location: [facility] OR [address] [place] [address];  Service: Cardiovascular;  Laterality: Left;  Vein [place] Start time: 1201pm  Vein [place] Stop time: 1226pm    Vein Prep Start time: 1227pm  Vein Prep Stop time: 1250pm    ESOPHAGOGASTRODUODENOSCOPY N/A 2/5/2020    Procedure: EGD (ESOPHAGOGASTRODUODENOSCOPY);  Surgeon: Cody Maria MD;  [address]  Service: Endoscopy;  Laterality: N/A;    ESOPHAGOGASTRODUODENOSCOPY N/A 4/30/2020    Procedure: EGD (ESOPHAGOGASTRODUODENOSCOPY);  Surgeon: Cody SUÁREZ  MD Crow;  Location: [facility] ENDO (2ND FLR);  Service: Endoscopy;  Laterality: N/A;  schedule 12 weeks from now  4/13/20 - removed from 4/29/20, needs to be rescheduled high priority - pg  4/28/20-scheduled from high priority list-BB  Covid screening test scheduled for 4/29/20-BB  instructions emailed to patient-BB    EXCLUSION OF LEFT ATRIAL APPENDAGE N/A 4/26/2021    Procedure: EXCLUSION, LEFT ATRIAL APPENDAGE;  Surgeon: Fidencio Galindo MD;  Location: [facility] OR 2ND FLR;  Service: Cardiovascular;  Laterality: N/A;  Left Atrial Appendage Resection    LEFT HEART CATHETERIZATION Left 4/21/2021    Procedure: Left heart cath;  Surgeon: Aric Alvarado MD;  Location: [facility] CATH LAB;  Service: Cardiology;  Laterality: Left;     Family History       Problem Relation (Age of Onset)    Cancer Father    Colon polyps Father    Heart disease Mother          Tobacco Use    Smoking status: Never Smoker    Smokeless tobacco: Never Used   Substance and Sexual Activity    Alcohol use: Yes     Comment: few times a week     Drug use: No    Sexual activity: Not on file     Review of Systems   Constitutional:  Negative for chills and fever.   HENT:  Negative for sore throat.    Eyes:  Negative for redness.   Respiratory:  Negative for shortness of breath.    Cardiovascular:  Negative for chest pain.   Gastrointestinal:  Positive for abdominal pain.   Endocrine: Negative for polyuria.   Genitourinary:  Negative for dysuria.   Musculoskeletal:  Negative for back pain.   Skin:  Negative for rash.   Neurological:  Negative for headaches.   Psychiatric/Behavioral:  Negative for agitation.    Objective:     Vital Signs (Most Recent):  Temp: 98.3 °F (36.8 °C) (03/12/22 1719)  Pulse: (!) 55 (03/12/22 1719)  Resp: 16 (03/12/22 1813)  BP: (!) 175/79 (03/12/22 1719)  SpO2: 100 % (03/12/22 1719)   Vital Signs (24h Range):  Temp:  [98.3 °F (36.8 °C)] 98.3 °F (36.8 °C)  Pulse:  [55] 55  Resp:  [16-18] 16  SpO2:  [100 %] 100 %  BP: (175)/(79)  175/79        There is no height or weight on file to calculate BMI.    Physical Exam  Vitals and nursing note reviewed.   Constitutional:       General: He is not in acute distress.     Appearance: He is well-developed.   HENT:      Head: Normocephalic and atraumatic.      Right Ear: External ear normal.      Left Ear: External ear normal.      Mouth/Throat:      Mouth: Mucous membranes are moist.   Eyes:      Conjunctiva/sclera: Conjunctivae normal.   Cardiovascular:      Rate and Rhythm: Normal rate.   Pulmonary:      Effort: Pulmonary effort is normal.   Abdominal:      Palpations: Abdomen is soft.      Tenderness: There is abdominal tenderness.      Comments: Tender umbilical hernia without skin changes. Reduced with gentle pressure. 2cm defect.    Musculoskeletal:         General: Normal range of motion.      Cervical back: Normal range of motion.   Skin:     General: Skin is warm and dry.   Neurological:      General: No focal deficit present.      Mental Status: He is alert.   Psychiatric:         Behavior: Behavior normal.       Significant Labs:  none    Significant Diagnostics:  none      Assessment/Plan:     Umbilical hernia  66-year-old male with CAD s/p CABG presented with incarcerated umbilical hernia. Reduced in ED.     -okay for discharge  -refer to surgery clinic for follow up.       VTE Risk Mitigation (From admission, onward)    None          Thank you for your consult. I will follow-up with patient. Please contact us if you have any additional questions.    Nate Dominguez MD  General Surgery  [facility] - Emergency Dept

## 2022-03-13 NOTE — ED PROVIDER NOTES
Encounter Date: 3/12/2022       History     Chief Complaint   Patient presents with    Abdominal Pain     Umbilical hernia      65 yo M with pmhx anemia, HTN, diverticulosis, CAD s/p CABG presents with a chief complaint of hernia pain.  Patient has a umbilical hernia.  It has been present for the past 4 years.  He has been unable to reduce it for the past 3 hours.  He reports that he reduced it twice already today but 3 hours ago it protruded and he has been unable to reduce it with his usual method of direct pressure.  He reported some nausea at onset of this pain but no nausea currently.  No vomiting, constipation.  Last bowel movement today was normal.  Pain is present exclusively in the umbilical hernia without radiation.        Review of patient's allergies indicates:   Allergen Reactions    Allegra-d 12 hour [fexofenadine-pseudoephedrine] Other (See Comments)     Trouble urinating    Mucinex d [pseudoephedrine-guaifenesin] Other (See Comments)     Trouble urinating    Losartan-hydrochlorothiazide Hives and Rash     Other reaction(s): Hives     Past Medical History:   Diagnosis Date    Anemia     Diverticulosis     Hypertension      Past Surgical History:   Procedure Laterality Date    APPENDECTOMY      COLONOSCOPY N/A 1/31/2017    Procedure: COLONOSCOPY;  Surgeon: BASILIO Winn MD;  Location: [facility] ([address] [place] [address]);  Service: Endoscopy;  Laterality: N/A;    COLONOSCOPY N/A 2/5/2020    Procedure: COLONOSCOPY;  Surgeon: Cody Maria MD;  [facility]  Service: Endoscopy;  Laterality: N/A;  OKay for Crow or ghazala. Needs to be done in Jan 2020    CORONARY ARTERY BYPASS GRAFT (CABG) N/A 4/26/2021    Procedure: CORONARY ARTERY BYPASS GRAFT (CABG)X3 WITH VEIN HARVEST;  Surgeon: Fidencio Galindo MD;  Location: [facility] OR [address] [place] [address];  Service: Cardiovascular;  Laterality: N/A;    ENDOSCOPIC HARVEST OF VEIN Left 4/26/2021    Procedure: HARVEST-VEIN-ENDOVASCULAR FOR CABGX3;  Surgeon:  Fidencio Galindo MD;  Location: [facility] OR 2ND FLR;  Service: Cardiovascular;  Laterality: Left;  Vein [place] Start time: 1201pm  Vein [place] Stop time: 1226pm    Vein Prep Start time: 1227pm  Vein Prep Stop time: 1250pm    ESOPHAGOGASTRODUODENOSCOPY N/A 2/5/2020    Procedure: EGD (ESOPHAGOGASTRODUODENOSCOPY);  Surgeon: Cody Maria MD;  Location: [facility] ENDO (4TH FLR);  Service: Endoscopy;  Laterality: N/A;    ESOPHAGOGASTRODUODENOSCOPY N/A 4/30/2020    Procedure: EGD (ESOPHAGOGASTRODUODENOSCOPY);  Surgeon: Cody Maria MD;  Location: [facility] ENDO (2ND FLR);  Service: Endoscopy;  Laterality: N/A;  schedule 12 weeks from now  4/13/20 - removed from 4/29/20, needs to be rescheduled high priority - pg  4/28/20-scheduled from high priority list-BB  Covid screening test scheduled for 4/29/20-BB  instructions emailed to patient-BB    EXCLUSION OF LEFT ATRIAL APPENDAGE N/A 4/26/2021    Procedure: EXCLUSION, LEFT ATRIAL APPENDAGE;  Surgeon: Fidencio Galindo MD;  Location: [facility] OR 2ND FLR;  Service: Cardiovascular;  Laterality: N/A;  Left Atrial Appendage Resection    LEFT HEART CATHETERIZATION Left 4/21/2021    Procedure: Left heart cath;  Surgeon: Aric Alvarado MD;  Location: [facility] CATH LAB;  Service: Cardiology;  Laterality: Left;     Family History   Problem Relation Age of Onset    Heart disease Mother         mi    Cancer Father         colon/prostate    Colon polyps Father     Stroke Neg Hx     Diabetes Neg Hx     Celiac disease Neg Hx     Esophageal cancer Neg Hx     Liver cancer Neg Hx     Liver disease Neg Hx     Stomach cancer Neg Hx     Rectal cancer Neg Hx      Social History     Tobacco Use    Smoking status: Never Smoker    Smokeless tobacco: Never Used   Substance Use Topics    Alcohol use: Yes     Comment: few times a week     Drug use: No     Review of Systems   Constitutional: Negative for fever.   HENT: Negative for sore throat.    Respiratory: Negative for shortness of  breath.    Cardiovascular: Negative for chest pain.   Gastrointestinal: Positive for abdominal pain. Negative for constipation, nausea and vomiting.   Genitourinary: Negative for dysuria.   Musculoskeletal: Negative for back pain.   Skin: Negative for rash.   Neurological: Negative for weakness.   Hematological: Does not bruise/bleed easily.       Physical Exam     Initial Vitals [03/12/22 1719]   BP Pulse Resp Temp SpO2   (!) 175/79 (!) 55 18 98.3 °F (36.8 °C) 100 %      MAP       --         Physical Exam    Nursing note and vitals reviewed.  Constitutional: He appears well-developed and well-nourished. He is not diaphoretic. No distress.   HENT:   Head: Normocephalic.   Neck: Neck supple.   Cardiovascular: Normal rate.   Pulmonary/Chest: Breath sounds normal. No respiratory distress. He has no wheezes. He has no rales.   Abdominal: Abdomen is soft. Bowel sounds are normal. There is abdominal tenderness.   Periumbilical hernia- unable to reduce manually There is no rebound and no guarding.   Musculoskeletal:         General: Normal range of motion.      Cervical back: Neck supple.     Neurological: He is alert.   Skin: Skin is warm.   Psychiatric: He has a normal mood and affect.         ED Course   Procedures  Labs Reviewed   CBC W/ AUTO DIFFERENTIAL - Abnormal; Notable for the following components:       Result Value    Hemoglobin 11.8 (*)     Hematocrit 36.6 (*)     MCV 69 (*)     MCH 22.4 (*)     RDW 16.0 (*)     All other components within normal limits   COMPREHENSIVE METABOLIC PANEL - Abnormal; Notable for the following components:    eGFR if non  56.1 (*)     All other components within normal limits          Imaging Results    None          Medications   morphine injection 4 mg (4 mg Intravenous Given 3/12/22 1813)     Medical Decision Making:   History:   Old Medical Records: I decided to obtain old medical records.  Initial Assessment:   65 yo M with pmhx anemia, HTN, diverticulosis, CAD  s/p CABG presents with a chief complaint of hernia pain.   Differential Diagnosis:   Hernia, incarcerated hernia  Clinical Tests:   Lab Tests: Ordered  ED Management:  No generalized tenderness and patient is not peritonitic, I doubt strangulated hernia.  I attempted reduction at bedside but was unsuccessful.   I administered morphine and in ice pack.  [facility] consulted and performed reduction successfully at bedside.  They are arranging clinic follow-up for him and recommend discharge.  On repeat assessment, patient resting comfortably.  Abdomen is soft and nontender.  CBC and CMP unremarkable. He was provided with instructions for hernia reduction and extensive return precautions.                      Clinical Impression:   Final diagnoses:  [K42.9] Umbilical hernia without obstruction and without gangrene (Primary)          ED Disposition Condition    Discharge Stable        ED Prescriptions     None        Follow-up Information     Follow up With Specialties Details Why Contact Info Additional Information    [facility] Surgery Schedule an appointment as soon as possible for a visit   [address]  [facility] [id number] [phone] Multispecialty Surgery Clinic - Main Building, 2nd Floor Please park in [facility] and use escalator or Clinic elevator    Matt Delgado MD General Surgery Call call for surgery appointment  for hernia repair [address]  [facility] [address]  [phone]              Carlton Gonzalez MD  03/12/22 1916

## 2022-03-13 NOTE — DISCHARGE INSTRUCTIONS
Follow up with gen surg in clinic.    Return to the ED for any worsening pain, constipation, inability to eat, vomiting, inability to reduce hernia, or other concerning symptoms.

## 2022-03-21 ENCOUNTER — OFFICE VISIT (OUTPATIENT)
Dept: SURGERY | Facility: CLINIC | Age: 67
End: 2022-03-21
Payer: MEDICARE

## 2022-03-21 VITALS
DIASTOLIC BLOOD PRESSURE: 83 MMHG | SYSTOLIC BLOOD PRESSURE: 161 MMHG | HEIGHT: 68 IN | OXYGEN SATURATION: 100 % | WEIGHT: 156.5 LBS | HEART RATE: 64 BPM | TEMPERATURE: 98 F | BODY MASS INDEX: 23.72 KG/M2

## 2022-03-21 DIAGNOSIS — Z01.818 PRE-OP EVALUATION: ICD-10-CM

## 2022-03-21 DIAGNOSIS — K42.9 UMBILICAL HERNIA WITHOUT OBSTRUCTION AND WITHOUT GANGRENE: Primary | ICD-10-CM

## 2022-03-21 PROCEDURE — 99215 OFFICE O/P EST HI 40 MIN: CPT | Mod: PBBFAC | Performed by: SURGERY

## 2022-03-21 PROCEDURE — 99999 PR PBB SHADOW E&M-EST. PATIENT-LVL V: CPT | Mod: PBBFAC,,, | Performed by: SURGERY

## 2022-03-21 PROCEDURE — 99213 OFFICE O/P EST LOW 20 MIN: CPT | Mod: S$PBB,,, | Performed by: SURGERY

## 2022-03-21 PROCEDURE — 99999 PR PBB SHADOW E&M-EST. PATIENT-LVL V: ICD-10-PCS | Mod: PBBFAC,,, | Performed by: SURGERY

## 2022-03-21 PROCEDURE — 99213 PR OFFICE/OUTPT VISIT, EST, LEVL III, 20-29 MIN: ICD-10-PCS | Mod: S$PBB,,, | Performed by: SURGERY

## 2022-03-21 NOTE — H&P (VIEW-ONLY)
"General Surgery Office Visit   History and Physical    Patient Name: Abdias Kim  YOB: 1955 (66 y.o.)  MRN: [id number]  Today's Date: 03/21/2022    Referring Md:   No referring provider defined for this encounter.    SUBJECTIVE:     Chief Complaint: Abdominal pain    History of Present Illness:  Abdias Kim is a 66 y.o. male with PMHx of HTN, diverticulosis, CAD (s/p CAGB April 2021) who presents to the clinic today complaining of abdominal pain at his umbilicus. He reports having an umbilical hernia for 4-5 years. During this time, it has "gotten stuck" 3 times. The last time brought him to the ED on 3/12/22 where the hernia was reduced at bedside. He reports doing well since with intermittent episodes of pain when it "pops out" but no more episodes of incarceration. Pain is described as a soreness which radiates throughout his abdomen. He denies fever, chills, unintentional weight loss, n/v/d, constipation, hematochezia, dysuria, hematuria, CP, SOB, and all other symptoms. Patient reports being compliant with home medication regimen.     He is very active and walks 3 miles per day on average.     Patient denies personal history of CVA, lung disease, DM  Previous abdominal surgeries include: open appendectomy (20 years ago)  Drinks ETOH a few times per week. Denies tobacco and elicit drug use.   Not currently on any anticoagulants      Review of patient's allergies indicates:   Allergen Reactions    Allegra-d 12 hour [fexofenadine-pseudoephedrine] Other (See Comments)     Trouble urinating    Mucinex d [pseudoephedrine-guaifenesin] Other (See Comments)     Trouble urinating    Losartan-hydrochlorothiazide Hives and Rash     Other reaction(s): Hives       Past Medical History:   Diagnosis Date    Anemia     Diverticulosis     Hypertension      Past Surgical History:   Procedure Laterality Date    APPENDECTOMY      COLONOSCOPY N/A 1/31/2017    Procedure: COLONOSCOPY;  Surgeon: BASILIO Snell " MD Pa;  Location: [facility] (4TH FLR);  Service: Endoscopy;  Laterality: N/A;    COLONOSCOPY N/A 2/5/2020    Procedure: COLONOSCOPY;  Surgeon: Cody Maria MD;  Location: [facility] (4TH FLR);  Service: Endoscopy;  Laterality: N/A;  OKay fang Maria or ghazala. Needs to be done in Jan 2020    CORONARY ARTERY BYPASS GRAFT (CABG) N/A 4/26/2021    Procedure: CORONARY ARTERY BYPASS GRAFT (CABG)X3 WITH VEIN HARVEST;  Surgeon: Fidencio Galindo MD;  Location: [facility] OR [address] [place] [address];  Service: Cardiovascular;  Laterality: N/A;    ENDOSCOPIC HARVEST OF VEIN Left 4/26/2021    Procedure: HARVEST-VEIN-ENDOVASCULAR FOR CABGX3;  Surgeon: Fidencio Galindo MD;  Location: [facility] OR [address] [place] [address];  Service: Cardiovascular;  Laterality: Left;  Vein [place] Start time: 1201pm  Vein [place] Stop time: 1226pm    Vein Prep Start time: 1227pm  Vein Prep Stop time: 1250pm    ESOPHAGOGASTRODUODENOSCOPY N/A 2/5/2020    Procedure: EGD (ESOPHAGOGASTRODUODENOSCOPY);  Surgeon: Cody Maria MD;  Location: [facility] (4TH FLR);  Service: Endoscopy;  Laterality: N/A;    ESOPHAGOGASTRODUODENOSCOPY N/A 4/30/2020    Procedure: EGD (ESOPHAGOGASTRODUODENOSCOPY);  Surgeon: Cody Maria MD;  Location: [facility] (2ND FLR);  Service: Endoscopy;  Laterality: N/A;  schedule 12 weeks from now  4/13/20 - removed from 4/29/20, needs to be rescheduled high priority - pg  4/28/20-scheduled from high priority list-BB  Covid screening test scheduled for 4/29/20-BB  instructions emailed to patient-BB    EXCLUSION OF LEFT ATRIAL APPENDAGE N/A 4/26/2021    Procedure: EXCLUSION, LEFT ATRIAL APPENDAGE;  Surgeon: Fidencio Galindo MD;  Location: [facility] OR [address] [place] [address];  Service: Cardiovascular;  Laterality: N/A;  Left Atrial Appendage Resection    LEFT HEART CATHETERIZATION Left 4/21/2021    Procedure: Left heart cath;  Surgeon: Aric Alvarado MD;  Location: [facility] CATH LAB;  Service: Cardiology;  Laterality: Left;     Family History   Problem Relation Age of Onset     "Heart disease Mother         mi    Cancer Father         colon/prostate    Colon polyps Father     Stroke Neg Hx     Diabetes Neg Hx     Celiac disease Neg Hx     Esophageal cancer Neg Hx     Liver cancer Neg Hx     Liver disease Neg Hx     Stomach cancer Neg Hx     Rectal cancer Neg Hx      Social History     Tobacco Use    Smoking status: Never Smoker    Smokeless tobacco: Never Used   Substance Use Topics    Alcohol use: Yes     Comment: few times a week     Drug use: No        Review of Systems:  Review of Systems   Constitutional: Negative for chills, fever and malaise/fatigue.   Respiratory: Negative for cough and shortness of breath.    Cardiovascular: Negative for chest pain.   Gastrointestinal: Positive for abdominal pain. Negative for blood in stool, constipation, diarrhea, nausea and vomiting.   Genitourinary: Negative for dysuria and hematuria.   Skin: Negative for rash.   Neurological: Negative for dizziness and headaches.   Psychiatric/Behavioral: Negative for substance abuse. The patient is not nervous/anxious.    All other systems reviewed and are negative.      OBJECTIVE:     Vital Signs (Most Recent)  BP (!) 161/83 (BP Location: Left arm, Patient Position: Sitting)   Pulse 64   Temp 98 °F (36.7 °C) (Oral)   Ht 5' 8" (1.727 m)   Wt 71 kg (156 lb 8.4 oz)   SpO2 100%   BMI 23.80 kg/m²     Physical Exam  Vitals and nursing note reviewed.   Constitutional:       General: He is not in acute distress.     Appearance: He is not ill-appearing or diaphoretic.      Comments: Room air   HENT:      Head: Normocephalic and atraumatic.      Mouth/Throat:      Mouth: Mucous membranes are moist.      Pharynx: Oropharynx is clear.   Eyes:      Extraocular Movements: Extraocular movements intact.      Conjunctiva/sclera: Conjunctivae normal.   Cardiovascular:      Rate and Rhythm: Normal rate.   Pulmonary:      Effort: Pulmonary effort is normal. No respiratory distress.   Abdominal:      General: " There is no distension.      Palpations: Abdomen is soft.      Tenderness: There is no abdominal tenderness. There is no guarding or rebound.      Hernia: A hernia is present.      Comments: Soft, reducible umbilical hernia noted. No overlying skin changes. Not TTP.    Musculoskeletal:         General: No deformity.   Skin:     General: Skin is warm and dry.      Coloration: Skin is not jaundiced.      Comments: Well healed sternotomy scar noted   Neurological:      Mental Status: He is alert and oriented to person, place, and time.       Labs:   Lab Results   Component Value Date    WBC 7.21 03/12/2022    HGB 11.8 (L) 03/12/2022    HCT 36.6 (L) 03/12/2022    MCV 69 (L) 03/12/2022     03/12/2022       CMP  Sodium   Date Value Ref Range Status   03/12/2022 140 136 - 145 mmol/L Final     Potassium   Date Value Ref Range Status   03/12/2022 4.4 3.5 - 5.1 mmol/L Final     Chloride   Date Value Ref Range Status   03/12/2022 106 95 - 110 mmol/L Final     CO2   Date Value Ref Range Status   03/12/2022 23 23 - 29 mmol/L Final     Glucose   Date Value Ref Range Status   03/12/2022 76 70 - 110 mg/dL Final     BUN   Date Value Ref Range Status   03/12/2022 16 8 - 23 mg/dL Final     Creatinine   Date Value Ref Range Status   03/12/2022 1.3 0.5 - 1.4 mg/dL Final     Calcium   Date Value Ref Range Status   03/12/2022 9.8 8.7 - 10.5 mg/dL Final     Total Protein   Date Value Ref Range Status   03/12/2022 7.4 6.0 - 8.4 g/dL Final     Albumin   Date Value Ref Range Status   03/12/2022 4.0 3.5 - 5.2 g/dL Final     Total Bilirubin   Date Value Ref Range Status   03/12/2022 0.9 0.1 - 1.0 mg/dL Final     Comment:     For infants and newborns, interpretation of results should be based  on gestational age, weight and in agreement with clinical  observations.    Premature Infant recommended reference ranges:  Up to 24 hours.............<8.0 mg/dL  Up to 48 hours............<12.0 mg/dL  3-5 days..................<15.0 mg/dL  6-29  days.................<15.0 mg/dL       Alkaline Phosphatase   Date Value Ref Range Status   03/12/2022 68 55 - 135 U/L Final     AST   Date Value Ref Range Status   03/12/2022 25 10 - 40 U/L Final     ALT   Date Value Ref Range Status   03/12/2022 26 10 - 44 U/L Final     Anion Gap   Date Value Ref Range Status   03/12/2022 11 8 - 16 mmol/L Final     eGFR if    Date Value Ref Range Status   03/12/2022 >60.0 >60 mL/min/1.73 m^2 Final     eGFR if non    Date Value Ref Range Status   03/12/2022 56.1 (A) >60 mL/min/1.73 m^2 Final     Comment:     Calculation used to obtain the estimated glomerular filtration  rate (eGFR) is the CKD-EPI equation.        Imaging: Reviewed       ASSESSMENT/PLAN:     Abdias Kim is a 66 y.o. male with PMHx of HTN, diverticulosis, CAD (s/p CAGB April 2021) who presents to the clinic today complaining of abdominal pain at his umbilicus concerning for umbilical hernia. Clinically stable without signs of incarceration/strangulation/obstruction, interested in repair       Umbilical hernia without obstruction and without gangrene  - Discussed risks and benefits of both operative and nonoperative management with patient in detail. Patient elected for operative management  - Schedule for umbilical hernia repair with Dr. Delgado   - Consent to be obtained day of procedure   - ED precautions given  - Continue any current medications  - Discussed POC with patient. All questions were answered. Patient is agreeable to plan and verbalized understanding.     Case discussed with Dr. Delgado. Patient seen and examined by Dr. Delgado.       Roberto Shepard, MPAS, PA-C  General Surgery  - [facility]

## 2022-03-21 NOTE — PROGRESS NOTES
"General Surgery Office Visit   History and Physical    Patient Name: Abdias Kim  YOB: 1955 (66 y.o.)  MRN: [id number]  Today's Date: 03/21/2022    Referring Md:   No referring provider defined for this encounter.    SUBJECTIVE:     Chief Complaint: Abdominal pain    History of Present Illness:  Abdias Kim is a 66 y.o. male with PMHx of HTN, diverticulosis, CAD (s/p CAGB April 2021) who presents to the clinic today complaining of abdominal pain at his umbilicus. He reports having an umbilical hernia for 4-5 years. During this time, it has "gotten stuck" 3 times. The last time brought him to the ED on 3/12/22 where the hernia was reduced at bedside. He reports doing well since with intermittent episodes of pain when it "pops out" but no more episodes of incarceration. Pain is described as a soreness which radiates throughout his abdomen. He denies fever, chills, unintentional weight loss, n/v/d, constipation, hematochezia, dysuria, hematuria, CP, SOB, and all other symptoms. Patient reports being compliant with home medication regimen.     He is very active and walks 3 miles per day on average.     Patient denies personal history of CVA, lung disease, DM  Previous abdominal surgeries include: open appendectomy (20 years ago)  Drinks ETOH a few times per week. Denies tobacco and elicit drug use.   Not currently on any anticoagulants      Review of patient's allergies indicates:   Allergen Reactions    Allegra-d 12 hour [fexofenadine-pseudoephedrine] Other (See Comments)     Trouble urinating    Mucinex d [pseudoephedrine-guaifenesin] Other (See Comments)     Trouble urinating    Losartan-hydrochlorothiazide Hives and Rash     Other reaction(s): Hives       Past Medical History:   Diagnosis Date    Anemia     Diverticulosis     Hypertension      Past Surgical History:   Procedure Laterality Date    APPENDECTOMY      COLONOSCOPY N/A 1/31/2017    Procedure: COLONOSCOPY;  Surgeon: BASILIO Snell " MD Pa;  Location: [facility] (4TH FLR);  Service: Endoscopy;  Laterality: N/A;    COLONOSCOPY N/A 2/5/2020    Procedure: COLONOSCOPY;  Surgeon: Cody Maria MD;  Location: [facility] (4TH FLR);  Service: Endoscopy;  Laterality: N/A;  OKay fang Maria or ghazala. Needs to be done in Jan 2020    CORONARY ARTERY BYPASS GRAFT (CABG) N/A 4/26/2021    Procedure: CORONARY ARTERY BYPASS GRAFT (CABG)X3 WITH VEIN HARVEST;  Surgeon: Fidencio Galindo MD;  Location: [facility] OR [address] [place] [address];  Service: Cardiovascular;  Laterality: N/A;    ENDOSCOPIC HARVEST OF VEIN Left 4/26/2021    Procedure: HARVEST-VEIN-ENDOVASCULAR FOR CABGX3;  Surgeon: Fidencio Galindo MD;  Location: [facility] OR [address] [place] [address];  Service: Cardiovascular;  Laterality: Left;  Vein [place] Start time: 1201pm  Vein [place] Stop time: 1226pm    Vein Prep Start time: 1227pm  Vein Prep Stop time: 1250pm    ESOPHAGOGASTRODUODENOSCOPY N/A 2/5/2020    Procedure: EGD (ESOPHAGOGASTRODUODENOSCOPY);  Surgeon: Cody Maria MD;  Location: [facility] (4TH FLR);  Service: Endoscopy;  Laterality: N/A;    ESOPHAGOGASTRODUODENOSCOPY N/A 4/30/2020    Procedure: EGD (ESOPHAGOGASTRODUODENOSCOPY);  Surgeon: Cody Maria MD;  Location: [facility] (2ND FLR);  Service: Endoscopy;  Laterality: N/A;  schedule 12 weeks from now  4/13/20 - removed from 4/29/20, needs to be rescheduled high priority - pg  4/28/20-scheduled from high priority list-BB  Covid screening test scheduled for 4/29/20-BB  instructions emailed to patient-BB    EXCLUSION OF LEFT ATRIAL APPENDAGE N/A 4/26/2021    Procedure: EXCLUSION, LEFT ATRIAL APPENDAGE;  Surgeon: Fidencio Galindo MD;  Location: [facility] OR [address] [place] [address];  Service: Cardiovascular;  Laterality: N/A;  Left Atrial Appendage Resection    LEFT HEART CATHETERIZATION Left 4/21/2021    Procedure: Left heart cath;  Surgeon: Aric Alvarado MD;  Location: [facility] CATH LAB;  Service: Cardiology;  Laterality: Left;     Family History   Problem Relation Age of Onset     "Heart disease Mother         mi    Cancer Father         colon/prostate    Colon polyps Father     Stroke Neg Hx     Diabetes Neg Hx     Celiac disease Neg Hx     Esophageal cancer Neg Hx     Liver cancer Neg Hx     Liver disease Neg Hx     Stomach cancer Neg Hx     Rectal cancer Neg Hx      Social History     Tobacco Use    Smoking status: Never Smoker    Smokeless tobacco: Never Used   Substance Use Topics    Alcohol use: Yes     Comment: few times a week     Drug use: No        Review of Systems:  Review of Systems   Constitutional: Negative for chills, fever and malaise/fatigue.   Respiratory: Negative for cough and shortness of breath.    Cardiovascular: Negative for chest pain.   Gastrointestinal: Positive for abdominal pain. Negative for blood in stool, constipation, diarrhea, nausea and vomiting.   Genitourinary: Negative for dysuria and hematuria.   Skin: Negative for rash.   Neurological: Negative for dizziness and headaches.   Psychiatric/Behavioral: Negative for substance abuse. The patient is not nervous/anxious.    All other systems reviewed and are negative.      OBJECTIVE:     Vital Signs (Most Recent)  BP (!) 161/83 (BP Location: Left arm, Patient Position: Sitting)   Pulse 64   Temp 98 °F (36.7 °C) (Oral)   Ht 5' 8" (1.727 m)   Wt 71 kg (156 lb 8.4 oz)   SpO2 100%   BMI 23.80 kg/m²     Physical Exam  Vitals and nursing note reviewed.   Constitutional:       General: He is not in acute distress.     Appearance: He is not ill-appearing or diaphoretic.      Comments: Room air   HENT:      Head: Normocephalic and atraumatic.      Mouth/Throat:      Mouth: Mucous membranes are moist.      Pharynx: Oropharynx is clear.   Eyes:      Extraocular Movements: Extraocular movements intact.      Conjunctiva/sclera: Conjunctivae normal.   Cardiovascular:      Rate and Rhythm: Normal rate.   Pulmonary:      Effort: Pulmonary effort is normal. No respiratory distress.   Abdominal:      General: " There is no distension.      Palpations: Abdomen is soft.      Tenderness: There is no abdominal tenderness. There is no guarding or rebound.      Hernia: A hernia is present.      Comments: Soft, reducible umbilical hernia noted. No overlying skin changes. Not TTP.    Musculoskeletal:         General: No deformity.   Skin:     General: Skin is warm and dry.      Coloration: Skin is not jaundiced.      Comments: Well healed sternotomy scar noted   Neurological:      Mental Status: He is alert and oriented to person, place, and time.       Labs:   Lab Results   Component Value Date    WBC 7.21 03/12/2022    HGB 11.8 (L) 03/12/2022    HCT 36.6 (L) 03/12/2022    MCV 69 (L) 03/12/2022     03/12/2022       CMP  Sodium   Date Value Ref Range Status   03/12/2022 140 136 - 145 mmol/L Final     Potassium   Date Value Ref Range Status   03/12/2022 4.4 3.5 - 5.1 mmol/L Final     Chloride   Date Value Ref Range Status   03/12/2022 106 95 - 110 mmol/L Final     CO2   Date Value Ref Range Status   03/12/2022 23 23 - 29 mmol/L Final     Glucose   Date Value Ref Range Status   03/12/2022 76 70 - 110 mg/dL Final     BUN   Date Value Ref Range Status   03/12/2022 16 8 - 23 mg/dL Final     Creatinine   Date Value Ref Range Status   03/12/2022 1.3 0.5 - 1.4 mg/dL Final     Calcium   Date Value Ref Range Status   03/12/2022 9.8 8.7 - 10.5 mg/dL Final     Total Protein   Date Value Ref Range Status   03/12/2022 7.4 6.0 - 8.4 g/dL Final     Albumin   Date Value Ref Range Status   03/12/2022 4.0 3.5 - 5.2 g/dL Final     Total Bilirubin   Date Value Ref Range Status   03/12/2022 0.9 0.1 - 1.0 mg/dL Final     Comment:     For infants and newborns, interpretation of results should be based  on gestational age, weight and in agreement with clinical  observations.    Premature Infant recommended reference ranges:  Up to 24 hours.............<8.0 mg/dL  Up to 48 hours............<12.0 mg/dL  3-5 days..................<15.0 mg/dL  6-29  days.................<15.0 mg/dL       Alkaline Phosphatase   Date Value Ref Range Status   03/12/2022 68 55 - 135 U/L Final     AST   Date Value Ref Range Status   03/12/2022 25 10 - 40 U/L Final     ALT   Date Value Ref Range Status   03/12/2022 26 10 - 44 U/L Final     Anion Gap   Date Value Ref Range Status   03/12/2022 11 8 - 16 mmol/L Final     eGFR if    Date Value Ref Range Status   03/12/2022 >60.0 >60 mL/min/1.73 m^2 Final     eGFR if non    Date Value Ref Range Status   03/12/2022 56.1 (A) >60 mL/min/1.73 m^2 Final     Comment:     Calculation used to obtain the estimated glomerular filtration  rate (eGFR) is the CKD-EPI equation.        Imaging: Reviewed       ASSESSMENT/PLAN:     Abdias Kim is a 66 y.o. male with PMHx of HTN, diverticulosis, CAD (s/p CAGB April 2021) who presents to the clinic today complaining of abdominal pain at his umbilicus concerning for umbilical hernia. Clinically stable without signs of incarceration/strangulation/obstruction, interested in repair       Umbilical hernia without obstruction and without gangrene  - Discussed risks and benefits of both operative and nonoperative management with patient in detail. Patient elected for operative management  - Schedule for umbilical hernia repair with Dr. Delgado   - Consent to be obtained day of procedure   - ED precautions given  - Continue any current medications  - Discussed POC with patient. All questions were answered. Patient is agreeable to plan and verbalized understanding.     Case discussed with Dr. Delgado. Patient seen and examined by Dr. Delgado.       Roberto Shepard, MPAS, PA-C  General Surgery  - [facility]

## 2022-03-22 ENCOUNTER — OFFICE VISIT (OUTPATIENT)
Dept: CARDIOLOGY | Facility: CLINIC | Age: 67
End: 2022-03-22
Payer: MEDICARE

## 2022-03-22 ENCOUNTER — PATIENT MESSAGE (OUTPATIENT)
Dept: SURGERY | Facility: HOSPITAL | Age: 67
End: 2022-03-22
Payer: MEDICARE

## 2022-03-22 ENCOUNTER — HOSPITAL ENCOUNTER (OUTPATIENT)
Dept: RADIOLOGY | Facility: HOSPITAL | Age: 67
Discharge: HOME | End: 2022-03-22
Attending: STUDENT IN AN ORGANIZED HEALTH CARE EDUCATION/TRAINING PROGRAM
Payer: MEDICARE

## 2022-03-22 VITALS
BODY MASS INDEX: 23.52 KG/M2 | HEIGHT: 68 IN | WEIGHT: 155.19 LBS | SYSTOLIC BLOOD PRESSURE: 126 MMHG | OXYGEN SATURATION: 100 % | RESPIRATION RATE: 15 BRPM | HEART RATE: 66 BPM | DIASTOLIC BLOOD PRESSURE: 76 MMHG

## 2022-03-22 DIAGNOSIS — I48.0 PAROXYSMAL ATRIAL FIBRILLATION: ICD-10-CM

## 2022-03-22 DIAGNOSIS — N52.9 ERECTILE DYSFUNCTION, UNSPECIFIED ERECTILE DYSFUNCTION TYPE: ICD-10-CM

## 2022-03-22 DIAGNOSIS — Z01.810 PREOPERATIVE CARDIOVASCULAR EXAMINATION: Primary | ICD-10-CM

## 2022-03-22 DIAGNOSIS — E78.49 OTHER HYPERLIPIDEMIA: ICD-10-CM

## 2022-03-22 DIAGNOSIS — I70.0 AORTIC ATHEROSCLEROSIS: ICD-10-CM

## 2022-03-22 DIAGNOSIS — Z95.1 S/P CABG X 3: ICD-10-CM

## 2022-03-22 DIAGNOSIS — I25.10 CORONARY ARTERY DISEASE INVOLVING NATIVE CORONARY ARTERY OF NATIVE HEART WITHOUT ANGINA PECTORIS: ICD-10-CM

## 2022-03-22 DIAGNOSIS — I10 ESSENTIAL HYPERTENSION: ICD-10-CM

## 2022-03-22 DIAGNOSIS — Z01.818 PRE-OP EVALUATION: ICD-10-CM

## 2022-03-22 DIAGNOSIS — I27.20 PULMONARY HYPERTENSION: ICD-10-CM

## 2022-03-22 PROCEDURE — 93010 EKG 12-LEAD: ICD-10-PCS | Mod: S$PBB,,, | Performed by: INTERNAL MEDICINE

## 2022-03-22 PROCEDURE — 93005 ELECTROCARDIOGRAM TRACING: CPT | Mod: PBBFAC | Performed by: INTERNAL MEDICINE

## 2022-03-22 PROCEDURE — 71045 XR CHEST 1 VIEW: ICD-10-PCS | Mod: 26,,, | Performed by: STUDENT IN AN ORGANIZED HEALTH CARE EDUCATION/TRAINING PROGRAM

## 2022-03-22 PROCEDURE — 99214 OFFICE O/P EST MOD 30 MIN: CPT | Mod: PBBFAC,25 | Performed by: INTERNAL MEDICINE

## 2022-03-22 PROCEDURE — 99214 PR OFFICE/OUTPT VISIT, EST, LEVL IV, 30-39 MIN: ICD-10-PCS | Mod: S$PBB,,, | Performed by: INTERNAL MEDICINE

## 2022-03-22 PROCEDURE — 99999 PR PBB SHADOW E&M-EST. PATIENT-LVL IV: ICD-10-PCS | Mod: PBBFAC,,, | Performed by: INTERNAL MEDICINE

## 2022-03-22 PROCEDURE — 99214 OFFICE O/P EST MOD 30 MIN: CPT | Mod: S$PBB,,, | Performed by: INTERNAL MEDICINE

## 2022-03-22 PROCEDURE — 93010 ELECTROCARDIOGRAM REPORT: CPT | Mod: S$PBB,,, | Performed by: INTERNAL MEDICINE

## 2022-03-22 PROCEDURE — 71045 X-RAY EXAM CHEST 1 VIEW: CPT | Mod: TC,FY

## 2022-03-22 PROCEDURE — 71045 X-RAY EXAM CHEST 1 VIEW: CPT | Mod: 26,,, | Performed by: STUDENT IN AN ORGANIZED HEALTH CARE EDUCATION/TRAINING PROGRAM

## 2022-03-22 PROCEDURE — 99999 PR PBB SHADOW E&M-EST. PATIENT-LVL IV: CPT | Mod: PBBFAC,,, | Performed by: INTERNAL MEDICINE

## 2022-03-22 RX ORDER — SILDENAFIL 100 MG/1
50 TABLET, FILM COATED ORAL
Qty: 10 TABLET | Refills: 3 | Status: SHIPPED | OUTPATIENT
Start: 2022-03-22 | End: 2023-06-13 | Stop reason: SDUPTHER

## 2022-03-22 RX ORDER — HYDRALAZINE HYDROCHLORIDE 50 MG/1
50 TABLET, FILM COATED ORAL EVERY 12 HOURS
Qty: 180 TABLET | Refills: 3 | Status: SHIPPED | OUTPATIENT
Start: 2022-03-22 | End: 2022-08-10

## 2022-03-22 NOTE — PROGRESS NOTES
CARDIOLOGY CLINIC VISIT        HISTORY OF PRESENT ILLNESS:     Abdias Kim presents for continued care. Last seen 11/03/21. Plans for hernia repair. No complaints. No chest pain. No shortness of breath. Has completed cardiac rehab. Inquired about stopping Imdur secondary of desire to use Sildenafil.     Previous visit had complaints of palpitations. No recurrence. Holter at that time showed average heart rate 57 beats per minute.  Very rare PVCs.  Occasional PACs.  Reported symptoms correlated with sinus bradycardia, heart rate roughly 50 beats per minute.   Previous clinic note:    CPX prior to starting cardiac rehab.  He exercised for 8 minutes 35 seconds.  Functional capacity 15 Mets.  Rare PVCs.  EKG portion was reported positive for ischemia.  Resolved less than 4 minutes into recovery.  His stress test prior to having coronary angiography and subsequent bypass surgery he had changes that lasted upwards of 27 minutes into recovery.  Status post CABG x3 vessel on 04/26/2021.  (Lima/lad, GIORGIO/1st diagonal, SVG/om 1) He had stress echocardiogram on 04/19/2021.  Positive for ischemia in territory supplied by LAD.  EKG was positive for ischemia.  Ejection fraction was 65%.  Pulmonary pressure was 30. Left heart catheterization revealed ostial to mid left main 70% stenosis.  Ostial to proximal left anterior descending artery stenosis 90%.     CARDIOVASCULAR HISTORY:     CABG x3 vessel  Pulmonary hypertension    PAST MEDICAL HISTORY:     Past Medical History:   Diagnosis Date    Anemia     Diverticulosis     Hypertension        PAST SURGICAL HISTORY:     Past Surgical History:   Procedure Laterality Date    APPENDECTOMY      COLONOSCOPY N/A 1/31/2017    Procedure: COLONOSCOPY;  Surgeon: BASILIO Winn MD;  Location: [facility] ([address] [place] [address]);  Service: Endoscopy;  Laterality: N/A;    COLONOSCOPY N/A 2/5/2020    Procedure: COLONOSCOPY;  Surgeon: Cody Maria MD;  [facility]  Service:  Endoscopy;  Laterality: N/A;  Luis Maria or ghazala. Needs to be done in Jan 2020    CORONARY ARTERY BYPASS GRAFT (CABG) N/A 4/26/2021    Procedure: CORONARY ARTERY BYPASS GRAFT (CABG)X3 WITH VEIN HARVEST;  Surgeon: Fidencio Galindo MD;  Location: [facility] [address] [place] [address];  Service: Cardiovascular;  Laterality: N/A;    ENDOSCOPIC HARVEST OF VEIN Left 4/26/2021    Procedure: HARVEST-VEIN-ENDOVASCULAR FOR CABGX3;  Surgeon: Fidencio Galindo MD;  Location: [facility] OR [address] [place] [address];  Service: Cardiovascular;  Laterality: Left;  Vein Sledge Start time: 1201pm  Vein Sledge Stop time: 1226pm    Vein Prep Start time: 1227pm  Vein Prep Stop time: 1250pm    ESOPHAGOGASTRODUODENOSCOPY N/A 2/5/2020    Procedure: EGD (ESOPHAGOGASTRODUODENOSCOPY);  Surgeon: Cody Maria MD;  Location: [facility] (4TH FLR);  Service: Endoscopy;  Laterality: N/A;    ESOPHAGOGASTRODUODENOSCOPY N/A 4/30/2020    Procedure: EGD (ESOPHAGOGASTRODUODENOSCOPY);  Surgeon: Cody Maria MD;  Location: [facility] (2ND FLR);  Service: Endoscopy;  Laterality: N/A;  schedule 12 weeks from now  4/13/20 - removed from 4/29/20, needs to be rescheduled high priority - pg  4/28/20-scheduled from high priority list-BB  Covid screening test scheduled for 4/29/20-BB  instructions emailed to patient-BB    EXCLUSION OF LEFT ATRIAL APPENDAGE N/A 4/26/2021    Procedure: EXCLUSION, LEFT ATRIAL APPENDAGE;  Surgeon: Fidencio Galindo MD;  Location: [facility] [address] [place] [address];  Service: Cardiovascular;  Laterality: N/A;  Left Atrial Appendage Resection    LEFT HEART CATHETERIZATION Left 4/21/2021    Procedure: Left heart cath;  Surgeon: Aric Alvarado MD;  Location: [facility] CATH LAB;  Service: Cardiology;  Laterality: Left;       ALLERGIES AND MEDICATION:     Review of patient's allergies indicates:   Allergen Reactions    Allegra-d 12 hour [fexofenadine-pseudoephedrine] Other (See Comments)     Trouble urinating    Mucinex d [pseudoephedrine-guaifenesin] Other (See Comments)      Trouble urinating    Losartan-hydrochlorothiazide Hives and Rash     Other reaction(s): Hives        Medication List          Accurate as of March 22, 2022 11:25 AM. If you have any questions, ask your nurse or doctor.            CONTINUE taking these medications    aspirin 325 MG tablet  Take 1 tablet (325 mg total) by mouth once daily.     atorvastatin 40 MG tablet  Commonly known as: LIPITOR  Take 1 tablet (40 mg total) by mouth every evening.     cetirizine 10 MG tablet  Commonly known as: ZYRTEC     diltiaZEM 120 MG Cp24  Commonly known as: CARDIZEM CD  Take 1 capsule (120 mg total) by mouth once daily.     fluticasone propionate 50 mcg/actuation nasal spray  Commonly known as: FLONASE     isosorbide mononitrate 60 MG 24 hr tablet  Commonly known as: IMDUR  Take 1 tablet (60 mg total) by mouth once daily.     multivitamin per tablet  Commonly known as: THERAGRAN     omeprazole 20 MG capsule  Commonly known as: PRILOSEC  Take 1 capsule (20 mg total) by mouth before breakfast.     sildenafiL 100 MG tablet  Commonly known as: VIAGRA            SOCIAL HISTORY:     Social History     Socioeconomic History    Marital status:    Tobacco Use    Smoking status: Never Smoker    Smokeless tobacco: Never Used   Substance and Sexual Activity    Alcohol use: Yes     Comment: few times a week     Drug use: No     Social Determinants of Health     Financial Resource Strain: Low Risk     Difficulty of Paying Living Expenses: Not hard at all   Food Insecurity: No Food Insecurity    Worried About Running Out of Food in the Last Year: Never true    Ran Out of Food in the Last Year: Never true   Transportation Needs: No Transportation Needs    Lack of Transportation (Medical): No    Lack of Transportation (Non-Medical): No   Physical Activity: Sufficiently Active    Days of Exercise per Week: 6 days    Minutes of Exercise per Session: 50 min   Stress: Stress Concern Present    Feeling of Stress : To some extent  "  Social Connections: Unknown    Frequency of Communication with Friends and Family: Twice a week    Frequency of Social Gatherings with Friends and Family: Once a week    Active Member of Clubs or Organizations: No    Attends Club or Organization Meetings: Never    Marital Status:    Housing Stability: Low Risk     Unable to Pay for Housing in the Last Year: No    Number of Places Lived in the Last Year: 1    Unstable Housing in the Last Year: No       FAMILY HISTORY:     Family History   Problem Relation Age of Onset    Heart disease Mother         mi    Cancer Father         colon/prostate    Colon polyps Father     Stroke Neg Hx     Diabetes Neg Hx     Celiac disease Neg Hx     Esophageal cancer Neg Hx     Liver cancer Neg Hx     Liver disease Neg Hx     Stomach cancer Neg Hx     Rectal cancer Neg Hx        REVIEW OF SYSTEMS:   Review of Systems   Respiratory: Negative for sputum production, shortness of breath and wheezing.    Cardiovascular: Negative for chest pain, palpitations, orthopnea, claudication, leg swelling and PND.   Gastrointestinal: Negative for abdominal pain, heartburn, nausea and vomiting.   Neurological: Negative for dizziness, speech change, focal weakness, loss of consciousness, weakness and headaches.   Psychiatric/Behavioral: The patient is not nervous/anxious.        PHYSICAL EXAM:     Vitals:    03/22/22 1102   BP: 126/76   Pulse: 66   Resp: 15    Body mass index is 23.6 kg/m².  Weight: 70.4 kg (155 lb 3.3 oz)   Height: 5' 8" (172.7 cm)     Physical Exam  Vitals reviewed.   Constitutional:       General: He is not in acute distress.     Appearance: Normal appearance. He is well-developed. He is not ill-appearing or diaphoretic.   Neck:      Vascular: No carotid bruit or JVD.   Cardiovascular:      Rate and Rhythm: Normal rate and regular rhythm.      Pulses: Normal pulses.      Heart sounds: Murmur heard.    Systolic murmur is present with a grade of " 2/6.  Pulmonary:      Effort: Pulmonary effort is normal. No respiratory distress.      Breath sounds: Normal breath sounds. No stridor.   Abdominal:      General: Abdomen is flat. Bowel sounds are normal.      Palpations: Abdomen is soft.      Tenderness: There is no abdominal tenderness.      Hernia: A hernia is present. Hernia is present in the umbilical area.   Musculoskeletal:      Cervical back: Normal range of motion and neck supple. No rigidity or tenderness.      Right lower leg: No edema.      Left lower leg: No edema.   Skin:     General: Skin is warm and dry.   Neurological:      General: No focal deficit present.      Mental Status: He is alert and oriented to person, place, and time.   Psychiatric:         Mood and Affect: Mood normal.         Speech: Speech normal.         Behavior: Behavior normal.         DATA:     EKG (personally reviewed):  3/22/2022 - SR  11/03/2021-sinus bradycardia    Laboratory:  CBC:  Recent Labs   Lab 07/12/21  1025 11/16/21  0956 03/12/22  1803   WBC 3.63 L 3.84 L 7.21   Hemoglobin 11.6 L 11.3 L 11.8 L   Hematocrit 36.3 L 35.8 L 36.6 L   Platelets 185 168 158       CHEMISTRIES:  Recent Labs   Lab 04/28/21  2347 04/29/21  0349 04/30/21  0359 05/01/21  0549 05/05/21  2117 07/12/21  1025 03/12/22  1803   Glucose 95   < > 87 99 90 88 76   Sodium 137   < > 137 137 140 143 140   Potassium 4.1   < > 3.6 3.5 4.5 4.9 4.4   BUN 12   < > 17 20 19 19 16   Creatinine 1.3   < > 1.4 1.5 H 1.5 H 1.5 H 1.3   eGFR if African American >60.0   < > 59.7 A 54.9 A 54.9 A 54.9 A >60.0   eGFR if non  56.5 A   < > 51.6 A 47.5 A 47.5 A 47.5 A 56.1 A   Calcium 8.4 L   < > 8.6 L 9.7 9.7 10.0 9.8   Magnesium 2.1  --  2.1 2.1  --   --   --     < > = values in this interval not displayed.       CARDIAC BIOMARKERS:        COAGS:  Recent Labs   Lab 04/28/21  0412 04/29/21  0349 05/05/21 2117   INR 1.0 0.9 1.0       LIPIDS/LFTS:  Recent Labs   Lab 07/31/20  0940 04/27/21  0612 05/05/21 2117  07/12/21  1025 11/16/21  0956 03/12/22  1803   Cholesterol 205 H  --   --  127 102 L  --    Triglycerides 69  --   --  49 34  --    HDL 52  --   --  46 50  --    LDL Cholesterol 139.2  --   --  71.2 45.2 L  --    Non-HDL Cholesterol 153  --   --  81 52  --    AST 19   < > 56 H 23  --  25   ALT 20   < > 86 H 30  --  26    < > = values in this interval not displayed.       Cardiovascular Testing:    Holter 10/26/2021:    · Sinus rhythm with heart rates varying between 42 and 124 BPM with an average of 57 BPM  · There were very rare PVCs  · There were occasional PACs  · Symptoms correlate with sinus bradycardia. Heart rate 50.    CPX 7/12/21:    · The patient exercised for 8 minutes and 35 seconds on a high ramp protocol, corresponding to a functional capacity of 15 METS, achieving a peak heart rate of 144 bpm, which is 94% of the age predicted maximum heart rate.  · During stress, the following significant arrhythmias were observed: rare PVCs.  · The test was stopped because the patient experienced fatigue.  · The ECG portion of this study is positive for myocardial ischemia.  · There is 1 mm of depression in the leads.  · The peak VO2 was 25.6 ml/kg/min which is 77.34% of predicted equating to a functional capacity of 7.31 METS indicating mild functional impairment.  · The anaerobic threshold (AT), which occurred at a heart rate of 132bpm, was 24.4 ml/kg/min, which is 73.72% of the predicted VO2 and is normal.  · Mild functional impairment associated with a normal breathing reserve, normal oxygen stauration, a good effort, and a normal AT. These findings are indicative of functional impairment secondary to deconditioning.    Carotid ultrasound 04/22/2021:     · There is 0-19% right Internal Carotid Stenosis.  · There is 0-19% left Internal Carotid Stenosis.     Left heart catheterization 04/21/2021:     · The Ost LM to Mid LM lesion was 70% stenosed.  · The Ost LAD to Prox LAD lesion was 90% stenosed.  · The Dist LAD  lesion was 60% stenosed.  · The estimated blood loss was none.  · Recommend CABG, discussed with Gia.     Stress echocardiogram 04/19/2021:     · The stress echo portion of this study is positive for myocardial ischemia in the typical myocardial territory supplied by the LAD. Target heart rate was achieved.  · The ECG portion of this study is positive for myocardial ischemia. Ischemic changes persisted for 27 minutes into the recovery period.  · There were no arrhythmias during stress.  · The test was stopped because the patient experienced ECG changes.  · The left ventricle is normal in size with normal systolic function. The estimated ejection fraction is 65%.  · Grade II left ventricular diastolic dysfunction.  · Normal right ventricular size with normal right ventricular systolic function.  · The estimated PA systolic pressure is 30 mmHg.  · Normal central venous pressure (3 mmHg).  · Mild left atrial enlargement.     To note, he was given nitroglycerin with improvement in anginal symptoms. He feels back to baseline. He was seen by interventional cardiology and is scheduled for C with PCI on 4/21/21. He has been instructed to start DAPT.    ASSESSMENT:     1. Preoperative Cardiovascular evaluation prior to hernia repair  2. Palpitations:  No recurrence  3. Coronary artery disease:  Status post CABG x3 vessel  4. Postop atrial fibrillation  5. Hypertension  6. Hyperlipidemia:  LDL 45.  7. Pulmonary hypertension  8. Aortic atherosclerosis  9. Coronary artery calcifications seen on CT scan  10. Erectile dysfunction      PLAN:     1. Preoperative Cardiovascular evaluation prior to hernia repair: patient can proceed. MCMILLAN 0.1% risk of myocardial infarction or cardiac arrest, intraoperatively or up to 30 days post op. Continue rate control perioperatively.   2. Coronary artery disease:  stable. No angina. Has completed rehab.  3. Hypertension: DC imdur. Start hydralazine 50 bid.  4. Hyperlipidemia:  Continue  current management.   5. Pulmonary hypertension: FU echocardiogram  6. Erectile dysfunction: patient desires Sildenafil. Will stop Imdur. Initiate hydralazine for #3.  7. Return to clinic 4 months.          Regan Aguilar MD, MPH, FACC, [facility]

## 2022-03-24 ENCOUNTER — PATIENT MESSAGE (OUTPATIENT)
Dept: CARDIOLOGY | Facility: CLINIC | Age: 67
End: 2022-03-24
Payer: MEDICARE

## 2022-03-30 ENCOUNTER — TELEPHONE (OUTPATIENT)
Dept: SURGERY | Facility: CLINIC | Age: 67
End: 2022-03-30
Payer: MEDICARE

## 2022-03-30 NOTE — PRE-PROCEDURE INSTRUCTIONS
Preop instructions(bathing/wear loose fitting clothing/fasting/directions/location of surgery/ and preop medication instructions reviewed with patient). Clear liquids are allowed up to 2 hours before procedure.Clear liquids are:water,apple juice, jello, gatorade & powerade. Patient instructed to hold/stop all blood thinning medications, prior to surgery, following the pre-surgery recommended guidelines. Instructed to follow the surgeon's instructions if they differ from these.    Patient verbalized understanding.    Denies any  history of side effects or issues with anesthesia or sedation.    Patient advised of the updated visitor policy.  Patient aware of the need to have someone drive them home following same -day surgery.      Patient GIVEN ARRIVAL TIME OF 0515 TO [facility]

## 2022-03-31 ENCOUNTER — ANESTHESIA EVENT (OUTPATIENT)
Dept: SURGERY | Facility: HOSPITAL | Age: 67
End: 2022-03-31
Payer: MEDICARE

## 2022-03-31 ENCOUNTER — HOSPITAL ENCOUNTER (OUTPATIENT)
Facility: HOSPITAL | Age: 67
Discharge: HOME | End: 2022-03-31
Attending: SURGERY | Admitting: SURGERY
Payer: MEDICARE

## 2022-03-31 ENCOUNTER — ANESTHESIA (OUTPATIENT)
Dept: SURGERY | Facility: HOSPITAL | Age: 67
End: 2022-03-31
Payer: MEDICARE

## 2022-03-31 VITALS
OXYGEN SATURATION: 99 % | WEIGHT: 152 LBS | BODY MASS INDEX: 23.04 KG/M2 | HEIGHT: 68 IN | TEMPERATURE: 99 F | HEART RATE: 50 BPM | DIASTOLIC BLOOD PRESSURE: 76 MMHG | SYSTOLIC BLOOD PRESSURE: 142 MMHG | RESPIRATION RATE: 18 BRPM

## 2022-03-31 DIAGNOSIS — K42.9 UMBILICAL HERNIA WITHOUT OBSTRUCTION AND WITHOUT GANGRENE: ICD-10-CM

## 2022-03-31 PROCEDURE — 37000008 HC ANESTHESIA 1ST 15 MINUTES: Performed by: SURGERY

## 2022-03-31 PROCEDURE — 71000044 HC DOSC ROUTINE RECOVERY FIRST HOUR: Performed by: SURGERY

## 2022-03-31 PROCEDURE — D9220A PRA ANESTHESIA: Mod: ANES,,, | Performed by: ANESTHESIOLOGY

## 2022-03-31 PROCEDURE — 63600175 PHARM REV CODE 636 W HCPCS: Performed by: STUDENT IN AN ORGANIZED HEALTH CARE EDUCATION/TRAINING PROGRAM

## 2022-03-31 PROCEDURE — 63600175 PHARM REV CODE 636 W HCPCS: Performed by: NURSE ANESTHETIST, CERTIFIED REGISTERED

## 2022-03-31 PROCEDURE — 25000003 PHARM REV CODE 250: Performed by: NURSE ANESTHETIST, CERTIFIED REGISTERED

## 2022-03-31 PROCEDURE — 49585 PR REPAIR UMBILICAL HERN,5+Y/O,REDUC: CPT | Mod: GC,,, | Performed by: SURGERY

## 2022-03-31 PROCEDURE — 25000003 PHARM REV CODE 250: Performed by: STUDENT IN AN ORGANIZED HEALTH CARE EDUCATION/TRAINING PROGRAM

## 2022-03-31 PROCEDURE — 36000707: Performed by: SURGERY

## 2022-03-31 PROCEDURE — 71000015 HC POSTOP RECOV 1ST HR: Performed by: SURGERY

## 2022-03-31 PROCEDURE — 36000706: Performed by: SURGERY

## 2022-03-31 PROCEDURE — D9220A PRA ANESTHESIA: ICD-10-PCS | Mod: CRNA,,, | Performed by: NURSE ANESTHETIST, CERTIFIED REGISTERED

## 2022-03-31 PROCEDURE — D9220A PRA ANESTHESIA: ICD-10-PCS | Mod: ANES,,, | Performed by: ANESTHESIOLOGY

## 2022-03-31 PROCEDURE — 25000003 PHARM REV CODE 250: Performed by: SURGERY

## 2022-03-31 PROCEDURE — 37000009 HC ANESTHESIA EA ADD 15 MINS: Performed by: SURGERY

## 2022-03-31 PROCEDURE — 49585 PR REPAIR UMBILICAL HERN,5+Y/O,REDUC: ICD-10-PCS | Mod: GC,,, | Performed by: SURGERY

## 2022-03-31 PROCEDURE — D9220A PRA ANESTHESIA: Mod: CRNA,,, | Performed by: NURSE ANESTHETIST, CERTIFIED REGISTERED

## 2022-03-31 RX ORDER — LIDOCAINE HYDROCHLORIDE 20 MG/ML
INJECTION, SOLUTION EPIDURAL; INFILTRATION; INTRACAUDAL; PERINEURAL
Status: DISCONTINUED | OUTPATIENT
Start: 2022-03-31 | End: 2022-03-31

## 2022-03-31 RX ORDER — BUPIVACAINE HYDROCHLORIDE 5 MG/ML
INJECTION, SOLUTION EPIDURAL; INTRACAUDAL
Status: DISCONTINUED | OUTPATIENT
Start: 2022-03-31 | End: 2022-03-31 | Stop reason: HOSPADM

## 2022-03-31 RX ORDER — OXYCODONE HYDROCHLORIDE 5 MG/1
5 TABLET ORAL ONCE AS NEEDED
Status: DISCONTINUED | OUTPATIENT
Start: 2022-03-31 | End: 2022-03-31 | Stop reason: HOSPADM

## 2022-03-31 RX ORDER — MIDAZOLAM HYDROCHLORIDE 1 MG/ML
INJECTION, SOLUTION INTRAMUSCULAR; INTRAVENOUS
Status: DISCONTINUED | OUTPATIENT
Start: 2022-03-31 | End: 2022-03-31

## 2022-03-31 RX ORDER — FENTANYL CITRATE 50 UG/ML
25 INJECTION, SOLUTION INTRAMUSCULAR; INTRAVENOUS EVERY 5 MIN PRN
Status: DISCONTINUED | OUTPATIENT
Start: 2022-03-31 | End: 2022-03-31 | Stop reason: HOSPADM

## 2022-03-31 RX ORDER — FENTANYL CITRATE 50 UG/ML
INJECTION, SOLUTION INTRAMUSCULAR; INTRAVENOUS
Status: DISCONTINUED | OUTPATIENT
Start: 2022-03-31 | End: 2022-03-31

## 2022-03-31 RX ORDER — ONDANSETRON 2 MG/ML
4 INJECTION INTRAMUSCULAR; INTRAVENOUS ONCE AS NEEDED
Status: DISCONTINUED | OUTPATIENT
Start: 2022-03-31 | End: 2022-03-31 | Stop reason: HOSPADM

## 2022-03-31 RX ORDER — ACETAMINOPHEN 500 MG
1000 TABLET ORAL
Status: COMPLETED | OUTPATIENT
Start: 2022-03-31 | End: 2022-03-31

## 2022-03-31 RX ORDER — ONDANSETRON 2 MG/ML
INJECTION INTRAMUSCULAR; INTRAVENOUS
Status: DISCONTINUED | OUTPATIENT
Start: 2022-03-31 | End: 2022-03-31

## 2022-03-31 RX ORDER — DEXAMETHASONE SODIUM PHOSPHATE 4 MG/ML
INJECTION, SOLUTION INTRA-ARTICULAR; INTRALESIONAL; INTRAMUSCULAR; INTRAVENOUS; SOFT TISSUE
Status: DISCONTINUED | OUTPATIENT
Start: 2022-03-31 | End: 2022-03-31

## 2022-03-31 RX ORDER — ROCURONIUM BROMIDE 10 MG/ML
INJECTION, SOLUTION INTRAVENOUS
Status: DISCONTINUED | OUTPATIENT
Start: 2022-03-31 | End: 2022-03-31

## 2022-03-31 RX ORDER — HYDROMORPHONE HYDROCHLORIDE 1 MG/ML
0.2 INJECTION, SOLUTION INTRAMUSCULAR; INTRAVENOUS; SUBCUTANEOUS EVERY 5 MIN PRN
Status: DISCONTINUED | OUTPATIENT
Start: 2022-03-31 | End: 2022-03-31 | Stop reason: HOSPADM

## 2022-03-31 RX ORDER — OXYCODONE HYDROCHLORIDE 5 MG/1
5 TABLET ORAL EVERY 4 HOURS PRN
Qty: 4 TABLET | Refills: 0 | Status: ON HOLD | OUTPATIENT
Start: 2022-03-31 | End: 2022-05-03 | Stop reason: SDUPTHER

## 2022-03-31 RX ORDER — PROPOFOL 10 MG/ML
VIAL (ML) INTRAVENOUS
Status: DISCONTINUED | OUTPATIENT
Start: 2022-03-31 | End: 2022-03-31

## 2022-03-31 RX ORDER — SODIUM CHLORIDE 9 MG/ML
INJECTION, SOLUTION INTRAVENOUS CONTINUOUS
Status: DISCONTINUED | OUTPATIENT
Start: 2022-03-31 | End: 2022-03-31 | Stop reason: HOSPADM

## 2022-03-31 RX ORDER — CEFAZOLIN SODIUM/WATER 2 G/20 ML
2 SYRINGE (ML) INTRAVENOUS
Status: COMPLETED | OUTPATIENT
Start: 2022-03-31 | End: 2022-03-31

## 2022-03-31 RX ADMIN — PROPOFOL 200 MG: 10 INJECTION, EMULSION INTRAVENOUS at 07:03

## 2022-03-31 RX ADMIN — FENTANYL CITRATE 100 MCG: 50 INJECTION INTRAMUSCULAR; INTRAVENOUS at 07:03

## 2022-03-31 RX ADMIN — Medication 2 G: at 07:03

## 2022-03-31 RX ADMIN — SUGAMMADEX 100 MG: 100 INJECTION, SOLUTION INTRAVENOUS at 07:03

## 2022-03-31 RX ADMIN — SODIUM CHLORIDE: 0.9 INJECTION, SOLUTION INTRAVENOUS at 06:03

## 2022-03-31 RX ADMIN — LIDOCAINE HYDROCHLORIDE 100 MG: 20 INJECTION, SOLUTION EPIDURAL; INFILTRATION; INTRACAUDAL at 07:03

## 2022-03-31 RX ADMIN — MIDAZOLAM 2 MG: 1 INJECTION INTRAMUSCULAR; INTRAVENOUS at 06:03

## 2022-03-31 RX ADMIN — ONDANSETRON 4 MG: 2 INJECTION INTRAMUSCULAR; INTRAVENOUS at 07:03

## 2022-03-31 RX ADMIN — ACETAMINOPHEN 1000 MG: 500 TABLET ORAL at 06:03

## 2022-03-31 RX ADMIN — ROCURONIUM BROMIDE 50 MG: 10 INJECTION, SOLUTION INTRAVENOUS at 07:03

## 2022-03-31 RX ADMIN — DEXAMETHASONE SODIUM PHOSPHATE 4 MG: 4 INJECTION INTRA-ARTICULAR; INTRALESIONAL; INTRAMUSCULAR; INTRAVENOUS; SOFT TISSUE at 07:03

## 2022-03-31 NOTE — ANESTHESIA PROCEDURE NOTES
Intubation    Date/Time: 3/31/2022 7:16 AM  Performed by: Luma Morocho CRNA  Authorized by: Corey Toscano MD     Intubation:     Induction:  Intravenous    Intubated:  Postinduction    Mask Ventilation:  Easy with oral airway    Attempts:  1    Attempted By:  CRNA    Method of Intubation:  Direct    Blade:  Richardson 2    Laryngeal View Grade: Grade I - full view of cords      Difficult Airway Encountered?: No      Complications:  None    Airway Device:  Oral endotracheal tube    Airway Device Size:  7.5    Style/Cuff Inflation:  Cuffed (inflated to minimal occlusive pressure)    Inflation Amount (mL):  8    Tube secured:  24    Secured at:  The lips    Placement Verified By:  Capnometry    Complicating Factors:  None    Findings Post-Intubation:  BS equal bilateral and atraumatic/condition of teeth unchanged

## 2022-03-31 NOTE — ANESTHESIA POSTPROCEDURE EVALUATION
Anesthesia Post Evaluation    Patient: Abdias Kim    Procedure(s) Performed: Procedure(s) (LRB):  REPAIR, HERNIA, UMBILICAL, AGE 5 YEARS OR OLDER Open With or Without Mesh (N/A)    Final Anesthesia Type: general      Patient location during evaluation: PACU  Patient participation: Yes- Able to Participate  Level of consciousness: awake  Post-procedure vital signs: reviewed and stable  Pain management: adequate  Airway patency: patent    PONV status at discharge: No PONV  Anesthetic complications: no      Cardiovascular status: blood pressure returned to baseline  Respiratory status: unassisted  Hydration status: euvolemic  Follow-up not needed.          Vitals Value Taken Time   /76 03/31/22 0930   Temp 37.1 °C (98.8 °F) 03/31/22 0930   Pulse 50 03/31/22 0930   Resp 18 03/31/22 0930   SpO2 99 % 03/31/22 0930         No case tracking events are documented in the log.      Pain/Terence Score: Pain Rating Prior to Med Admin: 0 (3/31/2022  6:24 AM)  Terence Score: 10 (3/31/2022  9:45 AM)

## 2022-03-31 NOTE — PATIENT INSTRUCTIONS
You had a umbilical hernia repair performed.     Please no heavy lifting/pushing/pulling. Do not lift anything heavier than a gallon of milk.   Please no driving while on narcotic pain medication.     You have been prescribed a narcotic pain medication to help with post-operative pain. Please wean over the next few days. You may alternate tylenol and ibuprofen instead.   Please make sure to take 1 capful of Miralax per day to help with narcotic associated constipation.     Skin glue will fall off on its own.  Showers are okay. Please no baths or soaking.     You have no diet restrictions.    Please follow up in clinic with Dr. Delgado in 2 weeks.

## 2022-03-31 NOTE — TRANSFER OF CARE
"Anesthesia Transfer of Care Note    Patient: Abdias Kim    Procedure(s) Performed: Procedure(s) (LRB):  REPAIR, HERNIA, UMBILICAL, AGE 5 YEARS OR OLDER Open With or Without Mesh (N/A)    Patient location: [facility]    Anesthesia Type: general    Transport from OR: Transported from OR on 6-10 L/min O2 by face mask with adequate spontaneous ventilation    Post pain: adequate analgesia    Post assessment: no apparent anesthetic complications and tolerated procedure well    Post vital signs: stable    Level of consciousness: awake    Nausea/Vomiting: no nausea/vomiting    Complications: none    Transfer of care protocol was followed      Last vitals:   Visit Vitals  BP (!) 143/73 (BP Location: Left arm, Patient Position: Lying)   Pulse (!) 50   Temp 36.8 °C (98.2 °F) (Oral)   Resp 16   Ht 5' 8" (1.727 m)   Wt 68.9 kg (152 lb)   SpO2 100%   BMI 23.11 kg/m²     "

## 2022-03-31 NOTE — ANESTHESIA PREPROCEDURE EVALUATION
03/31/2022  Abdias Kim is a 66 y.o., male.      Pre-op Assessment          Review of Systems  Anesthesia Hx:  No problems with previous Anesthesia    Cardiovascular:   Hypertension Denies CAD.   CABG/stent Dysrhythmias atrial fibrillation  Functional Capacity Can you climb two flights of stairs? ==> Yes    Pulmonary:   Denies Asthma.  Denies Sleep Apnea.    Renal/:   Denies Chronic Renal Disease.     Hepatic/GI:   Denies PUD. GERD Denies Liver Disease.    Neurological:   Denies CVA. Denies Seizures.    Endocrine:   Denies Diabetes. Denies Hypothyroidism.        Physical Exam  General: Alert    Airway:  Mallampati: I   Mouth Opening: Normal  TM Distance: Normal  Tongue: Normal  Neck ROM: Normal ROM    Dental:  Intact        Anesthesia Plan  Type of Anesthesia, risks & benefits discussed:    Anesthesia Type: Gen ETT  Intra-op Monitoring Plan: Standard ASA Monitors  Post Op Pain Control Plan: multimodal analgesia and IV/PO Opioids PRN  Induction:  IV  Airway Plan: Direct  Informed Consent: Informed consent signed with the Patient and all parties understand the risks and agree with anesthesia plan.  All questions answered.   ASA Score: 3    Ready For Surgery From Anesthesia Perspective.     .

## 2022-03-31 NOTE — BRIEF OP NOTE
[facility] - Surgery ([facility])  Brief Operative Note    Surgery Date: 3/31/2022     Surgeon(s) and Role:     * Matt Delgado MD - Primary     * Kandy Bolton MD - Resident - Assisting        Pre-op Diagnosis:  Umbilical hernia without obstruction and without gangrene [K42.9]    Post-op Diagnosis:  Post-Op Diagnosis Codes:     * Umbilical hernia without obstruction and without gangrene [K42.9]    Procedure(s) (LRB):  REPAIR, HERNIA, UMBILICAL, AGE 5 YEARS OR OLDER Open With or Without Mesh (N/A)    Anesthesia: General    Operative Findings: small pre-peritoneal fat containing umbilical hernia, 1cm defect.     Estimated Blood Loss: * No values recorded between 3/31/2022  7:21 AM and 3/31/2022  7:55 AM *         Specimens:   Specimen (24h ago, onward)            None            Discharge Note    OUTCOME: Patient tolerated treatment/procedure well without complication and is now ready for discharge.    DISPOSITION: Home or Self Care    FINAL DIAGNOSIS:  <principal problem not specified>    FOLLOWUP: In clinic    DISCHARGE INSTRUCTIONS:    Discharge Procedure Orders   Diet Adult Regular     Other restrictions (specify):   Order Comments: You had a umbilical hernia repair performed.     Please no heavy lifting/pushing/pulling. Do not lift anything heavier than a gallon of milk.   Please no driving while on narcotic pain medication.     You have been prescribed a narcotic pain medication to help with post-operative pain. Please wean over the next few days. You may alternate tylenol and ibuprofen instead.   Please make sure to take 1 capful of Miralax per day to help with narcotic associated constipation.     Skin glue will fall off on its own.  Showers are okay. Please no baths or soaking.     You have no diet restrictions.    Please follow up in clinic with Dr. Delgado in 2 weeks.     Notify your health care provider if you experience any of the following:  increased confusion or weakness     Notify your health care  provider if you experience any of the following:  persistent dizziness, light-headedness, or visual disturbances     Notify your health care provider if you experience any of the following:  worsening rash     Notify your health care provider if you experience any of the following:  severe persistent headache     Notify your health care provider if you experience any of the following:  difficulty breathing or increased cough     Notify your health care provider if you experience any of the following:  redness, tenderness, or signs of infection (pain, swelling, redness, odor or green/yellow discharge around incision site)     Notify your health care provider if you experience any of the following:  severe uncontrolled pain     Notify your health care provider if you experience any of the following:  persistent nausea and vomiting or diarrhea     Notify your health care provider if you experience any of the following:  temperature >100.4     No dressing needed     Kandy Bolton MD  General Surgery, PGY-2

## 2022-03-31 NOTE — PLAN OF CARE
PT AND WIFE GIVEN DISCHARGE INSTRUCTIONS AT THE BEDSIDE. PT AMBULATORY AT THE BEDSIDE. IV ACCESS REMOVED; CATHETER INTACT. PT TOLERATED WELL.  PT DRESSED SELF. PT WIFE GIVEN DISCHARGE PRESCRITION PER BEDSIDE PHARMACY. PT VSS. PT ADMITS FEELINGS OF DISCOMFORT BUT REFUSED PAIN MEDICATION. PT CALM  AND COOPERATIVE. WHEELCHAIR TRANSPORT REQUESTED FOR PT. WIFE LEFT FOR CAR.

## 2022-03-31 NOTE — INTERVAL H&P NOTE
The patient has been examined and the H&P has been reviewed:    I concur with the findings and no changes have occurred since H&P was written.    Surgery risks, benefits and alternative options discussed and understood by patient/family.    Kandy Bolton MD  General Surgery, PGY-2      There are no hospital problems to display for this patient.

## 2022-04-01 ENCOUNTER — PATIENT MESSAGE (OUTPATIENT)
Dept: SURGERY | Facility: CLINIC | Age: 67
End: 2022-04-01
Payer: MEDICARE

## 2022-04-04 NOTE — OP NOTE
DATE OF PROCEDURE: 3/31/2022    Surgeon(s) and Role:     * Matt Delgado MD - Primary     * Kandy Bolton MD - Resident - Assisting    PREOPERATIVE DIAGNOSES: Umbilical hernia, reducible    POSTOPERATIVE DIAGNOSES:Same    PROCEDURE: Umbilical hernia repair    ANESTHESIA: General endotracheal and local.     DESCRIPTION OF PROCEDURE:   Patient brought to the operating room in supine on the operating table.  Pressure points padded.  General endotracheal anesthesia induced with anesthesia department.  Antibiotics given prior to incision.  Patient's abdomen prepped and draped in usual sterile fashion.  A time-out procedure performed identifying correct patient, site, procedure.  An infraumbilical curvilinear incision made with a 15 blade scalpel.  Electrocautery used to dissect to the underlying abdominal wall fascia.  There was a approximately 0.5 cm defect the level of the umbilicus.  The hernia sac was dissected free and tucked back in the abdomen.  The defect closed with 0 PDS suture in a figure-of-eight fashion.  The umbilical stalk tacked down with 3-0 Vicryl suture.  Skin reapproximated with 4 Monocryl suture.  Patient tolerated from general endotracheal anesthesia and transferred to the PACU in stable condition.  All sponge, needle, instrument counts were correct at the conclusion of the procedure.  I was present for the entire procedure.    COMPLICATIONS: None.     BLOOD LOSS: 12 mL.     FLUIDS: Per Anesthesia.     SPECIMENS: None    CONDITION OF THE PATIENT: Stable to PACU

## 2022-04-14 ENCOUNTER — PATIENT MESSAGE (OUTPATIENT)
Dept: INTERNAL MEDICINE | Facility: CLINIC | Age: 67
End: 2022-04-14
Payer: MEDICARE

## 2022-04-18 ENCOUNTER — PATIENT MESSAGE (OUTPATIENT)
Dept: SURGERY | Facility: CLINIC | Age: 67
End: 2022-04-18

## 2022-04-18 ENCOUNTER — OFFICE VISIT (OUTPATIENT)
Dept: SURGERY | Facility: CLINIC | Age: 67
End: 2022-04-18
Payer: MEDICARE

## 2022-04-18 VITALS
BODY MASS INDEX: 23.72 KG/M2 | SYSTOLIC BLOOD PRESSURE: 159 MMHG | WEIGHT: 156.5 LBS | OXYGEN SATURATION: 100 % | HEART RATE: 50 BPM | DIASTOLIC BLOOD PRESSURE: 74 MMHG | HEIGHT: 68 IN

## 2022-04-18 DIAGNOSIS — K43.9 VENTRAL HERNIA: Primary | ICD-10-CM

## 2022-04-18 PROCEDURE — 99215 OFFICE O/P EST HI 40 MIN: CPT | Mod: PBBFAC | Performed by: SURGERY

## 2022-04-18 PROCEDURE — 99999 PR PBB SHADOW E&M-EST. PATIENT-LVL V: CPT | Mod: PBBFAC,,, | Performed by: SURGERY

## 2022-04-18 PROCEDURE — 99999 PR PBB SHADOW E&M-EST. PATIENT-LVL V: ICD-10-PCS | Mod: PBBFAC,,, | Performed by: SURGERY

## 2022-04-18 PROCEDURE — 99024 PR POST-OP FOLLOW-UP VISIT: ICD-10-PCS | Mod: POP,,, | Performed by: SURGERY

## 2022-04-18 PROCEDURE — 99024 POSTOP FOLLOW-UP VISIT: CPT | Mod: POP,,, | Performed by: SURGERY

## 2022-04-18 NOTE — PROGRESS NOTES
Surgery Clinic Note    Subjective:     Abdias Kim is a 66 y.o. male who presents to clinic for follow up s/p umbilical hernia repair on 3/31/2022.  Pt reports that He is tolerating a regular diet and having regular bowel movements.  He denies fever or chills. He denies pain. He denies drainage or redness to their incision.        PMH:   Past Medical History:   Diagnosis Date    Anemia     Diverticulosis     Hypertension        Past Surgical History:   Past Surgical History:   Procedure Laterality Date    APPENDECTOMY      COLONOSCOPY N/A 1/31/2017    Procedure: COLONOSCOPY;  Surgeon: BASILIO Winn MD;  Location: [facility] ENDO (4TH FLR);  Service: Endoscopy;  Laterality: N/A;    COLONOSCOPY N/A 2/5/2020    Procedure: COLONOSCOPY;  Surgeon: Cody Maria MD;  Location: [facility] ENDO (4TH FLR);  Service: Endoscopy;  Laterality: N/A;  OKay for Crow or ghazala. Needs to be done in Jan 2020    CORONARY ARTERY BYPASS GRAFT (CABG) N/A 4/26/2021    Procedure: CORONARY ARTERY BYPASS GRAFT (CABG)X3 WITH VEIN HARVEST;  Surgeon: Fidencio Galindo MD;  [facility];  Service: Cardiovascular;  Laterality: N/A;    ENDOSCOPIC HARVEST OF VEIN Left 4/26/2021    Procedure: HARVEST-VEIN-ENDOVASCULAR FOR CABGX3;  Surgeon: Fidencio Galindo MD;  Location: [facility] OR 2ND FLR;  Service: Cardiovascular;  Laterality: Left;  Vein [place] Start time: 1201pm  Vein [place] Stop time: 1226pm    Vein Prep Start time: 1227pm  Vein Prep Stop time: 1250pm    ESOPHAGOGASTRODUODENOSCOPY N/A 2/5/2020    Procedure: EGD (ESOPHAGOGASTRODUODENOSCOPY);  Surgeon: Cody Maria MD;  Location: [facility] ENDO (4TH FLR);  Service: Endoscopy;  Laterality: N/A;    ESOPHAGOGASTRODUODENOSCOPY N/A 4/30/2020    Procedure: EGD (ESOPHAGOGASTRODUODENOSCOPY);  Surgeon: Cody Maria MD;  Location: [facility] ENDO (2ND FLR);  Service: Endoscopy;  Laterality: N/A;  schedule 12 weeks from now  4/13/20 - removed from 4/29/20, needs to be rescheduled high  priority - pg  4/28/20-scheduled from high priority list-BB  Covid screening test scheduled for 4/29/20-BB  instructions emailed to patient-BB    EXCLUSION OF LEFT ATRIAL APPENDAGE N/A 4/26/2021    Procedure: EXCLUSION, LEFT ATRIAL APPENDAGE;  Surgeon: Fidencio Galindo MD;  Location: [facility] OR [address] [place] [address];  Service: Cardiovascular;  Laterality: N/A;  Left Atrial Appendage Resection    LEFT HEART CATHETERIZATION Left 4/21/2021    Procedure: Left heart cath;  Surgeon: Aric Alvarado MD;  Location: [facility] CATH LAB;  Service: Cardiology;  Laterality: Left;    UMBILICAL HERNIA REPAIR N/A 3/31/2022    Procedure: REPAIR, HERNIA, UMBILICAL, AGE 5 YEARS OR OLDER Open With or Without Mesh;  Surgeon: aMtt Delgado MD;  Location: [facility] OR [address] [place] [address];  Service: General;  Laterality: N/A;       Social History:  Social History     Socioeconomic History    Marital status:    Tobacco Use    Smoking status: Never Smoker    Smokeless tobacco: Never Used   Substance and Sexual Activity    Alcohol use: Yes     Comment: few times a week     Drug use: No     Social Determinants of Health     Financial Resource Strain: Low Risk     Difficulty of Paying Living Expenses: Not hard at all   Food Insecurity: No Food Insecurity    Worried About Running Out of Food in the Last Year: Never true    Ran Out of Food in the Last Year: Never true   Transportation Needs: No Transportation Needs    Lack of Transportation (Medical): No    Lack of Transportation (Non-Medical): No   Physical Activity: Sufficiently Active    Days of Exercise per Week: 6 days    Minutes of Exercise per Session: 50 min   Stress: Stress Concern Present    Feeling of Stress : To some extent   Social Connections: Unknown    Frequency of Communication with Friends and Family: Twice a week    Frequency of Social Gatherings with Friends and Family: Once a week    Active Member of Clubs or Organizations: No    Attends Club or Organization Meetings: Never     Marital Status:    Housing Stability: Low Risk     Unable to Pay for Housing in the Last Year: No    Number of Places Lived in the Last Year: 1    Unstable Housing in the Last Year: No       Allergies:   Review of patient's allergies indicates:   Allergen Reactions    Allegra-d 12 hour [fexofenadine-pseudoephedrine] Other (See Comments)     Trouble urinating    Mucinex d [pseudoephedrine-guaifenesin] Other (See Comments)     Trouble urinating    Losartan-hydrochlorothiazide Hives and Rash     Other reaction(s): Hives       Medications:    Current Outpatient Medications on File Prior to Visit   Medication Sig Dispense Refill    aspirin 325 MG tablet Take 1 tablet (325 mg total) by mouth once daily. 360 tablet 0    atorvastatin (LIPITOR) 40 MG tablet Take 1 tablet (40 mg total) by mouth every evening. 90 tablet 3    cetirizine (ZYRTEC) 10 MG tablet Take 1 tablet by mouth Daily.      diltiaZEM (CARDIZEM CD) 120 MG Cp24 Take 1 capsule (120 mg total) by mouth once daily. 90 capsule 3    fluticasone propionate (FLONASE) 50 mcg/actuation nasal spray       hydrALAZINE (APRESOLINE) 50 MG tablet Take 1 tablet (50 mg total) by mouth every 12 (twelve) hours. 180 tablet 3    multivitamin (THERAGRAN) per tablet Take 1 tablet by mouth once daily.      omeprazole (PRILOSEC) 20 MG capsule Take 1 capsule (20 mg total) by mouth before breakfast. 90 capsule 3    oxyCODONE (ROXICODONE) 5 MG immediate release tablet Take 1 tablet (5 mg total) by mouth every 4 (four) hours as needed for Pain. 4 tablet 0    sildenafiL (VIAGRA) 100 MG tablet Take 0.5 tablets (50 mg total) by mouth as needed for Erectile Dysfunction. 10 tablet 3     Current Facility-Administered Medications on File Prior to Visit   Medication Dose Route Frequency Provider Last Rate Last Admin    acetaminophen tablet 650 mg  650 mg Oral Once PRN Stephane Cruz MD        albuterol inhaler 2 puff  2 puff Inhalation Q20 Min PRN Stephane Cruz MD         diphenhydrAMINE injection 25 mg  25 mg Intravenous Once PRN Stephane Cruz MD        EPINEPHrine (EPIPEN) 0.3 mg/0.3 mL pen injection 0.3 mg  0.3 mg Intramuscular PRN Stephane Cruz MD        ondansetron disintegrating tablet 4 mg  4 mg Oral Once PRN Stephane Cruz MD             Objective:     PHYSICAL EXAM:  Vital Signs (Most Recent)  Pulse: (!) 50 (22 09)  BP: (!) 159/74 (22)  SpO2: 100 % (22)    ROS A 10+ review of systems was performed with pertinent positives and negatives noted above in the history of present illness.  Other systems were negative unless otherwise specified.    Physical Exam:  Physical Exam    Labs:  I have reviewed all pertinent lab results within the past 24 hours.    Imaging  The following imaging was reviewed: {Imagin}          Assessment:     66 y.o. male with ***    Plan:     - ***

## 2022-04-18 NOTE — PROGRESS NOTES
"  General Surgery Office Visit   History and Physical    SUBJECTIVE:     Chief Complaint: Abdominal pain    Interval History: Patient s/p primary repair of small umbilical hernia on 3/31. He states that since the day after surgery he continues to feel his hernia 'popping' out. The area of concern is supraumbilical. He has been able to reduce the mass every time it pops out. No associated pain. No concerns regarding the umbilical area. Incision c/d/i.       History of Present Illness:  Abdias Kim is a 66 y.o. male with PMHx of HTN, diverticulosis, CAD (s/p CAGB April 2021) who presents to the clinic today complaining of abdominal pain at his umbilicus. He reports having an umbilical hernia for 4-5 years. During this time, it has "gotten stuck" 3 times. The last time brought him to the ED on 3/12/22 where the hernia was reduced at bedside. He reports doing well since with intermittent episodes of pain when it "pops out" but no more episodes of incarceration. Pain is described as a soreness which radiates throughout his abdomen. He denies fever, chills, unintentional weight loss, n/v/d, constipation, hematochezia, dysuria, hematuria, CP, SOB, and all other symptoms. Patient reports being compliant with home medication regimen.     He is very active and walks 3 miles per day on average.     Patient denies personal history of CVA, lung disease, DM  Previous abdominal surgeries include: open appendectomy (20 years ago)  Drinks ETOH a few times per week. Denies tobacco and elicit drug use.   Not currently on any anticoagulants      Review of patient's allergies indicates:   Allergen Reactions    Allegra-d 12 hour [fexofenadine-pseudoephedrine] Other (See Comments)     Trouble urinating    Mucinex d [pseudoephedrine-guaifenesin] Other (See Comments)     Trouble urinating    Losartan-hydrochlorothiazide Hives and Rash     Other reaction(s): Hives       Past Medical History:   Diagnosis Date    Anemia     " Diverticulosis     Hypertension      Past Surgical History:   Procedure Laterality Date    APPENDECTOMY      COLONOSCOPY N/A 1/31/2017    Procedure: COLONOSCOPY;  Surgeon: BASILIO Winn MD;  Location: [facility] ENDO (4TH FLR);  Service: Endoscopy;  Laterality: N/A;    COLONOSCOPY N/A 2/5/2020    Procedure: COLONOSCOPY;  Surgeon: Cody Maria MD;  Location: [facility] ENDO (4TH FLR);  Service: Endoscopy;  Laterality: N/A;  OKay for Crow or ghazala. Needs to be done in Jan 2020    CORONARY ARTERY BYPASS GRAFT (CABG) N/A 4/26/2021    Procedure: CORONARY ARTERY BYPASS GRAFT (CABG)X3 WITH VEIN HARVEST;  Surgeon: Fidencio Galindo MD;  Location: [facility] OR 2ND FLR;  Service: Cardiovascular;  Laterality: N/A;    ENDOSCOPIC HARVEST OF VEIN Left 4/26/2021    Procedure: HARVEST-VEIN-ENDOVASCULAR FOR CABGX3;  Surgeon: Fidencio Galindo MD;  [facility];  Service: Cardiovascular;  Laterality: Left;  Vein [place] Start time: 1201pm  Vein [place] Stop time: 1226pm    Vein Prep Start time: 1227pm  Vein Prep Stop time: 1250pm    ESOPHAGOGASTRODUODENOSCOPY N/A 2/5/2020    Procedure: EGD (ESOPHAGOGASTRODUODENOSCOPY);  Surgeon: Cody Maria MD;  Location: [facility] (4TH FLR);  Service: Endoscopy;  Laterality: N/A;    ESOPHAGOGASTRODUODENOSCOPY N/A 4/30/2020    Procedure: EGD (ESOPHAGOGASTRODUODENOSCOPY);  Surgeon: Cody Maria MD;  Location: [facility] ENDO (2ND FLR);  Service: Endoscopy;  Laterality: N/A;  schedule 12 weeks from now  4/13/20 - removed from 4/29/20, needs to be rescheduled high priority - pg  4/28/20-scheduled from high priority list-BB  Covid screening test scheduled for 4/29/20-BB  instructions emailed to patient-BB    EXCLUSION OF LEFT ATRIAL APPENDAGE N/A 4/26/2021    Procedure: EXCLUSION, LEFT ATRIAL APPENDAGE;  Surgeon: Fidencio Galindo MD;  Location: [facility] OR 2ND FLR;  Service: Cardiovascular;  Laterality: N/A;  Left Atrial Appendage Resection    LEFT HEART CATHETERIZATION Left 4/21/2021  "   Procedure: Left heart cath;  Surgeon: Aric Alvarado MD;  Location: [facility] CATH LAB;  Service: Cardiology;  Laterality: Left;    UMBILICAL HERNIA REPAIR N/A 3/31/2022    Procedure: REPAIR, HERNIA, UMBILICAL, AGE 5 YEARS OR OLDER Open With or Without Mesh;  Surgeon: Matt Delgado MD;  Location: [facility] OR [address] [place] [address];  Service: General;  Laterality: N/A;     Family History   Problem Relation Age of Onset    Heart disease Mother         mi    Cancer Father         colon/prostate    Colon polyps Father     Stroke Neg Hx     Diabetes Neg Hx     Celiac disease Neg Hx     Esophageal cancer Neg Hx     Liver cancer Neg Hx     Liver disease Neg Hx     Stomach cancer Neg Hx     Rectal cancer Neg Hx      Social History     Tobacco Use    Smoking status: Never Smoker    Smokeless tobacco: Never Used   Substance Use Topics    Alcohol use: Yes     Comment: few times a week     Drug use: No        Review of Systems:  Review of Systems   Constitutional: Negative for chills, fever and malaise/fatigue.   Respiratory: Negative for cough and shortness of breath.    Cardiovascular: Negative for chest pain.   Gastrointestinal: Positive for abdominal pain. Negative for blood in stool, constipation, diarrhea, nausea and vomiting.   Genitourinary: Negative for dysuria and hematuria.   Skin: Negative for rash.   Neurological: Negative for dizziness and headaches.   Psychiatric/Behavioral: Negative for substance abuse. The patient is not nervous/anxious.    All other systems reviewed and are negative.      OBJECTIVE:     Vital Signs (Most Recent)  BP (!) 159/74 (BP Location: Left arm, Patient Position: Sitting)   Pulse (!) 50   Ht 5' 8" (1.727 m)   Wt 71 kg (156 lb 8.4 oz)   SpO2 100%   BMI 23.80 kg/m²     Physical Exam  Vitals and nursing note reviewed.   Constitutional:       General: He is not in acute distress.     Appearance: He is not ill-appearing or diaphoretic.      Comments: Room air   HENT:      Head: " Normocephalic and atraumatic.      Mouth/Throat:      Mouth: Mucous membranes are moist.      Pharynx: Oropharynx is clear.   Eyes:      Extraocular Movements: Extraocular movements intact.      Conjunctiva/sclera: Conjunctivae normal.   Cardiovascular:      Rate and Rhythm: Normal rate.   Pulmonary:      Effort: Pulmonary effort is normal. No respiratory distress.   Abdominal:      General: There is no distension.      Palpations: Abdomen is soft.      Tenderness: There is no abdominal tenderness. There is no guarding or rebound.      Hernia: A hernia is present.      Comments: periumbilical incision c/d/i- associated induration related to scar tissue. Able to palpable presumed fat herniation 2cm supraumbilically when patient valsalvas. No abdominal defect palpable.   Musculoskeletal:         General: No deformity.   Skin:     General: Skin is warm and dry.      Coloration: Skin is not jaundiced.      Comments: Well healed sternotomy scar noted   Neurological:      Mental Status: He is alert and oriented to person, place, and time.       Labs:   Lab Results   Component Value Date    WBC 7.21 03/12/2022    HGB 11.8 (L) 03/12/2022    HCT 36.6 (L) 03/12/2022    MCV 69 (L) 03/12/2022     03/12/2022       CMP  Sodium   Date Value Ref Range Status   03/12/2022 140 136 - 145 mmol/L Final     Potassium   Date Value Ref Range Status   03/12/2022 4.4 3.5 - 5.1 mmol/L Final     Chloride   Date Value Ref Range Status   03/12/2022 106 95 - 110 mmol/L Final     CO2   Date Value Ref Range Status   03/12/2022 23 23 - 29 mmol/L Final     Glucose   Date Value Ref Range Status   03/12/2022 76 70 - 110 mg/dL Final     BUN   Date Value Ref Range Status   03/12/2022 16 8 - 23 mg/dL Final     Creatinine   Date Value Ref Range Status   03/12/2022 1.3 0.5 - 1.4 mg/dL Final     Calcium   Date Value Ref Range Status   03/12/2022 9.8 8.7 - 10.5 mg/dL Final     Total Protein   Date Value Ref Range Status   03/12/2022 7.4 6.0 - 8.4 g/dL  Final     Albumin   Date Value Ref Range Status   03/12/2022 4.0 3.5 - 5.2 g/dL Final     Total Bilirubin   Date Value Ref Range Status   03/12/2022 0.9 0.1 - 1.0 mg/dL Final     Comment:     For infants and newborns, interpretation of results should be based  on gestational age, weight and in agreement with clinical  observations.    Premature Infant recommended reference ranges:  Up to 24 hours.............<8.0 mg/dL  Up to 48 hours............<12.0 mg/dL  3-5 days..................<15.0 mg/dL  6-29 days.................<15.0 mg/dL       Alkaline Phosphatase   Date Value Ref Range Status   03/12/2022 68 55 - 135 U/L Final     AST   Date Value Ref Range Status   03/12/2022 25 10 - 40 U/L Final     ALT   Date Value Ref Range Status   03/12/2022 26 10 - 44 U/L Final     Anion Gap   Date Value Ref Range Status   03/12/2022 11 8 - 16 mmol/L Final     eGFR if    Date Value Ref Range Status   03/12/2022 >60.0 >60 mL/min/1.73 m^2 Final     eGFR if non    Date Value Ref Range Status   03/12/2022 56.1 (A) >60 mL/min/1.73 m^2 Final     Comment:     Calculation used to obtain the estimated glomerular filtration  rate (eGFR) is the CKD-EPI equation.        Imaging: Reviewed       ASSESSMENT/PLAN:     Abdias Kim is a 66 y.o. male with PMHx of HTN, diverticulosis, CAD (s/p CAGB April 2021) who presents to the clinic today complaining of abdominal pain at his umbilicus concerning for umbilical hernia.   S/p primary repair of small umbilical hernia on 3/31.     Continues to complain of symptoms related to a hernia. Upon further questioning, patient with complaints of a supraumbilical hernia. On review of CT scan from 2018, he had a small umbilical hernia which was read by radiology (and since repaired) but he also has a very small, sub-centimeter supraumbilical hernia defect.  Will plan to repair supraumbilical hernia in OR on 5/3/22.   Consent signed in clinic     Chantel Encarnacion MD    General Surgery- PGYV  [phone]

## 2022-04-18 NOTE — H&P (VIEW-ONLY)
"  General Surgery Office Visit   History and Physical    SUBJECTIVE:     Chief Complaint: Abdominal pain    Interval History: Patient s/p primary repair of small umbilical hernia on 3/31. He states that since the day after surgery he continues to feel his hernia 'popping' out. The area of concern is supraumbilical. He has been able to reduce the mass every time it pops out. No associated pain. No concerns regarding the umbilical area. Incision c/d/i.       History of Present Illness:  Abdias Kim is a 66 y.o. male with PMHx of HTN, diverticulosis, CAD (s/p CAGB April 2021) who presents to the clinic today complaining of abdominal pain at his umbilicus. He reports having an umbilical hernia for 4-5 years. During this time, it has "gotten stuck" 3 times. The last time brought him to the ED on 3/12/22 where the hernia was reduced at bedside. He reports doing well since with intermittent episodes of pain when it "pops out" but no more episodes of incarceration. Pain is described as a soreness which radiates throughout his abdomen. He denies fever, chills, unintentional weight loss, n/v/d, constipation, hematochezia, dysuria, hematuria, CP, SOB, and all other symptoms. Patient reports being compliant with home medication regimen.     He is very active and walks 3 miles per day on average.     Patient denies personal history of CVA, lung disease, DM  Previous abdominal surgeries include: open appendectomy (20 years ago)  Drinks ETOH a few times per week. Denies tobacco and elicit drug use.   Not currently on any anticoagulants      Review of patient's allergies indicates:   Allergen Reactions    Allegra-d 12 hour [fexofenadine-pseudoephedrine] Other (See Comments)     Trouble urinating    Mucinex d [pseudoephedrine-guaifenesin] Other (See Comments)     Trouble urinating    Losartan-hydrochlorothiazide Hives and Rash     Other reaction(s): Hives       Past Medical History:   Diagnosis Date    Anemia     " Diverticulosis     Hypertension      Past Surgical History:   Procedure Laterality Date    APPENDECTOMY      COLONOSCOPY N/A 1/31/2017    Procedure: COLONOSCOPY;  Surgeon: BASILIO Winn MD;  Location: [facility] ENDO (4TH FLR);  Service: Endoscopy;  Laterality: N/A;    COLONOSCOPY N/A 2/5/2020    Procedure: COLONOSCOPY;  Surgeon: Cody Maria MD;  Location: [facility] ENDO (4TH FLR);  Service: Endoscopy;  Laterality: N/A;  OKay for Crow or ghazala. Needs to be done in Jan 2020    CORONARY ARTERY BYPASS GRAFT (CABG) N/A 4/26/2021    Procedure: CORONARY ARTERY BYPASS GRAFT (CABG)X3 WITH VEIN HARVEST;  Surgeon: Fidencio Galindo MD;  Location: [facility] OR 2ND FLR;  Service: Cardiovascular;  Laterality: N/A;    ENDOSCOPIC HARVEST OF VEIN Left 4/26/2021    Procedure: HARVEST-VEIN-ENDOVASCULAR FOR CABGX3;  Surgeon: Fidencio Galindo MD;  [facility];  Service: Cardiovascular;  Laterality: Left;  Vein [place] Start time: 1201pm  Vein [place] Stop time: 1226pm    Vein Prep Start time: 1227pm  Vein Prep Stop time: 1250pm    ESOPHAGOGASTRODUODENOSCOPY N/A 2/5/2020    Procedure: EGD (ESOPHAGOGASTRODUODENOSCOPY);  Surgeon: Cody Maria MD;  Location: [facility] (4TH FLR);  Service: Endoscopy;  Laterality: N/A;    ESOPHAGOGASTRODUODENOSCOPY N/A 4/30/2020    Procedure: EGD (ESOPHAGOGASTRODUODENOSCOPY);  Surgeon: Cody Maria MD;  Location: [facility] ENDO (2ND FLR);  Service: Endoscopy;  Laterality: N/A;  schedule 12 weeks from now  4/13/20 - removed from 4/29/20, needs to be rescheduled high priority - pg  4/28/20-scheduled from high priority list-BB  Covid screening test scheduled for 4/29/20-BB  instructions emailed to patient-BB    EXCLUSION OF LEFT ATRIAL APPENDAGE N/A 4/26/2021    Procedure: EXCLUSION, LEFT ATRIAL APPENDAGE;  Surgeon: Fidencio Galindo MD;  Location: [facility] OR 2ND FLR;  Service: Cardiovascular;  Laterality: N/A;  Left Atrial Appendage Resection    LEFT HEART CATHETERIZATION Left 4/21/2021  "   Procedure: Left heart cath;  Surgeon: Aric Alvarado MD;  Location: [facility] CATH LAB;  Service: Cardiology;  Laterality: Left;    UMBILICAL HERNIA REPAIR N/A 3/31/2022    Procedure: REPAIR, HERNIA, UMBILICAL, AGE 5 YEARS OR OLDER Open With or Without Mesh;  Surgeon: Matt Delgado MD;  Location: [facility] OR [address] [place] [address];  Service: General;  Laterality: N/A;     Family History   Problem Relation Age of Onset    Heart disease Mother         mi    Cancer Father         colon/prostate    Colon polyps Father     Stroke Neg Hx     Diabetes Neg Hx     Celiac disease Neg Hx     Esophageal cancer Neg Hx     Liver cancer Neg Hx     Liver disease Neg Hx     Stomach cancer Neg Hx     Rectal cancer Neg Hx      Social History     Tobacco Use    Smoking status: Never Smoker    Smokeless tobacco: Never Used   Substance Use Topics    Alcohol use: Yes     Comment: few times a week     Drug use: No        Review of Systems:  Review of Systems   Constitutional: Negative for chills, fever and malaise/fatigue.   Respiratory: Negative for cough and shortness of breath.    Cardiovascular: Negative for chest pain.   Gastrointestinal: Positive for abdominal pain. Negative for blood in stool, constipation, diarrhea, nausea and vomiting.   Genitourinary: Negative for dysuria and hematuria.   Skin: Negative for rash.   Neurological: Negative for dizziness and headaches.   Psychiatric/Behavioral: Negative for substance abuse. The patient is not nervous/anxious.    All other systems reviewed and are negative.      OBJECTIVE:     Vital Signs (Most Recent)  BP (!) 159/74 (BP Location: Left arm, Patient Position: Sitting)   Pulse (!) 50   Ht 5' 8" (1.727 m)   Wt 71 kg (156 lb 8.4 oz)   SpO2 100%   BMI 23.80 kg/m²     Physical Exam  Vitals and nursing note reviewed.   Constitutional:       General: He is not in acute distress.     Appearance: He is not ill-appearing or diaphoretic.      Comments: Room air   HENT:      Head: " Normocephalic and atraumatic.      Mouth/Throat:      Mouth: Mucous membranes are moist.      Pharynx: Oropharynx is clear.   Eyes:      Extraocular Movements: Extraocular movements intact.      Conjunctiva/sclera: Conjunctivae normal.   Cardiovascular:      Rate and Rhythm: Normal rate.   Pulmonary:      Effort: Pulmonary effort is normal. No respiratory distress.   Abdominal:      General: There is no distension.      Palpations: Abdomen is soft.      Tenderness: There is no abdominal tenderness. There is no guarding or rebound.      Hernia: A hernia is present.      Comments: periumbilical incision c/d/i- associated induration related to scar tissue. Able to palpable presumed fat herniation 2cm supraumbilically when patient valsalvas. No abdominal defect palpable.   Musculoskeletal:         General: No deformity.   Skin:     General: Skin is warm and dry.      Coloration: Skin is not jaundiced.      Comments: Well healed sternotomy scar noted   Neurological:      Mental Status: He is alert and oriented to person, place, and time.       Labs:   Lab Results   Component Value Date    WBC 7.21 03/12/2022    HGB 11.8 (L) 03/12/2022    HCT 36.6 (L) 03/12/2022    MCV 69 (L) 03/12/2022     03/12/2022       CMP  Sodium   Date Value Ref Range Status   03/12/2022 140 136 - 145 mmol/L Final     Potassium   Date Value Ref Range Status   03/12/2022 4.4 3.5 - 5.1 mmol/L Final     Chloride   Date Value Ref Range Status   03/12/2022 106 95 - 110 mmol/L Final     CO2   Date Value Ref Range Status   03/12/2022 23 23 - 29 mmol/L Final     Glucose   Date Value Ref Range Status   03/12/2022 76 70 - 110 mg/dL Final     BUN   Date Value Ref Range Status   03/12/2022 16 8 - 23 mg/dL Final     Creatinine   Date Value Ref Range Status   03/12/2022 1.3 0.5 - 1.4 mg/dL Final     Calcium   Date Value Ref Range Status   03/12/2022 9.8 8.7 - 10.5 mg/dL Final     Total Protein   Date Value Ref Range Status   03/12/2022 7.4 6.0 - 8.4 g/dL  Final     Albumin   Date Value Ref Range Status   03/12/2022 4.0 3.5 - 5.2 g/dL Final     Total Bilirubin   Date Value Ref Range Status   03/12/2022 0.9 0.1 - 1.0 mg/dL Final     Comment:     For infants and newborns, interpretation of results should be based  on gestational age, weight and in agreement with clinical  observations.    Premature Infant recommended reference ranges:  Up to 24 hours.............<8.0 mg/dL  Up to 48 hours............<12.0 mg/dL  3-5 days..................<15.0 mg/dL  6-29 days.................<15.0 mg/dL       Alkaline Phosphatase   Date Value Ref Range Status   03/12/2022 68 55 - 135 U/L Final     AST   Date Value Ref Range Status   03/12/2022 25 10 - 40 U/L Final     ALT   Date Value Ref Range Status   03/12/2022 26 10 - 44 U/L Final     Anion Gap   Date Value Ref Range Status   03/12/2022 11 8 - 16 mmol/L Final     eGFR if    Date Value Ref Range Status   03/12/2022 >60.0 >60 mL/min/1.73 m^2 Final     eGFR if non    Date Value Ref Range Status   03/12/2022 56.1 (A) >60 mL/min/1.73 m^2 Final     Comment:     Calculation used to obtain the estimated glomerular filtration  rate (eGFR) is the CKD-EPI equation.        Imaging: Reviewed       ASSESSMENT/PLAN:     Abdias Kim is a 66 y.o. male with PMHx of HTN, diverticulosis, CAD (s/p CAGB April 2021) who presents to the clinic today complaining of abdominal pain at his umbilicus concerning for umbilical hernia.   S/p primary repair of small umbilical hernia on 3/31.     Continues to complain of symptoms related to a hernia. Upon further questioning, patient with complaints of a supraumbilical hernia. On review of CT scan from 2018, he had a small umbilical hernia which was read by radiology (and since repaired) but he also has a very small, sub-centimeter supraumbilical hernia defect.  Will plan to repair supraumbilical hernia in OR on 5/3/22.   Consent signed in clinic     Chantel Encarnacion MD    General Surgery- PGYV  [phone]

## 2022-04-19 ENCOUNTER — OFFICE VISIT (OUTPATIENT)
Dept: OPTOMETRY | Facility: CLINIC | Age: 67
End: 2022-04-19
Payer: MEDICARE

## 2022-04-19 DIAGNOSIS — H52.223 HYPEROPIA OF BOTH EYES WITH REGULAR ASTIGMATISM AND PRESBYOPIA: ICD-10-CM

## 2022-04-19 DIAGNOSIS — Z01.00 EYE EXAM, ROUTINE: Primary | ICD-10-CM

## 2022-04-19 DIAGNOSIS — H52.03 HYPEROPIA OF BOTH EYES WITH REGULAR ASTIGMATISM AND PRESBYOPIA: ICD-10-CM

## 2022-04-19 DIAGNOSIS — H52.4 HYPEROPIA OF BOTH EYES WITH REGULAR ASTIGMATISM AND PRESBYOPIA: ICD-10-CM

## 2022-04-19 PROCEDURE — 99213 OFFICE O/P EST LOW 20 MIN: CPT | Mod: PBBFAC,PO | Performed by: OPTOMETRIST

## 2022-04-19 PROCEDURE — 99999 PR PBB SHADOW E&M-EST. PATIENT-LVL III: CPT | Mod: PBBFAC,,, | Performed by: OPTOMETRIST

## 2022-04-19 PROCEDURE — 99999 PR PBB SHADOW E&M-EST. PATIENT-LVL III: ICD-10-PCS | Mod: PBBFAC,,, | Performed by: OPTOMETRIST

## 2022-04-19 PROCEDURE — 92014 COMPRE OPH EXAM EST PT 1/>: CPT | Mod: S$PBB,,, | Performed by: OPTOMETRIST

## 2022-04-19 PROCEDURE — 92015 DETERMINE REFRACTIVE STATE: CPT | Mod: ,,, | Performed by: OPTOMETRIST

## 2022-04-19 PROCEDURE — 92014 PR EYE EXAM, EST PATIENT,COMPREHESV: ICD-10-PCS | Mod: S$PBB,,, | Performed by: OPTOMETRIST

## 2022-04-19 PROCEDURE — 92015 PR REFRACTION: ICD-10-PCS | Mod: ,,, | Performed by: OPTOMETRIST

## 2022-04-19 NOTE — PROGRESS NOTES
HPI     Annual eye exam for blurred VA  [facility] Vision  Hx Cataracts OU  (-)Flashes (+)Floaters  (+)Itch, (-)tear, (-)burn, (-)Dryness. (+) OTC Drops Artifical tears  (-)Photophobia  (-)Glare (-)diplopia (-) headaches          Last edited by Edi Goldman, OD on 4/19/2022 10:54 AM. (History)            Assessment /Plan     For exam results, see Encounter Report.    Eye exam, routine  -Spectera    Hyperopia of both eyes with regular astigmatism and presbyopia  Eyeglass Final Rx     Eyeglass Final Rx       Sphere Cylinder Axis Dist VA Add    Right +1.50 +0.75 005 20/25 +2.50    Left +1.50 +0.75 005 20/25 +2.50    Type: PAL    Expiration Date: 4/19/2023                  RTC 1 yr

## 2022-05-02 ENCOUNTER — TELEPHONE (OUTPATIENT)
Dept: SURGERY | Facility: CLINIC | Age: 67
End: 2022-05-02
Payer: MEDICARE

## 2022-05-03 ENCOUNTER — ANESTHESIA EVENT (OUTPATIENT)
Dept: SURGERY | Facility: HOSPITAL | Age: 67
End: 2022-05-03
Payer: MEDICARE

## 2022-05-03 ENCOUNTER — ANESTHESIA (OUTPATIENT)
Dept: SURGERY | Facility: HOSPITAL | Age: 67
End: 2022-05-03
Payer: MEDICARE

## 2022-05-03 ENCOUNTER — HOSPITAL ENCOUNTER (OUTPATIENT)
Facility: HOSPITAL | Age: 67
Discharge: HOME | End: 2022-05-03
Attending: SURGERY | Admitting: SURGERY
Payer: MEDICARE

## 2022-05-03 VITALS
TEMPERATURE: 98 F | HEART RATE: 50 BPM | OXYGEN SATURATION: 97 % | WEIGHT: 154 LBS | DIASTOLIC BLOOD PRESSURE: 80 MMHG | RESPIRATION RATE: 16 BRPM | BODY MASS INDEX: 23.42 KG/M2 | SYSTOLIC BLOOD PRESSURE: 145 MMHG

## 2022-05-03 DIAGNOSIS — K43.9 VENTRAL HERNIA WITHOUT OBSTRUCTION OR GANGRENE: Primary | ICD-10-CM

## 2022-05-03 PROCEDURE — 71000044 HC DOSC ROUTINE RECOVERY FIRST HOUR: Performed by: SURGERY

## 2022-05-03 PROCEDURE — D9220A PRA ANESTHESIA: ICD-10-PCS | Mod: ,,, | Performed by: STUDENT IN AN ORGANIZED HEALTH CARE EDUCATION/TRAINING PROGRAM

## 2022-05-03 PROCEDURE — 71000015 HC POSTOP RECOV 1ST HR: Performed by: SURGERY

## 2022-05-03 PROCEDURE — 25000003 PHARM REV CODE 250: Performed by: STUDENT IN AN ORGANIZED HEALTH CARE EDUCATION/TRAINING PROGRAM

## 2022-05-03 PROCEDURE — D9220A PRA ANESTHESIA: Mod: ,,, | Performed by: STUDENT IN AN ORGANIZED HEALTH CARE EDUCATION/TRAINING PROGRAM

## 2022-05-03 PROCEDURE — 63600175 PHARM REV CODE 636 W HCPCS: Performed by: STUDENT IN AN ORGANIZED HEALTH CARE EDUCATION/TRAINING PROGRAM

## 2022-05-03 PROCEDURE — 36000706: Performed by: SURGERY

## 2022-05-03 PROCEDURE — 25000003 PHARM REV CODE 250: Performed by: SURGERY

## 2022-05-03 PROCEDURE — 49560 PR REPAIR INCISIONAL HERNIA,REDUCIBLE: ICD-10-PCS | Mod: 58,,, | Performed by: SURGERY

## 2022-05-03 PROCEDURE — 37000009 HC ANESTHESIA EA ADD 15 MINS: Performed by: SURGERY

## 2022-05-03 PROCEDURE — 25000003 PHARM REV CODE 250

## 2022-05-03 PROCEDURE — 37000008 HC ANESTHESIA 1ST 15 MINUTES: Performed by: SURGERY

## 2022-05-03 PROCEDURE — 36000707: Performed by: SURGERY

## 2022-05-03 PROCEDURE — 71000016 HC POSTOP RECOV ADDL HR: Performed by: SURGERY

## 2022-05-03 PROCEDURE — 49560 PR REPAIR INCISIONAL HERNIA,REDUCIBLE: CPT | Mod: 58,,, | Performed by: SURGERY

## 2022-05-03 RX ORDER — SUCCINYLCHOLINE CHLORIDE 20 MG/ML
INJECTION INTRAMUSCULAR; INTRAVENOUS
Status: DISCONTINUED | OUTPATIENT
Start: 2022-05-03 | End: 2022-05-03

## 2022-05-03 RX ORDER — LIDOCAINE HYDROCHLORIDE 20 MG/ML
INJECTION, SOLUTION EPIDURAL; INFILTRATION; INTRACAUDAL; PERINEURAL
Status: DISCONTINUED | OUTPATIENT
Start: 2022-05-03 | End: 2022-05-03

## 2022-05-03 RX ORDER — OXYCODONE HYDROCHLORIDE 5 MG/1
10 TABLET ORAL EVERY 4 HOURS PRN
Status: DISCONTINUED | OUTPATIENT
Start: 2022-05-03 | End: 2022-05-03 | Stop reason: HOSPADM

## 2022-05-03 RX ORDER — FENTANYL CITRATE 50 UG/ML
INJECTION, SOLUTION INTRAMUSCULAR; INTRAVENOUS
Status: DISCONTINUED | OUTPATIENT
Start: 2022-05-03 | End: 2022-05-03

## 2022-05-03 RX ORDER — FENTANYL CITRATE 50 UG/ML
25 INJECTION, SOLUTION INTRAMUSCULAR; INTRAVENOUS EVERY 5 MIN PRN
Status: DISCONTINUED | OUTPATIENT
Start: 2022-05-03 | End: 2022-05-03 | Stop reason: HOSPADM

## 2022-05-03 RX ORDER — NEOSTIGMINE METHYLSULFATE 0.5 MG/ML
INJECTION, SOLUTION INTRAVENOUS
Status: DISCONTINUED | OUTPATIENT
Start: 2022-05-03 | End: 2022-05-03

## 2022-05-03 RX ORDER — CEFAZOLIN SODIUM/WATER 2 G/20 ML
2 SYRINGE (ML) INTRAVENOUS
Status: COMPLETED | OUTPATIENT
Start: 2022-05-03 | End: 2022-05-03

## 2022-05-03 RX ORDER — OXYCODONE HYDROCHLORIDE 5 MG/1
5 TABLET ORAL EVERY 4 HOURS PRN
Status: DISCONTINUED | OUTPATIENT
Start: 2022-05-03 | End: 2022-05-03 | Stop reason: HOSPADM

## 2022-05-03 RX ORDER — ONDANSETRON 2 MG/ML
INJECTION INTRAMUSCULAR; INTRAVENOUS
Status: DISCONTINUED | OUTPATIENT
Start: 2022-05-03 | End: 2022-05-03

## 2022-05-03 RX ORDER — PROPOFOL 10 MG/ML
VIAL (ML) INTRAVENOUS
Status: DISCONTINUED | OUTPATIENT
Start: 2022-05-03 | End: 2022-05-03

## 2022-05-03 RX ORDER — ACETAMINOPHEN 325 MG/1
650 TABLET ORAL EVERY 4 HOURS PRN
Status: DISCONTINUED | OUTPATIENT
Start: 2022-05-03 | End: 2022-05-03 | Stop reason: HOSPADM

## 2022-05-03 RX ORDER — DEXAMETHASONE SODIUM PHOSPHATE 4 MG/ML
INJECTION, SOLUTION INTRA-ARTICULAR; INTRALESIONAL; INTRAMUSCULAR; INTRAVENOUS; SOFT TISSUE
Status: DISCONTINUED | OUTPATIENT
Start: 2022-05-03 | End: 2022-05-03

## 2022-05-03 RX ORDER — SODIUM CHLORIDE 9 MG/ML
INJECTION, SOLUTION INTRAVENOUS CONTINUOUS
Status: DISCONTINUED | OUTPATIENT
Start: 2022-05-03 | End: 2022-05-03 | Stop reason: HOSPADM

## 2022-05-03 RX ORDER — HALOPERIDOL 5 MG/ML
0.5 INJECTION INTRAMUSCULAR EVERY 10 MIN PRN
Status: DISCONTINUED | OUTPATIENT
Start: 2022-05-03 | End: 2022-05-03 | Stop reason: HOSPADM

## 2022-05-03 RX ORDER — KETAMINE HCL IN 0.9 % NACL 50 MG/5 ML
SYRINGE (ML) INTRAVENOUS
Status: DISCONTINUED | OUTPATIENT
Start: 2022-05-03 | End: 2022-05-03

## 2022-05-03 RX ORDER — SODIUM CHLORIDE 0.9 % (FLUSH) 0.9 %
10 SYRINGE (ML) INJECTION
Status: DISCONTINUED | OUTPATIENT
Start: 2022-05-03 | End: 2022-05-03 | Stop reason: HOSPADM

## 2022-05-03 RX ORDER — BUPIVACAINE HYDROCHLORIDE 5 MG/ML
INJECTION, SOLUTION EPIDURAL; INTRACAUDAL
Status: DISCONTINUED | OUTPATIENT
Start: 2022-05-03 | End: 2022-05-03 | Stop reason: HOSPADM

## 2022-05-03 RX ORDER — OXYCODONE HYDROCHLORIDE 5 MG/1
5 TABLET ORAL EVERY 4 HOURS PRN
Qty: 10 TABLET | Refills: 0 | Status: SHIPPED | OUTPATIENT
Start: 2022-05-03 | End: 2022-05-16

## 2022-05-03 RX ORDER — ROCURONIUM BROMIDE 10 MG/ML
INJECTION, SOLUTION INTRAVENOUS
Status: DISCONTINUED | OUTPATIENT
Start: 2022-05-03 | End: 2022-05-03

## 2022-05-03 RX ADMIN — ROCURONIUM BROMIDE 20 MG: 10 INJECTION, SOLUTION INTRAVENOUS at 09:05

## 2022-05-03 RX ADMIN — GLYCOPYRROLATE 0.6 MG: 0.2 INJECTION, SOLUTION INTRAMUSCULAR; INTRAVENOUS at 10:05

## 2022-05-03 RX ADMIN — SODIUM CHLORIDE, SODIUM GLUCONATE, SODIUM ACETATE, POTASSIUM CHLORIDE, MAGNESIUM CHLORIDE, SODIUM PHOSPHATE, DIBASIC, AND POTASSIUM PHOSPHATE: .53; .5; .37; .037; .03; .012; .00082 INJECTION, SOLUTION INTRAVENOUS at 09:05

## 2022-05-03 RX ADMIN — NEOSTIGMINE METHYLSULFATE 3 MG: 0.5 INJECTION INTRAVENOUS at 10:05

## 2022-05-03 RX ADMIN — SUCCINYLCHOLINE CHLORIDE 100 MG: 20 INJECTION, SOLUTION INTRAMUSCULAR; INTRAVENOUS; PARENTERAL at 09:05

## 2022-05-03 RX ADMIN — ACETAMINOPHEN 650 MG: 325 TABLET ORAL at 10:05

## 2022-05-03 RX ADMIN — FENTANYL CITRATE 125 MCG: 50 INJECTION INTRAMUSCULAR; INTRAVENOUS at 09:05

## 2022-05-03 RX ADMIN — Medication 20 MG: at 09:05

## 2022-05-03 RX ADMIN — ONDANSETRON 4 MG: 2 INJECTION INTRAMUSCULAR; INTRAVENOUS at 09:05

## 2022-05-03 RX ADMIN — LIDOCAINE HYDROCHLORIDE 100 MG: 20 INJECTION, SOLUTION EPIDURAL; INFILTRATION; INTRACAUDAL at 09:05

## 2022-05-03 RX ADMIN — Medication 2 G: at 09:05

## 2022-05-03 RX ADMIN — PROPOFOL 150 MG: 10 INJECTION, EMULSION INTRAVENOUS at 09:05

## 2022-05-03 RX ADMIN — DEXAMETHASONE SODIUM PHOSPHATE 4 MG: 4 INJECTION INTRA-ARTICULAR; INTRALESIONAL; INTRAMUSCULAR; INTRAVENOUS; SOFT TISSUE at 09:05

## 2022-05-03 NOTE — OP NOTE
[facility]  Surgery Department  Operative Note    SUMMARY     Date of Procedure: 5/3/2022     Primary Surgeon: Surgeon(s) and Role:     * Matt Delgado MD - Primary     * Nate Mcfarland MD - Resident - Chief     Pre-Operative Diagnosis: Ventral hernia [K43.9]    Post-op Diagnosis: Same    Anesthesia: General     Procedure: Procedure(s) (LRB):  REPAIR, HERNIA, VENTRAL, WITHOUT HISTORY OF PRIOR REPAIR without mesh (N/A)     Indications for the procedure: Abdias Kim is a 66 y.o. with supraumbilical ventral hernia. We recommended they undergo a repair and the patient agreed to proceed. The patient signed informed consent and expressed understanding of the risks, benefits, and alternatives of surgery.     Operative Findings (including complications, if any):   1) <2 cm ventral hernia    Procedure in Detail: After the procedure was explained and all questions answered appropriately, consent was obtained. The patient was then brought back to the Operating Room, where he was placed in the supine position.  General endotracheal anesthesia was then induced.  Next, the patient was prepped and draped in the usual sterile fashion.  A timeout, including the patient's name, allergies, procedure, was then performed, to which all members of the operative team agreed. We also confirmed the administration of appropriate pre-operative antibiotics.     A midline skin incision just above the umbilicus was made with the scalpel. Subcutaneous tissue was divided with Bovie electrocautery and this dissection was carried down to the anterior abdominal wall fascia. We bluntly dissected all the way around the hernia defect. This left us with an easily reducible ventral hernia defect. We measured it approximately 1.5 cm in diameter. Bovie electrocautery was used to take down a few adhesions of omentum from the anterior abdominal wall around the defect. We decided to repair the fascial defect primarily with 0 PDS  sutures. Next we irrigated the wound and obtained hemostasis. The dead space was closed with interrupted 2-0 Vicryl sutures and the skin was closed with a running subcuticular 4-0 Monocryl suture. Marcaine was administered and Dermabond was applied. The patient was extubated in the Operating Room and transported to the Recovery Room in stable condition. All sponge, instrument and needle counts were correct at the end of the case.     Estimated Blood Loss (EBL): less than 50 mL           Implants: * No implants in log *    Specimens:   Specimen (24h ago, onward)                None                    Condition: Stable    Disposition: PACU - hemodynamically stable.    Attestation: Dr. Delgado was present for and either directly performed or assisted with the critical and key portions of the procedure.

## 2022-05-03 NOTE — ANESTHESIA PREPROCEDURE EVALUATION
05/03/2022  Abdias Kim is a 66 y.o., male.  Pre-operative evaluation for Procedure(s) (LRB):  REPAIR, HERNIA, INCISIONAL OR VENTRAL, WITHOUT HISTORY OF PRIOR REPAIR with or without mesh (N/A)    Abdias Kim is a 66 y.o. male     Patient Active Problem List   Diagnosis    Hypertension    Allergic rhinitis    Diverticulosis    Anemia, iron deficiency    External hemorrhoids    Tubular adenoma of colon    Tongue laceration    ED (erectile dysfunction)    Umbilical hernia    GERD (gastroesophageal reflux disease)    Eosinophilic esophagitis    Abnormal cardiovascular stress test    CAD (coronary artery disease)    Atherosclerotic heart disease of native coronary artery with other forms of angina pectoris    Hypophosphatemia    Acute blood loss anemia    Paroxysmal atrial fibrillation    Pain of left thigh    S/P CABG (coronary artery bypass graft)    Pulmonary hypertension    Aortic atherosclerosis       Review of patient's allergies indicates:   Allergen Reactions    Allegra-d 12 hour [fexofenadine-pseudoephedrine] Other (See Comments)     Trouble urinating    Mucinex d [pseudoephedrine-guaifenesin] Other (See Comments)     Trouble urinating    Losartan-hydrochlorothiazide Hives and Rash     Other reaction(s): Hives       Current Facility-Administered Medications on File Prior to Encounter   Medication Dose Route Frequency Provider Last Rate Last Admin    acetaminophen tablet 650 mg  650 mg Oral Once PRN Stephane Cruz MD        albuterol inhaler 2 puff  2 puff Inhalation Q20 Min PRN Stephane Cruz MD        diphenhydrAMINE injection 25 mg  25 mg Intravenous Once PRN Stephane Cruz MD        EPINEPHrine (EPIPEN) 0.3 mg/0.3 mL pen injection 0.3 mg  0.3 mg Intramuscular PRN Stephane Cruz MD        ondansetron disintegrating tablet 4 mg  4 mg Oral Once PRN Stephane  NNEKA Cruz MD         Current Outpatient Medications on File Prior to Encounter   Medication Sig Dispense Refill    aspirin 325 MG tablet Take 1 tablet (325 mg total) by mouth once daily. 360 tablet 0    atorvastatin (LIPITOR) 40 MG tablet Take 1 tablet (40 mg total) by mouth every evening. 90 tablet 3    cetirizine (ZYRTEC) 10 MG tablet Take 1 tablet by mouth Daily.      diltiaZEM (CARDIZEM CD) 120 MG Cp24 Take 1 capsule (120 mg total) by mouth once daily. 90 capsule 3    fluticasone propionate (FLONASE) 50 mcg/actuation nasal spray       hydrALAZINE (APRESOLINE) 50 MG tablet Take 1 tablet (50 mg total) by mouth every 12 (twelve) hours. 180 tablet 3    multivitamin (THERAGRAN) per tablet Take 1 tablet by mouth once daily.      omeprazole (PRILOSEC) 20 MG capsule Take 1 capsule (20 mg total) by mouth before breakfast. 90 capsule 3    oxyCODONE (ROXICODONE) 5 MG immediate release tablet Take 1 tablet (5 mg total) by mouth every 4 (four) hours as needed for Pain. 4 tablet 0    sildenafiL (VIAGRA) 100 MG tablet Take 0.5 tablets (50 mg total) by mouth as needed for Erectile Dysfunction. 10 tablet 3       Past Surgical History:   Procedure Laterality Date    APPENDECTOMY      COLONOSCOPY N/A 1/31/2017    Procedure: COLONOSCOPY;  Surgeon: BASILIO Winn MD;  Location: [facility] ([address] [place] [address]);  Service: Endoscopy;  Laterality: N/A;    COLONOSCOPY N/A 2/5/2020    Procedure: COLONOSCOPY;  Surgeon: Cody Maria MD;  [facility]  Service: Endoscopy;  Laterality: N/A;  OKay for [place] or ghazala. Needs to be done in Jan 2020    CORONARY ARTERY BYPASS GRAFT (CABG) N/A 4/26/2021    Procedure: CORONARY ARTERY BYPASS GRAFT (CABG)X3 WITH VEIN HARVEST;  Surgeon: Fidencio Galindo MD;  Location: [facility] OR [address] [place] [address];  Service: Cardiovascular;  Laterality: N/A;    ENDOSCOPIC HARVEST OF VEIN Left 4/26/2021    Procedure: HARVEST-VEIN-ENDOVASCULAR FOR CABGX3;  Surgeon: Fidencio Galindo MD;  Location:  [facility] OR 2ND FLR;  Service: Cardiovascular;  Laterality: Left;  Vein [place] Start time: 1201pm  Vein [place] Stop time: 1226pm    Vein Prep Start time: 1227pm  Vein Prep Stop time: 1250pm    ESOPHAGOGASTRODUODENOSCOPY N/A 2/5/2020    Procedure: EGD (ESOPHAGOGASTRODUODENOSCOPY);  Surgeon: Cody Maria MD;  Location: [facility] ENDO (4TH FLR);  Service: Endoscopy;  Laterality: N/A;    ESOPHAGOGASTRODUODENOSCOPY N/A 4/30/2020    Procedure: EGD (ESOPHAGOGASTRODUODENOSCOPY);  Surgeon: Cody Maria MD;  Location: [facility] ENDO (2ND FLR);  Service: Endoscopy;  Laterality: N/A;  schedule 12 weeks from now  4/13/20 - removed from 4/29/20, needs to be rescheduled high priority - pg  4/28/20-scheduled from high priority list-BB  Covid screening test scheduled for 4/29/20-BB  instructions emailed to patient-BB    EXCLUSION OF LEFT ATRIAL APPENDAGE N/A 4/26/2021    Procedure: EXCLUSION, LEFT ATRIAL APPENDAGE;  Surgeon: Fidencio Galindo MD;  Location: [facility] OR [address] [place] [address];  Service: Cardiovascular;  Laterality: N/A;  Left Atrial Appendage Resection    LEFT HEART CATHETERIZATION Left 4/21/2021    Procedure: Left heart cath;  Surgeon: Aric Alvarado MD;  Location: [facility] CATH LAB;  Service: Cardiology;  Laterality: Left;    UMBILICAL HERNIA REPAIR N/A 3/31/2022    Procedure: REPAIR, HERNIA, UMBILICAL, AGE 5 YEARS OR OLDER Open With or Without Mesh;  Surgeon: Matt Delgado MD;  Location: [facility] OR [address] [place] [address];  Service: General;  Laterality: N/A;       Social History     Socioeconomic History    Marital status:    Tobacco Use    Smoking status: Never Smoker    Smokeless tobacco: Never Used   Substance and Sexual Activity    Alcohol use: Yes     Comment: few times a week     Drug use: No     Social Determinants of Health     Financial Resource Strain: Low Risk     Difficulty of Paying Living Expenses: Not hard at all   Food Insecurity: No Food Insecurity    Worried About Running Out of Food in the Last Year: Never true     Ran Out of Food in the Last Year: Never true   Transportation Needs: No Transportation Needs    Lack of Transportation (Medical): No    Lack of Transportation (Non-Medical): No   Physical Activity: Sufficiently Active    Days of Exercise per Week: 6 days    Minutes of Exercise per Session: 50 min   Stress: Stress Concern Present    Feeling of Stress : To some extent   Social Connections: Unknown    Frequency of Communication with Friends and Family: Twice a week    Frequency of Social Gatherings with Friends and Family: Once a week    Active Member of Clubs or Organizations: No    Attends Club or Organization Meetings: Never    Marital Status:    Housing Stability: Low Risk     Unable to Pay for Housing in the Last Year: No    Number of Places Lived in the Last Year: 1    Unstable Housing in the Last Year: No         CBC: No results for input(s): WBC, RBC, HGB, HCT, PLT, MCV, MCH, MCHC in the last 72 hours.    CMP: No results for input(s): NA, K, CL, CO2, BUN, CREATININE, GLU, MG, PHOS, CALCIUM, ALBUMIN, PROT, ALKPHOS, ALT, AST, BILITOT in the last 72 hours.    INR  No results for input(s): PT, INR, PROTIME, APTT in the last 72 hours.        Diagnostic Studies:      EKD Echo:  No results found for this or any previous visit.        Pre-op Assessment    I have reviewed the Patient Summary Reports.     I have reviewed the Nursing Notes. I have reviewed the NPO Status.   I have reviewed the Medications.     Review of Systems  Cardiovascular:   Hypertension CAD   Angina    Hepatic/GI:   GERD        Physical Exam    Airway:  Mallampati: III / II  Mouth Opening: Normal  TM Distance: Normal  Tongue: Normal  Neck ROM: Normal ROM        Anesthesia Plan  Type of Anesthesia, risks & benefits discussed:    Anesthesia Type: Gen Supraglottic Airway, Gen ETT  Intra-op Monitoring Plan: Standard ASA Monitors  Post Op Pain Control Plan: multimodal analgesia  Induction:  IV  Airway Plan: Direct,  Post-Induction  Informed Consent: Informed consent signed with the Patient and all parties understand the risks and agree with anesthesia plan.  All questions answered.   ASA Score: 3    Ready For Surgery From Anesthesia Perspective.     .

## 2022-05-03 NOTE — DISCHARGE INSTRUCTIONS
ACTIVITY LEVEL:  If you received sedation and/or an anesthetic, you may feel sleepy for several hours. Rest until you feel more  awake. Gradually resume your normal activities in 2-3 days. No heavy lifting for 5-6 weeks. (Nothing heavier  than a gallon of milk.) You will be able to return to light work 1-2 weeks after surgery. You may expect some  discomfort after surgery. This will diminish gradually between two weeks and three months. Use this  discomfort as a guideline for your level of activity. Do not be alarmed if you notice a lump or firmness under  the scar for about 6 months after surgery. This is normal and should eventually disappear.  DIET:  At home, continue with liquids. If there is no nausea, you may eat a soft diet and gradually resume your normal  diet.  BATHING:  _____ Lap. Repair - You may shower in 1 day. Do not soak the incision for one week.  _____ Open repair- You may shower in 2 days. Do not soak the incision for one week.  CARE:  On the day following your surgery, you may carefully remove the outer dressing. Underneath the dressing, there  are small tape strips directly on your incision. Leave these on until you return for your clinic appointment.  Replace the dressing after drying off. You may notice a small amount of old blood when you change your  dressing. This is normal. If your dressing becomes saturated, change it and hold gentle pressure on the area for  15 minutes. If the bleeding continues, notify your surgeon. You may experience some bruising. This is normal.  Men may also experience some testicular swelling. This is normal and can be decreased by wearing a scrotal  support, which may be purchased at any drug store or athletic store.  MEDICATIONS:  You will receive instructions for any pain and/or antibiotic prescriptions. Pain medication should be taken only  if needed, and as directed. Antibiotics, if ordered, should be taken as directed until the entire prescription  is  completed.  ADDITIONAL INSTRUCTIONS: _________________________________________________________  WHEN TO CALL THE DOCTOR:   Any obvious bleeding   Fever over 101ºF (38.4ºC)   Redness around the surgical site   Purulent (pus) drainage   Persistent pain not relieved by the pain medication   Persistent nausea/vomiting  RETURN APPOINTMENT:________________________________________________________________  FOR EMERGENCIES: If any unusual problems or difficulties occur, contact Dr. _______________________  or the resident at [phone] (page ) or at the Clinic office, [phone].

## 2022-05-03 NOTE — BRIEF OP NOTE
[facility]  Surgery Department  Operative Note    SUMMARY     Date of Procedure: 5/3/2022     Procedure: Procedure(s) (LRB):  REPAIR, HERNIA, INCISIONAL OR VENTRAL, WITHOUT HISTORY OF PRIOR REPAIR with or without mesh (N/A)     Surgeon(s) and Role:     * Matt Delgado MD - Primary     * Nate Mcfarland MD - Resident - Chief    Pre-Operative Diagnosis: Ventral hernia [K43.9]    Post-op Diagnosis: Same    Anesthesia: General     Operative Findings (including complications, if any):   1) <2 cm ventral hernia repaired primarily    Estimated Blood Loss (EBL): Minimal           Implants: * No implants in log *    Specimens:   Specimen (24h ago, onward)            None                  Condition: Stable    Disposition: PACU - hemodynamically stable.    Attestation: I was present and scrubbed for the key portions of the procedure.    Discharge Note    OUTCOME: Patient tolerated treatment/procedure well without complication and is now ready for discharge.    DISPOSITION: Home or Self Care    FINAL DIAGNOSIS:  Ventral hernia without obstruction or gangrene    FOLLOWUP: In clinic    DISCHARGE INSTRUCTIONS:    Discharge Procedure Orders   Diet general     Other restrictions (specify):   Scheduling Instructions: WOUND CARE  -- You have skin glue over your incision(s); this will slowly flake away over the next few weeks.  -- Ok to shower; however, no baths or submerging in water (I.e. swimming, submerging in water) for at least two weeks.  -- Please keep the incision clean with soap and water, pat your incision dry, do not scrub hard over your incisions.    MEDICATIONS AND PAIN CONTROL  -- Please resume all home medications as instructed and take any newly prescribed medications.  -- Most people find that over-the-counter pain medications (Tylenol combined with Ibuprofen) will be sufficient for most pain control.  -- If you're taking prescription narcotics, do not drive or operate heavy machinery. Do not  drive if your pain is not controlled enough for you to react quickly safely.  -- Take a stool softener with narcotics medications to prevent constipation.    OTHER INSTRUCTIONS  -- Monitor for temp > 101 F, bleeding, redness, purulent drainage, or any sudden, new extreme pain. If any occur, please call our clinic or go to the emergency department if after normal business hours.  -- You may resume your regular diet as tolerated.   -- No heavy lifting (anything >10 lbs or = to a gallon of milk) or strenuous exercise until cleared by a physician.  -- Follow up with Dr. Delgado in two weeks in clinic for a post-op check. If no appointment is made within the week, please call the clinic to schedule.     Call MD for:  temperature >100.4     Call MD for:  persistent nausea and vomiting     Call MD for:  severe uncontrolled pain     Call MD for:  difficulty breathing, headache or visual disturbances     No dressing needed

## 2022-05-03 NOTE — ANESTHESIA POSTPROCEDURE EVALUATION
Anesthesia Post Evaluation    Patient: Abdias Kim    Procedure(s) Performed: Procedure(s) (LRB):  REPAIR, HERNIA, INCISIONAL OR VENTRAL, WITHOUT HISTORY OF PRIOR REPAIR with or without mesh (N/A)    Final Anesthesia Type: general      Patient location during evaluation: PACU  Patient participation: Yes- Able to Participate  Level of consciousness: awake and alert, awake and oriented  Post-procedure vital signs: reviewed and stable  Pain management: adequate  Airway patency: patent    PONV status at discharge: No PONV  Anesthetic complications: no      Cardiovascular status: blood pressure returned to baseline, hemodynamically stable and stable  Respiratory status: unassisted, spontaneous ventilation and room air  Hydration status: euvolemic  Follow-up not needed.          Vitals Value Taken Time   /77 05/03/22 1206   Temp 36.7 °C (98 °F) 05/03/22 1200   Pulse 56 05/03/22 1208   Resp 16 05/03/22 1200   SpO2 99 % 05/03/22 1208   Vitals shown include unvalidated device data.      No case tracking events are documented in the log.      Pain/Terence Score: Pain Rating Prior to Med Admin: 3 (5/3/2022 10:32 AM)  Terence Score: 10 (5/3/2022 11:02 AM)

## 2022-05-03 NOTE — ANESTHESIA PROCEDURE NOTES
Intubation    Date/Time: 5/3/2022 9:26 AM  Performed by: Channing Guerra MD  Authorized by: Channing Guerra MD     Intubation:     Induction:  Intravenous    Intubated:  Postinduction    Mask Ventilation:  Easy mask    Attempts:  1    Attempted By:  Staff anesthesiologist    Method of Intubation:  Direct    Blade:  Jana 3    Laryngeal View Grade: Grade I - full view of cords      Difficult Airway Encountered?: No      Complications:  None    Airway Device:  Oral endotracheal tube    Airway Device Size:  7.5    Style/Cuff Inflation:  Cuffed    Inflation Amount (mL):  5    Tube secured:  20    Placement Verified By:  Capnometry    Complicating Factors:  None    Findings Post-Intubation:  BS equal bilateral

## 2022-05-14 ENCOUNTER — PATIENT MESSAGE (OUTPATIENT)
Dept: INTERNAL MEDICINE | Facility: CLINIC | Age: 67
End: 2022-05-14
Payer: MEDICARE

## 2022-05-14 DIAGNOSIS — J30.1 ALLERGIC RHINITIS DUE TO POLLEN, UNSPECIFIED SEASONALITY: ICD-10-CM

## 2022-05-14 DIAGNOSIS — K21.9 GASTROESOPHAGEAL REFLUX DISEASE WITHOUT ESOPHAGITIS: ICD-10-CM

## 2022-05-14 DIAGNOSIS — I25.10 CORONARY ARTERY DISEASE INVOLVING NATIVE CORONARY ARTERY OF NATIVE HEART WITHOUT ANGINA PECTORIS: Primary | ICD-10-CM

## 2022-05-16 ENCOUNTER — OFFICE VISIT (OUTPATIENT)
Dept: SURGERY | Facility: CLINIC | Age: 67
End: 2022-05-16
Payer: MEDICARE

## 2022-05-16 VITALS
HEART RATE: 56 BPM | HEIGHT: 68 IN | TEMPERATURE: 98 F | DIASTOLIC BLOOD PRESSURE: 80 MMHG | BODY MASS INDEX: 23.94 KG/M2 | WEIGHT: 157.94 LBS | SYSTOLIC BLOOD PRESSURE: 161 MMHG

## 2022-05-16 DIAGNOSIS — K43.9 VENTRAL HERNIA WITHOUT OBSTRUCTION OR GANGRENE: Primary | ICD-10-CM

## 2022-05-16 PROCEDURE — 99213 OFFICE O/P EST LOW 20 MIN: CPT | Mod: PBBFAC | Performed by: SURGERY

## 2022-05-16 PROCEDURE — 99999 PR PBB SHADOW E&M-EST. PATIENT-LVL III: ICD-10-PCS | Mod: PBBFAC,,, | Performed by: SURGERY

## 2022-05-16 PROCEDURE — 99999 PR PBB SHADOW E&M-EST. PATIENT-LVL III: CPT | Mod: PBBFAC,,, | Performed by: SURGERY

## 2022-05-16 PROCEDURE — 99024 POSTOP FOLLOW-UP VISIT: CPT | Mod: POP,,, | Performed by: SURGERY

## 2022-05-16 PROCEDURE — 99024 PR POST-OP FOLLOW-UP VISIT: ICD-10-PCS | Mod: POP,,, | Performed by: SURGERY

## 2022-05-16 RX ORDER — ASPIRIN 325 MG
325 TABLET ORAL DAILY
Qty: 90 TABLET | Refills: 3 | Status: SHIPPED | OUTPATIENT
Start: 2022-05-16 | End: 2022-09-07

## 2022-05-16 RX ORDER — OMEPRAZOLE 20 MG/1
20 CAPSULE, DELAYED RELEASE ORAL
Qty: 90 CAPSULE | Refills: 3 | Status: SHIPPED | OUTPATIENT
Start: 2022-05-16 | End: 2023-07-18 | Stop reason: ALTCHOICE

## 2022-05-16 RX ORDER — FLUTICASONE PROPIONATE 50 MCG
1 SPRAY, SUSPENSION (ML) NASAL 2 TIMES DAILY
Qty: 18.2 ML | Refills: 11 | Status: SHIPPED | OUTPATIENT
Start: 2022-05-16 | End: 2022-08-14

## 2022-05-16 NOTE — PROGRESS NOTES
HPI:  The patient is status post umbilical hernia repair on 5/3. His wound is healing well, his pain is well controlled, denies any F/C/N/V or issues with BM. Denies any drainage or hernia recurrence.    PHYSICAL EXAM:  Physical Exam  Constitutional:       Appearance: Normal appearance.   Abdominal:      Comments: Incision CDI, healing well, no erythema, no hernia   Neurological:      Mental Status: He is alert.         ASSESSMENT:    The patient is doing well after surgery.     PLAN:    Follow up PRN.  Activity: light duty for 4 weeks  Ok to resume regular duty now

## 2022-05-17 ENCOUNTER — PATIENT MESSAGE (OUTPATIENT)
Dept: SURGERY | Facility: CLINIC | Age: 67
End: 2022-05-17
Payer: MEDICARE

## 2022-05-17 ENCOUNTER — PATIENT MESSAGE (OUTPATIENT)
Dept: INTERNAL MEDICINE | Facility: CLINIC | Age: 67
End: 2022-05-17
Payer: MEDICARE

## 2022-05-17 ENCOUNTER — PATIENT MESSAGE (OUTPATIENT)
Dept: CARDIOLOGY | Facility: CLINIC | Age: 67
End: 2022-05-17
Payer: MEDICARE

## 2022-06-10 ENCOUNTER — PATIENT MESSAGE (OUTPATIENT)
Dept: INTERNAL MEDICINE | Facility: CLINIC | Age: 67
End: 2022-06-10
Payer: MEDICARE

## 2022-06-22 ENCOUNTER — PATIENT MESSAGE (OUTPATIENT)
Dept: RESEARCH | Facility: HOSPITAL | Age: 67
End: 2022-06-22
Payer: MEDICARE

## 2022-06-22 ENCOUNTER — PATIENT MESSAGE (OUTPATIENT)
Dept: CARDIOLOGY | Facility: CLINIC | Age: 67
End: 2022-06-22

## 2022-06-22 ENCOUNTER — HOSPITAL ENCOUNTER (OUTPATIENT)
Dept: CARDIOLOGY | Facility: HOSPITAL | Age: 67
Discharge: HOME | End: 2022-06-22
Attending: INTERNAL MEDICINE
Payer: MEDICARE

## 2022-06-22 DIAGNOSIS — I70.0 AORTIC ATHEROSCLEROSIS: ICD-10-CM

## 2022-06-22 DIAGNOSIS — I27.20 PULMONARY HYPERTENSION: ICD-10-CM

## 2022-06-22 DIAGNOSIS — I10 ESSENTIAL HYPERTENSION: ICD-10-CM

## 2022-06-22 DIAGNOSIS — I25.10 CORONARY ARTERY DISEASE INVOLVING NATIVE CORONARY ARTERY OF NATIVE HEART WITHOUT ANGINA PECTORIS: ICD-10-CM

## 2022-06-22 DIAGNOSIS — E78.49 OTHER HYPERLIPIDEMIA: ICD-10-CM

## 2022-06-22 DIAGNOSIS — I48.0 PAROXYSMAL ATRIAL FIBRILLATION: ICD-10-CM

## 2022-06-22 DIAGNOSIS — Z95.1 S/P CABG X 3: ICD-10-CM

## 2022-06-22 LAB
AV INDEX (PROSTH): 0.81
AV MEAN GRADIENT: 5 MMHG
AV PEAK GRADIENT: 9 MMHG
AV VALVE AREA: 3.14 CM2
AV VELOCITY RATIO: 0.74
CV ECHO LV RWT: 0.4 CM
DOP CALC AO PEAK VEL: 1.54 M/S
DOP CALC AO VTI: 30.98 CM
DOP CALC LVOT AREA: 3.9 CM2
DOP CALC LVOT DIAMETER: 2.22 CM
DOP CALC LVOT PEAK VEL: 1.14 M/S
DOP CALC LVOT STROKE VOLUME: 97.26 CM3
DOP CALCLVOT PEAK VEL VTI: 25.14 CM
E WAVE DECELERATION TIME: 251.45 MSEC
E/A RATIO: 1.58
E/E' RATIO: 6.61 M/S
ECHO LV POSTERIOR WALL: 0.98 CM (ref 0.6–1.1)
EJECTION FRACTION: 60 %
FRACTIONAL SHORTENING: 35 % (ref 28–44)
INTERVENTRICULAR SEPTUM: 0.93 CM (ref 0.6–1.1)
IVRT: 106.11 MSEC
LA MAJOR: 5.88 CM
LA MINOR: 6.3 CM
LA WIDTH: 4.93 CM
LEFT ATRIUM SIZE: 5.41 CM
LEFT ATRIUM VOLUME: 137.9 CM3
LEFT INTERNAL DIMENSION IN SYSTOLE: 3.2 CM (ref 2.1–4)
LEFT VENTRICLE DIASTOLIC VOLUME: 116.18 ML
LEFT VENTRICLE SYSTOLIC VOLUME: 40.82 ML
LEFT VENTRICULAR INTERNAL DIMENSION IN DIASTOLE: 4.96 CM (ref 3.5–6)
LEFT VENTRICULAR MASS: 168.85 G
LV LATERAL E/E' RATIO: 5.43 M/S
LV SEPTAL E/E' RATIO: 8.44 M/S
MV PEAK A VEL: 0.48 M/S
MV PEAK E VEL: 0.76 M/S
MV STENOSIS PRESSURE HALF TIME: 72.92 MS
MV VALVE AREA P 1/2 METHOD: 3.02 CM2
PISA TR MAX VEL: 2.5 M/S
PULM VEIN S/D RATIO: 1.09
PV PEAK D VEL: 0.58 M/S
PV PEAK S VEL: 0.63 M/S
PV PEAK VELOCITY: 1.04 CM/S
RA MAJOR: 5.72 CM
RA PRESSURE: 3 MMHG
RA WIDTH: 4.79 CM
RIGHT VENTRICULAR END-DIASTOLIC DIMENSION: 4.66 CM
SINUS: 3.46 CM
STJ: 2.81 CM
TDI LATERAL: 0.14 M/S
TDI SEPTAL: 0.09 M/S
TDI: 0.12 M/S
TR MAX PG: 25 MMHG
TRICUSPID ANNULAR PLANE SYSTOLIC EXCURSION: 1.72 CM
TV REST PULMONARY ARTERY PRESSURE: 28 MMHG

## 2022-06-22 PROCEDURE — 93306 TTE W/DOPPLER COMPLETE: CPT | Mod: 26,,, | Performed by: INTERNAL MEDICINE

## 2022-06-22 PROCEDURE — 93306 TTE W/DOPPLER COMPLETE: CPT

## 2022-06-22 PROCEDURE — 93306 ECHO (CUPID ONLY): ICD-10-PCS | Mod: 26,,, | Performed by: INTERNAL MEDICINE

## 2022-07-25 ENCOUNTER — PATIENT MESSAGE (OUTPATIENT)
Dept: INTERNAL MEDICINE | Facility: CLINIC | Age: 67
End: 2022-07-25
Payer: MEDICARE

## 2022-08-04 ENCOUNTER — LAB VISIT (OUTPATIENT)
Dept: LAB | Facility: HOSPITAL | Age: 67
End: 2022-08-04
Attending: INTERNAL MEDICINE
Payer: MEDICARE

## 2022-08-04 DIAGNOSIS — I25.10 CORONARY ARTERY DISEASE INVOLVING NATIVE CORONARY ARTERY OF NATIVE HEART WITHOUT ANGINA PECTORIS: ICD-10-CM

## 2022-08-04 DIAGNOSIS — I10 ESSENTIAL HYPERTENSION: ICD-10-CM

## 2022-08-04 DIAGNOSIS — Z12.5 SCREENING FOR PROSTATE CANCER: ICD-10-CM

## 2022-08-04 LAB
ALBUMIN SERPL BCP-MCNC: 3.8 G/DL (ref 3.5–5.2)
ALP SERPL-CCNC: 64 U/L (ref 55–135)
ALT SERPL W/O P-5'-P-CCNC: 30 U/L (ref 10–44)
ANION GAP SERPL CALC-SCNC: 8 MMOL/L (ref 8–16)
AST SERPL-CCNC: 23 U/L (ref 10–40)
BASOPHILS # BLD AUTO: 0.02 K/UL (ref 0–0.2)
BASOPHILS NFR BLD: 0.5 % (ref 0–1.9)
BILIRUB SERPL-MCNC: 0.4 MG/DL (ref 0.1–1)
BUN SERPL-MCNC: 21 MG/DL (ref 8–23)
CALCIUM SERPL-MCNC: 9.1 MG/DL (ref 8.7–10.5)
CHLORIDE SERPL-SCNC: 111 MMOL/L (ref 95–110)
CHOLEST SERPL-MCNC: 121 MG/DL (ref 120–199)
CHOLEST/HDLC SERPL: 2.1 {RATIO} (ref 2–5)
CO2 SERPL-SCNC: 24 MMOL/L (ref 23–29)
COMPLEXED PSA SERPL-MCNC: 0.67 NG/ML (ref 0–4)
CREAT SERPL-MCNC: 1.3 MG/DL (ref 0.5–1.4)
DIFFERENTIAL METHOD: ABNORMAL
EOSINOPHIL # BLD AUTO: 0.3 K/UL (ref 0–0.5)
EOSINOPHIL NFR BLD: 6 % (ref 0–8)
ERYTHROCYTE [DISTWIDTH] IN BLOOD BY AUTOMATED COUNT: 16.2 % (ref 11.5–14.5)
EST. GFR  (NO RACE VARIABLE): 59.5 ML/MIN/1.73 M^2
GLUCOSE SERPL-MCNC: 89 MG/DL (ref 70–110)
HCT VFR BLD AUTO: 35.7 % (ref 40–54)
HDLC SERPL-MCNC: 59 MG/DL (ref 40–75)
HDLC SERPL: 48.8 % (ref 20–50)
HGB BLD-MCNC: 11.5 G/DL (ref 14–18)
IMM GRANULOCYTES # BLD AUTO: 0.01 K/UL (ref 0–0.04)
IMM GRANULOCYTES NFR BLD AUTO: 0.2 % (ref 0–0.5)
LDLC SERPL CALC-MCNC: 56 MG/DL (ref 63–159)
LYMPHOCYTES # BLD AUTO: 1.6 K/UL (ref 1–4.8)
LYMPHOCYTES NFR BLD: 38 % (ref 18–48)
MCH RBC QN AUTO: 22.7 PG (ref 27–31)
MCHC RBC AUTO-ENTMCNC: 32.2 G/DL (ref 32–36)
MCV RBC AUTO: 71 FL (ref 82–98)
MONOCYTES # BLD AUTO: 0.4 K/UL (ref 0.3–1)
MONOCYTES NFR BLD: 9.3 % (ref 4–15)
NEUTROPHILS # BLD AUTO: 1.9 K/UL (ref 1.8–7.7)
NEUTROPHILS NFR BLD: 46 % (ref 38–73)
NONHDLC SERPL-MCNC: 62 MG/DL
NRBC BLD-RTO: 0 /100 WBC
PLATELET # BLD AUTO: 177 K/UL (ref 150–450)
PMV BLD AUTO: 11.7 FL (ref 9.2–12.9)
POTASSIUM SERPL-SCNC: 4.6 MMOL/L (ref 3.5–5.1)
PROT SERPL-MCNC: 6.9 G/DL (ref 6–8.4)
RBC # BLD AUTO: 5.06 M/UL (ref 4.6–6.2)
SODIUM SERPL-SCNC: 143 MMOL/L (ref 136–145)
TRIGL SERPL-MCNC: 30 MG/DL (ref 30–150)
WBC # BLD AUTO: 4.18 K/UL (ref 3.9–12.7)

## 2022-08-04 PROCEDURE — 80053 COMPREHEN METABOLIC PANEL: CPT | Performed by: INTERNAL MEDICINE

## 2022-08-04 PROCEDURE — 36415 COLL VENOUS BLD VENIPUNCTURE: CPT | Mod: PO | Performed by: INTERNAL MEDICINE

## 2022-08-04 PROCEDURE — 85025 COMPLETE CBC W/AUTO DIFF WBC: CPT | Performed by: INTERNAL MEDICINE

## 2022-08-04 PROCEDURE — 80061 LIPID PANEL: CPT | Performed by: INTERNAL MEDICINE

## 2022-08-04 PROCEDURE — 84153 ASSAY OF PSA TOTAL: CPT | Performed by: INTERNAL MEDICINE

## 2022-08-10 RX ORDER — HYDRALAZINE HYDROCHLORIDE 25 MG/1
25 TABLET, FILM COATED ORAL 4 TIMES DAILY
COMMUNITY
Start: 2022-03-23 | End: 2022-08-11

## 2022-08-11 ENCOUNTER — OFFICE VISIT (OUTPATIENT)
Dept: INTERNAL MEDICINE | Facility: CLINIC | Age: 67
End: 2022-08-11
Payer: MEDICARE

## 2022-08-11 VITALS
HEIGHT: 68 IN | BODY MASS INDEX: 24.06 KG/M2 | WEIGHT: 158.75 LBS | DIASTOLIC BLOOD PRESSURE: 60 MMHG | OXYGEN SATURATION: 98 % | SYSTOLIC BLOOD PRESSURE: 132 MMHG | HEART RATE: 48 BPM

## 2022-08-11 DIAGNOSIS — I27.20 PULMONARY HYPERTENSION: Primary | ICD-10-CM

## 2022-08-11 DIAGNOSIS — Z23 NEED FOR SHINGLES VACCINE: ICD-10-CM

## 2022-08-11 DIAGNOSIS — K21.9 GASTROESOPHAGEAL REFLUX DISEASE WITHOUT ESOPHAGITIS: ICD-10-CM

## 2022-08-11 DIAGNOSIS — I25.10 CORONARY ARTERY DISEASE INVOLVING NATIVE CORONARY ARTERY OF NATIVE HEART WITHOUT ANGINA PECTORIS: ICD-10-CM

## 2022-08-11 DIAGNOSIS — I10 PRIMARY HYPERTENSION: ICD-10-CM

## 2022-08-11 DIAGNOSIS — I25.118 ATHEROSCLEROTIC HEART DISEASE OF NATIVE CORONARY ARTERY WITH OTHER FORMS OF ANGINA PECTORIS: ICD-10-CM

## 2022-08-11 DIAGNOSIS — J34.89 IRRITATION OF NOSE: ICD-10-CM

## 2022-08-11 DIAGNOSIS — I70.0 AORTIC ATHEROSCLEROSIS: ICD-10-CM

## 2022-08-11 DIAGNOSIS — I48.0 PAROXYSMAL ATRIAL FIBRILLATION: ICD-10-CM

## 2022-08-11 DIAGNOSIS — D50.8 OTHER IRON DEFICIENCY ANEMIA: ICD-10-CM

## 2022-08-11 PROCEDURE — 99214 OFFICE O/P EST MOD 30 MIN: CPT | Mod: S$PBB,,, | Performed by: NURSE PRACTITIONER

## 2022-08-11 PROCEDURE — 99214 PR OFFICE/OUTPT VISIT, EST, LEVL IV, 30-39 MIN: ICD-10-PCS | Mod: S$PBB,,, | Performed by: NURSE PRACTITIONER

## 2022-08-11 PROCEDURE — 99214 OFFICE O/P EST MOD 30 MIN: CPT | Mod: PBBFAC | Performed by: NURSE PRACTITIONER

## 2022-08-11 PROCEDURE — 99999 PR PBB SHADOW E&M-EST. PATIENT-LVL IV: ICD-10-PCS | Mod: PBBFAC,,, | Performed by: NURSE PRACTITIONER

## 2022-08-11 PROCEDURE — 99999 PR PBB SHADOW E&M-EST. PATIENT-LVL IV: CPT | Mod: PBBFAC,,, | Performed by: NURSE PRACTITIONER

## 2022-08-11 NOTE — PATIENT INSTRUCTIONS
Annual labs stable    Shingles vaccine sent to the pharmacy at [facility]    Keep appt with Cardiology on 8-16-22 with Dr. Aguilar next week as scheduled    Apply neosporin to bridge of nose if irritation occurs again

## 2022-08-11 NOTE — PROGRESS NOTES
Subjective:       Patient ID: Abdias Kim is a 67 y.o. male.    Chief Complaint: Annual Exam    Pt of Dr. Valdivia, here for annual    Last annual with PCP was 8-11-21    Had labs done prior to appt    Has had on and off irritation to bridge of nose with a stinging sensation. Denies injury or bite. Denies blistering.    I have reviewed the HPI/ROS info pt entered in portal prior to visit today below       Review of Systems   Constitutional: Negative for activity change, appetite change and unexpected weight change.   HENT: Negative for hearing loss, rhinorrhea, trouble swallowing and voice change.    Eyes: Negative for discharge and visual disturbance.   Respiratory: Negative for apnea, cough, chest tightness, shortness of breath and wheezing.    Cardiovascular: Negative for chest pain, palpitations and leg swelling.   Gastrointestinal: Negative for abdominal distention, abdominal pain, blood in stool, constipation, diarrhea, nausea and vomiting.   Endocrine: Negative for cold intolerance, heat intolerance, polydipsia, polyphagia and polyuria.   Genitourinary: Negative for difficulty urinating, dysuria, hematuria, penile pain and urgency.   Musculoskeletal: Negative for arthralgias, joint swelling, myalgias and neck pain.   Integumentary:  Negative for color change, pallor, rash and wound.        Skin sensation to nose as documented in HPI   Allergic/Immunologic: Negative for environmental allergies and food allergies.   Neurological: Negative for dizziness, weakness and headaches.   Hematological: Negative for adenopathy. Does not bruise/bleed easily.   Psychiatric/Behavioral: Negative for agitation, behavioral problems, confusion, dysphoric mood, sleep disturbance and suicidal ideas.     Review of patient's allergies indicates:   Allergen Reactions    Allegra-d 12 hour [fexofenadine-pseudoephedrine] Other (See Comments)     Trouble urinating    Mucinex d [pseudoephedrine-guaifenesin] Other (See Comments)      Trouble urinating    Losartan-hydrochlorothiazide Hives and Rash     Other reaction(s): Hives       Current Outpatient Medications:     aspirin 325 MG tablet, Take 1 tablet (325 mg total) by mouth once daily., Disp: 90 tablet, Rfl: 3    cetirizine (ZYRTEC) 10 MG tablet, Take 1 tablet by mouth Daily., Disp: , Rfl:     diltiaZEM (CARDIZEM CD) 120 MG Cp24, Take 1 capsule (120 mg total) by mouth once daily., Disp: 90 capsule, Rfl: 3    fluticasone propionate (FLONASE) 50 mcg/actuation nasal spray, 1 spray (50 mcg total) by Each Nostril route 2 (two) times a day., Disp: 18.2 mL, Rfl: 11    multivitamin (THERAGRAN) per tablet, Take 1 tablet by mouth once daily., Disp: , Rfl:     omeprazole (PRILOSEC) 20 MG capsule, Take 1 capsule (20 mg total) by mouth before breakfast., Disp: 90 capsule, Rfl: 3    sildenafiL (VIAGRA) 100 MG tablet, Take 0.5 tablets (50 mg total) by mouth as needed for Erectile Dysfunction., Disp: 10 tablet, Rfl: 3    atorvastatin (LIPITOR) 40 MG tablet, Take 1 tablet (40 mg total) by mouth every evening., Disp: 90 tablet, Rfl: 3    varicella-zoster gE-AS01B, PF, (SHINGRIX) 50 mcg/0.5 mL injection, Inject 0.5 mLs into the muscle once. for 1 dose, Disp: 1 each, Rfl: 0    Current Facility-Administered Medications:     acetaminophen tablet 650 mg, 650 mg, Oral, Once PRN, Stephane Cruz MD    albuterol inhaler 2 puff, 2 puff, Inhalation, Q20 Min PRN, Stephane Cruz MD    diphenhydrAMINE injection 25 mg, 25 mg, Intravenous, Once PRN, Stephane Cruz MD    EPINEPHrine (EPIPEN) 0.3 mg/0.3 mL pen injection 0.3 mg, 0.3 mg, Intramuscular, PRN, Stephane Cruz MD    ondansetron disintegrating tablet 4 mg, 4 mg, Oral, Once PRN, Stephane Cruz MD    Patient Active Problem List   Diagnosis    Hypertension    Allergic rhinitis    Diverticulosis    Anemia, iron deficiency    External hemorrhoids    Tubular adenoma of colon    Tongue laceration    ED (erectile dysfunction)     Ventral hernia without obstruction or gangrene    GERD (gastroesophageal reflux disease)    Eosinophilic esophagitis    Abnormal cardiovascular stress test    CAD (coronary artery disease)    Atherosclerotic heart disease of native coronary artery with other forms of angina pectoris    Hypophosphatemia    Acute blood loss anemia    Paroxysmal atrial fibrillation    Pain of left thigh    S/P CABG (coronary artery bypass graft)    Pulmonary hypertension    Aortic atherosclerosis     Past Medical History:   Diagnosis Date    Anemia     Diverticulosis     Hypertension      Past Surgical History:   Procedure Laterality Date    APPENDECTOMY      COLONOSCOPY N/A 1/31/2017    Procedure: COLONOSCOPY;  Surgeon: BASILIO Winn MD;  Location: [facility] ([facility]);  Service: Endoscopy;  Laterality: N/A;    COLONOSCOPY N/A 2/5/2020    Procedure: COLONOSCOPY;  Surgeon: Cody Maria MD;  Location: [facility] ([address] [place] [address]);  Service: Endoscopy;  Laterality: N/A;  OKay for Crow or ghazala. Needs to be done in Jan 2020    CORONARY ARTERY BYPASS GRAFT (CABG) N/A 4/26/2021    Procedure: CORONARY ARTERY BYPASS GRAFT (CABG)X3 WITH VEIN HARVEST;  Surgeon: Fidencio Galindo MD;  Location: [facility] OR [address] [place] [address];  Service: Cardiovascular;  Laterality: N/A;    ENDOSCOPIC HARVEST OF VEIN Left 4/26/2021    Procedure: HARVEST-VEIN-ENDOVASCULAR FOR CABGX3;  Surgeon: Fidencio Galindo MD;  Location: [facility] OR [address] [place] [address];  Service: Cardiovascular;  Laterality: Left;  Vein Scurry Start time: 1201pm  Vein Scurry Stop time: 1226pm    Vein Prep Start time: 1227pm  Vein Prep Stop time: 1250pm    ESOPHAGOGASTRODUODENOSCOPY N/A 2/5/2020    Procedure: EGD (ESOPHAGOGASTRODUODENOSCOPY);  Surgeon: Cody Maria MD;  Location: [facility] ([facility]);  Service: Endoscopy;  Laterality: N/A;    ESOPHAGOGASTRODUODENOSCOPY N/A 4/30/2020    Procedure: EGD (ESOPHAGOGASTRODUODENOSCOPY);  Surgeon: Cody Maria MD;  Location: [facility] ([facility]);   Service: Endoscopy;  Laterality: N/A;  schedule 12 weeks from now  4/13/20 - removed from 4/29/20, needs to be rescheduled high priority - pg  4/28/20-scheduled from high priority list-BB  Covid screening test scheduled for 4/29/20-BB  instructions emailed to patient-BB    EXCLUSION OF LEFT ATRIAL APPENDAGE N/A 4/26/2021    Procedure: EXCLUSION, LEFT ATRIAL APPENDAGE;  Surgeon: Fidencio Galindo MD;  Location: [facility] OR [address] [place] [address];  Service: Cardiovascular;  Laterality: N/A;  Left Atrial Appendage Resection    LEFT HEART CATHETERIZATION Left 4/21/2021    Procedure: Left heart cath;  Surgeon: Aric Alvarado MD;  Location: [facility] CATH LAB;  Service: Cardiology;  Laterality: Left;    UMBILICAL HERNIA REPAIR N/A 3/31/2022    Procedure: REPAIR, HERNIA, UMBILICAL, AGE 5 YEARS OR OLDER Open With or Without Mesh;  Surgeon: Matt Delgado MD;  Location: [facility] OR [address] [place] [address];  Service: General;  Laterality: N/A;     Social History     Socioeconomic History    Marital status:    Tobacco Use    Smoking status: Never Smoker    Smokeless tobacco: Never Used   Substance and Sexual Activity    Alcohol use: Yes     Comment: few times a week     Drug use: No     Social Determinants of Health     Financial Resource Strain: Low Risk     Difficulty of Paying Living Expenses: Not hard at all   Food Insecurity: No Food Insecurity    Worried About Running Out of Food in the Last Year: Never true    Ran Out of Food in the Last Year: Never true   Transportation Needs: No Transportation Needs    Lack of Transportation (Medical): No    Lack of Transportation (Non-Medical): No   Physical Activity: Sufficiently Active    Days of Exercise per Week: 6 days    Minutes of Exercise per Session: 30 min   Stress: Stress Concern Present    Feeling of Stress : To some extent   Social Connections: Unknown    Frequency of Communication with Friends and Family: Twice a week    Frequency of Social Gatherings with Friends and Family: Once  "a week    Active Member of Clubs or Organizations: No    Attends Club or Organization Meetings: Never    Marital Status:    Housing Stability: Low Risk     Unable to Pay for Housing in the Last Year: No    Number of Places Lived in the Last Year: 1    Unstable Housing in the Last Year: No     Family History   Problem Relation Age of Onset    Heart disease Mother         mi    Cancer Father         colon/prostate    Colon polyps Father     Stroke Neg Hx     Diabetes Neg Hx     Celiac disease Neg Hx     Esophageal cancer Neg Hx     Liver cancer Neg Hx     Liver disease Neg Hx     Stomach cancer Neg Hx     Rectal cancer Neg Hx            Objective:       Vitals:    08/11/22 1333   BP: 132/60   Pulse: (!) 48   SpO2: 98%   Weight: 72 kg (158 lb 11.7 oz)   Height: 5' 8" (1.727 m)   PainSc: 0-No pain     Body mass index is 24.14 kg/m².    Physical Exam  Vitals reviewed.   Constitutional:       Appearance: Normal appearance. He is well-developed and normal weight.   HENT:      Head: Normocephalic.      Right Ear: Tympanic membrane, ear canal and external ear normal. There is no impacted cerumen.      Left Ear: Tympanic membrane, ear canal and external ear normal. There is no impacted cerumen.      Nose: Nose normal.      Mouth/Throat:      Mouth: Mucous membranes are moist.      Pharynx: Oropharynx is clear.   Eyes:      General: Lids are normal. Lids are everted, no foreign bodies appreciated.      Extraocular Movements: Extraocular movements intact.      Conjunctiva/sclera: Conjunctivae normal.      Pupils: Pupils are equal, round, and reactive to light.   Neck:      Vascular: No carotid bruit or JVD.      Trachea: Trachea normal.   Cardiovascular:      Rate and Rhythm: Regular rhythm. Bradycardia present.      Pulses: Normal pulses.      Heart sounds: Normal heart sounds.   Pulmonary:      Effort: Pulmonary effort is normal.      Breath sounds: Normal breath sounds.   Abdominal:      General: " Abdomen is flat. Bowel sounds are normal.      Palpations: Abdomen is soft.   Musculoskeletal:         General: Normal range of motion.      Cervical back: Full passive range of motion without pain, normal range of motion and neck supple.   Skin:     General: Skin is warm and dry.      Capillary Refill: Capillary refill takes less than 2 seconds.      Findings: No rash.   Neurological:      General: No focal deficit present.      Mental Status: He is alert and oriented to person, place, and time.   Psychiatric:         Mood and Affect: Mood normal.         Speech: Speech normal.         Behavior: Behavior normal.         Thought Content: Thought content normal.         Judgment: Judgment normal.           Reviewed labs done prior to appt below:  Lipid Panel  Order: [id number]   Collected 8/4/2022 10:29     1 Result Note     1 Patient Communication     1  Topic    Component Ref Range & Units 7 d ago    Cholesterol 120 - 199 mg/dL 121    Comment: The National Cholesterol Education Program (NCEP) has set the   following guidelines (reference ranges) for Cholesterol:   Optimal.....................<200 mg/dL   Borderline High.............200-239 mg/dL   High........................> or = 240 mg/dL    Triglycerides 30 - 150 mg/dL 30    Comment: The National Cholesterol Education Program (NCEP) has set the   following guidelines (reference values) for triglycerides:   Normal......................<150 mg/dL   Borderline High.............150-199 mg/dL   High........................200-499 mg/dL    HDL 40 - 75 mg/dL 59    Comment: The National Cholesterol Education Program (NCEP) has set the   following guidelines (reference values) for HDL Cholesterol:   Low...............<40 mg/dL   Optimal...........>60 mg/dL    LDL Cholesterol 63.0 - 159.0 mg/dL 56.0 Low     Comment: The National Cholesterol Education Program (NCEP) has set the   following guidelines (reference values) for LDL Cholesterol:    Optimal.......................<130 mg/dL   Borderline High...............130-159 mg/dL   High..........................160-189 mg/dL   Very High.....................>190 mg/dL    HDL/Cholesterol Ratio 20.0 - 50.0 % 48.8    Total Cholesterol/HDL Ratio 2.0 - 5.0 2.1    Non-HDL Cholesterol mg/dL 62    Comment: Risk category and Non-HDL cholesterol goals:   Coronary heart disease (CHD)or equivalent (10-year risk of CHD >20%):   Non-HDL cholesterol goal     <130 mg/dL   Two or more CHD risk factors and 10-year risk of CHD <= 20%:   Non-HDL cholesterol goal     <160 mg/dL   0 to 1 CHD risk factor:   Non-HDL cholesterol goal     <190 mg/dL         View Full Report             Result Care Coordination  Result Notes     Melquiades Valdivia MD   8/5/2022  1:22 PM CDT Back to Top        Released to portal with message    Patient Communication     Append Comments   Seen Back to Top      Your studies are in the acceptable range.  Please call or e-mail with any questions.  PC   Written by Melquiades Valdivia MD on 8/5/2022  1:22 PM CDT  Seen by patient Abdias Kim on 8/5/2022  5:37 PM    Satisfied Health Maintenance Topics       Back to Top  Lipid Panel (Yearly)  Next due on 8/4/2023  Address Topic         PSA, Screening  Order: [id number]   Collected 8/4/2022 10:29     1 Result Note     1 Patient Communication     1  Topic    Component Ref Range & Units 7 d ago    PSA, Screen 0.00 - 4.00 ng/mL 0.67    Comment: The testing method is a chemiluminescent microparticle immunoassay   manufactured by [employer] and performed on the [employer]zzle   or   Poikos system. Values obtained with different assay manufacturers   for   methods may be different and cannot be used interchangeably.   PSA Expected levels:   Hormonal Therapy: <0.05 ng/ml   Prostatectomy: <0.01 ng/ml   Radiation Therapy: <1.00 ng/ml         View Full Report             Result Care Coordination  Result Notes     Melquiades Valdivia MD   8/5/2022  1:22 PM CDT Back to Top         Released to portal with message    Patient Communication     Append Comments   Seen Back to Top      Your studies are in the acceptable range.  Please call or e-mail with any questions.  PC   Written by Melquiades Valdivia MD on 8/5/2022  1:22 PM CDT  Seen by patient Abdias Kim on 8/5/2022  5:34 PM    Satisfied Health Maintenance Topics       Back to Top  PROSTATE-SPECIFIC ANTIGEN (Yearly)  Next due on 8/4/2023  Address Topic          Contains abnormal data Comprehensive Metabolic Panel  Order: [id number]   Collected 8/4/2022 10:29     1 Result Note     1 Patient Communication    Component Ref Range & Units 7 d ago    Sodium 136 - 145 mmol/L 143    Potassium 3.5 - 5.1 mmol/L 4.6    Chloride 95 - 110 mmol/L 111 High     CO2 23 - 29 mmol/L 24    Glucose 70 - 110 mg/dL 89    BUN 8 - 23 mg/dL 21    Creatinine 0.5 - 1.4 mg/dL 1.3    Calcium 8.7 - 10.5 mg/dL 9.1    Total Protein 6.0 - 8.4 g/dL 6.9    Albumin 3.5 - 5.2 g/dL 3.8    Total Bilirubin 0.1 - 1.0 mg/dL 0.4    Comment: For infants and newborns, interpretation of results should be based   on gestational age, weight and in agreement with clinical   observations.     Premature Infant recommended reference ranges:   Up to 24 hours.............<8.0 mg/dL   Up to 48 hours............<12.0 mg/dL   3-5 days..................<15.0 mg/dL   6-29 days.................<15.0 mg/dL    Alkaline Phosphatase 55 - 135 U/L 64    AST 10 - 40 U/L 23    ALT 10 - 44 U/L 30    Anion Gap 8 - 16 mmol/L 8    eGFR >60 mL/min/1.73 m^2 59.5 Abnormal          View Full Report             Result Care Coordination  Result Notes     Melquiades Valdivia MD   8/5/2022  1:22 PM CDT Back to Top        Released to portal with message    Patient Communication     Append Comments   Seen Back to Top      Your studies are in the acceptable range.  Please call or e-mail with any questions.  PC   Written by Melquiades Valdivia MD on 8/5/2022  1:22 PM CDT  Seen by patient Abdias Kim on 8/5/2022  5:35 PM          Contains abnormal data CBC Auto Differential  Order: [id number]   Collected 8/4/2022 10:29     1 Result Note     1 Patient Communication    Component Ref Range & Units 7 d ago    WBC 3.90 - 12.70 K/uL 4.18    RBC 4.60 - 6.20 M/uL 5.06    Hemoglobin 14.0 - 18.0 g/dL 11.5 Low     Hematocrit 40.0 - 54.0 % 35.7 Low     MCV 82 - 98 fL 71 Low     MCH 27.0 - 31.0 pg 22.7 Low     MCHC 32.0 - 36.0 g/dL 32.2    RDW 11.5 - 14.5 % 16.2 High     Platelets 150 - 450 K/uL 177    MPV 9.2 - 12.9 fL 11.7    Immature Granulocytes 0.0 - 0.5 % 0.2    Gran # (ANC) 1.8 - 7.7 K/uL 1.9    Immature Grans (Abs) 0.00 - 0.04 K/uL 0.01    Comment: Mild elevation in immature granulocytes is non specific and   can be seen in a variety of conditions including stress response,   acute inflammation, trauma and pregnancy. Correlation with other   laboratory and clinical findings is essential.    Lymph # 1.0 - 4.8 K/uL 1.6    Mono # 0.3 - 1.0 K/uL 0.4    Eos # 0.0 - 0.5 K/uL 0.3    Baso # 0.00 - 0.20 K/uL 0.02    nRBC 0 /100 WBC 0    Gran % 38.0 - 73.0 % 46.0    Lymph % 18.0 - 48.0 % 38.0    Mono % 4.0 - 15.0 % 9.3    Eosinophil % 0.0 - 8.0 % 6.0    Basophil % 0.0 - 1.9 % 0.5    Differential Method  Automated         View Full Report             Result Care Coordination  Result Notes     Melquiades Valdivia MD   8/5/2022  1:22 PM CDT Back to Top        Released to portal with message    Patient Communication     Append Comments   Seen Back to Top      Your studies are in the acceptable range.  Please call or e-mail with any questions.  PC   Written by Melquiades Valdivia MD on 8/5/2022  1:22 PM CDT  Seen by patient Abdias Kim on 8/5/2022  5:30 PM            Assessment:       Problem List Items Addressed This Visit        Pulmonary    Pulmonary hypertension - Primary       Cardiac/Vascular    Hypertension    CAD (coronary artery disease)    Atherosclerotic heart disease of native coronary artery with other forms of angina pectoris    Paroxysmal atrial  fibrillation    Aortic atherosclerosis       Oncology    Anemia, iron deficiency       GI    GERD (gastroesophageal reflux disease)      Other Visit Diagnoses     Irritation of nose        Need for shingles vaccine        Relevant Medications    varicella-zoster gE-AS01B, PF, (SHINGRIX) 50 mcg/0.5 mL injection    BMI 24.0-24.9, adult              Plan:       Abdias was seen today for annual exam.    Diagnoses and all orders for this visit:    Pulmonary hypertension  Chronic, followed by PCP    Aortic atherosclerosis  Chronic, followed by PCP    Atherosclerotic heart disease of native coronary artery with other forms of angina pectoris  Chronic, followed by Cardiology    Primary hypertension  Your annual labs are stable. Repeat in 1 yr with annual.    Maria Dolores Lujan, ALEM, FNP-C    Take medications as prescribed.    Monitor BP at home, goal BP < or = 140/80, call office if consistently above this range.    Follow low salt DASH diet and exercise.    BMI reviewed.    Go to ED if Headaches, blurred vision, chest pain, or SOB occurs along with elevated readings > or = 160/90.    Paroxysmal atrial fibrillation  Chronic, followed by Cardiology    Coronary artery disease involving native coronary artery of native heart without angina pectoris  Chronic, followed by Cardiology    Other iron deficiency anemia  Stable, followed by PCP    Gastroesophageal reflux disease without esophagitis  GERD stable on meds, education on dietary triggers done    Irritation of nose  Apply neosporin to bridge of nose if irritation occurs again    Need for shingles vaccine  -     varicella-zoster gE-AS01B, PF, (SHINGRIX) 50 mcg/0.5 mL injection; Inject 0.5 mLs into the muscle once. for 1 dose    BMI 24.0-24.9, adult  BMI reviewed    Annual labs stable    Shingles vaccine sent to the pharmacy at [facility]    Keep appt with Cardiology on 8-16-22 with Dr. Aguilar next week as scheduled    Follow up in about 1 year (around 8/11/2023) for annual or  sooner as needed with PCP Dr. Valdivia.

## 2022-09-02 ENCOUNTER — PATIENT MESSAGE (OUTPATIENT)
Dept: CARDIOLOGY | Facility: CLINIC | Age: 67
End: 2022-09-02
Payer: MEDICARE

## 2022-09-06 RX ORDER — ATORVASTATIN CALCIUM 40 MG/1
40 TABLET, FILM COATED ORAL NIGHTLY
Qty: 90 TABLET | Refills: 3 | Status: SHIPPED | OUTPATIENT
Start: 2022-09-06 | End: 2023-09-03 | Stop reason: SDUPTHER

## 2022-09-07 ENCOUNTER — OFFICE VISIT (OUTPATIENT)
Dept: CARDIOLOGY | Facility: CLINIC | Age: 67
End: 2022-09-07
Payer: MEDICARE

## 2022-09-07 VITALS
BODY MASS INDEX: 24.14 KG/M2 | WEIGHT: 158.75 LBS | DIASTOLIC BLOOD PRESSURE: 70 MMHG | OXYGEN SATURATION: 100 % | SYSTOLIC BLOOD PRESSURE: 154 MMHG | HEART RATE: 47 BPM

## 2022-09-07 DIAGNOSIS — I10 ESSENTIAL HYPERTENSION: ICD-10-CM

## 2022-09-07 DIAGNOSIS — Z95.1 S/P CABG X 3: ICD-10-CM

## 2022-09-07 DIAGNOSIS — I25.10 CORONARY ARTERY DISEASE INVOLVING NATIVE CORONARY ARTERY OF NATIVE HEART WITHOUT ANGINA PECTORIS: Primary | ICD-10-CM

## 2022-09-07 DIAGNOSIS — I27.20 PULMONARY HYPERTENSION: ICD-10-CM

## 2022-09-07 DIAGNOSIS — E78.49 OTHER HYPERLIPIDEMIA: ICD-10-CM

## 2022-09-07 DIAGNOSIS — I70.0 AORTIC ATHEROSCLEROSIS: ICD-10-CM

## 2022-09-07 PROCEDURE — 93005 ELECTROCARDIOGRAM TRACING: CPT | Mod: PBBFAC | Performed by: INTERNAL MEDICINE

## 2022-09-07 PROCEDURE — 99999 PR PBB SHADOW E&M-EST. PATIENT-LVL III: CPT | Mod: PBBFAC,,, | Performed by: INTERNAL MEDICINE

## 2022-09-07 PROCEDURE — 99999 PR PBB SHADOW E&M-EST. PATIENT-LVL III: ICD-10-PCS | Mod: PBBFAC,,, | Performed by: INTERNAL MEDICINE

## 2022-09-07 PROCEDURE — 99213 OFFICE O/P EST LOW 20 MIN: CPT | Mod: PBBFAC | Performed by: INTERNAL MEDICINE

## 2022-09-07 PROCEDURE — 99214 PR OFFICE/OUTPT VISIT, EST, LEVL IV, 30-39 MIN: ICD-10-PCS | Mod: S$PBB,,, | Performed by: INTERNAL MEDICINE

## 2022-09-07 PROCEDURE — 99214 OFFICE O/P EST MOD 30 MIN: CPT | Mod: S$PBB,,, | Performed by: INTERNAL MEDICINE

## 2022-09-07 PROCEDURE — 93010 ELECTROCARDIOGRAM REPORT: CPT | Mod: S$PBB,,, | Performed by: INTERNAL MEDICINE

## 2022-09-07 PROCEDURE — 93010 EKG 12-LEAD: ICD-10-PCS | Mod: S$PBB,,, | Performed by: INTERNAL MEDICINE

## 2022-09-07 RX ORDER — FLUTICASONE PROPIONATE 50 MCG
1 SPRAY, SUSPENSION (ML) NASAL DAILY
COMMUNITY
End: 2023-06-09 | Stop reason: SDUPTHER

## 2022-09-07 RX ORDER — ASPIRIN 81 MG/1
81 TABLET ORAL DAILY
Qty: 360 TABLET | Refills: 0 | Status: SHIPPED | OUTPATIENT
Start: 2022-09-07 | End: 2023-09-03 | Stop reason: SDUPTHER

## 2022-09-07 NOTE — PROGRESS NOTES
CARDIOLOGY CLINIC VISIT        HISTORY OF PRESENT ILLNESS:     Abdias Kim presents for continued care. Last seen 03/22/2022.  Seen for preoperative cardiovascular evaluation prior to hernia repair.    Previous visit had complaints of palpitations. No recurrence. Holter at that time showed average heart rate 57 beats per minute.  Very rare PVCs.  Occasional PACs.  Reported symptoms correlated with sinus bradycardia, heart rate roughly 50 beats per minute.   Previous clinic note:    CPX prior to starting cardiac rehab.  He exercised for 8 minutes 35 seconds.  Functional capacity 15 Mets.  Rare PVCs.  EKG portion was reported positive for ischemia.  Resolved less than 4 minutes into recovery.  His stress test prior to having coronary angiography and subsequent bypass surgery he had changes that lasted upwards of 27 minutes into recovery.  Status post CABG x3 vessel on 04/26/2021.  (Lima/lad, GIORGIO/1st diagonal, SVG/om 1) He had stress echocardiogram on 04/19/2021.  Positive for ischemia in territory supplied by LAD.  EKG was positive for ischemia.  Ejection fraction was 65%.  Pulmonary pressure was 30. Left heart catheterization revealed ostial to mid left main 70% stenosis.  Ostial to proximal left anterior descending artery stenosis 90%.     09/07/2022: Last visit discontinued Imdur.  Initiated hydralazine. He could not tolerate secondary to palpitations. History of pulmonary hypertension.  Follow-up echocardiogram showed PA pressure of 28 mm Hg.  Normal left ventricular systolic function. Feels good. Exercising regularly. Home blood pressure logs reviewed.     CARDIOVASCULAR HISTORY:     CABG x3 vessel  Pulmonary hypertension    PAST MEDICAL HISTORY:     Past Medical History:   Diagnosis Date    Anemia     Diverticulosis     Hypertension        PAST SURGICAL HISTORY:     Past Surgical History:   Procedure Laterality Date    APPENDECTOMY      COLONOSCOPY N/A 1/31/2017    Procedure: COLONOSCOPY;  Surgeon: BASILIO Snell  MD Pa;  Location: [facility] (4TH FLR);  Service: Endoscopy;  Laterality: N/A;    COLONOSCOPY N/A 2/5/2020    Procedure: COLONOSCOPY;  Surgeon: Cody Maria MD;  Location: [facility] (4TH FLR);  Service: Endoscopy;  Laterality: N/A;  OKay fang Maria or ghazala. Needs to be done in Jan 2020    CORONARY ARTERY BYPASS GRAFT (CABG) N/A 4/26/2021    Procedure: CORONARY ARTERY BYPASS GRAFT (CABG)X3 WITH VEIN HARVEST;  Surgeon: Fidencio Galindo MD;  Location: [facility] OR [address] [place] [address];  Service: Cardiovascular;  Laterality: N/A;    ENDOSCOPIC HARVEST OF VEIN Left 4/26/2021    Procedure: HARVEST-VEIN-ENDOVASCULAR FOR CABGX3;  Surgeon: Fidencio Galindo MD;  Location: [facility] OR [address] [place] [address];  Service: Cardiovascular;  Laterality: Left;  Vein Bison Start time: 1201pm  Vein Bison Stop time: 1226pm    Vein Prep Start time: 1227pm  Vein Prep Stop time: 1250pm    ESOPHAGOGASTRODUODENOSCOPY N/A 2/5/2020    Procedure: EGD (ESOPHAGOGASTRODUODENOSCOPY);  Surgeon: Cody Maria MD;  Location: [facility] (4TH FLR);  Service: Endoscopy;  Laterality: N/A;    ESOPHAGOGASTRODUODENOSCOPY N/A 4/30/2020    Procedure: EGD (ESOPHAGOGASTRODUODENOSCOPY);  Surgeon: Cody Maria MD;  Location: [facility] (2ND FLR);  Service: Endoscopy;  Laterality: N/A;  schedule 12 weeks from now  4/13/20 - removed from 4/29/20, needs to be rescheduled high priority - pg  4/28/20-scheduled from high priority list-BB  Covid screening test scheduled for 4/29/20-BB  instructions emailed to patient-BB    EXCLUSION OF LEFT ATRIAL APPENDAGE N/A 4/26/2021    Procedure: EXCLUSION, LEFT ATRIAL APPENDAGE;  Surgeon: Fidencio Galindo MD;  Location: [facility] OR [address] [place] [address];  Service: Cardiovascular;  Laterality: N/A;  Left Atrial Appendage Resection    LEFT HEART CATHETERIZATION Left 4/21/2021    Procedure: Left heart cath;  Surgeon: Aric Alvarado MD;  Location: [facility] CATH LAB;  Service: Cardiology;  Laterality: Left;    UMBILICAL HERNIA REPAIR N/A 3/31/2022    Procedure:  REPAIR, HERNIA, UMBILICAL, AGE 5 YEARS OR OLDER Open With or Without Mesh;  Surgeon: Matt Delgado MD;  Location: [facility] OR [address] [place] [address];  Service: General;  Laterality: N/A;       ALLERGIES AND MEDICATION:     Review of patient's allergies indicates:   Allergen Reactions    Allegra-d 12 hour [fexofenadine-pseudoephedrine] Other (See Comments)     Trouble urinating    Mucinex d [pseudoephedrine-guaifenesin] Other (See Comments)     Trouble urinating    Losartan-hydrochlorothiazide Hives and Rash     Other reaction(s): Hives        Medication List            Accurate as of September 7, 2022  4:00 PM. If you have any questions, ask your nurse or doctor.                START taking these medications      aspirin 81 MG EC tablet  Commonly known as: ECOTRIN  Take 1 tablet (81 mg total) by mouth once daily.  Replaces: aspirin 325 MG tablet  Started by: Regan Aguilar MD            CONTINUE taking these medications      atorvastatin 40 MG tablet  Commonly known as: LIPITOR  Take 1 tablet (40 mg total) by mouth every evening.     cetirizine 10 MG tablet  Commonly known as: ZYRTEC     diltiaZEM 120 MG Cp24  Commonly known as: CARDIZEM CD  Take 1 capsule (120 mg total) by mouth once daily.     fluticasone propionate 50 mcg/actuation nasal spray  Commonly known as: FLONASE     multivitamin per tablet  Commonly known as: THERAGRAN     omeprazole 20 MG capsule  Commonly known as: PRILOSEC  Take 1 capsule (20 mg total) by mouth before breakfast.     sildenafiL 100 MG tablet  Commonly known as: VIAGRA  Take 0.5 tablets (50 mg total) by mouth as needed for Erectile Dysfunction.            STOP taking these medications      aspirin 325 MG tablet  Replaced by: aspirin 81 MG EC tablet  Stopped by: Regan Aguilra MD               Where to Get Your Medications        These medications were sent to [facility] - [place] [address]  [address] [address]      Phone: [phone]    aspirin 81 MG EC tablet         SOCIAL HISTORY:     Social History     Socioeconomic History    Marital status:    Tobacco Use    Smoking status: Never    Smokeless tobacco: Never   Substance and Sexual Activity    Alcohol use: Yes     Comment: few times a week     Drug use: No     Social Determinants of Health     Financial Resource Strain: Low Risk     Difficulty of Paying Living Expenses: Not hard at all   Food Insecurity: No Food Insecurity    Worried About Running Out of Food in the Last Year: Never true    Ran Out of Food in the Last Year: Never true   Transportation Needs: No Transportation Needs    Lack of Transportation (Medical): No    Lack of Transportation (Non-Medical): No   Physical Activity: Sufficiently Active    Days of Exercise per Week: 6 days    Minutes of Exercise per Session: 40 min   Stress: Stress Concern Present    Feeling of Stress : To some extent   Social Connections: Unknown    Frequency of Communication with Friends and Family: Once a week    Frequency of Social Gatherings with Friends and Family: Once a week    Active Member of Clubs or Organizations: No    Attends Club or Organization Meetings: Never    Marital Status:    Housing Stability: Low Risk     Unable to Pay for Housing in the Last Year: No    Number of Places Lived in the Last Year: 1    Unstable Housing in the Last Year: No       FAMILY HISTORY:     Family History   Problem Relation Age of Onset    Heart disease Mother         mi    Cancer Father         colon/prostate    Colon polyps Father     Stroke Neg Hx     Diabetes Neg Hx     Celiac disease Neg Hx     Esophageal cancer Neg Hx     Liver cancer Neg Hx     Liver disease Neg Hx     Stomach cancer Neg Hx     Rectal cancer Neg Hx        REVIEW OF SYSTEMS:   Review of Systems   Respiratory:  Negative for sputum production, shortness of breath and wheezing.    Cardiovascular:  Negative for chest pain, palpitations, orthopnea, claudication, leg swelling and  PND.   Gastrointestinal:  Negative for abdominal pain, heartburn, nausea and vomiting.   Neurological:  Negative for dizziness, speech change, focal weakness, loss of consciousness, weakness and headaches.   Psychiatric/Behavioral:  The patient is not nervous/anxious.      PHYSICAL EXAM:     Vitals:    09/07/22 1525   BP: (!) 154/70   Pulse: (!) 47    Body mass index is 24.14 kg/m².  Weight: 72 kg (158 lb 11.7 oz)         Physical Exam  Vitals reviewed.   Constitutional:       General: He is not in acute distress.     Appearance: Normal appearance. He is well-developed. He is not ill-appearing or diaphoretic.   Neck:      Vascular: No carotid bruit or JVD.   Cardiovascular:      Rate and Rhythm: Normal rate and regular rhythm.      Pulses: Normal pulses.      Heart sounds: Murmur heard.   Systolic murmur is present with a grade of 2/6.   Pulmonary:      Effort: Pulmonary effort is normal. No respiratory distress.      Breath sounds: Normal breath sounds. No stridor.   Abdominal:      General: Abdomen is flat. Bowel sounds are normal.      Palpations: Abdomen is soft.      Tenderness: There is no abdominal tenderness.      Hernia: A hernia is present. Hernia is present in the umbilical area.   Musculoskeletal:      Cervical back: Normal range of motion and neck supple. No rigidity or tenderness.      Right lower leg: No edema.      Left lower leg: No edema.   Skin:     General: Skin is warm and dry.   Neurological:      General: No focal deficit present.      Mental Status: He is alert and oriented to person, place, and time.   Psychiatric:         Mood and Affect: Mood normal.         Speech: Speech normal.         Behavior: Behavior normal.       DATA:     EKG (personally reviewed):  09/07/2022 - SB with PAC  3/22/2022 - SR  11/03/2021-sinus bradycardia    Laboratory:  CBC:  Recent Labs   Lab 11/16/21  0956 03/12/22  1803 08/04/22  1029   WBC 3.84 L 7.21 4.18   Hemoglobin 11.3 L 11.8 L 11.5 L   Hematocrit 35.8 L  36.6 L 35.7 L   Platelets 168 158 177       CHEMISTRIES:  Recent Labs   Lab 04/28/21  2347 04/29/21  0349 04/30/21  0359 05/01/21  0549 05/05/21 2117 07/12/21  1025 03/12/22  1803 08/04/22  1029   Glucose 95   < > 87 99 90 88 76 89   Sodium 137   < > 137 137 140 143 140 143   Potassium 4.1   < > 3.6 3.5 4.5 4.9 4.4 4.6   BUN 12   < > 17 20 19 19 16 21   Creatinine 1.3   < > 1.4 1.5 H 1.5 H 1.5 H 1.3 1.3   eGFR if African American >60.0   < > 59.7 A 54.9 A 54.9 A 54.9 A >60.0  --    eGFR if non  56.5 A   < > 51.6 A 47.5 A 47.5 A 47.5 A 56.1 A  --    Calcium 8.4 L   < > 8.6 L 9.7 9.7 10.0 9.8 9.1   Magnesium 2.1  --  2.1 2.1  --   --   --   --     < > = values in this interval not displayed.       CARDIAC BIOMARKERS:        COAGS:  Recent Labs   Lab 04/28/21  0412 04/29/21 0349 05/05/21 2117   INR 1.0 0.9 1.0       LIPIDS/LFTS:  Recent Labs   Lab 07/12/21  1025 11/16/21  0956 03/12/22  1803 08/04/22  1029   Cholesterol 127 102 L  --  121   Triglycerides 49 34  --  30   HDL 46 50  --  59   LDL Cholesterol 71.2 45.2 L  --  56.0 L   Non-HDL Cholesterol 81 52  --  62   AST 23  --  25 23   ALT 30  --  26 30       Cardiovascular Testing:    Echocardiogram 06/22/2022:    The left ventricle is normal in size with normal systolic function.  The estimated ejection fraction is 60%.  Normal right ventricular size with normal right ventricular systolic function.  The estimated PA systolic pressure is 28 mmHg.    Holter 10/26/2021:    Sinus rhythm with heart rates varying between 42 and 124 BPM with an average of 57 BPM  There were very rare PVCs  There were occasional PACs  Symptoms correlate with sinus bradycardia. Heart rate 50.    CPX 7/12/21:    The patient exercised for 8 minutes and 35 seconds on a high ramp protocol, corresponding to a functional capacity of 15 METS, achieving a peak heart rate of 144 bpm, which is 94% of the age predicted maximum heart rate.  During stress, the following significant  arrhythmias were observed: rare PVCs.  The test was stopped because the patient experienced fatigue.  The ECG portion of this study is positive for myocardial ischemia.  There is 1 mm of depression in the leads.  The peak VO2 was 25.6 ml/kg/min which is 77.34% of predicted equating to a functional capacity of 7.31 METS indicating mild functional impairment.  The anaerobic threshold (AT), which occurred at a heart rate of 132bpm, was 24.4 ml/kg/min, which is 73.72% of the predicted VO2 and is normal.  Mild functional impairment associated with a normal breathing reserve, normal oxygen stauration, a good effort, and a normal AT. These findings are indicative of functional impairment secondary to deconditioning.    Carotid ultrasound 04/22/2021:     There is 0-19% right Internal Carotid Stenosis.  There is 0-19% left Internal Carotid Stenosis.     Left heart catheterization 04/21/2021:     The Ost LM to Mid LM lesion was 70% stenosed.  The Ost LAD to Prox LAD lesion was 90% stenosed.  The Dist LAD lesion was 60% stenosed.  The estimated blood loss was none.  Recommend CABG, discussed with Gia.     Stress echocardiogram 04/19/2021:     The stress echo portion of this study is positive for myocardial ischemia in the typical myocardial territory supplied by the LAD. Target heart rate was achieved.  The ECG portion of this study is positive for myocardial ischemia. Ischemic changes persisted for 27 minutes into the recovery period.  There were no arrhythmias during stress.  The test was stopped because the patient experienced ECG changes.  The left ventricle is normal in size with normal systolic function. The estimated ejection fraction is 65%.  Grade II left ventricular diastolic dysfunction.  Normal right ventricular size with normal right ventricular systolic function.  The estimated PA systolic pressure is 30 mmHg.  Normal central venous pressure (3 mmHg).  Mild left atrial enlargement.     To note, he was given  nitroglycerin with improvement in anginal symptoms. He feels back to baseline. He was seen by interventional cardiology and is scheduled for [facility] with PCI on 4/21/21. He has been instructed to start DAPT.    ASSESSMENT:     1. Coronary artery disease:  Status post CABG x3 vessel  2. Postop atrial fibrillation  3. Hypertension: home logs reviewed. Good values. Could not tolerate hydralazine.  4. Hyperlipidemia:  LDL 56.  5. Pulmonary hypertension: normal on latest echo  6. Aortic atherosclerosis  7. Coronary artery calcifications seen on CT scan  8. Erectile dysfunction      PLAN:     1. Coronary artery disease:  stable. No angina.   2. Hypertension: Continue current management.  3. Hyperlipidemia:  Continue current management.   4. Pulmonary hypertension: FU echocardiogram showed normal PASP.  5. Return to clinic 6 months.          Regan Aguilar MD, MPH, FACC, [facility]

## 2022-09-12 ENCOUNTER — PATIENT MESSAGE (OUTPATIENT)
Dept: CARDIOLOGY | Facility: CLINIC | Age: 67
End: 2022-09-12
Payer: MEDICARE

## 2022-10-03 ENCOUNTER — PATIENT MESSAGE (OUTPATIENT)
Dept: INTERNAL MEDICINE | Facility: CLINIC | Age: 67
End: 2022-10-03
Payer: MEDICARE

## 2022-10-05 ENCOUNTER — PES CALL (OUTPATIENT)
Dept: ADMINISTRATIVE | Facility: OTHER | Age: 67
End: 2022-10-05
Payer: MEDICARE

## 2022-10-17 ENCOUNTER — OFFICE VISIT (OUTPATIENT)
Dept: FAMILY MEDICINE | Facility: CLINIC | Age: 67
End: 2022-10-17
Payer: MEDICARE

## 2022-10-17 VITALS
BODY MASS INDEX: 23.89 KG/M2 | TEMPERATURE: 98 F | HEART RATE: 64 BPM | HEIGHT: 68 IN | SYSTOLIC BLOOD PRESSURE: 138 MMHG | WEIGHT: 157.63 LBS | OXYGEN SATURATION: 98 % | DIASTOLIC BLOOD PRESSURE: 70 MMHG

## 2022-10-17 DIAGNOSIS — I27.20 PULMONARY HYPERTENSION: ICD-10-CM

## 2022-10-17 DIAGNOSIS — I48.0 PAROXYSMAL ATRIAL FIBRILLATION: ICD-10-CM

## 2022-10-17 DIAGNOSIS — I70.0 AORTIC ATHEROSCLEROSIS: ICD-10-CM

## 2022-10-17 DIAGNOSIS — Z95.1 S/P CABG (CORONARY ARTERY BYPASS GRAFT): ICD-10-CM

## 2022-10-17 DIAGNOSIS — K21.9 GASTROESOPHAGEAL REFLUX DISEASE, UNSPECIFIED WHETHER ESOPHAGITIS PRESENT: ICD-10-CM

## 2022-10-17 DIAGNOSIS — Z00.00 ENCOUNTER FOR PREVENTIVE HEALTH EXAMINATION: Primary | ICD-10-CM

## 2022-10-17 DIAGNOSIS — I10 HYPERTENSION, UNSPECIFIED TYPE: ICD-10-CM

## 2022-10-17 DIAGNOSIS — N52.9 ERECTILE DYSFUNCTION, UNSPECIFIED ERECTILE DYSFUNCTION TYPE: ICD-10-CM

## 2022-10-17 DIAGNOSIS — I25.118 ATHEROSCLEROTIC HEART DISEASE OF NATIVE CORONARY ARTERY WITH OTHER FORMS OF ANGINA PECTORIS: ICD-10-CM

## 2022-10-17 PROCEDURE — G0439 PR MEDICARE ANNUAL WELLNESS SUBSEQUENT VISIT: ICD-10-PCS | Mod: ,,, | Performed by: NURSE PRACTITIONER

## 2022-10-17 PROCEDURE — 99999 PR PBB SHADOW E&M-EST. PATIENT-LVL V: CPT | Mod: PBBFAC,,, | Performed by: NURSE PRACTITIONER

## 2022-10-17 PROCEDURE — 99999 PR PBB SHADOW E&M-EST. PATIENT-LVL V: ICD-10-PCS | Mod: PBBFAC,,, | Performed by: NURSE PRACTITIONER

## 2022-10-17 PROCEDURE — 99215 OFFICE O/P EST HI 40 MIN: CPT | Mod: PBBFAC,PO | Performed by: NURSE PRACTITIONER

## 2022-10-17 PROCEDURE — G0439 PPPS, SUBSEQ VISIT: HCPCS | Mod: ,,, | Performed by: NURSE PRACTITIONER

## 2022-10-17 NOTE — PATIENT INSTRUCTIONS
Counseling and Referral of Other Preventative  (Italic type indicates deductible and co-insurance are waived)    Patient Name: Abdias Kim  Today's Date: 10/17/2022    Health Maintenance       Date Due Completion Date    Shingles Vaccine (2 of 3) 03/25/2015 1/28/2015    COVID-19 Vaccine (5 - Booster) 06/16/2022 4/21/2022    Influenza Vaccine (1) 09/01/2022 10/8/2021    Colorectal Cancer Screening 02/05/2023 2/5/2020    PROSTATE-SPECIFIC ANTIGEN 08/04/2023 8/4/2022    Lipid Panel 08/04/2023 8/4/2022    Aspirin/Antiplatelet Therapy 09/07/2023 9/7/2022    TETANUS VACCINE 04/08/2026 4/8/2016        No orders of the defined types were placed in this encounter.      The following information is provided to all patients.  This information is to help you find resources for any of the problems found today that may be affecting your health:                Living healthy guide: www.[facility].[place].[facility]      Understanding Diabetes: www.diabetes.org      Eating healthy: www.cdc.gov/healthyweight      [facility] home safety checklist: www.cdc.gov/steadi/patient.html      Agency on Aging: www.goea.[place].gov      Alcoholics anonymous (AA): www.aa.org      Physical Activity: www.saurabh.nih.gov/qr6ltbd      Tobacco use: www.quitwithusla.org

## 2022-10-17 NOTE — PROGRESS NOTES
"  Abdias Kim presented for a  Medicare AWV and comprehensive Health Risk Assessment today. The following components were reviewed and updated:    Medical history  Family History  Social history  Allergies and Current Medications  Health Risk Assessment  Health Maintenance  Care Team       ** See Completed Assessments for Annual Wellness Visit within the encounter summary.**       The following assessments were completed:  Living Situation  CAGE  Depression Screening  Timed Get Up and Go  Whisper Test  Cognitive Function Screening  Nutrition Screening  ADL Screening  PAQ Screening          Vitals:    10/17/22 1329   BP: 138/70   Pulse: 64   Temp: 98.3 °F (36.8 °C)   TempSrc: Oral   SpO2: 98%   Weight: 71.5 kg (157 lb 10.1 oz)   Height: 5' 8" (1.727 m)     Body mass index is 23.97 kg/m².  Physical Exam  Vitals and nursing note reviewed.   Constitutional:       Appearance: Normal appearance.   Cardiovascular:      Rate and Rhythm: Normal rate.      Pulses: Normal pulses.      Heart sounds: Normal heart sounds.   Pulmonary:      Effort: Pulmonary effort is normal.      Breath sounds: Normal breath sounds.   Abdominal:      General: Bowel sounds are normal.      Palpations: Abdomen is soft.   Musculoskeletal:         General: Normal range of motion.   Neurological:      Mental Status: He is alert and oriented to person, place, and time.   Psychiatric:         Mood and Affect: Mood normal.         Behavior: Behavior normal.           Diagnoses and health risks identified today and associated recommendations/orders:    1. Encounter for preventive health examination  Pt was seen today for an Annual Wellness visit. Healthcare maintenance and screening recommendations were discussed and updated as indicated. Return in one year for AWV.    Review current opioid prescriptions:n/a  Screen for potential Substance Use Disorders:n/a    2. Paroxysmal atrial fibrillation  Stable. The current medical regimen is effective;  " continue present plan and medications.    3. Aortic atherosclerosis  CXR 4/26/21. The current medical regimen is effective;  continue present plan and medications.    4. Atherosclerotic heart disease of native coronary artery with other forms of angina pectoris  Asymptomatic. The current medical regimen is effective;  continue present plan and medications.    5. Pulmonary hypertension  The current medical regimen is effective;  continue present plan and medications.    6. Hypertension, unspecified type  At goal. The current medical regimen is effective;  continue present plan and medications.    7. S/P CABG (coronary artery bypass graft)  The current medical regimen is effective;  continue present plan and medications.    8. Erectile dysfunction, unspecified erectile dysfunction type  The current medical regimen is effective;  continue present plan and medications.    9. Gastroesophageal reflux disease, unspecified whether esophagitis present  The current medical regimen is effective;  continue present plan and medications.        Provided Abdias with a 5-10 year written screening schedule and personal prevention plan. Recommendations were developed using the USPSTF age appropriate recommendations. Education, counseling, and referrals were provided as needed. After Visit Summary printed and given to patient which includes a list of additional screenings\tests needed.    Follow up in about 1 year (around 10/17/2023).    PALAK Armendariz  I offered to discuss advanced care planning, including how to pick a person who would make decisions for you if you were unable to make them for yourself, called a health care power of , and what kind of decisions you might make such as use of life sustaining treatments such as ventilators and tube feeding when faced with a life limiting illness recorded on a living will that they will need to know. (How you want to be cared for as you near the end of your natural  life)     X Patient is interested in learning more about how to make advanced directives.  I provided them paperwork and offered to discuss this with them.

## 2023-02-22 ENCOUNTER — OFFICE VISIT (OUTPATIENT)
Dept: PRIMARY CARE CLINIC | Facility: CLINIC | Age: 68
End: 2023-02-22
Payer: MEDICARE

## 2023-02-22 ENCOUNTER — PATIENT MESSAGE (OUTPATIENT)
Dept: PRIMARY CARE CLINIC | Facility: CLINIC | Age: 68
End: 2023-02-22

## 2023-02-22 VITALS
HEIGHT: 68 IN | TEMPERATURE: 99 F | OXYGEN SATURATION: 98 % | RESPIRATION RATE: 18 BRPM | WEIGHT: 159.63 LBS | BODY MASS INDEX: 24.19 KG/M2 | HEART RATE: 49 BPM | SYSTOLIC BLOOD PRESSURE: 134 MMHG | DIASTOLIC BLOOD PRESSURE: 80 MMHG

## 2023-02-22 DIAGNOSIS — I25.118 ATHEROSCLEROTIC HEART DISEASE OF NATIVE CORONARY ARTERY WITH OTHER FORMS OF ANGINA PECTORIS: ICD-10-CM

## 2023-02-22 DIAGNOSIS — I25.10 CORONARY ARTERY DISEASE INVOLVING NATIVE CORONARY ARTERY OF NATIVE HEART WITHOUT ANGINA PECTORIS: ICD-10-CM

## 2023-02-22 DIAGNOSIS — I70.0 AORTIC ATHEROSCLEROSIS: ICD-10-CM

## 2023-02-22 DIAGNOSIS — N18.31 CHRONIC KIDNEY DISEASE, STAGE 3A: ICD-10-CM

## 2023-02-22 DIAGNOSIS — I13.10 HYPERTENSIVE CARDIOVASCULAR-RENAL DISEASE, STAGE 1-4 OR UNSPECIFIED CHRONIC KIDNEY DISEASE, WITHOUT HEART FAILURE: Primary | ICD-10-CM

## 2023-02-22 DIAGNOSIS — L57.0 ACTINIC KERATOSES: ICD-10-CM

## 2023-02-22 DIAGNOSIS — I27.20 PULMONARY HYPERTENSION: ICD-10-CM

## 2023-02-22 DIAGNOSIS — I48.0 PAROXYSMAL ATRIAL FIBRILLATION: ICD-10-CM

## 2023-02-22 DIAGNOSIS — H61.22 IMPACTED CERUMEN OF LEFT EAR: ICD-10-CM

## 2023-02-22 PROCEDURE — 99215 OFFICE O/P EST HI 40 MIN: CPT | Mod: PBBFAC | Performed by: INTERNAL MEDICINE

## 2023-02-22 PROCEDURE — 99214 PR OFFICE/OUTPT VISIT, EST, LEVL IV, 30-39 MIN: ICD-10-PCS | Mod: S$PBB,,, | Performed by: INTERNAL MEDICINE

## 2023-02-22 PROCEDURE — 99999 PR PBB SHADOW E&M-EST. PATIENT-LVL V: ICD-10-PCS | Mod: PBBFAC,,, | Performed by: INTERNAL MEDICINE

## 2023-02-22 PROCEDURE — 99214 OFFICE O/P EST MOD 30 MIN: CPT | Mod: S$PBB,,, | Performed by: INTERNAL MEDICINE

## 2023-02-22 PROCEDURE — 99999 PR PBB SHADOW E&M-EST. PATIENT-LVL V: CPT | Mod: PBBFAC,,, | Performed by: INTERNAL MEDICINE

## 2023-02-22 NOTE — PROGRESS NOTES
[facility] Note    Chief Complaint      Chief Complaint   Patient presents with    Establish Care       History of Present Illness      Abdias Kim is a 67 y.o. male who presents today for   Chief Complaint   Patient presents with    Establish Care   .  Patient comes to appointment here for establish visit with me . He is uptpdate with labs all reviewed by me today form august . He is seeing dr euceda cardiology currently . He is uptoddte with all covid vaccines as well as boosters . He is very active with walking and is eating healthy diet as well .    HPI    No problem-specific Assessment & Plan notes found for this encounter.       Problem List Items Addressed This Visit          Pulmonary    Pulmonary hypertension       Cardiac/Vascular    Hypertensive cardiovascular-renal disease, stage 1-4 or unspecified chronic kidney disease, without heart failure - Primary    Overview     bp well controlled on current regimen          CAD (coronary artery disease)    Overview     Dr euceda managing is on good regimen statin asa and cardizem          Atherosclerotic heart disease of native coronary artery with other forms of angina pectoris    Overview     stabl e         Paroxysmal atrial fibrillation    Aortic atherosclerosis    Overview     Stable             Renal/    Chronic kidney disease, stage 3a    Overview     stable repeat labs with next visit           Other Visit Diagnoses       Actinic keratoses        Impacted cerumen of left ear                 Past Medical History:  Past Medical History:   Diagnosis Date    Anemia     Diverticulosis     Hypertension        Past Surgical History:  Past Surgical History:   Procedure Laterality Date    APPENDECTOMY      COLONOSCOPY N/A 01/31/2017    Procedure: COLONOSCOPY;  Surgeon: BASILIO Winn MD;  Location: [address];  Service: Endoscopy;  Laterality: N/A;    COLONOSCOPY N/A 02/05/2020    Procedure: COLONOSCOPY;  Surgeon: Cody  JOSE ARMANDO Maria MD;  Location: [facility] ENDO (4TH FLR);  Service: Endoscopy;  Laterality: N/A;  Luis Maria or ghazala. Needs to be done in Jan 2020    CORONARY ARTERY BYPASS GRAFT  05/26/2021    CORONARY ARTERY BYPASS GRAFT (CABG) N/A 04/26/2021    Procedure: CORONARY ARTERY BYPASS GRAFT (CABG)X3 WITH VEIN HARVEST;  Surgeon: Fidencio Galindo MD;  [facility];  Service: Cardiovascular;  Laterality: N/A;    ENDOSCOPIC HARVEST OF VEIN Left 04/26/2021    Procedure: HARVEST-VEIN-ENDOVASCULAR FOR CABGX3;  Surgeon: Fidencio Galindo MD;  Location: [facility] OR 2ND FLR;  Service: Cardiovascular;  Laterality: Left;  Vein [place] Start time: 1201pm  Vein [place] Stop time: 1226pm    Vein Prep Start time: 1227pm  Vein Prep Stop time: 1250pm    ESOPHAGOGASTRODUODENOSCOPY N/A 02/05/2020    Procedure: EGD (ESOPHAGOGASTRODUODENOSCOPY);  Surgeon: Cody Maria MD;  Location: [facility] ENDO (4TH FLR);  Service: Endoscopy;  Laterality: N/A;    ESOPHAGOGASTRODUODENOSCOPY N/A 04/30/2020    Procedure: EGD (ESOPHAGOGASTRODUODENOSCOPY);  Surgeon: Cody Maria MD;  Location: [facility] (2ND FLR);  Service: Endoscopy;  Laterality: N/A;  schedule 12 weeks from now  4/13/20 - removed from 4/29/20, needs to be rescheduled high priority - pg  4/28/20-scheduled from high priority list-BB  Covid screening test scheduled for 4/29/20-BB  instructions emailed to patient-BB    EXCLUSION OF LEFT ATRIAL APPENDAGE N/A 04/26/2021    Procedure: EXCLUSION, LEFT ATRIAL APPENDAGE;  Surgeon: Fidencio Galindo MD;  [facility];  Service: Cardiovascular;  Laterality: N/A;  Left Atrial Appendage Resection    HERNIA REPAIR  April/May 2022    LEFT HEART CATHETERIZATION Left 04/21/2021    Procedure: Left heart cath;  Surgeon: Aric Alvarado MD;  Location: [facility] CATH LAB;  Service: Cardiology;  Laterality: Left;    UMBILICAL HERNIA REPAIR N/A 03/31/2022    Procedure: REPAIR, HERNIA, UMBILICAL, AGE 3 YEARS OR OLDER Open With or Without Mesh;   Surgeon: Matt Delgado MD;  Location: [address];  Service: General;  Laterality: N/A;       Family History:  family history includes Arthritis in his father; Cancer in his father; Colon polyps in his father; Heart disease in his father and mother; Hypertension in his father, mother, and son.     Social History:  Social History     Socioeconomic History    Marital status:    Tobacco Use    Smoking status: Never     Passive exposure: Never    Smokeless tobacco: Never   Substance and Sexual Activity    Alcohol use: Yes     Alcohol/week: 12.0 standard drinks     Types: 6 Glasses of wine, 6 Cans of beer per week     Comment: few times a week     Drug use: No    Sexual activity: Yes     Partners: Female     Birth control/protection: Other-see comments     Comment: Wife, tubal ligation     Social Determinants of Health     Financial Resource Strain: Low Risk     Difficulty of Paying Living Expenses: Not hard at all   Food Insecurity: No Food Insecurity    Worried About Running Out of Food in the Last Year: Never true    Ran Out of Food in the Last Year: Never true   Transportation Needs: No Transportation Needs    Lack of Transportation (Medical): No    Lack of Transportation (Non-Medical): No   Physical Activity: Sufficiently Active    Days of Exercise per Week: 6 days    Minutes of Exercise per Session: 40 min   Stress: Stress Concern Present    Feeling of Stress : To some extent   Social Connections: Unknown    Frequency of Communication with Friends and Family: Once a week    Frequency of Social Gatherings with Friends and Family: Once a week    Active Member of Clubs or Organizations: No    Attends Club or Organization Meetings: Never    Marital Status:    Housing Stability: Low Risk     Unable to Pay for Housing in the Last Year: No    Number of Places Lived in the Last Year: 1    Unstable Housing in the Last Year: No       Review of Systems:   Review of Systems   Constitutional:  Negative for  fever and weight loss.   HENT:  Negative for congestion, hearing loss and sore throat.    Eyes:  Negative for blurred vision.   Respiratory:  Negative for cough and shortness of breath.    Cardiovascular:  Negative for chest pain, palpitations, claudication and leg swelling.   Gastrointestinal:  Negative for abdominal pain, constipation, diarrhea and heartburn.   Genitourinary:  Negative for dysuria.   Musculoskeletal:  Negative for back pain and myalgias.   Skin:  Negative for rash.   Neurological:  Negative for focal weakness and headaches.   Psychiatric/Behavioral:  Negative for depression and suicidal ideas. The patient is not nervous/anxious.       Medications:  Outpatient Encounter Medications as of 2/22/2023   Medication Sig Note Dispense Refill    aspirin (ECOTRIN) 81 MG EC tablet Take 1 tablet (81 mg total) by mouth once daily.  360 tablet 0    atorvastatin (LIPITOR) 40 MG tablet Take 1 tablet (40 mg total) by mouth every evening.  90 tablet 3    cetirizine (ZYRTEC) 10 MG tablet Take 1 tablet by mouth Daily. 3/30/2022: Hold am of surgery       diltiaZEM (CARDIZEM CD) 120 MG Cp24 Take 1 capsule (120 mg total) by mouth once daily.  90 capsule 3    fluticasone propionate (FLONASE) 50 mcg/actuation nasal spray 1 spray by Each Nostril route once daily.       multivitamin (THERAGRAN) per tablet Take 1 tablet by mouth once daily. 3/30/2022: Continue to hold until after Surgery.         sildenafiL (VIAGRA) 100 MG tablet Take 0.5 tablets (50 mg total) by mouth as needed for Erectile Dysfunction. 3/30/2022: Hold am of surgery  10 tablet 3    omeprazole (PRILOSEC) 20 MG capsule Take 1 capsule (20 mg total) by mouth before breakfast.  90 capsule 3     Facility-Administered Encounter Medications as of 2/22/2023   Medication Dose Route Frequency Provider Last Rate Last Admin    acetaminophen tablet 650 mg  650 mg Oral Once PRN Stephane Cruz MD        albuterol inhaler 2 puff  2 puff Inhalation Q20 Min PRN Stephane EARLY  "MD Nancy        diphenhydrAMINE injection 25 mg  25 mg Intravenous Once PRN Stephane Cruz MD        EPINEPHrine (EPIPEN) 0.3 mg/0.3 mL pen injection 0.3 mg  0.3 mg Intramuscular PRN Stephane Cruz MD        ondansetron disintegrating tablet 4 mg  4 mg Oral Once PRN Stephane Cruz MD           Allergies:  Review of patient's allergies indicates:   Allergen Reactions    Allegra-d 12 hour [fexofenadine-pseudoephedrine] Other (See Comments)     Trouble urinating    Mucinex d [pseudoephedrine-guaifenesin] Other (See Comments)     Trouble urinating    Losartan-hydrochlorothiazide Hives and Rash     Other reaction(s): Hives         Physical Exam      Vitals:    02/22/23 1044   BP: 134/80   Pulse: (!) 49   Resp: 18   Temp: 98.6 °F (37 °C)        Vital Signs  Temp: 98.6 °F (37 °C)  Temp src: Oral  Pulse: (!) 49  Resp: 18  SpO2: 98 %  BP: 134/80  BP Location: Right arm  Patient Position: Sitting  Pain Score: 0-No pain  Height and Weight  Height: 5' 8" (172.7 cm)  Weight: 72.4 kg (159 lb 9.8 oz)  BSA (Calculated - sq m): 1.86 sq meters  BMI (Calculated): 24.3  Weight in (lb) to have BMI = 25: 164.1]     Body mass index is 24.27 kg/m².    Physical Exam  Constitutional:       Appearance: He is well-developed.   HENT:      Head: Normocephalic.      Left Ear: There is impacted cerumen.   Eyes:      Pupils: Pupils are equal, round, and reactive to light.   Neck:      Thyroid: No thyromegaly.   Cardiovascular:      Rate and Rhythm: Normal rate and regular rhythm.      Heart sounds: Murmur heard.   Systolic murmur is present with a grade of 1/6.     No friction rub. No gallop.   Pulmonary:      Effort: Pulmonary effort is normal.      Breath sounds: Normal breath sounds.   Abdominal:      General: Bowel sounds are normal.      Palpations: Abdomen is soft.   Musculoskeletal:         General: Normal range of motion.      Cervical back: Normal range of motion.   Skin:     General: Skin is warm and dry.   Neurological: "      Mental Status: He is alert and oriented to person, place, and time.      Sensory: No sensory deficit.   Psychiatric:         Behavior: Behavior normal.        Laboratory:  CBC:  No results for input(s): WBC, RBC, HGB, HCT, PLT, MCV, MCH, MCHC in the last 2160 hours.  CMP:  No results for input(s): GLU, CALCIUM, ALBUMIN, PROT, NA, K, CO2, CL, BUN, ALKPHOS, ALT, AST, BILITOT in the last 2160 hours.    Invalid input(s): CREATININ  URINALYSIS:  No results for input(s): COLORU, CLARITYU, SPECGRAV, PHUR, PROTEINUA, GLUCOSEU, BILIRUBINCON, BLOODU, WBCU, RBCU, BACTERIA, MUCUS, NITRITE, LEUKOCYTESUR, UROBILINOGEN, HYALINECASTS in the last 2160 hours.   LIPIDS:  No results for input(s): TSH, HDL, CHOL, TRIG, LDLCALC, CHOLHDL, NONHDLCHOL, TOTALCHOLEST in the last 2160 hours.  TSH:  No results for input(s): TSH in the last 2160 hours.  A1C:  No results for input(s): HGBA1C in the last 2160 hours.    Radiology:        Assessment:     Abdias Kim is a 67 y.o.male with:    Hypertensive cardiovascular-renal disease, stage 1-4 or unspecified chronic kidney disease, without heart failure    Chronic kidney disease, stage 3a    Pulmonary hypertension    Atherosclerotic heart disease of native coronary artery with other forms of angina pectoris    Paroxysmal atrial fibrillation    Aortic atherosclerosis    Coronary artery disease involving native coronary artery of native heart without angina pectoris    Actinic keratoses  -     Ambulatory referral/consult to Dermatology; Future; Expected date: 03/01/2023    Impacted cerumen of left ear  -     Ambulatory referral/consult to ENT; Future; Expected date: 03/01/2023                Plan:     Problem List Items Addressed This Visit          Pulmonary    Pulmonary hypertension       Cardiac/Vascular    Hypertensive cardiovascular-renal disease, stage 1-4 or unspecified chronic kidney disease, without heart failure - Primary    Overview     bp well controlled on current regimen           CAD (coronary artery disease)    Overview     Dr euceda managing is on good regimen statin asa and cardizem          Atherosclerotic heart disease of native coronary artery with other forms of angina pectoris    Overview     stabl e         Paroxysmal atrial fibrillation    Aortic atherosclerosis    Overview     Stable             Renal/    Chronic kidney disease, stage 3a    Overview     stable repeat labs with next visit           Other Visit Diagnoses       Actinic keratoses        Impacted cerumen of left ear                As above, continue current medications and maintain follow up with specialists.  Return to clinic in 6 months.      Frederick W Dantagnan [facility]

## 2023-03-05 ENCOUNTER — PATIENT MESSAGE (OUTPATIENT)
Dept: PRIMARY CARE CLINIC | Facility: CLINIC | Age: 68
End: 2023-03-05
Payer: MEDICARE

## 2023-03-05 ENCOUNTER — PATIENT MESSAGE (OUTPATIENT)
Dept: CARDIOLOGY | Facility: CLINIC | Age: 68
End: 2023-03-05
Payer: MEDICARE

## 2023-03-06 ENCOUNTER — TELEPHONE (OUTPATIENT)
Dept: PRIMARY CARE CLINIC | Facility: CLINIC | Age: 68
End: 2023-03-06
Payer: MEDICARE

## 2023-03-06 ENCOUNTER — OFFICE VISIT (OUTPATIENT)
Dept: URGENT CARE | Facility: CLINIC | Age: 68
End: 2023-03-06
Payer: MEDICARE

## 2023-03-06 VITALS
TEMPERATURE: 100 F | DIASTOLIC BLOOD PRESSURE: 91 MMHG | HEIGHT: 68 IN | HEART RATE: 64 BPM | SYSTOLIC BLOOD PRESSURE: 168 MMHG | OXYGEN SATURATION: 97 % | RESPIRATION RATE: 18 BRPM | WEIGHT: 159 LBS | BODY MASS INDEX: 24.1 KG/M2

## 2023-03-06 DIAGNOSIS — U07.1 COVID-19: Primary | ICD-10-CM

## 2023-03-06 PROCEDURE — 99214 PR OFFICE/OUTPT VISIT, EST, LEVL IV, 30-39 MIN: ICD-10-PCS | Mod: CR,S$GLB,, | Performed by: PHYSICIAN ASSISTANT

## 2023-03-06 PROCEDURE — 99214 OFFICE O/P EST MOD 30 MIN: CPT | Mod: CR,S$GLB,, | Performed by: PHYSICIAN ASSISTANT

## 2023-03-06 RX ORDER — BENZONATATE 200 MG/1
200 CAPSULE ORAL 3 TIMES DAILY PRN
Qty: 30 CAPSULE | Refills: 0 | Status: SHIPPED | OUTPATIENT
Start: 2023-03-06 | End: 2023-03-16

## 2023-03-06 NOTE — PROGRESS NOTES
"Subjective:       Patient ID: Abdias Kim is a 67 y.o. male.    Vitals:  height is 5' 8" (1.727 m) and weight is 72.1 kg (159 lb). His oral temperature is 99.7 °F (37.6 °C). His blood pressure is 168/91 (abnormal) and his pulse is 64. His respiration is 18 and oxygen saturation is 97%.     Chief Complaint: URI    Pt states that he is having fever, cough/mucus,sore throat , congestion and chills that started 5 days ago . Pt is taking tylenol and otc cold meds . Pt states that he did test positive at home for covid      URI   This is a new problem. The current episode started in the past 7 days. The problem has been unchanged. There has been no fever. Associated symptoms include congestion, coughing and a sore throat. Pertinent negatives include no abdominal pain, chest pain, diarrhea, ear pain, headaches, nausea, neck pain, rash, sinus pain, vomiting or wheezing. He has tried acetaminophen and decongestant for the symptoms.     Constitution: Positive for fever. Negative for activity change, appetite change, chills, sweating, fatigue, unexpected weight change and generalized weakness.   HENT:  Positive for congestion, postnasal drip and sore throat. Negative for ear pain, ear discharge, hearing loss, dental problem, drooling, mouth sores, tongue pain, tongue lesion, sinus pain, sinus pressure, trouble swallowing and voice change.    Neck: Negative for neck pain, neck stiffness and painful lymph nodes.   Cardiovascular:  Negative for chest pain and sob on exertion.   Eyes:  Negative for eye discharge and eye itching.   Respiratory:  Positive for cough and sputum production. Negative for chest tightness, shortness of breath and wheezing.    Gastrointestinal:  Negative for abdominal pain, nausea, vomiting, constipation and diarrhea.   Musculoskeletal:  Negative for pain, muscle cramps and muscle ache.   Skin:  Negative for color change, pale and rash.   Neurological:  Negative for dizziness, headaches, disorientation " and altered mental status.   Hematologic/Lymphatic: Negative for swollen lymph nodes.   Psychiatric/Behavioral:  Negative for altered mental status, disorientation, confusion and agitation.    Past Medical History:   Diagnosis Date    Anemia     Diverticulosis     Hypertension        Past Surgical History:   Procedure Laterality Date    APPENDECTOMY      COLONOSCOPY N/A 01/31/2017    Procedure: COLONOSCOPY;  Surgeon: BASILIO Winn MD;  Location: [facility] (4TH FLR);  Service: Endoscopy;  Laterality: N/A;    COLONOSCOPY N/A 02/05/2020    Procedure: COLONOSCOPY;  Surgeon: Cody Maria MD;  Location: [facility] ENDO (4TH FLR);  Service: Endoscopy;  Laterality: N/A;  OKay for Crow or ghazala. Needs to be done in Jan 2020    CORONARY ARTERY BYPASS GRAFT  05/26/2021    CORONARY ARTERY BYPASS GRAFT (CABG) N/A 04/26/2021    Procedure: CORONARY ARTERY BYPASS GRAFT (CABG)X3 WITH VEIN HARVEST;  Surgeon: Fidencio Galindo MD;  [facility];  Service: Cardiovascular;  Laterality: N/A;    ENDOSCOPIC HARVEST OF VEIN Left 04/26/2021    Procedure: HARVEST-VEIN-ENDOVASCULAR FOR CABGX3;  Surgeon: Fidencio Galindo MD;  [facility];  Service: Cardiovascular;  Laterality: Left;  Vein Chester Start time: 1201pm  Vein Chester Stop time: 1226pm    Vein Prep Start time: 1227pm  Vein Prep Stop time: 1250pm    ESOPHAGOGASTRODUODENOSCOPY N/A 02/05/2020    Procedure: EGD (ESOPHAGOGASTRODUODENOSCOPY);  Surgeon: Cody Maria MD;  Location: [facility] (4TH FLR);  Service: Endoscopy;  Laterality: N/A;    ESOPHAGOGASTRODUODENOSCOPY N/A 04/30/2020    Procedure: EGD (ESOPHAGOGASTRODUODENOSCOPY);  Surgeon: Cody Maria MD;  Location: [facility] (2ND FLR);  Service: Endoscopy;  Laterality: N/A;  schedule 12 weeks from now  4/13/20 - removed from 4/29/20, needs to be rescheduled high priority - pg  4/28/20-scheduled from high priority list-BB  Covid screening test scheduled for 4/29/20-BB  instructions emailed to  patient-BB    EXCLUSION OF LEFT ATRIAL APPENDAGE N/A 2021    Procedure: EXCLUSION, LEFT ATRIAL APPENDAGE;  Surgeon: Fidencio Galindo MD;  Location: [facility] OR [address] [place] [address];  Service: Cardiovascular;  Laterality: N/A;  Left Atrial Appendage Resection    HERNIA REPAIR  April/May 2022    LEFT HEART CATHETERIZATION Left 2021    Procedure: Left heart cath;  Surgeon: Aric Alvarado MD;  Location: [facility] CATH LAB;  Service: Cardiology;  Laterality: Left;    UMBILICAL HERNIA REPAIR N/A 2022    Procedure: REPAIR, HERNIA, UMBILICAL, AGE 5 YEARS OR OLDER Open With or Without Mesh;  Surgeon: Matt Delgado MD;  Location: [facility] OR [address] [place] [address];  Service: General;  Laterality: N/A;       Family History   Problem Relation Age of Onset    Heart disease Mother         , Coronary artery disease    Hypertension Mother     Cancer Father         colon/prostate    Colon polyps Father     Arthritis Father             Heart disease Father         Coronary artery disease, Afib, CHF    Hypertension Father     Hypertension Son     Stroke Neg Hx     Diabetes Neg Hx     Celiac disease Neg Hx     Esophageal cancer Neg Hx     Liver cancer Neg Hx     Liver disease Neg Hx     Stomach cancer Neg Hx     Rectal cancer Neg Hx        Social History     Socioeconomic History    Marital status:    Tobacco Use    Smoking status: Never     Passive exposure: Never    Smokeless tobacco: Never   Substance and Sexual Activity    Alcohol use: Yes     Alcohol/week: 12.0 standard drinks     Types: 6 Glasses of wine, 6 Cans of beer per week     Comment: few times a week     Drug use: No    Sexual activity: Yes     Partners: Female     Birth control/protection: Other-see comments     Comment: Wife, tubal ligation     Social Determinants of Health     Financial Resource Strain: Low Risk     Difficulty of Paying Living Expenses: Not hard at all   Food Insecurity: No Food Insecurity    Worried About Running Out of Food in the Last  Year: Never true    Ran Out of Food in the Last Year: Never true   Transportation Needs: No Transportation Needs    Lack of Transportation (Medical): No    Lack of Transportation (Non-Medical): No   Physical Activity: Sufficiently Active    Days of Exercise per Week: 6 days    Minutes of Exercise per Session: 40 min   Stress: Stress Concern Present    Feeling of Stress : To some extent   Social Connections: Unknown    Frequency of Communication with Friends and Family: Once a week    Frequency of Social Gatherings with Friends and Family: Once a week    Active Member of Clubs or Organizations: No    Attends Club or Organization Meetings: Never    Marital Status:    Housing Stability: Low Risk     Unable to Pay for Housing in the Last Year: No    Number of Places Lived in the Last Year: 1    Unstable Housing in the Last Year: No       Current Outpatient Medications   Medication Sig Dispense Refill    aspirin (ECOTRIN) 81 MG EC tablet Take 1 tablet (81 mg total) by mouth once daily. 360 tablet 0    atorvastatin (LIPITOR) 40 MG tablet Take 1 tablet (40 mg total) by mouth every evening. 90 tablet 3    cetirizine (ZYRTEC) 10 MG tablet Take 1 tablet by mouth Daily.      diltiaZEM (CARDIZEM CD) 120 MG Cp24 Take 1 capsule (120 mg total) by mouth once daily. 90 capsule 3    fluticasone propionate (FLONASE) 50 mcg/actuation nasal spray 1 spray by Each Nostril route once daily.      multivitamin (THERAGRAN) per tablet Take 1 tablet by mouth once daily.      sildenafiL (VIAGRA) 100 MG tablet Take 0.5 tablets (50 mg total) by mouth as needed for Erectile Dysfunction. 10 tablet 3    benzonatate (TESSALON) 200 MG capsule Take 1 capsule (200 mg total) by mouth 3 (three) times daily as needed for Cough. 30 capsule 0    molnupiravir 200 mg capsule (EUA) Take 4 capsules (800 mg total) by mouth every 12 (twelve) hours. for 5 days 40 capsule 0    omeprazole (PRILOSEC) 20 MG capsule Take 1 capsule (20 mg total) by mouth before  breakfast. 90 capsule 3     Current Facility-Administered Medications   Medication Dose Route Frequency Provider Last Rate Last Admin    acetaminophen tablet 650 mg  650 mg Oral Once PRN Stephane Cruz MD        albuterol inhaler 2 puff  2 puff Inhalation Q20 Min PRN Stephane Cruz MD        diphenhydrAMINE injection 25 mg  25 mg Intravenous Once PRN Stephane Cruz MD        EPINEPHrine (EPIPEN) 0.3 mg/0.3 mL pen injection 0.3 mg  0.3 mg Intramuscular PRN Stephane Cruz MD        ondansetron disintegrating tablet 4 mg  4 mg Oral Once PRN Stephane Cruz MD           Review of patient's allergies indicates:   Allergen Reactions    Allegra-d 12 hour [fexofenadine-pseudoephedrine] Other (See Comments)     Trouble urinating    Mucinex d [pseudoephedrine-guaifenesin] Other (See Comments)     Trouble urinating    Losartan-hydrochlorothiazide Hives and Rash     Other reaction(s): Hives         Objective:      Physical Exam   Constitutional: He is oriented to person, place, and time. He appears well-developed. He is cooperative.  Non-toxic appearance. He does not appear ill. No distress. normal  HENT:   Head: Normocephalic and atraumatic.   Ears:   Right Ear: Hearing, tympanic membrane, external ear and ear canal normal. impacted cerumen  Left Ear: Hearing, tympanic membrane, external ear and ear canal normal. impacted cerumen  Nose: Rhinorrhea and congestion present. No mucosal edema or nasal deformity. No epistaxis. Right sinus exhibits no maxillary sinus tenderness and no frontal sinus tenderness. Left sinus exhibits no maxillary sinus tenderness and no frontal sinus tenderness.   Mouth/Throat: Uvula is midline and mucous membranes are normal. Mucous membranes are moist. No trismus in the jaw. Normal dentition. No uvula swelling. Posterior oropharyngeal erythema present. No oropharyngeal exudate or posterior oropharyngeal edema.   Eyes: Conjunctivae and lids are normal. Right eye exhibits no discharge.  Left eye exhibits no discharge. No scleral icterus.   Neck: Trachea normal and phonation normal. Neck supple. No edema present. No erythema present. No neck rigidity present.   Cardiovascular: Normal rate, regular rhythm, normal heart sounds and normal pulses.   No murmur heard.Exam reveals no gallop.   Pulmonary/Chest: Effort normal and breath sounds normal. No stridor. No respiratory distress. He has no decreased breath sounds. He has no wheezes. He has no rhonchi. He has no rales.   Abdominal: Normal appearance.   Musculoskeletal:      Cervical back: He exhibits no tenderness.   Lymphadenopathy:     He has no cervical adenopathy.   Neurological: no focal deficit. He is alert and oriented to person, place, and time. He displays no weakness. He exhibits normal muscle tone. Coordination normal.   Skin: Skin is warm, dry, intact, not diaphoretic, not pale and no rash.   Psychiatric: His speech is normal and behavior is normal. Mood, judgment and thought content normal.   Nursing note and vitals reviewed.        Assessment:       1. COVID-19          Plan:         COVID-19  -     molnupiravir 200 mg capsule (EUA); Take 4 capsules (800 mg total) by mouth every 12 (twelve) hours. for 5 days  Dispense: 40 capsule; Refill: 0  -     benzonatate (TESSALON) 200 MG capsule; Take 1 capsule (200 mg total) by mouth 3 (three) times daily as needed for Cough.  Dispense: 30 capsule; Refill: 0    Molnupiravir handout given. Pt has drug interactions with paxlovid.   I have reviewed the patient chart and pertinent past imaging/labs.    Patient Instructions   You have tested positive for COVID-19 today. Take molnupivir as prescribed. Tylenol as needed for fever/pain. Flonase for nasal congestion. Stay hydrated, drink plenty of water. Peptobismol for upset stomach/diarrhea, not immodium. Tessalon as needed for cough.      ISOLATION  If you tested positive and do not have symptoms, you must isolate for 5 days starting on the day of the  positive test. I    If you tested positive and have symptoms, you must isolate for 5 days starting on the day of the first symptoms,  not the day of the positive test.     Both the CDC and the [facility] emphasize that you do not test out of isolation.     After 5 days, if your symptoms have improved and you have not had fever on day 5, you can return to the community on day 6- NO TESTING REQUIRED!      In fact, we do not retest if you were positive in the last 90 days.    After your 5 days of isolation are completed, the CDC recommends strict mask use for the first 5 days that you come out of isolation.

## 2023-03-06 NOTE — TELEPHONE ENCOUNTER
----- Message from Delicia Whitmore sent at 3/6/2023 12:22 PM CST -----  Contact: [phone] - pt  Pt is requesting a callback in regards to After visit summary from visit on 2/22/2023       Please call and advise @ [phone]

## 2023-03-06 NOTE — PATIENT INSTRUCTIONS
You have tested positive for COVID-19 today. Take molnupivir as prescribed. Tylenol as needed for fever/pain. Flonase for nasal congestion. Stay hydrated, drink plenty of water. Peptobismol for upset stomach/diarrhea, not immodium. Tessalon as needed for cough.      ISOLATION  If you tested positive and do not have symptoms, you must isolate for 5 days starting on the day of the positive test. I    If you tested positive and have symptoms, you must isolate for 5 days starting on the day of the first symptoms,  not the day of the positive test.     Both the CDC and the [facility] emphasize that you do not test out of isolation.     After 5 days, if your symptoms have improved and you have not had fever on day 5, you can return to the community on day 6- NO TESTING REQUIRED!      In fact, we do not retest if you were positive in the last 90 days.    After your 5 days of isolation are completed, the CDC recommends strict mask use for the first 5 days that you come out of isolation.

## 2023-03-08 ENCOUNTER — OFFICE VISIT (OUTPATIENT)
Dept: CARDIOLOGY | Facility: CLINIC | Age: 68
End: 2023-03-08
Payer: MEDICARE

## 2023-03-08 VITALS
RESPIRATION RATE: 18 BRPM | SYSTOLIC BLOOD PRESSURE: 148 MMHG | DIASTOLIC BLOOD PRESSURE: 74 MMHG | HEIGHT: 68 IN | OXYGEN SATURATION: 99 % | HEART RATE: 61 BPM | WEIGHT: 155.63 LBS | BODY MASS INDEX: 23.59 KG/M2

## 2023-03-08 DIAGNOSIS — I10 ESSENTIAL HYPERTENSION: ICD-10-CM

## 2023-03-08 DIAGNOSIS — I25.10 CORONARY ARTERY DISEASE INVOLVING NATIVE CORONARY ARTERY OF NATIVE HEART WITHOUT ANGINA PECTORIS: Primary | ICD-10-CM

## 2023-03-08 DIAGNOSIS — I27.20 PULMONARY HYPERTENSION: ICD-10-CM

## 2023-03-08 DIAGNOSIS — E78.49 OTHER HYPERLIPIDEMIA: ICD-10-CM

## 2023-03-08 DIAGNOSIS — I25.10 CORONARY ARTERY CALCIFICATION SEEN ON CAT SCAN: ICD-10-CM

## 2023-03-08 DIAGNOSIS — Z95.1 S/P CABG X 3: ICD-10-CM

## 2023-03-08 DIAGNOSIS — I70.0 AORTIC ATHEROSCLEROSIS: ICD-10-CM

## 2023-03-08 PROCEDURE — 93010 EKG 12-LEAD: ICD-10-PCS | Mod: S$PBB,,, | Performed by: INTERNAL MEDICINE

## 2023-03-08 PROCEDURE — 99999 PR PBB SHADOW E&M-EST. PATIENT-LVL IV: ICD-10-PCS | Mod: PBBFAC,,, | Performed by: INTERNAL MEDICINE

## 2023-03-08 PROCEDURE — 93010 ELECTROCARDIOGRAM REPORT: CPT | Mod: S$PBB,,, | Performed by: INTERNAL MEDICINE

## 2023-03-08 PROCEDURE — 99213 PR OFFICE/OUTPT VISIT, EST, LEVL III, 20-29 MIN: ICD-10-PCS | Mod: S$PBB,,, | Performed by: INTERNAL MEDICINE

## 2023-03-08 PROCEDURE — 99999 PR PBB SHADOW E&M-EST. PATIENT-LVL IV: CPT | Mod: PBBFAC,,, | Performed by: INTERNAL MEDICINE

## 2023-03-08 PROCEDURE — 93005 ELECTROCARDIOGRAM TRACING: CPT | Mod: PBBFAC,PO | Performed by: INTERNAL MEDICINE

## 2023-03-08 PROCEDURE — 99214 OFFICE O/P EST MOD 30 MIN: CPT | Mod: PBBFAC,PO | Performed by: INTERNAL MEDICINE

## 2023-03-08 PROCEDURE — 99213 OFFICE O/P EST LOW 20 MIN: CPT | Mod: S$PBB,,, | Performed by: INTERNAL MEDICINE

## 2023-03-08 NOTE — PROGRESS NOTES
CARDIOLOGY CLINIC VISIT        HISTORY OF PRESENT ILLNESS:     Abdias Kim presents for continued care.     Previous visit had complaints of palpitations. No recurrence. Holter at that time showed average heart rate 57 beats per minute.  Very rare PVCs.  Occasional PACs.  Reported symptoms correlated with sinus bradycardia, heart rate roughly 50 beats per minute.   Previous clinic note:    CPX prior to starting cardiac rehab.  He exercised for 8 minutes 35 seconds.  Functional capacity 15 Mets.  Rare PVCs.  EKG portion was reported positive for ischemia.  Resolved less than 4 minutes into recovery.  His stress test prior to having coronary angiography and subsequent bypass surgery he had changes that lasted upwards of 27 minutes into recovery.  Status post CABG x3 vessel on 04/26/2021.  (Lima/lad, GIORGIO/1st diagonal, SVG/om 1) He had stress echocardiogram on 04/19/2021.  Positive for ischemia in territory supplied by LAD.  EKG was positive for ischemia.  Ejection fraction was 65%.  Pulmonary pressure was 30. Left heart catheterization revealed ostial to mid left main 70% stenosis.  Ostial to proximal left anterior descending artery stenosis 90%.     09/07/2022: Last visit discontinued Imdur.  Initiated hydralazine. He could not tolerate secondary to palpitations. History of pulmonary hypertension.  Follow-up echocardiogram showed PA pressure of 28 mm Hg.  Normal left ventricular systolic function. Feels good. Exercising regularly. Home blood pressure logs reviewed.     03/08/2023: Recently had COVID. Feels good today. EKG today sinus bradycardia.    CARDIOVASCULAR HISTORY:     CABG x3 vessel  Pulmonary hypertension    PAST MEDICAL HISTORY:     Past Medical History:   Diagnosis Date    Anemia     Diverticulosis     Hypertension        PAST SURGICAL HISTORY:     Past Surgical History:   Procedure Laterality Date    APPENDECTOMY      COLONOSCOPY N/A 01/31/2017    Procedure: COLONOSCOPY;  Surgeon: BASILIO Winn MD;   Location: [facility] (4TH FLR);  Service: Endoscopy;  Laterality: N/A;    COLONOSCOPY N/A 02/05/2020    Procedure: COLONOSCOPY;  Surgeon: Cody Maria MD;  Location: [facility] (4TH FLR);  Service: Endoscopy;  Laterality: N/A;  OKay fang Maria or ghazala. Needs to be done in Jan 2020    CORONARY ARTERY BYPASS GRAFT  05/26/2021    CORONARY ARTERY BYPASS GRAFT (CABG) N/A 04/26/2021    Procedure: CORONARY ARTERY BYPASS GRAFT (CABG)X3 WITH VEIN HARVEST;  Surgeon: Fidencio Galindo MD;  Location: [facility] OR 2ND FLR;  Service: Cardiovascular;  Laterality: N/A;    ENDOSCOPIC HARVEST OF VEIN Left 04/26/2021    Procedure: HARVEST-VEIN-ENDOVASCULAR FOR CABGX3;  Surgeon: Fidencio Galindo MD;  [facility];  Service: Cardiovascular;  Laterality: Left;  Vein Wann Start time: 1201pm  Vein Wann Stop time: 1226pm    Vein Prep Start time: 1227pm  Vein Prep Stop time: 1250pm    ESOPHAGOGASTRODUODENOSCOPY N/A 02/05/2020    Procedure: EGD (ESOPHAGOGASTRODUODENOSCOPY);  Surgeon: Cody Maria MD;  Location: [facility] (4TH FLR);  Service: Endoscopy;  Laterality: N/A;    ESOPHAGOGASTRODUODENOSCOPY N/A 04/30/2020    Procedure: EGD (ESOPHAGOGASTRODUODENOSCOPY);  Surgeon: Cody Maria MD;  Location: [facility] (2ND FLR);  Service: Endoscopy;  Laterality: N/A;  schedule 12 weeks from now  4/13/20 - removed from 4/29/20, needs to be rescheduled high priority - pg  4/28/20-scheduled from high priority list-BB  Covid screening test scheduled for 4/29/20-BB  instructions emailed to patient-BB    EXCLUSION OF LEFT ATRIAL APPENDAGE N/A 04/26/2021    Procedure: EXCLUSION, LEFT ATRIAL APPENDAGE;  Surgeon: Fidencio Galindo MD;  [facility];  Service: Cardiovascular;  Laterality: N/A;  Left Atrial Appendage Resection    HERNIA REPAIR  April/May 2022    LEFT HEART CATHETERIZATION Left 04/21/2021    Procedure: Left heart cath;  Surgeon: Aric Alvarado MD;  Location: [facility] CATH LAB;  Service: Cardiology;   Laterality: Left;    UMBILICAL HERNIA REPAIR N/A 03/31/2022    Procedure: REPAIR, HERNIA, UMBILICAL, AGE 5 YEARS OR OLDER Open With or Without Mesh;  Surgeon: Matt Delgado MD;  [facility] [address] [place] [address];  Service: General;  Laterality: N/A;       ALLERGIES AND MEDICATION:     Review of patient's allergies indicates:   Allergen Reactions    Allegra-d 12 hour [fexofenadine-pseudoephedrine] Other (See Comments)     Trouble urinating    Mucinex d [pseudoephedrine-guaifenesin] Other (See Comments)     Trouble urinating    Losartan-hydrochlorothiazide Hives and Rash     Other reaction(s): Hives        Medication List            Accurate as of March 8, 2023 11:21 AM. If you have any questions, ask your nurse or doctor.                CONTINUE taking these medications      aspirin 81 MG EC tablet  Commonly known as: ECOTRIN  Take 1 tablet (81 mg total) by mouth once daily.     atorvastatin 40 MG tablet  Commonly known as: LIPITOR  Take 1 tablet (40 mg total) by mouth every evening.     benzonatate 200 MG capsule  Commonly known as: TESSALON  Take 1 capsule (200 mg total) by mouth 3 (three) times daily as needed for Cough.     cetirizine 10 MG tablet  Commonly known as: ZYRTEC     diltiaZEM 120 MG Cp24  Commonly known as: CARDIZEM CD  Take 1 capsule (120 mg total) by mouth once daily.     fluticasone propionate 50 mcg/actuation nasal spray  Commonly known as: FLONASE     molnupiravir 200 mg capsule (EUA)  Take 4 capsules (800 mg total) by mouth every 12 (twelve) hours. for 5 days     multivitamin per tablet  Commonly known as: THERAGRAN     omeprazole 20 MG capsule  Commonly known as: PRILOSEC  Take 1 capsule (20 mg total) by mouth before breakfast.     sildenafiL 100 MG tablet  Commonly known as: VIAGRA  Take 0.5 tablets (50 mg total) by mouth as needed for Erectile Dysfunction.              SOCIAL HISTORY:     Social History     Socioeconomic History    Marital status:    Tobacco Use    Smoking status: Never      Passive exposure: Never    Smokeless tobacco: Never   Substance and Sexual Activity    Alcohol use: Yes     Alcohol/week: 12.0 standard drinks     Types: 6 Glasses of wine, 6 Cans of beer per week     Comment: few times a week     Drug use: No    Sexual activity: Yes     Partners: Female     Birth control/protection: Other-see comments     Comment: Wife, tubal ligation     Social Determinants of Health     Financial Resource Strain: Low Risk     Difficulty of Paying Living Expenses: Not hard at all   Food Insecurity: No Food Insecurity    Worried About Running Out of Food in the Last Year: Never true    Ran Out of Food in the Last Year: Never true   Transportation Needs: No Transportation Needs    Lack of Transportation (Medical): No    Lack of Transportation (Non-Medical): No   Physical Activity: Sufficiently Active    Days of Exercise per Week: 6 days    Minutes of Exercise per Session: 40 min   Stress: Stress Concern Present    Feeling of Stress : To some extent   Social Connections: Unknown    Frequency of Communication with Friends and Family: Twice a week    Frequency of Social Gatherings with Friends and Family: Once a week    Active Member of Clubs or Organizations: No    Attends Club or Organization Meetings: Never    Marital Status:    Housing Stability: Low Risk     Unable to Pay for Housing in the Last Year: No    Number of Places Lived in the Last Year: 1    Unstable Housing in the Last Year: No       FAMILY HISTORY:     Family History   Problem Relation Age of Onset    Heart disease Mother         , Coronary artery disease    Hypertension Mother     Cancer Father         colon/prostate    Colon polyps Father     Arthritis Father             Heart disease Father         Coronary artery disease, Afib, CHF    Hypertension Father     Hypertension Son     Stroke Neg Hx     Diabetes Neg Hx     Celiac disease Neg Hx     Esophageal cancer Neg Hx     Liver cancer Neg Hx     Liver disease  "Neg Hx     Stomach cancer Neg Hx     Rectal cancer Neg Hx        REVIEW OF SYSTEMS:   Review of Systems   Constitutional:  Negative for chills, diaphoresis, fever, malaise/fatigue and weight loss.   HENT:  Negative for congestion, hearing loss, sinus pain, sore throat and tinnitus.    Eyes:  Negative for blurred vision, double vision, photophobia and pain.   Respiratory:  Negative for cough, hemoptysis, sputum production, shortness of breath, wheezing and stridor.    Cardiovascular:  Negative for chest pain, palpitations, orthopnea, claudication, leg swelling and PND.   Gastrointestinal:  Negative for abdominal pain, blood in stool, heartburn, melena, nausea and vomiting.   Musculoskeletal:  Negative for back pain, falls, joint pain, myalgias and neck pain.   Neurological:  Negative for dizziness, tingling, tremors, sensory change, speech change, focal weakness, seizures, loss of consciousness, weakness and headaches.   Endo/Heme/Allergies:  Does not bruise/bleed easily.   Psychiatric/Behavioral:  Negative for depression, memory loss and substance abuse. The patient is not nervous/anxious.      PHYSICAL EXAM:     Vitals:    03/08/23 1105   BP: (!) 148/74   Pulse: 61   Resp: 18    Body mass index is 23.67 kg/m².  Weight: 70.6 kg (155 lb 10.3 oz)   Height: 5' 8" (172.7 cm)     Physical Exam  Vitals reviewed.   Constitutional:       General: He is not in acute distress.     Appearance: Normal appearance. He is well-developed. He is not ill-appearing or diaphoretic.   Neck:      Vascular: No carotid bruit or JVD.   Cardiovascular:      Rate and Rhythm: Normal rate and regular rhythm.      Pulses: Normal pulses.      Heart sounds: Murmur heard.   Systolic murmur is present with a grade of 2/6.   Pulmonary:      Effort: Pulmonary effort is normal. No respiratory distress.      Breath sounds: Normal breath sounds. No stridor.   Abdominal:      General: Abdomen is flat. Bowel sounds are normal.      Palpations: Abdomen is " soft.      Tenderness: There is no abdominal tenderness.      Hernia: No hernia is present.   Musculoskeletal:      Cervical back: Normal range of motion and neck supple. No rigidity or tenderness.      Right lower leg: No edema.      Left lower leg: No edema.   Skin:     General: Skin is warm and dry.   Neurological:      General: No focal deficit present.      Mental Status: He is alert and oriented to person, place, and time.   Psychiatric:         Mood and Affect: Mood normal.         Speech: Speech normal.         Behavior: Behavior normal.       DATA:     EKG (personally reviewed):  03/08/2023-sinus bradycardia  09/07/2022 - SB with PAC  3/22/2022 - SR  11/03/2021-sinus bradycardia    Laboratory:  CBC:  Recent Labs   Lab 11/16/21  0956 03/12/22  1803 08/04/22  1029   WBC 3.84 L 7.21 4.18   Hemoglobin 11.3 L 11.8 L 11.5 L   Hematocrit 35.8 L 36.6 L 35.7 L   Platelets 168 158 177       CHEMISTRIES:  Recent Labs   Lab 04/28/21  2347 04/29/21  0349 04/30/21  0359 05/01/21  0549 05/05/21  2117 07/12/21  1025 03/12/22  1803 08/04/22  1029   Glucose 95   < > 87 99 90 88 76 89   Sodium 137   < > 137 137 140 143 140 143   Potassium 4.1   < > 3.6 3.5 4.5 4.9 4.4 4.6   BUN 12   < > 17 20 19 19 16 21   Creatinine 1.3   < > 1.4 1.5 H 1.5 H 1.5 H 1.3 1.3   eGFR if African American >60.0   < > 59.7 A 54.9 A 54.9 A 54.9 A >60.0  --    eGFR if non  56.5 A   < > 51.6 A 47.5 A 47.5 A 47.5 A 56.1 A  --    Calcium 8.4 L   < > 8.6 L 9.7 9.7 10.0 9.8 9.1   Magnesium 2.1  --  2.1 2.1  --   --   --   --     < > = values in this interval not displayed.       CARDIAC BIOMARKERS:        COAGS:  Recent Labs   Lab 04/28/21  0412 04/29/21  0349 05/05/21  2117   INR 1.0 0.9 1.0       LIPIDS/LFTS:  Recent Labs   Lab 07/12/21  1025 11/16/21  0956 03/12/22  1803 08/04/22  1029   Cholesterol 127 102 L  --  121   Triglycerides 49 34  --  30   HDL 46 50  --  59   LDL Cholesterol 71.2 45.2 L  --  56.0 L   Non-HDL Cholesterol 81 52   --  62   AST 23  --  25 23   ALT 30  --  26 30       Cardiovascular Testing:    Echocardiogram 06/22/2022:    The left ventricle is normal in size with normal systolic function.  The estimated ejection fraction is 60%.  Normal right ventricular size with normal right ventricular systolic function.  The estimated PA systolic pressure is 28 mmHg.    Holter 10/26/2021:    Sinus rhythm with heart rates varying between 42 and 124 BPM with an average of 57 BPM  There were very rare PVCs  There were occasional PACs  Symptoms correlate with sinus bradycardia. Heart rate 50.    CPX 7/12/21:    The patient exercised for 8 minutes and 35 seconds on a high ramp protocol, corresponding to a functional capacity of 15 METS, achieving a peak heart rate of 144 bpm, which is 94% of the age predicted maximum heart rate.  During stress, the following significant arrhythmias were observed: rare PVCs.  The test was stopped because the patient experienced fatigue.  The ECG portion of this study is positive for myocardial ischemia.  There is 1 mm of depression in the leads.  The peak VO2 was 25.6 ml/kg/min which is 77.34% of predicted equating to a functional capacity of 7.31 METS indicating mild functional impairment.  The anaerobic threshold (AT), which occurred at a heart rate of 132bpm, was 24.4 ml/kg/min, which is 73.72% of the predicted VO2 and is normal.  Mild functional impairment associated with a normal breathing reserve, normal oxygen stauration, a good effort, and a normal AT. These findings are indicative of functional impairment secondary to deconditioning.    Carotid ultrasound 04/22/2021:     There is 0-19% right Internal Carotid Stenosis.  There is 0-19% left Internal Carotid Stenosis.     Left heart catheterization 04/21/2021:     The Ost LM to Mid LM lesion was 70% stenosed.  The Ost LAD to Prox LAD lesion was 90% stenosed.  The Dist LAD lesion was 60% stenosed.  The estimated blood loss was none.  Recommend CABG,  discussed with Gia.     Stress echocardiogram 04/19/2021:     The stress echo portion of this study is positive for myocardial ischemia in the typical myocardial territory supplied by the LAD. Target heart rate was achieved.  The ECG portion of this study is positive for myocardial ischemia. Ischemic changes persisted for 27 minutes into the recovery period.  There were no arrhythmias during stress.  The test was stopped because the patient experienced ECG changes.  The left ventricle is normal in size with normal systolic function. The estimated ejection fraction is 65%.  Grade II left ventricular diastolic dysfunction.  Normal right ventricular size with normal right ventricular systolic function.  The estimated PA systolic pressure is 30 mmHg.  Normal central venous pressure (3 mmHg).  Mild left atrial enlargement.     To note, he was given nitroglycerin with improvement in anginal symptoms. He feels back to baseline. He was seen by interventional cardiology and is scheduled for [facility] with PCI on 4/21/21. He has been instructed to start DAPT.    ASSESSMENT:     Coronary artery disease:  Status post CABG x3 vessel  Postop atrial fibrillation  Hypertension  Hyperlipidemia:  LDL 56.  Pulmonary hypertension: normal on last echo  Aortic atherosclerosis  Coronary artery calcifications seen on CT scan      PLAN:     Coronary artery disease:  stable. No angina.   Hypertension: Monitor. Continue current management.  Hyperlipidemia:  Continue current management.   Pulmonary hypertension: FU echocardiogram showed normal PASP.  Return to clinic 6 months.          Regan Aguilar MD, MPH, FACC, [facility]

## 2023-05-05 ENCOUNTER — PATIENT MESSAGE (OUTPATIENT)
Dept: PRIMARY CARE CLINIC | Facility: CLINIC | Age: 68
End: 2023-05-05
Payer: MEDICARE

## 2023-05-22 ENCOUNTER — PATIENT MESSAGE (OUTPATIENT)
Dept: PRIMARY CARE CLINIC | Facility: CLINIC | Age: 68
End: 2023-05-22
Payer: MEDICARE

## 2023-05-22 DIAGNOSIS — N18.31 CHRONIC KIDNEY DISEASE, STAGE 3A: Primary | ICD-10-CM

## 2023-06-09 ENCOUNTER — PATIENT MESSAGE (OUTPATIENT)
Dept: PRIMARY CARE CLINIC | Facility: CLINIC | Age: 68
End: 2023-06-09
Payer: MEDICARE

## 2023-06-09 ENCOUNTER — OFFICE VISIT (OUTPATIENT)
Dept: OTOLARYNGOLOGY | Facility: CLINIC | Age: 68
End: 2023-06-09
Payer: MEDICARE

## 2023-06-09 DIAGNOSIS — H61.22 IMPACTED CERUMEN OF LEFT EAR: ICD-10-CM

## 2023-06-09 PROCEDURE — 69210 REMOVE IMPACTED EAR WAX UNI: CPT | Mod: S$PBB,,, | Performed by: OTOLARYNGOLOGY

## 2023-06-09 PROCEDURE — 69210 PR REMOVAL IMPACTED CERUMEN REQUIRING INSTRUMENTATION, UNILATERAL: ICD-10-PCS | Mod: S$PBB,,, | Performed by: OTOLARYNGOLOGY

## 2023-06-09 PROCEDURE — 99999 PR PBB SHADOW E&M-EST. PATIENT-LVL III: CPT | Mod: PBBFAC,,, | Performed by: OTOLARYNGOLOGY

## 2023-06-09 PROCEDURE — 99499 UNLISTED E&M SERVICE: CPT | Mod: S$PBB,,, | Performed by: OTOLARYNGOLOGY

## 2023-06-09 PROCEDURE — 99499 NO LOS: ICD-10-PCS | Mod: S$PBB,,, | Performed by: OTOLARYNGOLOGY

## 2023-06-09 PROCEDURE — 99999 PR PBB SHADOW E&M-EST. PATIENT-LVL III: ICD-10-PCS | Mod: PBBFAC,,, | Performed by: OTOLARYNGOLOGY

## 2023-06-09 PROCEDURE — 69210 REMOVE IMPACTED EAR WAX UNI: CPT | Mod: PBBFAC | Performed by: OTOLARYNGOLOGY

## 2023-06-09 PROCEDURE — 99213 OFFICE O/P EST LOW 20 MIN: CPT | Mod: PBBFAC | Performed by: OTOLARYNGOLOGY

## 2023-06-09 RX ORDER — FLUTICASONE PROPIONATE 50 MCG
1 SPRAY, SUSPENSION (ML) NASAL DAILY
Qty: 16 G | Refills: 0 | Status: SHIPPED | OUTPATIENT
Start: 2023-06-09 | End: 2023-09-06 | Stop reason: SDUPTHER

## 2023-06-09 NOTE — PROGRESS NOTES
Otology/Neurotology History and Physical     CC: cerumen impaction    HPI:  Abdias Kim is a 67 y.o. male who was referred to me by Dr. Trace Ervin* in consultation for cerumen impaction. Patient has not had ears cleaned previously. No significant otologic history. Does not use any ear drops. Denies any HL, vertigo, or tinnitus      Past Medical History  He has a past medical history of Anemia, Diverticulosis, and Hypertension.    Past Surgical History  He has a past surgical history that includes Appendectomy; Colonoscopy (N/A, 01/31/2017); Esophagogastroduodenoscopy (N/A, 02/05/2020); Colonoscopy (N/A, 02/05/2020); Esophagogastroduodenoscopy (N/A, 04/30/2020); Left heart catheterization (Left, 04/21/2021); Coronary Artery Bypass Graft (CABG) (N/A, 04/26/2021); Endoscopic harvest of vein (Left, 04/26/2021); Exclusion of left atrial appendage (N/A, 04/26/2021); Umbilical hernia repair (N/A, 03/31/2022); Hernia repair (April/May 2022); and Coronary artery bypass graft (05/26/2021).    Family History  His family history includes Arthritis in his father; Cancer in his father; Colon polyps in his father; Heart disease in his father and mother; Hypertension in his father, mother, and son.    Social History  He reports that he has never smoked. He has never been exposed to tobacco smoke. He has never used smokeless tobacco. He reports current alcohol use of about 12.0 standard drinks per week. He reports that he does not use drugs.    Allergies  He is allergic to allegra-d 12 hour [fexofenadine-pseudoephedrine], mucinex d [pseudoephedrine-guaifenesin], and losartan-hydrochlorothiazide.    Medications  He has a current medication list which includes the following prescription(s): aspirin, atorvastatin, cetirizine, diltiazem, fluticasone propionate, multivitamin, omeprazole, and sildenafil, and the following Facility-Administered Medications: acetaminophen, albuterol, diphenhydramine, epinephrine, and  ondansetron.    Review of Systems       Objective:     There were no vitals taken for this visit.   Physical Exam  Constitutional: Well appearing/communicating. Voice clear. NAD.  Eyes: EOM I Bilaterally  Head/Face: Normocephalic.  House Brackmann I Bilaterally.  Right Ear: See procedure  Left Ear:   Nose: No gross nasal septal deviation. Inferior Turbinates 2+ bilaterally. No septal perforation. No masses/lesions. External nasal skin without masses/lesions.  Oral Cavity: Gingiva/lips WNL. Oral Tongue mobile. Hard Palate WNL.   Oropharynx: Tonsillar fossa/pharyngeal wall without lesions. Posterior oropharynx WNL.  Soft palate without masses. Midline uvula.   Neck/Lymphatic: No LAD I-VI bilaterally.  No thyromegaly.  No masses noted on exam.  Neuro/Psychiatric: AOx3.  Normal mood and affect.   Respiratory: Normal respiratory effort, no stridor/stertor, no retractions noted.      Procedure  Bilateral Cerumen Disimpaction  The patient was placed supine in the minor procedure room. The microscope was used to visualize the canal. Thick cerumen was removed with instrumentation and suction bilaterally until the TM was fully visible. The TM were intact without JOSE J or retraction. The patient tolerated the procedure well.       Data Reviewed           Assessment:     1. Impacted cerumen of left ear         Plan:   - Discussed protecting ears from loose hair  - Discussed OTC cerumen management  - RTC prn      Answers submitted by the patient for this visit:  Review of Symptoms Questionnaire  (Submitted on 6/9/2023)  Snoring?: Yes  heartburn: Yes  Acid Reflux?: Yes  Urinating too frequently?: Yes  Muscle aches / pain?: Yes  Seasonal Allergies?: Yes

## 2023-06-13 ENCOUNTER — OFFICE VISIT (OUTPATIENT)
Dept: OPTOMETRY | Facility: CLINIC | Age: 68
End: 2023-06-13
Payer: MEDICARE

## 2023-06-13 DIAGNOSIS — H52.4 HYPEROPIA OF BOTH EYES WITH REGULAR ASTIGMATISM AND PRESBYOPIA: ICD-10-CM

## 2023-06-13 DIAGNOSIS — H52.03 HYPEROPIA OF BOTH EYES WITH REGULAR ASTIGMATISM AND PRESBYOPIA: ICD-10-CM

## 2023-06-13 DIAGNOSIS — N52.9 ERECTILE DYSFUNCTION, UNSPECIFIED ERECTILE DYSFUNCTION TYPE: ICD-10-CM

## 2023-06-13 DIAGNOSIS — Z01.00 EYE EXAM, ROUTINE: Primary | ICD-10-CM

## 2023-06-13 DIAGNOSIS — H52.223 HYPEROPIA OF BOTH EYES WITH REGULAR ASTIGMATISM AND PRESBYOPIA: ICD-10-CM

## 2023-06-13 PROCEDURE — 92014 COMPRE OPH EXAM EST PT 1/>: CPT | Mod: S$PBB,,, | Performed by: OPTOMETRIST

## 2023-06-13 PROCEDURE — 92014 PR EYE EXAM, EST PATIENT,COMPREHESV: ICD-10-PCS | Mod: S$PBB,,, | Performed by: OPTOMETRIST

## 2023-06-13 PROCEDURE — 92015 PR REFRACTION: ICD-10-PCS | Mod: ,,, | Performed by: OPTOMETRIST

## 2023-06-13 PROCEDURE — 99999 PR PBB SHADOW E&M-EST. PATIENT-LVL III: ICD-10-PCS | Mod: PBBFAC,,, | Performed by: OPTOMETRIST

## 2023-06-13 PROCEDURE — 92015 DETERMINE REFRACTIVE STATE: CPT | Mod: ,,, | Performed by: OPTOMETRIST

## 2023-06-13 PROCEDURE — 99999 PR PBB SHADOW E&M-EST. PATIENT-LVL III: CPT | Mod: PBBFAC,,, | Performed by: OPTOMETRIST

## 2023-06-13 PROCEDURE — 99213 OFFICE O/P EST LOW 20 MIN: CPT | Mod: PBBFAC,PO | Performed by: OPTOMETRIST

## 2023-06-13 NOTE — PROGRESS NOTES
HPI    Annual Exam   Pt states Blurred Vision c Spec MRX  Old sRx is better than last years   Pt denies F/F   Pt denies Dry/ Itchy/ Burning  Gtt: Yes not sure brand AT Therapy   Pt reports feeling relief    Last edited by Edi Goldman, OD on 6/13/2023  1:14 PM.            Assessment /Plan     For exam results, see Encounter Report.    Eye exam, routine  -[facility]    Hyperopia of both eyes with regular astigmatism and presbyopia  Eyeglass Final Rx       Eyeglass Final Rx         Sphere Cylinder Axis Dist VA Add    Right +1.50 +0.75 005 20/30 +2.50    Left +1.25 +0.75 005 20/30 +2.50      Type: PAL    Expiration Date: 6/13/2024                      RTC 1 yr

## 2023-06-14 RX ORDER — SILDENAFIL 100 MG/1
50 TABLET, FILM COATED ORAL
Qty: 10 TABLET | Refills: 3 | Status: SHIPPED | OUTPATIENT
Start: 2023-06-14

## 2023-06-21 ENCOUNTER — OFFICE VISIT (OUTPATIENT)
Dept: DERMATOLOGY | Facility: CLINIC | Age: 68
End: 2023-06-21
Payer: MEDICARE

## 2023-06-21 DIAGNOSIS — L81.4 LENTIGINES: ICD-10-CM

## 2023-06-21 PROCEDURE — 99202 PR OFFICE/OUTPT VISIT, NEW, LEVL II, 15-29 MIN: ICD-10-PCS | Mod: S$PBB,,, | Performed by: DERMATOLOGY

## 2023-06-21 PROCEDURE — 99202 OFFICE O/P NEW SF 15 MIN: CPT | Mod: S$PBB,,, | Performed by: DERMATOLOGY

## 2023-06-21 PROCEDURE — 99213 OFFICE O/P EST LOW 20 MIN: CPT | Mod: PBBFAC | Performed by: DERMATOLOGY

## 2023-06-21 PROCEDURE — 99999 PR PBB SHADOW E&M-EST. PATIENT-LVL III: CPT | Mod: PBBFAC,,, | Performed by: DERMATOLOGY

## 2023-06-21 PROCEDURE — 99999 PR PBB SHADOW E&M-EST. PATIENT-LVL III: ICD-10-PCS | Mod: PBBFAC,,, | Performed by: DERMATOLOGY

## 2023-06-21 NOTE — PROGRESS NOTES
Subjective:      Patient ID:  Abdias Kim is a 67 y.o. male who presents for   Chief Complaint   Patient presents with    Mass     L-scalp      Pt is a 68 y/o male presenting to the clinic for a bump on the left scalp. Pt was visiting his PCP on 02/22/2023 and was advised to see a dermatologist for a potential actinic keratosis.  The bump first appeared 7 months ago.  He described the bump as dry and tender to touch.  He has tried no treatments and nothing exacerbates his symptoms.  However, he does note the bump has gone down two weeks after the visit with his PCP.  Pt can no longer feel or see the bump.  Unsure what color it was initially. He has no other complaints.       Review of Systems   Constitutional:  Negative for fever.   Skin:  Positive for activity-related sunscreen use (sometimes), recent sunburn and wears hat. Negative for daily sunscreen use.   Hematologic/Lymphatic: Does not bruise/bleed easily.     Objective:   Physical Exam   Constitutional: He appears well-developed and well-nourished. No distress.   Neurological: He is alert and oriented to person, place, and time. He is not disoriented.   Psychiatric: He has a normal mood and affect.   Skin:   Areas Examined (abnormalities noted in diagram):   Scalp / Hair Palpated and Inspected          Diagram Legend     Erythematous scaling macule/papule c/w actinic keratosis       Vascular papule c/w angioma      Pigmented verrucoid papule/plaque c/w seborrheic keratosis      Yellow umbilicated papule c/w sebaceous hyperplasia      Irregularly shaped tan macule c/w lentigo     1-2 mm smooth white papules consistent with Milia      Movable subcutaneous cyst with punctum c/w epidermal inclusion cyst      Subcutaneous movable cyst c/w pilar cyst      Firm pink to brown papule c/w dermatofibroma      Pedunculated fleshy papule(s) c/w skin tag(s)      Evenly pigmented macule c/w junctional nevus     Mildly variegated pigmented, slightly irregular-bordered  macule c/w mildly atypical nevus      Flesh colored to evenly pigmented papule c/w intradermal nevus       Pink pearly papule/plaque c/w basal cell carcinoma      Erythematous hyperkeratotic cursted plaque c/w SCC      Surgical scar with no sign of skin cancer recurrence      Open and closed comedones      Inflammatory papules and pustules      Verrucoid papule consistent consistent with wart     Erythematous eczematous patches and plaques     Dystrophic onycholytic nail with subungual debris c/w onychomycosis     Umbilicated papule    Erythematous-base heme-crusted tan verrucoid plaque consistent with inflamed seborrheic keratosis     Erythematous Silvery Scaling Plaque c/w Psoriasis     See annotation      Assessment / Plan:        Lentigines  These are benign sun spots which should be monitored for changes. Patient instructed in importance of daily broad spectrum sunscreen use with spf at least 30. Sun avoidance and topical protection/protective clothing discussed.    Lesion could have been AK or SK? Described both and gave warning signs of AK along with brochure.    Rtc prn

## 2023-06-21 NOTE — PATIENT INSTRUCTIONS

## 2023-06-28 ENCOUNTER — PATIENT MESSAGE (OUTPATIENT)
Dept: NEPHROLOGY | Facility: CLINIC | Age: 68
End: 2023-06-28

## 2023-06-28 ENCOUNTER — HOSPITAL ENCOUNTER (OUTPATIENT)
Dept: RADIOLOGY | Facility: HOSPITAL | Age: 68
Discharge: HOME | End: 2023-06-28
Attending: INTERNAL MEDICINE
Payer: MEDICARE

## 2023-06-28 ENCOUNTER — OFFICE VISIT (OUTPATIENT)
Dept: NEPHROLOGY | Facility: CLINIC | Age: 68
End: 2023-06-28
Payer: MEDICARE

## 2023-06-28 VITALS
BODY MASS INDEX: 23.67 KG/M2 | SYSTOLIC BLOOD PRESSURE: 150 MMHG | DIASTOLIC BLOOD PRESSURE: 68 MMHG | HEART RATE: 45 BPM | OXYGEN SATURATION: 97 % | HEIGHT: 68 IN

## 2023-06-28 DIAGNOSIS — I48.0 PAROXYSMAL ATRIAL FIBRILLATION: ICD-10-CM

## 2023-06-28 DIAGNOSIS — D51.8 OTHER VITAMIN B12 DEFICIENCY ANEMIAS: ICD-10-CM

## 2023-06-28 DIAGNOSIS — I27.20 PULMONARY HYPERTENSION: Primary | ICD-10-CM

## 2023-06-28 DIAGNOSIS — Z95.1 S/P CABG (CORONARY ARTERY BYPASS GRAFT): ICD-10-CM

## 2023-06-28 DIAGNOSIS — D52.0 DIETARY FOLATE DEFICIENCY ANEMIA: ICD-10-CM

## 2023-06-28 DIAGNOSIS — I25.10 CORONARY ARTERY DISEASE INVOLVING NATIVE CORONARY ARTERY OF NATIVE HEART WITHOUT ANGINA PECTORIS: ICD-10-CM

## 2023-06-28 DIAGNOSIS — N18.31 CHRONIC KIDNEY DISEASE, STAGE 3A: ICD-10-CM

## 2023-06-28 DIAGNOSIS — D50.8 IRON DEFICIENCY ANEMIA SECONDARY TO INADEQUATE DIETARY IRON INTAKE: ICD-10-CM

## 2023-06-28 DIAGNOSIS — I13.10 HYPERTENSIVE CARDIOVASCULAR-RENAL DISEASE, STAGE 1-4 OR UNSPECIFIED CHRONIC KIDNEY DISEASE, WITHOUT HEART FAILURE: ICD-10-CM

## 2023-06-28 DIAGNOSIS — I27.20 PULMONARY HYPERTENSION: ICD-10-CM

## 2023-06-28 PROCEDURE — 99214 OFFICE O/P EST MOD 30 MIN: CPT | Mod: PBBFAC,PN | Performed by: INTERNAL MEDICINE

## 2023-06-28 PROCEDURE — 99205 PR OFFICE/OUTPT VISIT, NEW, LEVL V, 60-74 MIN: ICD-10-PCS | Mod: S$PBB,,, | Performed by: INTERNAL MEDICINE

## 2023-06-28 PROCEDURE — 76770 US EXAM ABDO BACK WALL COMP: CPT | Mod: 26,,, | Performed by: INTERNAL MEDICINE

## 2023-06-28 PROCEDURE — 99999 PR PBB SHADOW E&M-EST. PATIENT-LVL IV: ICD-10-PCS | Mod: PBBFAC,,, | Performed by: INTERNAL MEDICINE

## 2023-06-28 PROCEDURE — 99205 OFFICE O/P NEW HI 60 MIN: CPT | Mod: S$PBB,,, | Performed by: INTERNAL MEDICINE

## 2023-06-28 PROCEDURE — 76770 US RETROPERITONEAL COMPLETE: ICD-10-PCS | Mod: 26,,, | Performed by: INTERNAL MEDICINE

## 2023-06-28 PROCEDURE — 99999 PR PBB SHADOW E&M-EST. PATIENT-LVL IV: CPT | Mod: PBBFAC,,, | Performed by: INTERNAL MEDICINE

## 2023-06-28 PROCEDURE — 76770 US EXAM ABDO BACK WALL COMP: CPT | Mod: TC

## 2023-06-28 NOTE — PROGRESS NOTES
"Subjective:       Patient ID: Abdias Kim is a 67 y.o. Black or  male who presents for initial evaluation of elevated sr cr referred by PCP, dr Noriega.     Renal function per chart review with sr cr baseline of 1.3 - 1.5 mg/dL     - PAF on Diltiazem and CAD s/p CABG 2021.  - HTN diagnosed about in his 20's. Patient reports good adherence to the current regiment, which includes Diltiazem. They are following low salt diet. Previously on Losartan and stopped due to Hives. Never hospitalized for uncontrolled HTN or hypotension/syncopal episodes.    Denies use of NSAIDs, nephrolithiasis or fam Hx of renal disease.      Review of Systems   Constitutional:  Negative for chills, fatigue and fever.   HENT:  Negative for hearing loss, trouble swallowing and voice change.    Respiratory:  Negative for cough, shortness of breath and wheezing.    Cardiovascular:  Positive for palpitations. Negative for chest pain and leg swelling.   Gastrointestinal:  Negative for abdominal distention, abdominal pain, constipation and diarrhea.   Endocrine: Negative for polydipsia, polyphagia and polyuria.   Genitourinary:  Negative for dysuria, flank pain, frequency and hematuria.   Musculoskeletal:  Negative for arthralgias, back pain and myalgias.   Skin:  Negative for rash and wound.   Neurological:  Positive for dizziness and light-headedness. Negative for syncope and weakness.   Hematological:  Negative for adenopathy. Does not bruise/bleed easily.     The past medical, family and social histories were reviewed for this encounter.     BP (!) 150/68 (BP Location: Left arm, Patient Position: Sitting, BP Method: Medium (Manual))   Pulse (!) 45   Ht 5' 8" (1.727 m)   SpO2 97%   BMI 23.67 kg/m²     Objective:      Physical Exam  Vitals reviewed.   Constitutional:       General: He is not in acute distress.     Appearance: Normal appearance. He is well-developed. He is not ill-appearing.   HENT:      Head: Normocephalic " and atraumatic.      Mouth/Throat:      Mouth: Mucous membranes are moist.      Pharynx: Oropharynx is clear.   Eyes:      General: No scleral icterus.     Extraocular Movements: Extraocular movements intact.      Conjunctiva/sclera: Conjunctivae normal.   Neck:      Vascular: No JVD.   Cardiovascular:      Rate and Rhythm: Normal rate and regular rhythm.      Heart sounds: Normal heart sounds. No murmur heard.    No friction rub. No gallop.   Pulmonary:      Effort: Pulmonary effort is normal. No respiratory distress.      Breath sounds: Normal breath sounds. No wheezing.   Abdominal:      General: Bowel sounds are normal. There is no distension.      Palpations: Abdomen is soft.      Tenderness: There is no abdominal tenderness.   Musculoskeletal:         General: No tenderness. Normal range of motion.      Cervical back: Normal range of motion.      Right lower leg: No edema.      Left lower leg: No edema.   Skin:     General: Skin is warm and dry.      Capillary Refill: Capillary refill takes less than 2 seconds.      Findings: No rash.   Neurological:      General: No focal deficit present.      Mental Status: He is alert and oriented to person, place, and time.   Psychiatric:         Mood and Affect: Mood normal.         Behavior: Behavior normal.       Assessment:       1. Pulmonary hypertension    2. Chronic kidney disease, stage 3a    3. S/P CABG (coronary artery bypass graft)    4. Paroxysmal atrial fibrillation    5. Hypertensive cardiovascular-renal disease, stage 1-4 or unspecified chronic kidney disease, without heart failure    6. Coronary artery disease involving native coronary artery of native heart without angina pectoris    7. Iron deficiency anemia secondary to inadequate dietary iron intake        Plan:   Return to clinic in 6 months    CKD stage 3a in a setting of HTN and cardiorenal pathophysiology  Baseline creatinine is 1.3 - 1.5 mg/dL with pending proteinuria  Lab Results   Component Value  Date    CREATININE 1.3 08/04/2022      - Euvolemic   - Pt understands importance of blood pressure control and is aware that uncontrolled HTN and development of DM leads to progression of CKD   - Complete avoidance of NSAIDs   - Low salt diet with < 2000 mg/day   - Dose adjust medications to GFR of 45 -60   - Avoid nephrotoxins including IV contrast    HTN   - BP well controlled: continue current medications, avoid hypotension and dehydration    PAF and CAD   - Guideline directed per cardiology    GERD   - Stop Prilosec and try Pepcid    Anemia   - Stable    Med changes:   Stop Prilosec and try Pepcid    I have personally review notes from referring physician, laboratory findings and appropriate images.  I have spent more then 60 min on this encounter with 50% of my time spent on face to face patient interaction.

## 2023-06-28 NOTE — PATIENT INSTRUCTIONS
DO NOT take any of NSAIDs or pain medications listed below, instead use Tylenol as written on the bottle.   DO NOT take and of the following  ibuprofen.  naproxen.  diclofenac.  celecoxib.  mefenamic acid.  etoricoxib.  indomethacin.  high-dose aspirin (low-dose aspirin is not normally considered to be an NSAID).    Ok to take Tylenol    Low Salt diet < 2000 mg per day    Stop Prilosec and try Pepcid

## 2023-06-29 ENCOUNTER — HOSPITAL ENCOUNTER (OUTPATIENT)
Dept: RADIOLOGY | Facility: HOSPITAL | Age: 68
Discharge: HOME | End: 2023-06-29
Attending: INTERNAL MEDICINE
Payer: MEDICARE

## 2023-06-29 DIAGNOSIS — N28.89 OTHER SPECIFIED DISORDERS OF KIDNEY AND URETER: ICD-10-CM

## 2023-06-29 DIAGNOSIS — N28.89 OTHER SPECIFIED DISORDERS OF KIDNEY AND URETER: Primary | ICD-10-CM

## 2023-06-29 PROCEDURE — A9585 GADOBUTROL INJECTION: HCPCS | Performed by: INTERNAL MEDICINE

## 2023-06-29 PROCEDURE — 74183 MRI ABD W/O CNTR FLWD CNTR: CPT | Mod: TC

## 2023-06-29 PROCEDURE — 74183 MRI ABD W/O CNTR FLWD CNTR: CPT | Mod: 26,,, | Performed by: STUDENT IN AN ORGANIZED HEALTH CARE EDUCATION/TRAINING PROGRAM

## 2023-06-29 PROCEDURE — 25500020 PHARM REV CODE 255: Performed by: INTERNAL MEDICINE

## 2023-06-29 PROCEDURE — 74183 MRI ABDOMEN W WO CONTRAST: ICD-10-PCS | Mod: 26,,, | Performed by: STUDENT IN AN ORGANIZED HEALTH CARE EDUCATION/TRAINING PROGRAM

## 2023-06-29 RX ORDER — GADOBUTROL 604.72 MG/ML
10 INJECTION INTRAVENOUS
Status: COMPLETED | OUTPATIENT
Start: 2023-06-29 | End: 2023-06-29

## 2023-06-29 RX ADMIN — GADOBUTROL 10 ML: 604.72 INJECTION INTRAVENOUS at 02:06

## 2023-06-30 ENCOUNTER — PATIENT MESSAGE (OUTPATIENT)
Dept: OPTOMETRY | Facility: CLINIC | Age: 68
End: 2023-06-30
Payer: MEDICARE

## 2023-06-30 DIAGNOSIS — N28.89 OTHER SPECIFIED DISORDERS OF KIDNEY AND URETER: Primary | ICD-10-CM

## 2023-07-18 ENCOUNTER — OFFICE VISIT (OUTPATIENT)
Dept: OPTOMETRY | Facility: CLINIC | Age: 68
End: 2023-07-18
Payer: MEDICARE

## 2023-07-18 DIAGNOSIS — H52.223 HYPEROPIA OF BOTH EYES WITH REGULAR ASTIGMATISM AND PRESBYOPIA: Primary | ICD-10-CM

## 2023-07-18 DIAGNOSIS — H52.03 HYPEROPIA OF BOTH EYES WITH REGULAR ASTIGMATISM AND PRESBYOPIA: Primary | ICD-10-CM

## 2023-07-18 DIAGNOSIS — H52.4 HYPEROPIA OF BOTH EYES WITH REGULAR ASTIGMATISM AND PRESBYOPIA: Primary | ICD-10-CM

## 2023-07-18 PROCEDURE — 99999 PR PBB SHADOW E&M-EST. PATIENT-LVL III: CPT | Mod: PBBFAC,,, | Performed by: OPTOMETRIST

## 2023-07-18 PROCEDURE — 99499 UNLISTED E&M SERVICE: CPT | Mod: S$PBB,,, | Performed by: OPTOMETRIST

## 2023-07-18 PROCEDURE — 99213 OFFICE O/P EST LOW 20 MIN: CPT | Mod: PBBFAC,PO | Performed by: OPTOMETRIST

## 2023-07-18 PROCEDURE — 99499 NO LOS: ICD-10-PCS | Mod: S$PBB,,, | Performed by: OPTOMETRIST

## 2023-07-18 PROCEDURE — 99999 PR PBB SHADOW E&M-EST. PATIENT-LVL III: ICD-10-PCS | Mod: PBBFAC,,, | Performed by: OPTOMETRIST

## 2023-07-18 RX ORDER — FAMOTIDINE 20 MG/1
40 TABLET, FILM COATED ORAL DAILY
Qty: 60 TABLET | Refills: 11 | Status: SHIPPED | OUTPATIENT
Start: 2023-07-18 | End: 2024-07-17

## 2023-07-18 NOTE — PROGRESS NOTES
HPI    68 y/o male here for  MRX check       Last edited by Bunny Jacobo on 7/18/2023  9:55 AM.            Assessment /Plan     For exam results, see Encounter Report.    Hyperopia of both eyes with regular astigmatism and presbyopia  -reviewed impact of Cataract greater OD and that VA will not be equal to left eye  Eyeglass Final Rx       Eyeglass Final Rx         Sphere Cylinder Axis Dist VA Add    Right +1.00 +1.25 005 20/30 +2.50    Left +1.25 +0.75 005 20/25 +2.50      Type: PAL    Expiration Date: 7/18/2024   Dr Ayala                 RTC 1 yr

## 2023-08-15 ENCOUNTER — PATIENT MESSAGE (OUTPATIENT)
Dept: PRIMARY CARE CLINIC | Facility: CLINIC | Age: 68
End: 2023-08-15
Payer: MEDICARE

## 2023-09-03 ENCOUNTER — PATIENT MESSAGE (OUTPATIENT)
Dept: PRIMARY CARE CLINIC | Facility: CLINIC | Age: 68
End: 2023-09-03
Payer: MEDICARE

## 2023-09-03 DIAGNOSIS — I27.20 PULMONARY HYPERTENSION: Primary | ICD-10-CM

## 2023-09-03 DIAGNOSIS — I25.10 CORONARY ARTERY DISEASE INVOLVING NATIVE CORONARY ARTERY OF NATIVE HEART WITHOUT ANGINA PECTORIS: ICD-10-CM

## 2023-09-03 DIAGNOSIS — Z12.5 SCREENING PSA (PROSTATE SPECIFIC ANTIGEN): ICD-10-CM

## 2023-09-05 ENCOUNTER — LAB VISIT (OUTPATIENT)
Dept: LAB | Facility: HOSPITAL | Age: 68
End: 2023-09-05
Attending: INTERNAL MEDICINE
Payer: MEDICARE

## 2023-09-05 DIAGNOSIS — I27.20 PULMONARY HYPERTENSION: ICD-10-CM

## 2023-09-05 DIAGNOSIS — Z12.5 SCREENING PSA (PROSTATE SPECIFIC ANTIGEN): ICD-10-CM

## 2023-09-05 DIAGNOSIS — I25.10 CORONARY ARTERY DISEASE INVOLVING NATIVE CORONARY ARTERY OF NATIVE HEART WITHOUT ANGINA PECTORIS: ICD-10-CM

## 2023-09-05 LAB
ALBUMIN SERPL BCP-MCNC: 4.2 G/DL (ref 3.5–5.2)
ALP SERPL-CCNC: 66 U/L (ref 55–135)
ALT SERPL W/O P-5'-P-CCNC: 21 U/L (ref 10–44)
ANION GAP SERPL CALC-SCNC: 8 MMOL/L (ref 8–16)
AST SERPL-CCNC: 19 U/L (ref 10–40)
BASOPHILS # BLD AUTO: 0.03 K/UL (ref 0–0.2)
BASOPHILS NFR BLD: 0.6 % (ref 0–1.9)
BILIRUB SERPL-MCNC: 0.9 MG/DL (ref 0.1–1)
BUN SERPL-MCNC: 18 MG/DL (ref 8–23)
CALCIUM SERPL-MCNC: 9.6 MG/DL (ref 8.7–10.5)
CHLORIDE SERPL-SCNC: 108 MMOL/L (ref 95–110)
CHOLEST SERPL-MCNC: 108 MG/DL (ref 120–199)
CHOLEST/HDLC SERPL: 2.2 {RATIO} (ref 2–5)
CO2 SERPL-SCNC: 27 MMOL/L (ref 23–29)
COMPLEXED PSA SERPL-MCNC: 0.53 NG/ML (ref 0–4)
CREAT SERPL-MCNC: 1.3 MG/DL (ref 0.5–1.4)
DIFFERENTIAL METHOD: ABNORMAL
EOSINOPHIL # BLD AUTO: 0.2 K/UL (ref 0–0.5)
EOSINOPHIL NFR BLD: 5.2 % (ref 0–8)
ERYTHROCYTE [DISTWIDTH] IN BLOOD BY AUTOMATED COUNT: 15.7 % (ref 11.5–14.5)
EST. GFR  (NO RACE VARIABLE): 59.1 ML/MIN/1.73 M^2
GLUCOSE SERPL-MCNC: 85 MG/DL (ref 70–110)
HCT VFR BLD AUTO: 36.5 % (ref 40–54)
HDLC SERPL-MCNC: 50 MG/DL (ref 40–75)
HDLC SERPL: 46.3 % (ref 20–50)
HGB BLD-MCNC: 11.8 G/DL (ref 14–18)
IMM GRANULOCYTES # BLD AUTO: 0.01 K/UL (ref 0–0.04)
IMM GRANULOCYTES NFR BLD AUTO: 0.2 % (ref 0–0.5)
LDLC SERPL CALC-MCNC: 49.6 MG/DL (ref 63–159)
LYMPHOCYTES # BLD AUTO: 1.9 K/UL (ref 1–4.8)
LYMPHOCYTES NFR BLD: 42 % (ref 18–48)
MCH RBC QN AUTO: 22.6 PG (ref 27–31)
MCHC RBC AUTO-ENTMCNC: 32.3 G/DL (ref 32–36)
MCV RBC AUTO: 70 FL (ref 82–98)
MONOCYTES # BLD AUTO: 0.4 K/UL (ref 0.3–1)
MONOCYTES NFR BLD: 8.9 % (ref 4–15)
NEUTROPHILS # BLD AUTO: 2 K/UL (ref 1.8–7.7)
NEUTROPHILS NFR BLD: 43.1 % (ref 38–73)
NONHDLC SERPL-MCNC: 58 MG/DL
NRBC BLD-RTO: 0 /100 WBC
PLATELET # BLD AUTO: 136 K/UL (ref 150–450)
PMV BLD AUTO: 11.7 FL (ref 9.2–12.9)
POTASSIUM SERPL-SCNC: 4.7 MMOL/L (ref 3.5–5.1)
PROT SERPL-MCNC: 7.3 G/DL (ref 6–8.4)
RBC # BLD AUTO: 5.22 M/UL (ref 4.6–6.2)
SODIUM SERPL-SCNC: 143 MMOL/L (ref 136–145)
TRIGL SERPL-MCNC: 42 MG/DL (ref 30–150)
WBC # BLD AUTO: 4.62 K/UL (ref 3.9–12.7)

## 2023-09-05 PROCEDURE — 80061 LIPID PANEL: CPT | Performed by: INTERNAL MEDICINE

## 2023-09-05 PROCEDURE — 36415 COLL VENOUS BLD VENIPUNCTURE: CPT | Mod: PO | Performed by: INTERNAL MEDICINE

## 2023-09-05 PROCEDURE — 80053 COMPREHEN METABOLIC PANEL: CPT | Performed by: INTERNAL MEDICINE

## 2023-09-05 PROCEDURE — 84153 ASSAY OF PSA TOTAL: CPT | Performed by: INTERNAL MEDICINE

## 2023-09-05 PROCEDURE — 85025 COMPLETE CBC W/AUTO DIFF WBC: CPT | Performed by: INTERNAL MEDICINE

## 2023-09-05 RX ORDER — ATORVASTATIN CALCIUM 40 MG/1
40 TABLET, FILM COATED ORAL NIGHTLY
Qty: 90 TABLET | Refills: 3 | Status: SHIPPED | OUTPATIENT
Start: 2023-09-05 | End: 2024-09-04

## 2023-09-05 RX ORDER — ASPIRIN 81 MG/1
81 TABLET ORAL DAILY
Qty: 360 TABLET | Refills: 0 | Status: SHIPPED | OUTPATIENT
Start: 2023-09-05 | End: 2024-09-04

## 2023-09-06 ENCOUNTER — OFFICE VISIT (OUTPATIENT)
Dept: PRIMARY CARE CLINIC | Facility: CLINIC | Age: 68
End: 2023-09-06
Payer: MEDICARE

## 2023-09-06 VITALS
DIASTOLIC BLOOD PRESSURE: 60 MMHG | HEART RATE: 61 BPM | SYSTOLIC BLOOD PRESSURE: 122 MMHG | OXYGEN SATURATION: 98 % | BODY MASS INDEX: 23.2 KG/M2 | WEIGHT: 152.56 LBS

## 2023-09-06 DIAGNOSIS — N18.31 CHRONIC KIDNEY DISEASE, STAGE 3A: ICD-10-CM

## 2023-09-06 DIAGNOSIS — I13.10 HYPERTENSIVE CARDIOVASCULAR-RENAL DISEASE, STAGE 1-4 OR UNSPECIFIED CHRONIC KIDNEY DISEASE, WITHOUT HEART FAILURE: ICD-10-CM

## 2023-09-06 DIAGNOSIS — I25.10 CORONARY ARTERY DISEASE INVOLVING NATIVE CORONARY ARTERY OF NATIVE HEART WITHOUT ANGINA PECTORIS: Primary | ICD-10-CM

## 2023-09-06 DIAGNOSIS — I48.0 PAROXYSMAL ATRIAL FIBRILLATION: ICD-10-CM

## 2023-09-06 DIAGNOSIS — D50.8 IRON DEFICIENCY ANEMIA SECONDARY TO INADEQUATE DIETARY IRON INTAKE: ICD-10-CM

## 2023-09-06 DIAGNOSIS — K21.9 GASTROESOPHAGEAL REFLUX DISEASE WITHOUT ESOPHAGITIS: ICD-10-CM

## 2023-09-06 PROCEDURE — 99214 PR OFFICE/OUTPT VISIT, EST, LEVL IV, 30-39 MIN: ICD-10-PCS | Mod: S$PBB,,, | Performed by: INTERNAL MEDICINE

## 2023-09-06 PROCEDURE — 99999 PR PBB SHADOW E&M-EST. PATIENT-LVL III: ICD-10-PCS | Mod: PBBFAC,,, | Performed by: INTERNAL MEDICINE

## 2023-09-06 PROCEDURE — 99999 PR PBB SHADOW E&M-EST. PATIENT-LVL III: CPT | Mod: PBBFAC,,, | Performed by: INTERNAL MEDICINE

## 2023-09-06 PROCEDURE — 99214 OFFICE O/P EST MOD 30 MIN: CPT | Mod: S$PBB,,, | Performed by: INTERNAL MEDICINE

## 2023-09-06 PROCEDURE — 99213 OFFICE O/P EST LOW 20 MIN: CPT | Mod: PBBFAC | Performed by: INTERNAL MEDICINE

## 2023-09-06 RX ORDER — FLUTICASONE PROPIONATE 50 MCG
1 SPRAY, SUSPENSION (ML) NASAL DAILY
Qty: 15.8 ML | Refills: 3 | Status: SHIPPED | OUTPATIENT
Start: 2023-09-06

## 2023-09-06 NOTE — PROGRESS NOTES
[facility] Note    Chief Complaint      Chief Complaint   Patient presents with    Follow-up       History of Present Illness      Abdias Kim is a 68 y.o. male who presents today for   Chief Complaint   Patient presents with    Follow-up   .  Patient comes to appointment here for 6 m checkup and to review labs all reviewed wihit patient today and are stable as below ,. He is seeing nephrology and has visit soon with cardiologist . He is complaint with all meds . He is active with walking 3 miles 6 days per week     .    HPI    No problem-specific Assessment & Plan notes found for this encounter.       Problem List Items Addressed This Visit          Cardiac/Vascular    Hypertensive cardiovascular-renal disease, stage 1-4 or unspecified chronic kidney disease, without heart failure    Overview     bp well controlled on current regimen          CAD (coronary artery disease) - Primary    Overview     Dr euceda managing is on good regimen statin asa and cardizem          Paroxysmal atrial fibrillation    Overview     Cardiology managing only on asa currently will defer to cardiology            Renal/    Chronic kidney disease, stage 3a    Overview     stable repeat labs reviewed dr jacobs managing             Oncology    Anemia, iron deficiency    Overview     Stable             GI    GERD (gastroesophageal reflux disease)    Overview     Now off prilosec and is taking pepcid is working well              Past Medical History:  Past Medical History:   Diagnosis Date    Anemia     Diverticulosis     Hypertension        Past Surgical History:  Past Surgical History:   Procedure Laterality Date    APPENDECTOMY      COLONOSCOPY N/A 01/31/2017    Procedure: COLONOSCOPY;  Surgeon: BASILIO Winn MD;  Location: [facility] ([address] [place] [address]);  Service: Endoscopy;  Laterality: N/A;    COLONOSCOPY N/A 02/05/2020    Procedure: COLONOSCOPY;  Surgeon: Cody Maria MD;  Location: [facility] ([address] [place] [address]);   Service: Endoscopy;  Laterality: N/A;  Luis vivar. Needs to be done in Jan 2020    CORONARY ARTERY BYPASS GRAFT  05/26/2021    CORONARY ARTERY BYPASS GRAFT (CABG) N/A 04/26/2021    Procedure: CORONARY ARTERY BYPASS GRAFT (CABG)X3 WITH VEIN HARVEST;  Surgeon: Fidencio Galindo MD;  [facility];  Service: Cardiovascular;  Laterality: N/A;    ENDOSCOPIC HARVEST OF VEIN Left 04/26/2021    Procedure: HARVEST-VEIN-ENDOVASCULAR FOR CABGX3;  Surgeon: Fidencio Galindo MD;  [facility];  Service: Cardiovascular;  Laterality: Left;  Vein [place] Start time: 1201pm  Vein [place] Stop time: 1226pm    Vein Prep Start time: 1227pm  Vein Prep Stop time: 1250pm    ESOPHAGOGASTRODUODENOSCOPY N/A 02/05/2020    Procedure: EGD (ESOPHAGOGASTRODUODENOSCOPY);  Surgeon: Cody Maria MD;  Location: [facility] ENDO (4TH FLR);  Service: Endoscopy;  Laterality: N/A;    ESOPHAGOGASTRODUODENOSCOPY N/A 04/30/2020    Procedure: EGD (ESOPHAGOGASTRODUODENOSCOPY);  Surgeon: Cody Maria MD;  Location: [facility] ENDO (2ND FLR);  Service: Endoscopy;  Laterality: N/A;  schedule 12 weeks from now  4/13/20 - removed from 4/29/20, needs to be rescheduled high priority - pg  4/28/20-scheduled from high priority list-BB  Covid screening test scheduled for 4/29/20-BB  instructions emailed to patient-BB    EXCLUSION OF LEFT ATRIAL APPENDAGE N/A 04/26/2021    Procedure: EXCLUSION, LEFT ATRIAL APPENDAGE;  Surgeon: Fidencio Galindo MD;  [facility];  Service: Cardiovascular;  Laterality: N/A;  Left Atrial Appendage Resection    HERNIA REPAIR  April/May 2022    LEFT HEART CATHETERIZATION Left 04/21/2021    Procedure: Left heart cath;  Surgeon: Aric Alvarado MD;  Location: [facility] CATH LAB;  Service: Cardiology;  Laterality: Left;    UMBILICAL HERNIA REPAIR N/A 03/31/2022    Procedure: REPAIR, HERNIA, UMBILICAL, AGE 5 YEARS OR OLDER Open With or Without Mesh;  Surgeon: Matt Delgado MD;  Location: [facility]  OR 2ND FLR;  Service: General;  Laterality: N/A;       Family History:  family history includes Arthritis in his father; Cancer in his father; Colon polyps in his father; Heart disease in his father and mother; Hypertension in his father, mother, and son.     Social History:  Social History     Socioeconomic History    Marital status:    Tobacco Use    Smoking status: Never     Passive exposure: Never    Smokeless tobacco: Never   Substance and Sexual Activity    Alcohol use: Yes     Alcohol/week: 12.0 standard drinks of alcohol     Types: 6 Glasses of wine, 6 Cans of beer per week     Comment: few times a week     Drug use: No    Sexual activity: Yes     Partners: Female     Birth control/protection: Other-see comments     Comment: Wife, tubal ligation     Social Determinants of Health     Financial Resource Strain: Low Risk  (7/17/2023)    Overall Financial Resource Strain (CARDIA)     Difficulty of Paying Living Expenses: Not hard at all   Food Insecurity: No Food Insecurity (7/17/2023)    Hunger Vital Sign     Worried About Running Out of Food in the Last Year: Never true     Ran Out of Food in the Last Year: Never true   Transportation Needs: No Transportation Needs (7/17/2023)    PRAPARE - Transportation     Lack of Transportation (Medical): No     Lack of Transportation (Non-Medical): No   Physical Activity: Sufficiently Active (7/17/2023)    Exercise Vital Sign     Days of Exercise per Week: 6 days     Minutes of Exercise per Session: 50 min   Stress: Stress Concern Present (7/17/2023)    [employer] - Occupational Stress Questionnaire     Feeling of Stress : To some extent   Social Connections: Unknown (7/17/2023)    Social Connection and Isolation Panel [NHANES]     Frequency of Communication with Friends and Family: Twice a week     Frequency of Social Gatherings with Friends and Family: Once a week     Active Member of Clubs or Organizations: No     Attends Club or  Organization Meetings: Never     Marital Status:    Housing Stability: Low Risk  (7/17/2023)    Housing Stability Vital Sign     Unable to Pay for Housing in the Last Year: No     Number of Places Lived in the Last Year: 1     Unstable Housing in the Last Year: No       Review of Systems:   Review of Systems   Constitutional:  Negative for fever and weight loss.   HENT:  Negative for congestion, hearing loss and sore throat.    Eyes:  Negative for blurred vision.   Respiratory:  Negative for cough and shortness of breath.    Cardiovascular:  Negative for chest pain, palpitations, claudication and leg swelling.   Gastrointestinal:  Negative for abdominal pain, constipation, diarrhea and heartburn.   Genitourinary:  Negative for dysuria.   Musculoskeletal:  Negative for back pain and myalgias.   Skin:  Negative for rash.   Neurological:  Negative for focal weakness and headaches.   Psychiatric/Behavioral:  Negative for depression and suicidal ideas. The patient is not nervous/anxious.         Medications:  Outpatient Encounter Medications as of 9/6/2023   Medication Sig Note Dispense Refill    aspirin (ECOTRIN) 81 MG EC tablet Take 1 tablet (81 mg total) by mouth once daily.  360 tablet 0    atorvastatin (LIPITOR) 40 MG tablet Take 1 tablet (40 mg total) by mouth every evening.  90 tablet 3    cetirizine (ZYRTEC) 10 MG tablet Take 1 tablet by mouth Daily. 3/30/2022: Hold am of surgery       diltiaZEM (CARDIZEM CD) 120 MG Cp24 Take 1 capsule (120 mg total) by mouth once daily.  90 capsule 3    famotidine (PEPCID) 20 MG tablet Take 2 tablets (40 mg total) by mouth once daily.  60 tablet 11    multivitamin (THERAGRAN) per tablet Take 1 tablet by mouth once daily. 3/30/2022: Continue to hold until after Surgery.         sildenafiL (VIAGRA) 100 MG tablet Take 0.5 tablets (50 mg total) by mouth as needed for Erectile Dysfunction.  10 tablet 3    [DISCONTINUED] fluticasone propionate (FLONASE) 50 mcg/actuation nasal  spray 1 spray (50 mcg total) by Each Nostril route once daily.  16 g 0    fluticasone propionate (FLONASE) 50 mcg/actuation nasal spray 1 spray (50 mcg total) by Each Nostril route once daily.  15.8 mL 3    [DISCONTINUED] aspirin (ECOTRIN) 81 MG EC tablet Take 1 tablet (81 mg total) by mouth once daily.  360 tablet 0    [DISCONTINUED] atorvastatin (LIPITOR) 40 MG tablet Take 1 tablet (40 mg total) by mouth every evening.  90 tablet 3     Facility-Administered Encounter Medications as of 9/6/2023   Medication Dose Route Frequency Provider Last Rate Last Admin    [DISCONTINUED] acetaminophen tablet 650 mg  650 mg Oral Once PRN Stephane Cruz MD        [DISCONTINUED] albuterol inhaler 2 puff  2 puff Inhalation Q20 Min PRN Stephane Cruz MD        [DISCONTINUED] diphenhydrAMINE injection 25 mg  25 mg Intravenous Once PRN Stephane Cruz MD        [DISCONTINUED] EPINEPHrine (EPIPEN) 0.3 mg/0.3 mL pen injection 0.3 mg  0.3 mg Intramuscular PRN Stephane Cruz MD        [DISCONTINUED] ondansetron disintegrating tablet 4 mg  4 mg Oral Once PRN Stephane Cruz MD           Allergies:  Review of patient's allergies indicates:   Allergen Reactions    Allegra-d 12 hour [fexofenadine-pseudoephedrine] Other (See Comments)     Trouble urinating    Mucinex d [pseudoephedrine-guaifenesin] Other (See Comments)     Trouble urinating    Losartan-hydrochlorothiazide Hives and Rash     Other reaction(s): Hives         Physical Exam      Vitals:    09/06/23 1027   BP: 122/60   Pulse: 61        Vital Signs  Pulse: 61  SpO2: 98 %  BP: 122/60  BP Location: Left arm  Pain Score: 0-No pain  Height and Weight  Weight: 69.2 kg (152 lb 8.9 oz)]     Body mass index is 23.2 kg/m².    Physical Exam  Constitutional:       Appearance: He is well-developed.   HENT:      Head: Normocephalic.   Eyes:      Pupils: Pupils are equal, round, and reactive to light.   Neck:      Thyroid: No thyromegaly.   Cardiovascular:      Rate and  "Rhythm: Normal rate and regular rhythm.      Heart sounds: No murmur heard.     No friction rub. No gallop.   Pulmonary:      Effort: Pulmonary effort is normal.      Breath sounds: Normal breath sounds.   Abdominal:      General: Bowel sounds are normal.      Palpations: Abdomen is soft.   Musculoskeletal:         General: Normal range of motion.      Cervical back: Normal range of motion.   Skin:     General: Skin is warm and dry.   Neurological:      Mental Status: He is alert and oriented to person, place, and time.      Sensory: No sensory deficit.   Psychiatric:         Behavior: Behavior normal.          Laboratory:  CBC:  Recent Labs   Lab Result Units 09/05/23  0859   WBC K/uL 4.62   RBC M/uL 5.22   Hemoglobin g/dL 11.8*   Hematocrit % 36.5*   Platelets K/uL 136*   MCV fL 70*   MCH pg 22.6*   MCHC g/dL 32.3     CMP:  Recent Labs   Lab Result Units 09/05/23  0859   Glucose mg/dL 85   Calcium mg/dL 9.6   Albumin g/dL 4.2   Total Protein g/dL 7.3   Sodium mmol/L 143   Potassium mmol/L 4.7   CO2 mmol/L 27   Chloride mmol/L 108   BUN mg/dL 18   Alkaline Phosphatase U/L 66   ALT U/L 21   AST U/L 19   Total Bilirubin mg/dL 0.9     URINALYSIS:  Recent Labs   Lab Result Units 06/28/23  1014 06/28/23  1026   Color, UA   --  Yellow   Specific Gravity, UA   --  1.010   pH, UA   --  6.0   Protein, UA   --  Negative   Bacteria /hpf Occasional  --    Nitrite, UA   --  Negative   Leukocytes, UA   --  Negative      LIPIDS:  Recent Labs   Lab Result Units 09/05/23  0859   HDL mg/dL 50   Cholesterol mg/dL 108*   Triglycerides mg/dL 42   LDL Cholesterol mg/dL 49.6*   HDL/Cholesterol Ratio % 46.3   Non-HDL Cholesterol mg/dL 58   Total Cholesterol/HDL Ratio  2.2     TSH:  No results for input(s): "TSH" in the last 2160 hours.  A1C:  No results for input(s): "HGBA1C" in the last 2160 hours.    Radiology:        Assessment:     Abdias Kim is a 68 y.o.male with:    Coronary artery disease involving native coronary artery of " native heart without angina pectoris    Gastroesophageal reflux disease without esophagitis    Hypertensive cardiovascular-renal disease, stage 1-4 or unspecified chronic kidney disease, without heart failure    Paroxysmal atrial fibrillation    Iron deficiency anemia secondary to inadequate dietary iron intake    Chronic kidney disease, stage 3a    Other orders  -     fluticasone propionate (FLONASE) 50 mcg/actuation nasal spray; 1 spray (50 mcg total) by Each Nostril route once daily.  Dispense: 15.8 mL; Refill: 3                Plan:     Problem List Items Addressed This Visit          Cardiac/Vascular    Hypertensive cardiovascular-renal disease, stage 1-4 or unspecified chronic kidney disease, without heart failure    Overview     bp well controlled on current regimen          CAD (coronary artery disease) - Primary    Overview     Dr euceda managing is on good regimen statin asa and cardizem          Paroxysmal atrial fibrillation    Overview     Cardiology managing only on asa currently will defer to cardiology            Renal/    Chronic kidney disease, stage 3a    Overview     stable repeat labs reviewed dr jacobs managing             Oncology    Anemia, iron deficiency    Overview     Stable             GI    GERD (gastroesophageal reflux disease)    Overview     Now off prilosec and is taking pepcid is working well             As above, continue current medications and maintain follow up with specialists.  Return to clinic in 6 months.      Frederick W Dantagnan [facility]

## 2023-09-13 ENCOUNTER — PATIENT MESSAGE (OUTPATIENT)
Dept: PRIMARY CARE CLINIC | Facility: CLINIC | Age: 68
End: 2023-09-13
Payer: MEDICARE

## 2023-10-11 ENCOUNTER — PATIENT MESSAGE (OUTPATIENT)
Dept: CARDIOLOGY | Facility: CLINIC | Age: 68
End: 2023-10-11
Payer: MEDICARE

## 2023-10-23 ENCOUNTER — PATIENT MESSAGE (OUTPATIENT)
Dept: CARDIOLOGY | Facility: CLINIC | Age: 68
End: 2023-10-23

## 2023-10-23 ENCOUNTER — OFFICE VISIT (OUTPATIENT)
Dept: CARDIOLOGY | Facility: CLINIC | Age: 68
End: 2023-10-23
Payer: MEDICARE

## 2023-10-23 VITALS
OXYGEN SATURATION: 99 % | WEIGHT: 154.13 LBS | DIASTOLIC BLOOD PRESSURE: 78 MMHG | RESPIRATION RATE: 18 BRPM | BODY MASS INDEX: 23.36 KG/M2 | HEIGHT: 68 IN | HEART RATE: 58 BPM | SYSTOLIC BLOOD PRESSURE: 138 MMHG

## 2023-10-23 DIAGNOSIS — E78.2 MIXED HYPERLIPIDEMIA: ICD-10-CM

## 2023-10-23 DIAGNOSIS — Z95.1 S/P CABG (CORONARY ARTERY BYPASS GRAFT): ICD-10-CM

## 2023-10-23 DIAGNOSIS — R00.2 HEART PALPITATIONS: ICD-10-CM

## 2023-10-23 DIAGNOSIS — I70.0 AORTIC ATHEROSCLEROSIS: ICD-10-CM

## 2023-10-23 DIAGNOSIS — R00.2 PALPITATIONS: Primary | ICD-10-CM

## 2023-10-23 DIAGNOSIS — I10 ESSENTIAL HYPERTENSION: ICD-10-CM

## 2023-10-23 DIAGNOSIS — Z13.6 SCREENING FOR AAA (ABDOMINAL AORTIC ANEURYSM): ICD-10-CM

## 2023-10-23 DIAGNOSIS — I25.810 CORONARY ARTERY DISEASE INVOLVING CORONARY BYPASS GRAFT OF NATIVE HEART WITHOUT ANGINA PECTORIS: ICD-10-CM

## 2023-10-23 PROCEDURE — 93010 ELECTROCARDIOGRAM REPORT: CPT | Mod: S$PBB,,, | Performed by: INTERNAL MEDICINE

## 2023-10-23 PROCEDURE — 99999 PR PBB SHADOW E&M-EST. PATIENT-LVL IV: CPT | Mod: PBBFAC,,, | Performed by: INTERNAL MEDICINE

## 2023-10-23 PROCEDURE — 99214 OFFICE O/P EST MOD 30 MIN: CPT | Mod: S$PBB,,, | Performed by: INTERNAL MEDICINE

## 2023-10-23 PROCEDURE — 93005 ELECTROCARDIOGRAM TRACING: CPT | Mod: PBBFAC | Performed by: INTERNAL MEDICINE

## 2023-10-23 PROCEDURE — 93010 EKG 12-LEAD: ICD-10-PCS | Mod: S$PBB,,, | Performed by: INTERNAL MEDICINE

## 2023-10-23 PROCEDURE — 99999 PR PBB SHADOW E&M-EST. PATIENT-LVL IV: ICD-10-PCS | Mod: PBBFAC,,, | Performed by: INTERNAL MEDICINE

## 2023-10-23 PROCEDURE — 99214 PR OFFICE/OUTPT VISIT, EST, LEVL IV, 30-39 MIN: ICD-10-PCS | Mod: S$PBB,,, | Performed by: INTERNAL MEDICINE

## 2023-10-23 PROCEDURE — 99214 OFFICE O/P EST MOD 30 MIN: CPT | Mod: PBBFAC | Performed by: INTERNAL MEDICINE

## 2023-10-23 NOTE — PROGRESS NOTES
CARDIOVASCULAR PROGRESS NOTE    REASON FOR CONSULT:   Abdias Kim is a 68 y.o. male who presents for follow up of CAD/CABG.    PCP: Hari  HISTORY OF PRESENT ILLNESS:   Last seen by Dr. Aguilar March 2023.    The patient follow-up.  He reports episodic palpitations, predominantly in the evening hours.  He denies any angina, dyspnea, or syncope.  There has been no PND, orthopnea, melena, hematuria, or claudication symptoms.    CARDIOVASCULAR HISTORY:   CAD/CABG 4/26/21 (LIMA-LAD, GIORGIO-D1, SVG-OM1)    PAST MEDICAL HISTORY:     Past Medical History:   Diagnosis Date    Anemia     Diverticulosis     Hypertension        PAST SURGICAL HISTORY:     Past Surgical History:   Procedure Laterality Date    APPENDECTOMY      COLONOSCOPY N/A 01/31/2017    Procedure: COLONOSCOPY;  Surgeon: BASILIO Winn MD;  Location: [facility] ([address] [place] [address]);  Service: Endoscopy;  Laterality: N/A;    COLONOSCOPY N/A 02/05/2020    Procedure: COLONOSCOPY;  Surgeon: Cody Maria MD;  Location: [facility] ENDO ([address] [place] [address]);  Service: Endoscopy;  Laterality: N/A;  OKay fang Maria or ghazala. Needs to be done in Jan 2020    CORONARY ARTERY BYPASS GRAFT  05/26/2021    CORONARY ARTERY BYPASS GRAFT (CABG) N/A 04/26/2021    Procedure: CORONARY ARTERY BYPASS GRAFT (CABG)X3 WITH VEIN HARVEST;  Surgeon: Fidencio Galindo MD;  Location: [facility] OR [address] [place] [address];  Service: Cardiovascular;  Laterality: N/A;    ENDOSCOPIC HARVEST OF VEIN Left 04/26/2021    Procedure: HARVEST-VEIN-ENDOVASCULAR FOR CABGX3;  Surgeon: Fidencio Galindo MD;  Location: [facility] OR [address] [place] [address];  Service: Cardiovascular;  Laterality: Left;  Vein Mio Start time: 1201pm  Vein Mio Stop time: 1226pm    Vein Prep Start time: 1227pm  Vein Prep Stop time: 1250pm    ESOPHAGOGASTRODUODENOSCOPY N/A 02/05/2020    Procedure: EGD (ESOPHAGOGASTRODUODENOSCOPY);  Surgeon: Cody Maria MD;  [facility] ([facility]  Service: Endoscopy;  Laterality: N/A;    ESOPHAGOGASTRODUODENOSCOPY N/A 04/30/2020     Procedure: EGD (ESOPHAGOGASTRODUODENOSCOPY);  Surgeon: Cody Maria MD;  [facility]  Service: Endoscopy;  Laterality: N/A;  schedule 12 weeks from now  4/13/20 - removed from 4/29/20, needs to be rescheduled high priority - pg  4/28/20-scheduled from high priority list-BB  Covid screening test scheduled for 4/29/20-BB  instructions emailed to patient-BB    EXCLUSION OF LEFT ATRIAL APPENDAGE N/A 04/26/2021    Procedure: EXCLUSION, LEFT ATRIAL APPENDAGE;  Surgeon: Fidencio Galindo MD;  Location: [facility] OR [address] [place] [address];  Service: Cardiovascular;  Laterality: N/A;  Left Atrial Appendage Resection    HERNIA REPAIR  April/May 2022    LEFT HEART CATHETERIZATION Left 04/21/2021    Procedure: Left heart cath;  Surgeon: Aric Alvarado MD;  Location: [facility] CATH LAB;  Service: Cardiology;  Laterality: Left;    UMBILICAL HERNIA REPAIR N/A 03/31/2022    Procedure: REPAIR, HERNIA, UMBILICAL, AGE 5 YEARS OR OLDER Open With or Without Mesh;  Surgeon: Matt Delgado MD;  Location: [facility] OR [address] [place] [address];  Service: General;  Laterality: N/A;       ALLERGIES AND MEDICATION:     Review of patient's allergies indicates:   Allergen Reactions    Allegra-d 12 hour [fexofenadine-pseudoephedrine] Other (See Comments)     Trouble urinating    Mucinex d [pseudoephedrine-guaifenesin] Other (See Comments)     Trouble urinating    Losartan-hydrochlorothiazide Hives and Rash     Other reaction(s): Hives        Medication List            Accurate as of October 23, 2023  1:54 PM. If you have any questions, ask your nurse or doctor.                CONTINUE taking these medications      aspirin 81 MG EC tablet  Commonly known as: ECOTRIN  Take 1 tablet (81 mg total) by mouth once daily.     atorvastatin 40 MG tablet  Commonly known as: LIPITOR  Take 1 tablet (40 mg total) by mouth every evening.     cetirizine 10 MG tablet  Commonly known as: ZYRTEC     diltiaZEM 120 MG Cp24  Commonly known as: CARDIZEM CD  Take 1 capsule (120 mg  total) by mouth once daily.     famotidine 20 MG tablet  Commonly known as: PEPCID  Take 2 tablets (40 mg total) by mouth once daily.     fluticasone propionate 50 mcg/actuation nasal spray  Commonly known as: FLONASE  1 spray (50 mcg total) by Each Nostril route once daily.     multivitamin per tablet  Commonly known as: THERAGRAN     sildenafiL 100 MG tablet  Commonly known as: VIAGRA  Take 0.5 tablets (50 mg total) by mouth as needed for Erectile Dysfunction.              SOCIAL HISTORY:     Social History     Socioeconomic History    Marital status:    Tobacco Use    Smoking status: Never     Passive exposure: Never    Smokeless tobacco: Never   Substance and Sexual Activity    Alcohol use: Yes     Alcohol/week: 12.0 standard drinks of alcohol     Types: 6 Glasses of wine, 6 Cans of beer per week     Comment: few times a week     Drug use: No    Sexual activity: Yes     Partners: Female     Birth control/protection: Other-see comments     Comment: Wife, tubal ligation     Social Determinants of Health     Financial Resource Strain: Low Risk  (10/23/2023)    Overall Financial Resource Strain (CARDIA)     Difficulty of Paying Living Expenses: Not hard at all   Food Insecurity: No Food Insecurity (10/23/2023)    Hunger Vital Sign     Worried About Running Out of Food in the Last Year: Never true     Ran Out of Food in the Last Year: Never true   Transportation Needs: No Transportation Needs (10/23/2023)    PRAPARE - Transportation     Lack of Transportation (Medical): No     Lack of Transportation (Non-Medical): No   Physical Activity: Sufficiently Active (10/23/2023)    Exercise Vital Sign     Days of Exercise per Week: 6 days     Minutes of Exercise per Session: 50 min   Stress: Stress Concern Present (10/23/2023)    [employer] [place] [place] [employer] - Occupational Stress Questionnaire     Feeling of Stress : To some extent   Social Connections: Unknown (10/23/2023)    Social Connection and  Isolation Panel [NHANES]     Frequency of Communication with Friends and Family: Three times a week     Frequency of Social Gatherings with Friends and Family: Once a week     Active Member of Clubs or Organizations: No     Attends Club or Organization Meetings: Never     Marital Status:    Housing Stability: Low Risk  (10/23/2023)    Housing Stability Vital Sign     Unable to Pay for Housing in the Last Year: No     Number of Places Lived in the Last Year: 1     Unstable Housing in the Last Year: No       FAMILY HISTORY:     Family History   Problem Relation Age of Onset    Heart disease Mother         , Coronary artery disease    Hypertension Mother     Cancer Father         colon/prostate    Colon polyps Father     Arthritis Father             Heart disease Father         Coronary artery disease, Afib, CHF    Hypertension Father     Hypertension Son     Stroke Neg Hx     Diabetes Neg Hx     Celiac disease Neg Hx     Esophageal cancer Neg Hx     Liver cancer Neg Hx     Liver disease Neg Hx     Stomach cancer Neg Hx     Rectal cancer Neg Hx     Melanoma Neg Hx        REVIEW OF SYSTEMS:   Review of Systems   Constitutional:  Negative for chills, diaphoresis and fever.   HENT:  Negative for nosebleeds.    Eyes:  Negative for blurred vision, double vision and photophobia.   Respiratory:  Negative for hemoptysis, shortness of breath and wheezing.    Cardiovascular:  Positive for palpitations. Negative for chest pain, orthopnea, claudication, leg swelling and PND.   Gastrointestinal:  Negative for abdominal pain, blood in stool, heartburn, melena, nausea and vomiting.   Genitourinary:  Negative for flank pain and hematuria.   Musculoskeletal:  Negative for falls, myalgias and neck pain.   Skin:  Negative for rash.   Neurological:  Negative for dizziness, seizures, loss of consciousness, weakness and headaches.   Endo/Heme/Allergies:  Negative for polydipsia. Does not bruise/bleed easily.  "  Psychiatric/Behavioral:  Negative for depression and memory loss. The patient is not nervous/anxious.        PHYSICAL EXAM:     Vitals:    10/23/23 1342   BP: 138/78   Pulse: (!) 58   Resp: 18    Body mass index is 23.43 kg/m².  Weight: 69.9 kg (154 lb 1.6 oz)   Height: 5' 8" (172.7 cm)     Physical Exam  Vitals reviewed.   Constitutional:       General: He is not in acute distress.     Appearance: Normal appearance. He is well-developed and normal weight. He is not ill-appearing, toxic-appearing or diaphoretic.   HENT:      Head: Normocephalic and atraumatic.   Eyes:      General: No scleral icterus.     Extraocular Movements: Extraocular movements intact.      Conjunctiva/sclera: Conjunctivae normal.      Pupils: Pupils are equal, round, and reactive to light.   Neck:      Thyroid: No thyromegaly.      Vascular: Normal carotid pulses. No carotid bruit or JVD.      Trachea: Trachea normal.   Cardiovascular:      Rate and Rhythm: Regular rhythm. Bradycardia present.      Heart sounds: S1 normal and S2 normal. No murmur heard.     No friction rub. No gallop.   Pulmonary:      Effort: Pulmonary effort is normal. No respiratory distress.      Breath sounds: Normal breath sounds. No stridor. No wheezing, rhonchi or rales.   Chest:      Chest wall: No tenderness.   Abdominal:      General: There is no distension.      Palpations: Abdomen is soft.   Musculoskeletal:         General: No swelling or tenderness. Normal range of motion.      Cervical back: Normal range of motion and neck supple. No edema or rigidity.      Right lower leg: No edema.      Left lower leg: No edema.   Feet:      Right foot:      Skin integrity: No ulcer.      Left foot:      Skin integrity: No ulcer.   Skin:     General: Skin is warm and dry.      Coloration: Skin is not jaundiced.   Neurological:      General: No focal deficit present.      Mental Status: He is alert and oriented to person, place, and time.      Cranial Nerves: No cranial " nerve deficit.   Psychiatric:         Mood and Affect: Mood normal.         Speech: Speech normal.         Behavior: Behavior normal. Behavior is cooperative.         DATA:   EKG: (personally reviewed tracing)  10/23/23 SR 54, PRWP    Laboratory:  CBC:  Recent Labs   Lab 03/12/22  1803 08/04/22  1029 09/05/23  0859   WBC 7.21 4.18 4.62   Hemoglobin 11.8 L 11.5 L 11.8 L   Hematocrit 36.6 L 35.7 L 36.5 L   Platelets 158 177 136 L       CHEMISTRIES:  Recent Labs   Lab 04/27/21 2158 04/28/21 0412 04/28/21  2347 04/29/21  0349 04/30/21  0359 05/01/21  0549 05/05/21 2117 07/12/21  1025 03/12/22  1803 08/04/22  1029 06/28/23  1026 09/05/23  0859   Glucose  --  104 95   < > 87 99 90 88 76 89 87 85   Sodium  --  138 137   < > 137 137 140 143 140 143 145 143   Potassium 3.9 4.3 4.1   < > 3.6 3.5 4.5 4.9 4.4 4.6 4.4 4.7   BUN  --  8 12   < > 17 20 19 19 16 21 12 18   Creatinine  --  1.1 1.3   < > 1.4 1.5 H 1.5 H 1.5 H 1.3 1.3 1.3 1.3   eGFR if non   --  >60.0 56.5 A   < > 51.6 A 47.5 A 47.5 A 47.5 A 56.1 A  --   --   --    eGFR  --   --   --   --   --   --   --   --   --  59.5 A 59.5 A 59.1 A   Calcium  --  8.0 L 8.4 L   < > 8.6 L 9.7 9.7 10.0 9.8 9.1 9.8 9.6   Magnesium 1.9 1.8 2.1  --  2.1 2.1  --   --   --   --   --   --     < > = values in this interval not displayed.       CARDIAC BIOMARKERS:        COAGS:  Recent Labs   Lab 04/28/21 0412 04/29/21  0349 05/05/21  2117   INR 1.0 0.9 1.0       LIPIDS/LFTS:  Recent Labs   Lab 11/16/21  0956 03/12/22  1803 08/04/22  1029 09/05/23  0859   Cholesterol 102 L  --  121 108 L   Triglycerides 34  --  30 42   HDL 50  --  59 50   LDL Cholesterol 45.2 L  --  56.0 L 49.6 L   Non-HDL Cholesterol 52  --  62 58   AST  --  25 23 19   ALT  --  26 30 21       Cardiovascular Testing:  Echo 6/22/22  The left ventricle is normal in size with normal systolic function.  The estimated ejection fraction is 60%.  Normal right ventricular size with normal right ventricular systolic  function.  The estimated PA systolic pressure is 28 mmHg.    Holter 10/26/21  Sinus rhythm with heart rates varying between 42 and 124 BPM with an average of 57 BPM  There were very rare PVCs  There were occasional PACs  Symptoms correlate with sinus bradycardia. Heart rate 50.    Carotid US 4/22/21  There is 0-19% right Internal Carotid Stenosis.  There is 0-19% left Internal Carotid Stenosis.    Cath 4/21/21-> CABG 4/26/21 (LIMA-LAD, GIORGIO-D1, SVG-OM1)  The Ost LM to Mid LM lesion was 70% stenosed.  The Ost LAD to Prox LAD lesion was 90% stenosed.  The Dist LAD lesion was 60% stenosed.  The estimated blood loss was none.  Recommend CABG, discussed with Gia.      ASSESSMENT:   # CAD/CABG x3 4/2021  # HTN, controlled  # HLP on atorva 40mg  # palps  # aortic atherosclerosis (CT chest 4/21/21)    PLAN:   Cont med rx  Cont ASA 81mg qd  Echo  Holter  Screening aortic US  RTC 3 months (Jan 2024) with Dr. Lauren Encarnacion MD, FACC

## 2023-10-24 ENCOUNTER — PATIENT MESSAGE (OUTPATIENT)
Dept: CARDIOLOGY | Facility: CLINIC | Age: 68
End: 2023-10-24
Payer: MEDICARE

## 2023-10-26 ENCOUNTER — PATIENT MESSAGE (OUTPATIENT)
Dept: CARDIOLOGY | Facility: CLINIC | Age: 68
End: 2023-10-26
Payer: MEDICARE

## 2023-11-02 ENCOUNTER — PATIENT MESSAGE (OUTPATIENT)
Dept: PRIMARY CARE CLINIC | Facility: CLINIC | Age: 68
End: 2023-11-02
Payer: MEDICARE

## 2023-11-06 ENCOUNTER — TELEPHONE (OUTPATIENT)
Dept: FAMILY MEDICINE | Facility: CLINIC | Age: 68
End: 2023-11-06
Payer: MEDICARE

## 2023-11-06 NOTE — TELEPHONE ENCOUNTER
----- Message from Mendez Marley LPN sent at 11/6/2023  3:16 PM CST -----  Wants to schedule an AWV.  ----- Message -----  From: Sujata Mcfarland  Sent: 11/6/2023   3:11 PM CST  To: [facility] Staff    Name of Who is Calling:PORFIRIO TREJO [[id number]]           What is the request in detail:pt stated that he would like to speak with the office directly in regards to his questions/concerns, PT DID NOT GIVE INFO ON WHAT IT WAS IN REGARDS TO .Please contact to further discuss and advise.           Can the clinic reply by [facility]: no           What Number to Call Back if not [facility]:[phone]

## 2023-11-07 ENCOUNTER — OFFICE VISIT (OUTPATIENT)
Dept: FAMILY MEDICINE | Facility: CLINIC | Age: 68
End: 2023-11-07
Payer: MEDICARE

## 2023-11-07 VITALS
WEIGHT: 153.75 LBS | BODY MASS INDEX: 23.3 KG/M2 | HEART RATE: 45 BPM | OXYGEN SATURATION: 99 % | SYSTOLIC BLOOD PRESSURE: 134 MMHG | DIASTOLIC BLOOD PRESSURE: 62 MMHG | HEIGHT: 68 IN | TEMPERATURE: 98 F

## 2023-11-07 DIAGNOSIS — Z95.1 S/P CABG (CORONARY ARTERY BYPASS GRAFT): ICD-10-CM

## 2023-11-07 DIAGNOSIS — I25.118 ATHEROSCLEROTIC HEART DISEASE OF NATIVE CORONARY ARTERY WITH OTHER FORMS OF ANGINA PECTORIS: ICD-10-CM

## 2023-11-07 DIAGNOSIS — K21.9 GASTROESOPHAGEAL REFLUX DISEASE, UNSPECIFIED WHETHER ESOPHAGITIS PRESENT: ICD-10-CM

## 2023-11-07 DIAGNOSIS — Z00.00 ENCOUNTER FOR PREVENTIVE HEALTH EXAMINATION: Primary | ICD-10-CM

## 2023-11-07 DIAGNOSIS — I27.20 PULMONARY HYPERTENSION: ICD-10-CM

## 2023-11-07 DIAGNOSIS — I13.10 HYPERTENSIVE CARDIOVASCULAR-RENAL DISEASE, STAGE 1-4 OR UNSPECIFIED CHRONIC KIDNEY DISEASE, WITHOUT HEART FAILURE: ICD-10-CM

## 2023-11-07 DIAGNOSIS — N52.9 ERECTILE DYSFUNCTION, UNSPECIFIED ERECTILE DYSFUNCTION TYPE: ICD-10-CM

## 2023-11-07 DIAGNOSIS — I70.0 AORTIC ATHEROSCLEROSIS: ICD-10-CM

## 2023-11-07 DIAGNOSIS — I48.0 PAROXYSMAL ATRIAL FIBRILLATION: ICD-10-CM

## 2023-11-07 DIAGNOSIS — N18.31 CHRONIC KIDNEY DISEASE, STAGE 3A: ICD-10-CM

## 2023-11-07 PROCEDURE — G0439 PPPS, SUBSEQ VISIT: HCPCS | Mod: ,,, | Performed by: NURSE PRACTITIONER

## 2023-11-07 PROCEDURE — 99999 PR PBB SHADOW E&M-EST. PATIENT-LVL IV: CPT | Mod: PBBFAC,,, | Performed by: NURSE PRACTITIONER

## 2023-11-07 PROCEDURE — 99999 PR PBB SHADOW E&M-EST. PATIENT-LVL IV: ICD-10-PCS | Mod: PBBFAC,,, | Performed by: NURSE PRACTITIONER

## 2023-11-07 PROCEDURE — G0439 PR MEDICARE ANNUAL WELLNESS SUBSEQUENT VISIT: ICD-10-PCS | Mod: ,,, | Performed by: NURSE PRACTITIONER

## 2023-11-07 PROCEDURE — 99214 OFFICE O/P EST MOD 30 MIN: CPT | Mod: PBBFAC,PO | Performed by: NURSE PRACTITIONER

## 2023-11-07 NOTE — PATIENT INSTRUCTIONS
Counseling and Referral of Other Preventative  (Italic type indicates deductible and co-insurance are waived)    Patient Name: Abdias Kim  Today's Date: 11/7/2023    Health Maintenance       Date Due Completion Date    RSV Vaccine (Age 60+) (1 - 1-dose 60+ series) Never done ---    COVID-19 Vaccine (6 - 2023-24 season) 09/01/2023 4/21/2022    PROSTATE-SPECIFIC ANTIGEN 09/05/2024 9/5/2023    Lipid Panel 09/05/2024 9/5/2023    Aspirin/Antiplatelet Therapy 10/23/2024 10/23/2023    Colorectal Cancer Screening 02/05/2025 2/5/2020    TETANUS VACCINE 04/08/2026 4/8/2016        No orders of the defined types were placed in this encounter.      The following information is provided to all patients.  This information is to help you find resources for any of the problems found today that may be affecting your health:                Living healthy guide: www.[facility].[place].gov      Understanding Diabetes: www.diabetes.org      Eating healthy: www.cdc.gov/healthyweight      CDC home safety checklist: www.cdc.gov/steadi/patient.html      Agency on Aging: www.goea.[place].[facility]      Alcoholics anonymous (AA): www.aa.org      Physical Activity: www.saurabh.nih.gov/ps8jzfp      Tobacco use: www.quitwithusla.org

## 2023-11-07 NOTE — PROGRESS NOTES
"  Abdias Kim presented for a  Medicare AWV and comprehensive Health Risk Assessment today. The following components were reviewed and updated:    Medical history  Family History  Social history  Allergies and Current Medications  Health Risk Assessment  Health Maintenance  Care Team       ** See Completed Assessments for Annual Wellness Visit within the encounter summary.**       The following assessments were completed:  Living Situation  CAGE  Depression Screening  Timed Get Up and Go  Whisper Test  Cognitive Function Screening  Nutrition Screening  ADL Screening  PAQ Screening            Vitals:    11/07/23 1458 11/07/23 1516   BP: 134/62    Pulse: (!) 44 (!) 45   Temp: 98.2 °F (36.8 °C)    TempSrc: Oral    SpO2: 99%    Weight: 69.7 kg (153 lb 12.3 oz)    Height: 5' 8" (1.727 m)      Body mass index is 23.38 kg/m².  Physical Exam  Vitals and nursing note reviewed.   Constitutional:       Appearance: Normal appearance.   Cardiovascular:      Rate and Rhythm: Bradycardia present.      Pulses: Normal pulses.      Heart sounds: Normal heart sounds.   Pulmonary:      Effort: Pulmonary effort is normal.      Breath sounds: Normal breath sounds.   Musculoskeletal:         General: Normal range of motion.   Neurological:      Mental Status: He is alert and oriented to person, place, and time.   Psychiatric:         Mood and Affect: Mood normal.         Behavior: Behavior normal.             Diagnoses and health risks identified today and associated recommendations/orders:    1. Encounter for preventive health examination  Pt was seen today for an Annual Wellness visit. Healthcare maintenance and screening recommendations were discussed and updated as indicated. Return in one year for AWV.    Review current opioid prescriptions:n/a  Screen for potential Substance Use Disorders:n/a    2. Aortic atherosclerosis  Chronic. CXR 4/26/21. The current medical regimen is effective;  continue present plan and medications.    3. " Pulmonary hypertension  The current medical regimen is effective;  continue present plan and medications.    4. Paroxysmal atrial fibrillation  Stable. The current medical regimen is effective;  continue present plan and medications.    5. Atherosclerotic heart disease of native coronary artery with other forms of angina pectoris  The current medical regimen is effective;  continue present plan and medications.    6. Chronic kidney disease, stage 3a  The current medical regimen is effective;  continue present plan and medications.    7. Hypertensive cardiovascular-renal disease, stage 1-4 or unspecified chronic kidney disease, without heart failure  The current medical regimen is effective;  continue present plan and medications.    8. S/P CABG (coronary artery bypass graft)  The current medical regimen is effective;  continue present plan and medications.    9. Erectile dysfunction, unspecified erectile dysfunction type  The current medical regimen is effective;  continue present plan and medications.    10. Gastroesophageal reflux disease, unspecified whether esophagitis present  The current medical regimen is effective;  continue present plan and medications.        Provided Abdias with a 5-10 year written screening schedule and personal prevention plan. Recommendations were developed using the USPSTF age appropriate recommendations. Education, counseling, and referrals were provided as needed. After Visit Summary printed and given to patient which includes a list of additional screenings\tests needed.    Follow up in about 1 year (around 11/7/2024).    PALAK Armendariz  I offered to discuss advanced care planning, including how to pick a person who would make decisions for you if you were unable to make them for yourself, called a health care power of , and what kind of decisions you might make such as use of life sustaining treatments such as ventilators and tube feeding when faced with a life  limiting illness recorded on a living will that they will need to know. (How you want to be cared for as you near the end of your natural life)     X Patient is interested in learning more about how to make advanced directives.  I provided them paperwork and offered to discuss this with them.

## 2023-11-08 ENCOUNTER — HOSPITAL ENCOUNTER (OUTPATIENT)
Dept: CARDIOLOGY | Facility: HOSPITAL | Age: 68
Discharge: HOME | End: 2023-11-08
Attending: INTERNAL MEDICINE
Payer: MEDICARE

## 2023-11-08 DIAGNOSIS — R00.2 HEART PALPITATIONS: ICD-10-CM

## 2023-11-08 DIAGNOSIS — R00.2 PALPITATIONS: ICD-10-CM

## 2023-11-08 DIAGNOSIS — Z13.6 SCREENING FOR AAA (ABDOMINAL AORTIC ANEURYSM): ICD-10-CM

## 2023-11-08 LAB
ABDOMINAL IMA AP: 1.9 CM
ABDOMINAL IMA ED VEL: 0 CM/S
ABDOMINAL IMA PS VEL: 128 CM/S
ABDOMINAL IMA TRANS: 1.9 CM
ABDOMINAL INFRARENAL AORTA AP: 2 CM
ABDOMINAL INFRARENAL AORTA ED VEL: 0 CM/S
ABDOMINAL INFRARENAL AORTA PS VEL: 118 CM/S
ABDOMINAL INFRARENAL AORTA TRANS: 1.7 CM
ABDOMINAL JUXTARENAL AORTA AP: 2.3 CM
ABDOMINAL JUXTARENAL AORTA ED VEL: 0 CM/S
ABDOMINAL JUXTARENAL AORTA PS VEL: 133 CM/S
ABDOMINAL JUXTARENAL AORTA TRANS: 2.2 CM
ABDOMINAL LT COM ILIAC AP: 0.9 CM
ABDOMINAL LT COM ILIAC TRANS: 1.3 CM
ABDOMINAL LT COM ILIAC VEL: 175 CM/S
ABDOMINAL LT COM ILLIAC ED VEL: 0 CM/S
ABDOMINAL RT COM ILIAC AP: 1.1 CM
ABDOMINAL RT COM ILIAC TRANS: 1 CM
ABDOMINAL RT COM ILIAC VEL: 131 CM/S
ABDOMINAL RT COM ILLIAC ED VEL: 0 CM/S
ASCENDING AORTA: 3.73 CM
AV INDEX (PROSTH): 0.87
AV MEAN GRADIENT: 5 MMHG
AV PEAK GRADIENT: 10 MMHG
AV REGURGITATION PRESSURE HALF TIME: 1246.84 MS
AV VALVE AREA BY VELOCITY RATIO: 3.12 CM²
AV VALVE AREA: 2.86 CM²
AV VELOCITY RATIO: 0.96
CV ECHO LV RWT: 0.45 CM
DOP CALC AO PEAK VEL: 1.6 M/S
DOP CALC AO VTI: 39.7 CM
DOP CALC LVOT AREA: 3.3 CM2
DOP CALC LVOT DIAMETER: 2.04 CM
DOP CALC LVOT PEAK VEL: 1.53 M/S
DOP CALC LVOT STROKE VOLUME: 113.36 CM3
DOP CALC MV VTI: 34.7 CM
DOP CALCLVOT PEAK VEL VTI: 34.7 CM
E WAVE DECELERATION TIME: 333 MSEC
E/A RATIO: 1.83
E/E' RATIO: 8.4 M/S
ECHO LV POSTERIOR WALL: 1.06 CM (ref 0.6–1.1)
FRACTIONAL SHORTENING: 41 % (ref 28–44)
INTERVENTRICULAR SEPTUM: 1.16 CM (ref 0.6–1.1)
IVC DIAMETER: 1.14 CM
IVRT: 114.18 MSEC
LA MAJOR: 5.7 CM
LA MINOR: 5.8 CM
LA WIDTH: 4.1 CM
LEFT ATRIUM SIZE: 4.15 CM
LEFT ATRIUM VOLUME: 83.15 CM3
LEFT INTERNAL DIMENSION IN SYSTOLE: 2.76 CM (ref 2.1–4)
LEFT VENTRICLE DIASTOLIC VOLUME: 102.71 ML
LEFT VENTRICLE SYSTOLIC VOLUME: 28.61 ML
LEFT VENTRICULAR INTERNAL DIMENSION IN DIASTOLE: 4.71 CM (ref 3.5–6)
LEFT VENTRICULAR MASS: 190.57 G
LV LATERAL E/E' RATIO: 6.46 M/S
LV SEPTAL E/E' RATIO: 12 M/S
LVOT MG: 4.41 MMHG
LVOT MV: 0.98 CM/S
MV MEAN GRADIENT: 1 MMHG
MV PEAK A VEL: 0.46 M/S
MV PEAK E VEL: 0.84 M/S
MV PEAK GRADIENT: 3 MMHG
MV STENOSIS PRESSURE HALF TIME: 96.57 MS
MV VALVE AREA BY CONTINUITY EQUATION: 3.27 CM2
MV VALVE AREA P 1/2 METHOD: 2.28 CM2
PISA AR MAX VEL: 3.51 M/S
PISA TR MAX VEL: 2.53 M/S
PV PEAK GRADIENT: 4 MMHG
PV PEAK VELOCITY: 1.06 M/S
RA MAJOR: 5.65 CM
RA PRESSURE ESTIMATED: 3 MMHG
RA WIDTH: 3.2 CM
RIGHT VENTRICULAR END-DIASTOLIC DIMENSION: 2.69 CM
RV TB RVSP: 6 MMHG
RV TISSUE DOPPLER FREE WALL SYSTOLIC VELOCITY 1 (APICAL 4 CHAMBER VIEW): 9.52 CM/S
SINUS: 3.23 CM
TDI LATERAL: 0.13 M/S
TDI SEPTAL: 0.07 M/S
TDI: 0.1 M/S
TR MAX PG: 26 MMHG
TRICUSPID ANNULAR PLANE SYSTOLIC EXCURSION: 1.47 CM
TV REST PULMONARY ARTERY PRESSURE: 29 MMHG

## 2023-11-08 PROCEDURE — 93227 XTRNL ECG REC<48 HR R&I: CPT | Mod: ,,, | Performed by: INTERNAL MEDICINE

## 2023-11-08 PROCEDURE — 93978 VASCULAR STUDY: CPT | Mod: 26,,, | Performed by: INTERNAL MEDICINE

## 2023-11-08 PROCEDURE — 93978 CV US ABDOMINAL AORTA EVALUATION (CUPID ONLY): ICD-10-PCS | Mod: 26,,, | Performed by: INTERNAL MEDICINE

## 2023-11-08 PROCEDURE — 93306 ECHO (CUPID ONLY): ICD-10-PCS | Mod: 26,,, | Performed by: INTERNAL MEDICINE

## 2023-11-08 PROCEDURE — 93306 TTE W/DOPPLER COMPLETE: CPT | Mod: 26,,, | Performed by: INTERNAL MEDICINE

## 2023-11-08 PROCEDURE — 93227 HOLTER MONITOR - 24 HOUR (CUPID ONLY): ICD-10-PCS | Mod: ,,, | Performed by: INTERNAL MEDICINE

## 2023-11-08 PROCEDURE — 93306 TTE W/DOPPLER COMPLETE: CPT

## 2023-11-08 PROCEDURE — 93225 XTRNL ECG REC<48 HRS REC: CPT

## 2023-11-08 PROCEDURE — 93978 VASCULAR STUDY: CPT

## 2023-11-23 LAB
OHS CV EVENT MONITOR DAY: 1
OHS CV HOLTER LENGTH DECIMAL HOURS: 48
OHS CV HOLTER LENGTH HOURS: 24
OHS CV HOLTER LENGTH MINUTES: 0
OHS CV HOLTER SINUS AVERAGE HR: 63
OHS CV HOLTER SINUS MAX HR: 126
OHS CV HOLTER SINUS MIN HR: 42

## 2023-11-29 DIAGNOSIS — I10 ESSENTIAL HYPERTENSION: ICD-10-CM

## 2023-11-30 RX ORDER — DILTIAZEM HYDROCHLORIDE 120 MG/1
120 CAPSULE, COATED, EXTENDED RELEASE ORAL DAILY
Qty: 90 CAPSULE | Refills: 3 | Status: SHIPPED | OUTPATIENT
Start: 2023-11-30 | End: 2024-11-29

## 2023-12-27 ENCOUNTER — LAB VISIT (OUTPATIENT)
Dept: LAB | Facility: HOSPITAL | Age: 68
End: 2023-12-27
Attending: INTERNAL MEDICINE
Payer: MEDICARE

## 2023-12-27 DIAGNOSIS — I13.10 HYPERTENSIVE CARDIOVASCULAR-RENAL DISEASE, STAGE 1-4 OR UNSPECIFIED CHRONIC KIDNEY DISEASE, WITHOUT HEART FAILURE: ICD-10-CM

## 2023-12-27 DIAGNOSIS — D50.8 IRON DEFICIENCY ANEMIA SECONDARY TO INADEQUATE DIETARY IRON INTAKE: ICD-10-CM

## 2023-12-27 DIAGNOSIS — N18.31 CHRONIC KIDNEY DISEASE, STAGE 3A: ICD-10-CM

## 2023-12-27 DIAGNOSIS — I25.10 CORONARY ARTERY DISEASE INVOLVING NATIVE CORONARY ARTERY OF NATIVE HEART WITHOUT ANGINA PECTORIS: ICD-10-CM

## 2023-12-27 DIAGNOSIS — I27.20 PULMONARY HYPERTENSION: ICD-10-CM

## 2023-12-27 DIAGNOSIS — I48.0 PAROXYSMAL ATRIAL FIBRILLATION: ICD-10-CM

## 2023-12-27 DIAGNOSIS — Z95.1 S/P CABG (CORONARY ARTERY BYPASS GRAFT): ICD-10-CM

## 2023-12-27 LAB
ALBUMIN SERPL BCP-MCNC: 4.1 G/DL (ref 3.5–5.2)
ANION GAP SERPL CALC-SCNC: 8 MMOL/L (ref 8–16)
BASOPHILS # BLD AUTO: 0.02 K/UL (ref 0–0.2)
BASOPHILS NFR BLD: 0.5 % (ref 0–1.9)
BILIRUB UR QL STRIP: NEGATIVE
BUN SERPL-MCNC: 16 MG/DL (ref 8–23)
CALCIUM SERPL-MCNC: 9.1 MG/DL (ref 8.7–10.5)
CHLORIDE SERPL-SCNC: 108 MMOL/L (ref 95–110)
CLARITY UR REFRACT.AUTO: CLEAR
CO2 SERPL-SCNC: 25 MMOL/L (ref 23–29)
COLOR UR AUTO: YELLOW
CREAT SERPL-MCNC: 1.3 MG/DL (ref 0.5–1.4)
CREAT UR-MCNC: 170 MG/DL (ref 23–375)
DIFFERENTIAL METHOD: ABNORMAL
EOSINOPHIL # BLD AUTO: 0.3 K/UL (ref 0–0.5)
EOSINOPHIL NFR BLD: 5.9 % (ref 0–8)
ERYTHROCYTE [DISTWIDTH] IN BLOOD BY AUTOMATED COUNT: 15.9 % (ref 11.5–14.5)
EST. GFR  (NO RACE VARIABLE): 59.1 ML/MIN/1.73 M^2
GLUCOSE SERPL-MCNC: 94 MG/DL (ref 70–110)
GLUCOSE UR QL STRIP: NEGATIVE
HCT VFR BLD AUTO: 37.7 % (ref 40–54)
HGB BLD-MCNC: 12.2 G/DL (ref 14–18)
HGB UR QL STRIP: NEGATIVE
IMM GRANULOCYTES # BLD AUTO: 0.01 K/UL (ref 0–0.04)
IMM GRANULOCYTES NFR BLD AUTO: 0.2 % (ref 0–0.5)
KETONES UR QL STRIP: NEGATIVE
LEUKOCYTE ESTERASE UR QL STRIP: NEGATIVE
LYMPHOCYTES # BLD AUTO: 1.7 K/UL (ref 1–4.8)
LYMPHOCYTES NFR BLD: 40.6 % (ref 18–48)
MCH RBC QN AUTO: 23.1 PG (ref 27–31)
MCHC RBC AUTO-ENTMCNC: 32.4 G/DL (ref 32–36)
MCV RBC AUTO: 71 FL (ref 82–98)
MONOCYTES # BLD AUTO: 0.4 K/UL (ref 0.3–1)
MONOCYTES NFR BLD: 8.7 % (ref 4–15)
NEUTROPHILS # BLD AUTO: 1.9 K/UL (ref 1.8–7.7)
NEUTROPHILS NFR BLD: 44.1 % (ref 38–73)
NITRITE UR QL STRIP: NEGATIVE
NRBC BLD-RTO: 0 /100 WBC
PH UR STRIP: 5 [PH] (ref 5–8)
PHOSPHATE SERPL-MCNC: 3.1 MG/DL (ref 2.7–4.5)
PLATELET # BLD AUTO: 176 K/UL (ref 150–450)
PMV BLD AUTO: 11.9 FL (ref 9.2–12.9)
POTASSIUM SERPL-SCNC: 4.6 MMOL/L (ref 3.5–5.1)
PROT UR QL STRIP: NEGATIVE
PROT UR-MCNC: 7 MG/DL (ref 0–15)
PROT/CREAT UR: 0.04 MG/G{CREAT} (ref 0–0.2)
PTH-INTACT SERPL-MCNC: 65.1 PG/ML (ref 9–77)
RBC # BLD AUTO: 5.29 M/UL (ref 4.6–6.2)
SODIUM SERPL-SCNC: 141 MMOL/L (ref 136–145)
SP GR UR STRIP: 1.02 (ref 1–1.03)
URATE SERPL-MCNC: 6.8 MG/DL (ref 3.4–7)
URN SPEC COLLECT METH UR: NORMAL
WBC # BLD AUTO: 4.26 K/UL (ref 3.9–12.7)

## 2023-12-27 PROCEDURE — 81003 URINALYSIS AUTO W/O SCOPE: CPT | Performed by: INTERNAL MEDICINE

## 2023-12-27 PROCEDURE — 82570 ASSAY OF URINE CREATININE: CPT | Performed by: INTERNAL MEDICINE

## 2023-12-27 PROCEDURE — 84550 ASSAY OF BLOOD/URIC ACID: CPT | Performed by: INTERNAL MEDICINE

## 2023-12-27 PROCEDURE — 80069 RENAL FUNCTION PANEL: CPT | Performed by: INTERNAL MEDICINE

## 2023-12-27 PROCEDURE — 83970 ASSAY OF PARATHORMONE: CPT | Performed by: INTERNAL MEDICINE

## 2023-12-27 PROCEDURE — 85025 COMPLETE CBC W/AUTO DIFF WBC: CPT | Performed by: INTERNAL MEDICINE

## 2023-12-27 PROCEDURE — 36415 COLL VENOUS BLD VENIPUNCTURE: CPT | Mod: PO | Performed by: INTERNAL MEDICINE

## 2024-01-04 ENCOUNTER — OFFICE VISIT (OUTPATIENT)
Dept: NEPHROLOGY | Facility: CLINIC | Age: 69
End: 2024-01-04
Payer: MEDICARE

## 2024-01-04 VITALS
BODY MASS INDEX: 24.02 KG/M2 | HEART RATE: 76 BPM | DIASTOLIC BLOOD PRESSURE: 77 MMHG | OXYGEN SATURATION: 98 % | WEIGHT: 158 LBS | SYSTOLIC BLOOD PRESSURE: 177 MMHG

## 2024-01-04 DIAGNOSIS — Z95.1 S/P CABG (CORONARY ARTERY BYPASS GRAFT): Primary | ICD-10-CM

## 2024-01-04 DIAGNOSIS — N28.9 RENAL LESION: ICD-10-CM

## 2024-01-04 DIAGNOSIS — I13.10 HYPERTENSIVE CARDIOVASCULAR-RENAL DISEASE, STAGE 1-4 OR UNSPECIFIED CHRONIC KIDNEY DISEASE, WITHOUT HEART FAILURE: ICD-10-CM

## 2024-01-04 DIAGNOSIS — D50.8 IRON DEFICIENCY ANEMIA SECONDARY TO INADEQUATE DIETARY IRON INTAKE: ICD-10-CM

## 2024-01-04 DIAGNOSIS — I25.10 CORONARY ARTERY DISEASE INVOLVING NATIVE CORONARY ARTERY OF NATIVE HEART WITHOUT ANGINA PECTORIS: ICD-10-CM

## 2024-01-04 DIAGNOSIS — N18.31 CHRONIC KIDNEY DISEASE, STAGE 3A: ICD-10-CM

## 2024-01-04 DIAGNOSIS — I48.0 PAROXYSMAL ATRIAL FIBRILLATION: ICD-10-CM

## 2024-01-04 PROCEDURE — 99215 OFFICE O/P EST HI 40 MIN: CPT | Mod: S$PBB,GC,, | Performed by: STUDENT IN AN ORGANIZED HEALTH CARE EDUCATION/TRAINING PROGRAM

## 2024-01-04 PROCEDURE — 99999 PR PBB SHADOW E&M-EST. PATIENT-LVL III: CPT | Mod: PBBFAC,GC,, | Performed by: STUDENT IN AN ORGANIZED HEALTH CARE EDUCATION/TRAINING PROGRAM

## 2024-01-04 PROCEDURE — 99213 OFFICE O/P EST LOW 20 MIN: CPT | Mod: PBBFAC | Performed by: STUDENT IN AN ORGANIZED HEALTH CARE EDUCATION/TRAINING PROGRAM

## 2024-01-04 NOTE — PROGRESS NOTES
Subjective     Chief Complaint: CKD 3a    History of Present Illness:  Mr. Abdias Kim is a 68 y.o. male   male who presents for follow up of CKD 3a. He was previously seen by Dr. Wall in June 2023 for initial nephrology evaluation, who now presents to our clinic.     Renal function per chart review with sr cr baseline of 1.3 - 1.5 mg/dL and baseline eGFR around 59, which has remained stable for the past year. Urinalysis obtained 12/27/23 was unremarkable, UPCR of 0.04. Denies use of NSAIDs, nephrolithiasis or fam Hx of renal disease. He does not notice any difficulty emptying his bladder, or any nocturia, but does not that on occasion he feels the urge to urinate 30 minutes after his last void.      - PAF on Diltiazem and CAD s/p CABG 2021.    - CAD/CABG 4/26/21 (LIMA-LAD, GIORGIO-D1, SVG-OM1)     - HTN diagnosed about in his 20's. Patient reports good adherence to the current regiment, which includes Diltiazem. He is following low salt diet. Previously on Losartan-HCTZ and stopped due to Hives. He participates in digital blood pressure monitoring which shows that his home readings are consistently in the 120s/70s on his current medications.         Review of Systems   Constitutional:  Negative for chills, fever, malaise/fatigue and weight loss.   HENT:  Negative for nosebleeds.    Respiratory:  Negative for cough, hemoptysis and wheezing.    Cardiovascular:  Negative for chest pain, palpitations, orthopnea, leg swelling and PND.   Gastrointestinal:  Negative for abdominal pain, constipation, diarrhea, heartburn, nausea and vomiting.   Genitourinary:  Negative for dysuria and urgency.   Musculoskeletal:  Negative for back pain, falls and neck pain.   Skin:  Negative for itching and rash.   Neurological:  Negative for dizziness, tremors, seizures, weakness and headaches.       PAST HISTORY:     Past Medical History:   Diagnosis Date    Anemia     Diverticulosis     Hypertension        Past  Surgical History:   Procedure Laterality Date    APPENDECTOMY      COLONOSCOPY N/A 01/31/2017    Procedure: COLONOSCOPY;  Surgeon: BASILIO Winn MD;  Location: [facility] ENDO (4TH FLR);  Service: Endoscopy;  Laterality: N/A;    COLONOSCOPY N/A 02/05/2020    Procedure: COLONOSCOPY;  Surgeon: Cody Maria MD;  Location: [facility] (4TH FLR);  Service: Endoscopy;  Laterality: N/A;  Luis Maria or ghazala. Needs to be done in Jan 2020    CORONARY ARTERY BYPASS GRAFT  05/26/2021    CORONARY ARTERY BYPASS GRAFT (CABG) N/A 04/26/2021    Procedure: CORONARY ARTERY BYPASS GRAFT (CABG)X3 WITH VEIN HARVEST;  Surgeon: Fidencio Galindo MD;  [facility];  Service: Cardiovascular;  Laterality: N/A;    ENDOSCOPIC HARVEST OF VEIN Left 04/26/2021    Procedure: HARVEST-VEIN-ENDOVASCULAR FOR CABGX3;  Surgeon: Fidencio Galindo MD;  [facility];  Service: Cardiovascular;  Laterality: Left;  Vein [place] Start time: 1201pm  Vein [place] Stop time: 1226pm    Vein Prep Start time: 1227pm  Vein Prep Stop time: 1250pm    ESOPHAGOGASTRODUODENOSCOPY N/A 02/05/2020    Procedure: EGD (ESOPHAGOGASTRODUODENOSCOPY);  Surgeon: Cody Maria MD;  Location: [facility] (4TH FLR);  Service: Endoscopy;  Laterality: N/A;    ESOPHAGOGASTRODUODENOSCOPY N/A 04/30/2020    Procedure: EGD (ESOPHAGOGASTRODUODENOSCOPY);  Surgeon: Cody Maria MD;  Location: [facility] (2ND FLR);  Service: Endoscopy;  Laterality: N/A;  schedule 12 weeks from now  4/13/20 - removed from 4/29/20, needs to be rescheduled high priority - pg  4/28/20-scheduled from high priority list-BB  Covid screening test scheduled for 4/29/20-BB  instructions emailed to patient-BB    EXCLUSION OF LEFT ATRIAL APPENDAGE N/A 04/26/2021    Procedure: EXCLUSION, LEFT ATRIAL APPENDAGE;  Surgeon: Fidencio Galindo MD;  Location: [facility] OR [address] [place] [address];  Service: Cardiovascular;  Laterality: N/A;  Left Atrial Appendage Resection    HERNIA REPAIR  April/May 2022    LEFT  HEART CATHETERIZATION Left 2021    Procedure: Left heart cath;  Surgeon: Aric Alvarado MD;  Location: [facility] CATH LAB;  Service: Cardiology;  Laterality: Left;    UMBILICAL HERNIA REPAIR N/A 2022    Procedure: REPAIR, HERNIA, UMBILICAL, AGE 5 YEARS OR OLDER Open With or Without Mesh;  Surgeon: Matt Delgado MD;  Location: [facility] OR [address] [place] [address];  Service: General;  Laterality: N/A;       Family History   Problem Relation Age of Onset    Heart disease Mother         , Coronary artery disease    Hypertension Mother     Cancer Father         colon/prostate    Colon polyps Father     Arthritis Father             Heart disease Father         Coronary artery disease, Afib, CHF    Hypertension Father     Hypertension Son     Stroke Neg Hx     Diabetes Neg Hx     Celiac disease Neg Hx     Esophageal cancer Neg Hx     Liver cancer Neg Hx     Liver disease Neg Hx     Stomach cancer Neg Hx     Rectal cancer Neg Hx     Melanoma Neg Hx        Social History     Socioeconomic History    Marital status:    Tobacco Use    Smoking status: Never     Passive exposure: Never    Smokeless tobacco: Never   Substance and Sexual Activity    Alcohol use: Not Currently     Comment: few times a week     Drug use: No    Sexual activity: Yes     Partners: Female     Birth control/protection: Other-see comments     Comment: Wife, tubal ligation     Social Determinants of Health     Financial Resource Strain: Low Risk  (2024)    Overall Financial Resource Strain (CARDIA)     Difficulty of Paying Living Expenses: Not hard at all   Food Insecurity: No Food Insecurity (2024)    Hunger Vital Sign     Worried About Running Out of Food in the Last Year: Never true     Ran Out of Food in the Last Year: Never true   Transportation Needs: No Transportation Needs (2024)    PRAPARE - Transportation     Lack of Transportation (Medical): No     Lack of Transportation (Non-Medical): No   Physical Activity:  Sufficiently Active (1/1/2024)    Exercise Vital Sign     Days of Exercise per Week: 6 days     Minutes of Exercise per Session: 50 min   Stress: Stress Concern Present (1/1/2024)    [employer] - Occupational Stress Questionnaire     Feeling of Stress : To some extent   Social Connections: Unknown (1/1/2024)    Social Connection and Isolation Panel [NHANES]     Frequency of Communication with Friends and Family: Three times a week     Frequency of Social Gatherings with Friends and Family: Once a week     Active Member of Clubs or Organizations: No     Attends Club or Organization Meetings: Never     Marital Status:    Housing Stability: Low Risk  (1/1/2024)    Housing Stability Vital Sign     Unable to Pay for Housing in the Last Year: No     Number of Places Lived in the Last Year: 1     Unstable Housing in the Last Year: No       MEDICATIONS & ALLERGIES:     Current Outpatient Medications on File Prior to Visit   Medication Sig    aspirin (ECOTRIN) 81 MG EC tablet Take 1 tablet (81 mg total) by mouth once daily.    atorvastatin (LIPITOR) 40 MG tablet Take 1 tablet (40 mg total) by mouth every evening.    cetirizine (ZYRTEC) 10 MG tablet Take 1 tablet by mouth Daily.    diltiaZEM (CARDIZEM CD) 120 MG Cp24 Take 1 capsule (120 mg total) by mouth once daily.    famotidine (PEPCID) 20 MG tablet Take 2 tablets (40 mg total) by mouth once daily.    fluticasone propionate (FLONASE) 50 mcg/actuation nasal spray 1 spray (50 mcg total) by Each Nostril route once daily.    multivitamin (THERAGRAN) per tablet Take 1 tablet by mouth once daily.    sildenafiL (VIAGRA) 100 MG tablet Take 0.5 tablets (50 mg total) by mouth as needed for Erectile Dysfunction.     No current facility-administered medications on file prior to visit.       Review of patient's allergies indicates:   Allergen Reactions    Allegra-d 12 hour [fexofenadine-pseudoephedrine] Other (See Comments)     Trouble urinating     Mucinex d [pseudoephedrine-guaifenesin] Other (See Comments)     Trouble urinating    Losartan-hydrochlorothiazide Hives and Rash     Other reaction(s): Hives       OBJECTIVE:     Vital Signs:  Vitals:    01/04/24 1329   BP: (!) 177/77   Pulse: 76   SpO2: 98%   Weight: 71.7 kg (158 lb)       Body mass index is 24.02 kg/m².     Physical Exam  Constitutional:       General: He is not in acute distress.     Appearance: Normal appearance. He is not ill-appearing or toxic-appearing.   HENT:      Head: Atraumatic.      Right Ear: External ear normal.      Left Ear: External ear normal.      Mouth/Throat:      Mouth: Mucous membranes are moist.   Eyes:      Extraocular Movements: Extraocular movements intact.   Cardiovascular:      Rate and Rhythm: Normal rate and regular rhythm.      Pulses: Normal pulses.      Heart sounds: Murmur heard.   Pulmonary:      Effort: Pulmonary effort is normal. No respiratory distress.      Breath sounds: Normal breath sounds. No wheezing or rales.   Chest:      Chest wall: No tenderness.   Abdominal:      General: Abdomen is flat.      Palpations: Abdomen is soft.   Musculoskeletal:         General: No swelling. Normal range of motion.      Cervical back: Normal range of motion and neck supple.      Right lower leg: No edema.      Left lower leg: No edema.   Skin:     General: Skin is warm.      Capillary Refill: Capillary refill takes less than 2 seconds.      Coloration: Skin is not jaundiced.      Findings: No lesion or rash.   Neurological:      General: No focal deficit present.      Mental Status: He is alert and oriented to person, place, and time.   Psychiatric:         Mood and Affect: Mood normal.         Behavior: Behavior normal.         Laboratory  No results found for this or any previous visit (from the past 24 hour(s)).    Diagnostic Results:      Health Maintenance Due   Topic Date Due    RSV Vaccine (Age 60+ and Pregnant patients) (1 - 1-dose 60+ series) Never done          ASSESSMENT & PLAN:   Mr. Abdias Kim is a 68 y.o. male with history of CAD s/p CABG who presents for evaluation of CKD 3a which has remained stable for the past several years. He did have a transient decrease in his eGFR around the time of his CABG in 2021, however was able to recover renal function back to his baseline of 59. At this time, I suspect that his CKD is secondary to his CAD and Hypertension, and has remained stable on his current medications. He would benefit from an ACE/ARB however developed severe hives with Losartan-HCTZ in the past.     Retroperitoneal ultrasound in June 2023 showed a 1.2 cm exophytic isoechoic renal lesion extending from the right midpole. I will plan to repeat the ultrasound to monitor for any potential evolution of this lesion.       S/P CABG (coronary artery bypass graft)    Chronic kidney disease, stage 3a  -     CBC Auto Differential; Future; Expected date: 07/04/2024  -     Renal Function Panel; Future; Expected date: 07/04/2024    Paroxysmal atrial fibrillation    Hypertensive cardiovascular-renal disease, stage 1-4 or unspecified chronic kidney disease, without heart failure    Coronary artery disease involving native coronary artery of native heart without angina pectoris    Iron deficiency anemia secondary to inadequate dietary iron intake    Renal lesion  -     US Retroperitoneal Complete; Future; Expected date: 01/04/2024        RTC in 6 months    Discussed with Dr. Moreira  - staff attestation to follow      Reji Mcfarland MD  Nephrology PGY-4    [address] [place] [address]  [phone]

## 2024-01-19 ENCOUNTER — HOSPITAL ENCOUNTER (OUTPATIENT)
Dept: RADIOLOGY | Facility: HOSPITAL | Age: 69
Discharge: HOME | End: 2024-01-19
Attending: STUDENT IN AN ORGANIZED HEALTH CARE EDUCATION/TRAINING PROGRAM
Payer: MEDICARE

## 2024-01-19 DIAGNOSIS — N28.9 RENAL LESION: ICD-10-CM

## 2024-01-19 PROCEDURE — 76770 US EXAM ABDO BACK WALL COMP: CPT | Mod: TC

## 2024-01-19 PROCEDURE — 76770 US EXAM ABDO BACK WALL COMP: CPT | Mod: 26,,, | Performed by: STUDENT IN AN ORGANIZED HEALTH CARE EDUCATION/TRAINING PROGRAM

## 2024-01-24 ENCOUNTER — OFFICE VISIT (OUTPATIENT)
Dept: CARDIOLOGY | Facility: CLINIC | Age: 69
End: 2024-01-24
Payer: MEDICARE

## 2024-01-24 VITALS
HEART RATE: 49 BPM | BODY MASS INDEX: 23.39 KG/M2 | RESPIRATION RATE: 18 BRPM | HEIGHT: 68 IN | OXYGEN SATURATION: 98 % | WEIGHT: 154.31 LBS | SYSTOLIC BLOOD PRESSURE: 150 MMHG | DIASTOLIC BLOOD PRESSURE: 74 MMHG

## 2024-01-24 DIAGNOSIS — Z95.1 S/P CABG (CORONARY ARTERY BYPASS GRAFT): ICD-10-CM

## 2024-01-24 DIAGNOSIS — N18.31 STAGE 3A CHRONIC KIDNEY DISEASE: ICD-10-CM

## 2024-01-24 DIAGNOSIS — R00.2 HEART PALPITATIONS: ICD-10-CM

## 2024-01-24 DIAGNOSIS — I77.810 AORTIC ROOT DILATATION: ICD-10-CM

## 2024-01-24 DIAGNOSIS — I25.810 CORONARY ARTERY DISEASE INVOLVING CORONARY BYPASS GRAFT OF NATIVE HEART WITHOUT ANGINA PECTORIS: ICD-10-CM

## 2024-01-24 DIAGNOSIS — I70.0 AORTIC ATHEROSCLEROSIS: ICD-10-CM

## 2024-01-24 DIAGNOSIS — I10 ESSENTIAL HYPERTENSION: Primary | ICD-10-CM

## 2024-01-24 PROBLEM — I27.20 PULMONARY HYPERTENSION: Status: RESOLVED | Noted: 2022-03-22 | Resolved: 2024-01-24

## 2024-01-24 PROCEDURE — 99214 OFFICE O/P EST MOD 30 MIN: CPT | Mod: S$PBB,,, | Performed by: INTERNAL MEDICINE

## 2024-01-24 PROCEDURE — 99214 OFFICE O/P EST MOD 30 MIN: CPT | Mod: PBBFAC,PO | Performed by: INTERNAL MEDICINE

## 2024-01-24 PROCEDURE — 99999 PR PBB SHADOW E&M-EST. PATIENT-LVL IV: CPT | Mod: PBBFAC,,, | Performed by: INTERNAL MEDICINE

## 2024-01-24 RX ORDER — LISINOPRIL 5 MG/1
5 TABLET ORAL DAILY
Qty: 90 TABLET | Refills: 3 | Status: SHIPPED | OUTPATIENT
Start: 2024-01-24 | End: 2025-01-23

## 2024-01-24 NOTE — PROGRESS NOTES
CARDIOVASCULAR PROGRESS NOTE    REASON FOR CONSULT:   Abdias Kim is a 68 y.o. male who presents for follow up of CAD/CABG.    PCP: Hari  Neph: Bartolo (resident)/Harish  HISTORY OF PRESENT ILLNESS:   Last seen by Dr. Aguilar March 2023.    The patient returns for follow up.  In the interim since his last visit with me was seen by Nephrology.  They have recommended initiation of ACE or ARB with the limitation of prior rash on Hyzaar.  On further discussion with the patient, it is unclear to him whether or not it was due to the Hyzaar.  He is wanting to re-attempt this medication.  His blood pressure is out of range and I think that this is a reasonable option.  He was instructed to immediately go to the emergency room if he develops any concerning symptoms.  I plan to start lisinopril 5 mg daily.  He otherwise denies angina, dyspnea, or syncope.  He does describe episodic palpitations, including palpitations during his Holter which did not reveal any arrhythmia.  There has been no PND, orthopnea, melena, hematuria, or claudication symptoms.    CARDIOVASCULAR HISTORY:   CAD/CABG 4/26/21 (LIMA-LAD, GIORGIO-D1, SVG-OM1)    Ao root dil, 3.7cm (echo 11/2023)    PAST MEDICAL HISTORY:     Past Medical History:   Diagnosis Date    Anemia     Diverticulosis     Hypertension        PAST SURGICAL HISTORY:     Past Surgical History:   Procedure Laterality Date    APPENDECTOMY      COLONOSCOPY N/A 01/31/2017    Procedure: COLONOSCOPY;  Surgeon: BASILIO Winn MD;  Location: [facility] ([address] [place] [address]);  Service: Endoscopy;  Laterality: N/A;    COLONOSCOPY N/A 02/05/2020    Procedure: COLONOSCOPY;  Surgeon: Cody Maria MD;  Location: [facility] ([address] [place] [address]);  Service: Endoscopy;  Laterality: N/A;  OKay for Crow or ghazala. Needs to be done in Jan 2020    CORONARY ARTERY BYPASS GRAFT  05/26/2021    CORONARY ARTERY BYPASS GRAFT (CABG) N/A 04/26/2021    Procedure: CORONARY ARTERY BYPASS GRAFT (CABG)X3 WITH VEIN HARVEST;  Surgeon:  Fidencio Galindo MD;  Location: [address];  Service: Cardiovascular;  Laterality: N/A;    ENDOSCOPIC HARVEST OF VEIN Left 04/26/2021    Procedure: HARVEST-VEIN-ENDOVASCULAR FOR CABGX3;  Surgeon: Fidencio Galindo MD;  Location: [facility] OR [address] [place] [address];  Service: Cardiovascular;  Laterality: Left;  Vein [place] Start time: 1201pm  Vein [place] Stop time: 1226pm    Vein Prep Start time: 1227pm  Vein Prep Stop time: 1250pm    ESOPHAGOGASTRODUODENOSCOPY N/A 02/05/2020    Procedure: EGD (ESOPHAGOGASTRODUODENOSCOPY);  Surgeon: Cody Maria MD;  Location: [facility] ENDO (4TH FLR);  Service: Endoscopy;  Laterality: N/A;    ESOPHAGOGASTRODUODENOSCOPY N/A 04/30/2020    Procedure: EGD (ESOPHAGOGASTRODUODENOSCOPY);  Surgeon: Cody Maria MD;  Location: [facility] (2ND FLR);  Service: Endoscopy;  Laterality: N/A;  schedule 12 weeks from now  4/13/20 - removed from 4/29/20, needs to be rescheduled high priority - pg  4/28/20-scheduled from high priority list-BB  Covid screening test scheduled for 4/29/20-BB  instructions emailed to patient-BB    EXCLUSION OF LEFT ATRIAL APPENDAGE N/A 04/26/2021    Procedure: EXCLUSION, LEFT ATRIAL APPENDAGE;  Surgeon: Fidencio Galindo MD;  Location: [address];  Service: Cardiovascular;  Laterality: N/A;  Left Atrial Appendage Resection    HERNIA REPAIR  April/May 2022    LEFT HEART CATHETERIZATION Left 04/21/2021    Procedure: Left heart cath;  Surgeon: Aric Alvarado MD;  Location: [facility] CATH LAB;  Service: Cardiology;  Laterality: Left;    UMBILICAL HERNIA REPAIR N/A 03/31/2022    Procedure: REPAIR, HERNIA, UMBILICAL, AGE 5 YEARS OR OLDER Open With or Without Mesh;  Surgeon: Matt Delgado MD;  Location: [facility] OR [address] [place] [address];  Service: General;  Laterality: N/A;       ALLERGIES AND MEDICATION:     Review of patient's allergies indicates:   Allergen Reactions    Allegra-d 12 hour [fexofenadine-pseudoephedrine] Other (See Comments)     Trouble urinating    Mucinex d  [pseudoephedrine-guaifenesin] Other (See Comments)     Trouble urinating    Losartan-hydrochlorothiazide Hives and Rash     Other reaction(s): Hives        Medication List            Accurate as of January 24, 2024  9:44 AM. If you have any questions, ask your nurse or doctor.                CONTINUE taking these medications      aspirin 81 MG EC tablet  Commonly known as: ECOTRIN  Take 1 tablet (81 mg total) by mouth once daily.     atorvastatin 40 MG tablet  Commonly known as: LIPITOR  Take 1 tablet (40 mg total) by mouth every evening.     cetirizine 10 MG tablet  Commonly known as: ZYRTEC     diltiaZEM 120 MG Cp24  Commonly known as: CARDIZEM CD  Take 1 capsule (120 mg total) by mouth once daily.     famotidine 20 MG tablet  Commonly known as: PEPCID  Take 2 tablets (40 mg total) by mouth once daily.     fluticasone propionate 50 mcg/actuation nasal spray  Commonly known as: FLONASE  1 spray (50 mcg total) by Each Nostril route once daily.     multivitamin per tablet  Commonly known as: THERAGRAN     sildenafiL 100 MG tablet  Commonly known as: VIAGRA  Take 0.5 tablets (50 mg total) by mouth as needed for Erectile Dysfunction.              SOCIAL HISTORY:     Social History     Socioeconomic History    Marital status:    Tobacco Use    Smoking status: Never     Passive exposure: Never    Smokeless tobacco: Never   Substance and Sexual Activity    Alcohol use: Not Currently     Comment: few times a week     Drug use: No    Sexual activity: Yes     Partners: Female     Birth control/protection: Other-see comments     Comment: Wife, tubal ligation     Social Determinants of Health     Financial Resource Strain: Low Risk  (1/1/2024)    Overall Financial Resource Strain (CARDIA)     Difficulty of Paying Living Expenses: Not hard at all   Food Insecurity: No Food Insecurity (1/1/2024)    Hunger Vital Sign     Worried About Running Out of Food in the Last Year: Never true     Ran Out of Food in the Last Year:  Never true   Transportation Needs: No Transportation Needs (2024)    PRAPARE - Transportation     Lack of Transportation (Medical): No     Lack of Transportation (Non-Medical): No   Physical Activity: Sufficiently Active (2024)    Exercise Vital Sign     Days of Exercise per Week: 6 days     Minutes of Exercise per Session: 50 min   Stress: Stress Concern Present (2024)    [employer] - Occupational Stress Questionnaire     Feeling of Stress : To some extent   Social Connections: Unknown (2024)    Social Connection and Isolation Panel [NHANES]     Frequency of Communication with Friends and Family: Three times a week     Frequency of Social Gatherings with Friends and Family: Once a week     Active Member of Clubs or Organizations: No     Attends Club or Organization Meetings: Never     Marital Status:    Housing Stability: Low Risk  (2024)    Housing Stability Vital Sign     Unable to Pay for Housing in the Last Year: No     Number of Places Lived in the Last Year: 1     Unstable Housing in the Last Year: No       FAMILY HISTORY:     Family History   Problem Relation Age of Onset    Heart disease Mother         , Coronary artery disease    Hypertension Mother     Cancer Father         colon/prostate    Colon polyps Father     Arthritis Father             Heart disease Father         Coronary artery disease, Afib, CHF    Hypertension Father     Hypertension Son     Stroke Neg Hx     Diabetes Neg Hx     Celiac disease Neg Hx     Esophageal cancer Neg Hx     Liver cancer Neg Hx     Liver disease Neg Hx     Stomach cancer Neg Hx     Rectal cancer Neg Hx     Melanoma Neg Hx        REVIEW OF SYSTEMS:   Review of Systems   Constitutional:  Negative for chills, diaphoresis and fever.   HENT:  Negative for nosebleeds.    Eyes:  Negative for blurred vision, double vision and photophobia.   Respiratory:  Negative for hemoptysis, shortness of breath and  "wheezing.    Cardiovascular:  Positive for palpitations. Negative for chest pain, orthopnea, claudication, leg swelling and PND.   Gastrointestinal:  Negative for abdominal pain, blood in stool, heartburn, melena, nausea and vomiting.   Genitourinary:  Negative for flank pain and hematuria.   Musculoskeletal:  Negative for falls, myalgias and neck pain.   Skin:  Negative for rash.   Neurological:  Negative for dizziness, seizures, loss of consciousness, weakness and headaches.   Endo/Heme/Allergies:  Negative for polydipsia. Does not bruise/bleed easily.   Psychiatric/Behavioral:  Negative for depression and memory loss. The patient is not nervous/anxious.        PHYSICAL EXAM:     Vitals:    01/24/24 0938   BP: (!) 150/74   Pulse: (!) 49   Resp: 18    Body mass index is 23.46 kg/m².  Weight: 70 kg (154 lb 5.2 oz)   Height: 5' 8" (172.7 cm)     Physical Exam  Vitals reviewed.   Constitutional:       General: He is not in acute distress.     Appearance: Normal appearance. He is well-developed and normal weight. He is not ill-appearing, toxic-appearing or diaphoretic.   HENT:      Head: Normocephalic and atraumatic.   Eyes:      General: No scleral icterus.     Extraocular Movements: Extraocular movements intact.      Conjunctiva/sclera: Conjunctivae normal.      Pupils: Pupils are equal, round, and reactive to light.   Neck:      Thyroid: No thyromegaly.      Vascular: Normal carotid pulses. No carotid bruit or JVD.      Trachea: Trachea normal.   Cardiovascular:      Rate and Rhythm: Regular rhythm. Bradycardia present.      Heart sounds: S1 normal and S2 normal. No murmur heard.     No friction rub. No gallop.   Pulmonary:      Effort: Pulmonary effort is normal. No respiratory distress.      Breath sounds: Normal breath sounds. No stridor. No wheezing, rhonchi or rales.   Chest:      Chest wall: No tenderness.   Abdominal:      General: There is no distension.      Palpations: Abdomen is soft.   Musculoskeletal:   "       General: No swelling or tenderness. Normal range of motion.      Cervical back: Normal range of motion and neck supple. No edema or rigidity.      Right lower leg: No edema.      Left lower leg: No edema.   Feet:      Right foot:      Skin integrity: No ulcer.      Left foot:      Skin integrity: No ulcer.   Skin:     General: Skin is warm and dry.      Coloration: Skin is not jaundiced.   Neurological:      General: No focal deficit present.      Mental Status: He is alert and oriented to person, place, and time.      Cranial Nerves: No cranial nerve deficit.   Psychiatric:         Mood and Affect: Mood normal.         Speech: Speech normal.         Behavior: Behavior normal. Behavior is cooperative.         DATA:   EKG: (personally reviewed tracing)  1/24/24 SR 45    Laboratory:  CBC:  Recent Labs   Lab 08/04/22  1029 09/05/23  0859 12/27/23  1104   WBC 4.18 4.62 4.26   Hemoglobin 11.5 L 11.8 L 12.2 L   Hematocrit 35.7 L 36.5 L 37.7 L   Platelets 177 136 L 176         CHEMISTRIES:  Recent Labs   Lab 04/27/21  2158 04/28/21  0412 04/28/21  2347 04/29/21  0349 04/30/21  0359 05/01/21  0549 05/05/21  2117 07/12/21  1025 03/12/22  1803 08/04/22  1029 06/28/23  1026 09/05/23  0859 12/27/23  1104   Glucose  --  104 95   < > 87 99 90 88 76 89 87 85 94   Sodium  --  138 137   < > 137 137 140 143 140 143 145 143 141   Potassium 3.9 4.3 4.1   < > 3.6 3.5 4.5 4.9 4.4 4.6 4.4 4.7 4.6   BUN  --  8 12   < > 17 20 19 19 16 21 12 18 16   Creatinine  --  1.1 1.3   < > 1.4 1.5 H 1.5 H 1.5 H 1.3 1.3 1.3 1.3 1.3   eGFR if non   --  >60.0 56.5 A   < > 51.6 A 47.5 A 47.5 A 47.5 A 56.1 A  --   --   --   --    eGFR  --   --   --   --   --   --   --   --   --  59.5 A 59.5 A 59.1 A 59.1 A   Calcium  --  8.0 L 8.4 L   < > 8.6 L 9.7 9.7 10.0 9.8 9.1 9.8 9.6 9.1   Magnesium 1.9 1.8 2.1  --  2.1 2.1  --   --   --   --   --   --   --     < > = values in this interval not displayed.         CARDIAC BIOMARKERS:         COAGS:  Recent Labs   Lab 04/28/21  0412 04/29/21  0349 05/05/21  2117   INR 1.0 0.9 1.0         LIPIDS/LFTS:  Recent Labs   Lab 11/16/21  0956 03/12/22  1803 08/04/22  1029 09/05/23  0859   Cholesterol 102 L  --  121 108 L   Triglycerides 34  --  30 42   HDL 50  --  59 50   LDL Cholesterol 45.2 L  --  56.0 L 49.6 L   Non-HDL Cholesterol 52  --  62 58   AST  --  25 23 19   ALT  --  26 30 21         Cardiovascular Testing:  Echo 11/8/23 (Asc Ao 3.7cm)    Left Ventricle: Normal wall motion. There is normal systolic function with a visually estimated ejection fraction of 55 - 60%. There is diastolic dysfunction.    Right Ventricle: Normal right ventricular cavity size. Systolic function is normal.    Left Atrium: Left atrium is moderately dilated.    Right Atrium: Right atrium is mildly dilated.    Aortic Valve: The aortic valve is a trileaflet valve. There is mild aortic regurgitation.    Mitral Valve: There is mild regurgitation.    Tricuspid Valve: There is mild regurgitation.    Pulmonary Artery: The estimated pulmonary artery systolic pressure is 29 mmHg.    IVC/SVC: Normal venous pressure at 3 mmHg.    Holter 11/8/23    NSR Heart rates varied between 42 and 126 BPM with an average of 63 BPM.    There were very rare PVCs totalling 6 and averaging 0.13 per hour.    There were very rare PACs totalling 85 and averaging 1.77 per hour.    Aortic US 11/8/23  Suprarenal abdominal aorta not well visualized due to overlying bowel gas.    Otherwise, no evidence of AAA.    Carotid US 4/22/21  There is 0-19% right Internal Carotid Stenosis.  There is 0-19% left Internal Carotid Stenosis.    Cath 4/21/21-> CABG 4/26/21 (LIMA-LAD, GIORGIO-D1, SVG-OM1)  The Ost LM to Mid LM lesion was 70% stenosed.  The Ost LAD to Prox LAD lesion was 90% stenosed.  The Dist LAD lesion was 60% stenosed.  The estimated blood loss was none.  Recommend CABG, discussed with Gia.      ASSESSMENT:   # CAD/CABG x3 4/2021  # HTN, uncontrolled  #  HLP on atorva 40mg  # palps, persistent, holter (with sxs)/echo 11/2023 neg.  # CKD3a  # Ao root dil, 3.7cm (echo 11/2023)  # aortic atherosclerosis (CT chest 4/21/21)    PLAN:   Cont med rx  Cont ASA 81mg qd  Start lisinopril 5mg qd (ER precautions for allergic rxn)  Check BMP 1 week  RN BP check 2 weeks (Dr. Noriega)  RTC 6 months with surveillance echo (July 2024) with Dr. Lauren Encarnacion MD, FACC

## 2024-01-25 ENCOUNTER — PATIENT MESSAGE (OUTPATIENT)
Dept: CARDIOLOGY | Facility: CLINIC | Age: 69
End: 2024-01-25
Payer: MEDICARE

## 2024-01-31 ENCOUNTER — LAB VISIT (OUTPATIENT)
Dept: LAB | Facility: HOSPITAL | Age: 69
End: 2024-01-31
Payer: MEDICARE

## 2024-01-31 DIAGNOSIS — I10 ESSENTIAL HYPERTENSION: ICD-10-CM

## 2024-01-31 LAB
ANION GAP SERPL CALC-SCNC: 6 MMOL/L (ref 8–16)
BUN SERPL-MCNC: 17 MG/DL (ref 8–23)
CALCIUM SERPL-MCNC: 9.4 MG/DL (ref 8.7–10.5)
CHLORIDE SERPL-SCNC: 109 MMOL/L (ref 95–110)
CO2 SERPL-SCNC: 25 MMOL/L (ref 23–29)
CREAT SERPL-MCNC: 1.3 MG/DL (ref 0.5–1.4)
EST. GFR  (NO RACE VARIABLE): 59.1 ML/MIN/1.73 M^2
GLUCOSE SERPL-MCNC: 83 MG/DL (ref 70–110)
POTASSIUM SERPL-SCNC: 4.3 MMOL/L (ref 3.5–5.1)
SODIUM SERPL-SCNC: 140 MMOL/L (ref 136–145)

## 2024-01-31 PROCEDURE — 36415 COLL VENOUS BLD VENIPUNCTURE: CPT | Mod: PO | Performed by: INTERNAL MEDICINE

## 2024-01-31 PROCEDURE — 80048 BASIC METABOLIC PNL TOTAL CA: CPT | Performed by: INTERNAL MEDICINE

## 2024-03-12 ENCOUNTER — PATIENT MESSAGE (OUTPATIENT)
Dept: PRIMARY CARE CLINIC | Facility: CLINIC | Age: 69
End: 2024-03-12
Payer: MEDICARE

## 2024-03-14 ENCOUNTER — TELEPHONE (OUTPATIENT)
Dept: CARDIOLOGY | Facility: CLINIC | Age: 69
End: 2024-03-14
Payer: MEDICARE

## 2024-03-14 NOTE — TELEPHONE ENCOUNTER
Patient wanted to schedule his follow up appointment and testing. Helped them schedule their Echo and follow up appointment with Dr. Encarnacion. They acknowledged.

## 2024-03-18 ENCOUNTER — OFFICE VISIT (OUTPATIENT)
Dept: PRIMARY CARE CLINIC | Facility: CLINIC | Age: 69
End: 2024-03-18
Payer: MEDICARE

## 2024-03-18 VITALS
WEIGHT: 153.25 LBS | BODY MASS INDEX: 23.22 KG/M2 | RESPIRATION RATE: 18 BRPM | HEIGHT: 68 IN | DIASTOLIC BLOOD PRESSURE: 70 MMHG | TEMPERATURE: 98 F | SYSTOLIC BLOOD PRESSURE: 130 MMHG

## 2024-03-18 DIAGNOSIS — I13.10 HYPERTENSIVE CARDIOVASCULAR-RENAL DISEASE, STAGE 1-4 OR UNSPECIFIED CHRONIC KIDNEY DISEASE, WITHOUT HEART FAILURE: ICD-10-CM

## 2024-03-18 DIAGNOSIS — I25.10 CORONARY ARTERY DISEASE INVOLVING NATIVE CORONARY ARTERY OF NATIVE HEART WITHOUT ANGINA PECTORIS: ICD-10-CM

## 2024-03-18 DIAGNOSIS — N18.31 STAGE 3A CHRONIC KIDNEY DISEASE: ICD-10-CM

## 2024-03-18 DIAGNOSIS — I48.0 PAROXYSMAL ATRIAL FIBRILLATION: ICD-10-CM

## 2024-03-18 DIAGNOSIS — I70.0 AORTIC ATHEROSCLEROSIS: Primary | ICD-10-CM

## 2024-03-18 PROBLEM — N18.9 CHRONIC KIDNEY DISEASE: Status: ACTIVE | Noted: 2023-02-22

## 2024-03-18 PROCEDURE — 99999 PR PBB SHADOW E&M-EST. PATIENT-LVL III: CPT | Mod: PBBFAC,,, | Performed by: INTERNAL MEDICINE

## 2024-03-18 PROCEDURE — 99213 OFFICE O/P EST LOW 20 MIN: CPT | Mod: PBBFAC | Performed by: INTERNAL MEDICINE

## 2024-03-18 PROCEDURE — 99214 OFFICE O/P EST MOD 30 MIN: CPT | Mod: S$PBB,,, | Performed by: INTERNAL MEDICINE

## 2024-03-18 NOTE — PROGRESS NOTES
[facility] Note    Chief Complaint      Chief Complaint   Patient presents with    Follow-up     6m       History of Present Illness      Abdias Kim is a 68 y.o. male who presents today for   Chief Complaint   Patient presents with    Follow-up     6m   .  Patient comes to appointment here for 6 m checkup for chronc issuesa ssblwo . He is current feeling well . Is complaint iwht all meds . He is getting regular walking 50 min a day for 6 days a week . He sicne last visit has been started on lisinopril 5mg . Bp looks great . Renal fucntion is stable     HPI    No problem-specific Assessment & Plan notes found for this encounter.       Problem List Items Addressed This Visit          Cardiac/Vascular    Hypertensive cardiovascular-renal disease, stage 1-4 or unspecified chronic kidney disease, without heart failure    Overview     bp well controlled on current regimen lisinopril has been added          CAD (coronary artery disease)    Overview     Dr euceda managing is on good regimen statin asa and cardizem .         Paroxysmal atrial fibrillation    Overview     Cardiology managing only on asa currently will defer to cardiology         Aortic atherosclerosis - Primary    Overview     Stable cont atorvasatin 40 mg             Renal/    Chronic kidney disease    Overview     stable repeat labs reviewed dr jacobs managing              Past Medical History:  Past Medical History:   Diagnosis Date    Anemia     Diverticulosis     Hypertension        Past Surgical History:  Past Surgical History:   Procedure Laterality Date    APPENDECTOMY      COLONOSCOPY N/A 01/31/2017    Procedure: COLONOSCOPY;  Surgeon: BASILIO Winn MD;  Location: [address]);  Service: Endoscopy;  Laterality: N/A;    COLONOSCOPY N/A 02/05/2020    Procedure: COLONOSCOPY;  Surgeon: Cody Maria MD;  Location: [facility] BLANK [address]);  Service: Endoscopy;  Laterality: N/A;  OKay for Crow vivar. Needs to be  done in Jan 2020    CORONARY ARTERY BYPASS GRAFT  05/26/2021    CORONARY ARTERY BYPASS GRAFT (CABG) N/A 04/26/2021    Procedure: CORONARY ARTERY BYPASS GRAFT (CABG)X3 WITH VEIN HARVEST;  Surgeon: Fidencio Galindo MD;  [facility];  Service: Cardiovascular;  Laterality: N/A;    ENDOSCOPIC HARVEST OF VEIN Left 04/26/2021    Procedure: HARVEST-VEIN-ENDOVASCULAR FOR CABGX3;  Surgeon: Fidencio Galindo MD;  [facility];  Service: Cardiovascular;  Laterality: Left;  Vein [place] Start time: 1201pm  Vein [place] Stop time: 1226pm    Vein Prep Start time: 1227pm  Vein Prep Stop time: 1250pm    ESOPHAGOGASTRODUODENOSCOPY N/A 02/05/2020    Procedure: EGD (ESOPHAGOGASTRODUODENOSCOPY);  Surgeon: Cody Maria MD;  Location: [facility] ENDO (4TH FLR);  Service: Endoscopy;  Laterality: N/A;    ESOPHAGOGASTRODUODENOSCOPY N/A 04/30/2020    Procedure: EGD (ESOPHAGOGASTRODUODENOSCOPY);  Surgeon: Cody Maria MD;  Location: [facility] ENDO (2ND FLR);  Service: Endoscopy;  Laterality: N/A;  schedule 12 weeks from now  4/13/20 - removed from 4/29/20, needs to be rescheduled high priority - pg  4/28/20-scheduled from high priority list-BB  Covid screening test scheduled for 4/29/20-BB  instructions emailed to patient-BB    EXCLUSION OF LEFT ATRIAL APPENDAGE N/A 04/26/2021    Procedure: EXCLUSION, LEFT ATRIAL APPENDAGE;  Surgeon: Fidencio Galindo MD;  [facility];  Service: Cardiovascular;  Laterality: N/A;  Left Atrial Appendage Resection    HERNIA REPAIR  April/May 2022    LEFT HEART CATHETERIZATION Left 04/21/2021    Procedure: Left heart cath;  Surgeon: Aric Alvarado MD;  Location: [facility] CATH LAB;  Service: Cardiology;  Laterality: Left;    UMBILICAL HERNIA REPAIR N/A 03/31/2022    Procedure: REPAIR, HERNIA, UMBILICAL, AGE 5 YEARS OR OLDER Open With or Without Mesh;  Surgeon: Matt Delgado MD;  Location: [facility] OR [facility] FLR;  Service: General;  Laterality: N/A;       Family  History:  family history includes Arthritis in his father; Cancer in his father; Colon polyps in his father; Heart disease in his father and mother; Hypertension in his father, mother, and son.     Social History:  Social History     Socioeconomic History    Marital status:    Tobacco Use    Smoking status: Never     Passive exposure: Never    Smokeless tobacco: Never   Substance and Sexual Activity    Alcohol use: Not Currently     Comment: few times a week     Drug use: No    Sexual activity: Yes     Partners: Female     Birth control/protection: Other-see comments     Comment: Wife, tubal ligation     Social Determinants of Health     Financial Resource Strain: Low Risk  (1/1/2024)    Overall Financial Resource Strain (CARDIA)     Difficulty of Paying Living Expenses: Not hard at all   Food Insecurity: No Food Insecurity (1/1/2024)    Hunger Vital Sign     Worried About Running Out of Food in the Last Year: Never true     Ran Out of Food in the Last Year: Never true   Transportation Needs: No Transportation Needs (1/1/2024)    PRAPARE - Transportation     Lack of Transportation (Medical): No     Lack of Transportation (Non-Medical): No   Physical Activity: Sufficiently Active (1/1/2024)    Exercise Vital Sign     Days of Exercise per Week: 6 days     Minutes of Exercise per Session: 50 min   Stress: Stress Concern Present (1/1/2024)    [employer] [employer] - Occupational Stress Questionnaire     Feeling of Stress : To some extent   Social Connections: Unknown (1/1/2024)    Social Connection and Isolation Panel [NHANES]     Frequency of Communication with Friends and Family: Three times a week     Frequency of Social Gatherings with Friends and Family: Once a week     Active Member of Clubs or Organizations: No     Attends Club or Organization Meetings: Never     Marital Status:    Housing Stability: Low Risk  (1/1/2024)    Housing Stability Vital Sign     Unable to Pay for  Housing in the Last Year: No     Number of Places Lived in the Last Year: 1     Unstable Housing in the Last Year: No       Review of Systems:   Review of Systems   Constitutional:  Negative for fever and weight loss.   HENT:  Negative for congestion, hearing loss and sore throat.    Eyes:  Negative for blurred vision.   Respiratory:  Negative for cough and shortness of breath.    Cardiovascular:  Negative for chest pain, palpitations, claudication and leg swelling.   Gastrointestinal:  Negative for abdominal pain, constipation, diarrhea and heartburn.   Genitourinary:  Negative for dysuria.   Musculoskeletal:  Negative for back pain and myalgias.   Skin:  Negative for rash.   Neurological:  Negative for focal weakness and headaches.   Psychiatric/Behavioral:  Negative for depression and suicidal ideas. The patient is not nervous/anxious.         Medications:  Outpatient Encounter Medications as of 3/18/2024   Medication Sig Note Dispense Refill    aspirin (ECOTRIN) 81 MG EC tablet Take 1 tablet (81 mg total) by mouth once daily.  360 tablet 0    atorvastatin (LIPITOR) 40 MG tablet Take 1 tablet (40 mg total) by mouth every evening.  90 tablet 3    cetirizine (ZYRTEC) 10 MG tablet Take 1 tablet by mouth Daily. 3/30/2022: Hold am of surgery       diltiaZEM (CARDIZEM CD) 120 MG Cp24 Take 1 capsule (120 mg total) by mouth once daily.  90 capsule 3    famotidine (PEPCID) 20 MG tablet Take 2 tablets (40 mg total) by mouth once daily.  60 tablet 11    fluticasone propionate (FLONASE) 50 mcg/actuation nasal spray 1 spray (50 mcg total) by Each Nostril route once daily.  15.8 mL 3    lisinopriL (PRINIVIL,ZESTRIL) 5 MG tablet Take 1 tablet (5 mg total) by mouth once daily.  90 tablet 3    multivitamin (THERAGRAN) per tablet Take 1 tablet by mouth once daily. 10/23/2023: Taking centrum      sildenafiL (VIAGRA) 100 MG tablet Take 0.5 tablets (50 mg total) by mouth as needed for Erectile Dysfunction.  10 tablet 3     No  "facility-administered encounter medications on file as of 3/18/2024.       Allergies:  Review of patient's allergies indicates:   Allergen Reactions    Allegra-d 12 hour [fexofenadine-pseudoephedrine] Other (See Comments)     Trouble urinating    Mucinex d [pseudoephedrine-guaifenesin] Other (See Comments)     Trouble urinating    Losartan-hydrochlorothiazide Hives and Rash     Other reaction(s): Hives         Physical Exam      Vitals:    03/18/24 0953   BP: 130/70   Resp: 18   Temp: 98 °F (36.7 °C)        Vital Signs  Temp: 98 °F (36.7 °C)  Temp Source: Oral  Resp: 18  BP: 130/70  BP Location: Right arm  Patient Position: Sitting  Pain Score: 0-No pain  Height and Weight  Height: 5' 8" (172.7 cm)  Weight: 69.5 kg (153 lb 3.5 oz)  BSA (Calculated - sq m): 1.83 sq meters  BMI (Calculated): 23.3  Weight in (lb) to have BMI = 25: 164.1]     Body mass index is 23.3 kg/m².    Physical Exam  Constitutional:       Appearance: He is well-developed.   HENT:      Head: Normocephalic.   Eyes:      Pupils: Pupils are equal, round, and reactive to light.   Neck:      Thyroid: No thyromegaly.   Cardiovascular:      Rate and Rhythm: Normal rate and regular rhythm.      Heart sounds: No murmur heard.     No friction rub. No gallop.   Pulmonary:      Effort: Pulmonary effort is normal.      Breath sounds: Normal breath sounds.   Abdominal:      General: Bowel sounds are normal.      Palpations: Abdomen is soft.   Musculoskeletal:         General: Normal range of motion.      Cervical back: Normal range of motion.   Skin:     General: Skin is warm and dry.   Neurological:      Mental Status: He is alert and oriented to person, place, and time.      Sensory: No sensory deficit.   Psychiatric:         Behavior: Behavior normal.          Laboratory:  CBC:  Recent Labs   Lab Result Units 12/27/23  1104   WBC K/uL 4.26   RBC M/uL 5.29   Hemoglobin g/dL 12.2*   Hematocrit % 37.7*   Platelets K/uL 176   MCV fL 71*   MCH pg 23.1*   MCHC " "g/dL 32.4     CMP:  Recent Labs   Lab Result Units 12/27/23  1104 01/31/24  0900   Glucose mg/dL 94 83   Calcium mg/dL 9.1 9.4   Albumin g/dL 4.1  --    Sodium mmol/L 141 140   Potassium mmol/L 4.6 4.3   CO2 mmol/L 25 25   Chloride mmol/L 108 109   BUN mg/dL 16 17     URINALYSIS:  Recent Labs   Lab Result Units 12/27/23  1214   Color, UA  Yellow   Specific Gravity, UA  1.020   pH, UA  5.0   Protein, UA  Negative   Nitrite, UA  Negative   Leukocytes, UA  Negative      LIPIDS:  No results for input(s): "TSH", "HDL", "CHOL", "TRIG", "LDLCALC", "CHOLHDL", "NONHDLCHOL", "TOTALCHOLEST" in the last 2160 hours.  TSH:  No results for input(s): "TSH" in the last 2160 hours.  A1C:  No results for input(s): "HGBA1C" in the last 2160 hours.    Radiology:        Assessment:     Abdias Kim is a 68 y.o.male with:    Aortic atherosclerosis    Coronary artery disease involving native coronary artery of native heart without angina pectoris    Stage 3a chronic kidney disease    Hypertensive cardiovascular-renal disease, stage 1-4 or unspecified chronic kidney disease, without heart failure    Paroxysmal atrial fibrillation                Plan:     Problem List Items Addressed This Visit          Cardiac/Vascular    Hypertensive cardiovascular-renal disease, stage 1-4 or unspecified chronic kidney disease, without heart failure    Overview     bp well controlled on current regimen lisinopril has been added          CAD (coronary artery disease)    Overview     Dr euceda managing is on good regimen statin asa and cardizem .         Paroxysmal atrial fibrillation    Overview     Cardiology managing only on asa currently will defer to cardiology         Aortic atherosclerosis - Primary    Overview     Stable cont atorvasatin 40 mg             Renal/    Chronic kidney disease    Overview     stable repeat labs reviewed dr jacobs managing             As above, continue current medications and maintain follow up with specialists.  Return " to clinic in 6 months.      Frederick W Dantagnan [facility]

## 2024-04-28 NOTE — TELEPHONE ENCOUNTER
No care due was identified.  [facility] Embedded Care Due Messages. Reference number: [id number].   4/27/2024 11:55:45 PM CDT

## 2024-04-29 ENCOUNTER — TELEPHONE (OUTPATIENT)
Dept: NEPHROLOGY | Facility: CLINIC | Age: 69
End: 2024-04-29
Payer: MEDICARE

## 2024-04-29 RX ORDER — FLUTICASONE PROPIONATE 50 MCG
1 SPRAY, SUSPENSION (ML) NASAL DAILY
Qty: 15.8 ML | Refills: 3 | Status: SHIPPED | OUTPATIENT
Start: 2024-04-29

## 2024-05-30 ENCOUNTER — TELEPHONE (OUTPATIENT)
Dept: NEPHROLOGY | Facility: CLINIC | Age: 69
End: 2024-05-30
Payer: MEDICARE

## 2024-05-31 ENCOUNTER — PATIENT MESSAGE (OUTPATIENT)
Dept: NEPHROLOGY | Facility: CLINIC | Age: 69
End: 2024-05-31
Payer: MEDICARE

## 2024-05-31 DIAGNOSIS — N18.31 CHRONIC KIDNEY DISEASE, STAGE 3A: Primary | ICD-10-CM

## 2024-06-17 ENCOUNTER — LAB VISIT (OUTPATIENT)
Dept: LAB | Facility: HOSPITAL | Age: 69
End: 2024-06-17
Payer: MEDICARE

## 2024-06-17 DIAGNOSIS — N18.31 CHRONIC KIDNEY DISEASE, STAGE 3A: ICD-10-CM

## 2024-06-17 LAB
BILIRUB UR QL STRIP: NEGATIVE
CLARITY UR REFRACT.AUTO: CLEAR
COLOR UR AUTO: YELLOW
CREAT UR-MCNC: 136 MG/DL (ref 23–375)
GLUCOSE UR QL STRIP: NEGATIVE
HGB UR QL STRIP: NEGATIVE
KETONES UR QL STRIP: NEGATIVE
LEUKOCYTE ESTERASE UR QL STRIP: NEGATIVE
NITRITE UR QL STRIP: NEGATIVE
PH UR STRIP: 5 [PH] (ref 5–8)
PROT UR QL STRIP: NEGATIVE
PROT UR-MCNC: <7 MG/DL (ref 0–15)
PROT/CREAT UR: NORMAL MG/G{CREAT} (ref 0–0.2)
SP GR UR STRIP: 1.01 (ref 1–1.03)
URN SPEC COLLECT METH UR: NORMAL

## 2024-06-17 PROCEDURE — 84156 ASSAY OF PROTEIN URINE: CPT | Performed by: STUDENT IN AN ORGANIZED HEALTH CARE EDUCATION/TRAINING PROGRAM

## 2024-06-17 PROCEDURE — 81003 URINALYSIS AUTO W/O SCOPE: CPT | Performed by: STUDENT IN AN ORGANIZED HEALTH CARE EDUCATION/TRAINING PROGRAM

## 2024-06-20 ENCOUNTER — OFFICE VISIT (OUTPATIENT)
Dept: NEPHROLOGY | Facility: CLINIC | Age: 69
End: 2024-06-20
Payer: MEDICARE

## 2024-06-20 VITALS
BODY MASS INDEX: 22.49 KG/M2 | SYSTOLIC BLOOD PRESSURE: 155 MMHG | HEIGHT: 68 IN | OXYGEN SATURATION: 100 % | HEART RATE: 48 BPM | WEIGHT: 148.38 LBS | DIASTOLIC BLOOD PRESSURE: 70 MMHG

## 2024-06-20 DIAGNOSIS — N18.31 CHRONIC KIDNEY DISEASE, STAGE 3A: Primary | ICD-10-CM

## 2024-06-20 DIAGNOSIS — D50.9 MICROCYTIC ANEMIA: ICD-10-CM

## 2024-06-20 DIAGNOSIS — N28.9 RENAL LESION: ICD-10-CM

## 2024-06-20 DIAGNOSIS — Z95.1 S/P CABG (CORONARY ARTERY BYPASS GRAFT): ICD-10-CM

## 2024-06-20 DIAGNOSIS — I48.0 PAROXYSMAL ATRIAL FIBRILLATION: ICD-10-CM

## 2024-06-20 PROCEDURE — 99999 PR PBB SHADOW E&M-EST. PATIENT-LVL III: CPT | Mod: PBBFAC,GC,, | Performed by: STUDENT IN AN ORGANIZED HEALTH CARE EDUCATION/TRAINING PROGRAM

## 2024-06-20 PROCEDURE — 99213 OFFICE O/P EST LOW 20 MIN: CPT | Mod: PBBFAC | Performed by: STUDENT IN AN ORGANIZED HEALTH CARE EDUCATION/TRAINING PROGRAM

## 2024-06-20 RX ORDER — FERROUS SULFATE 325(65) MG
325 TABLET ORAL
Qty: 30 TABLET | Refills: 11 | Status: SHIPPED | OUTPATIENT
Start: 2024-06-20 | End: 2025-06-19

## 2024-06-20 NOTE — PROGRESS NOTES
Subjective     Chief Complaint: CKD 3a    History of Present Illness:  Mr. Abdias Kim is a 68 y.o. male   male who presents for follow up of CKD 3a. He was previously seen by Dr. Wall in June 2023 for initial nephrology evaluation, who now presents to our clinic.     Renal function per chart review with sr cr baseline of 1.3 - 1.5 mg/dL and baseline eGFR around 59.    Clinic 6/20/24; Since the last time he saw me, he was started on Lisinopril 5 mg daily by his cardiologist. Since that time his serum creatinine has increased from 1.3 up to 1.5, I have explained that this is expected after starting ACEi. He denies use of NSAIDs, nephrolithiasis or fam Hx of renal disease. He does not notice any difficulty emptying his bladder, or any nocturia, but does note that he will urinate 6-8 times during the day, and sometimes will need to urinate 30 minutes after his last urinary void.      - PAF on Diltiazem and CAD s/p CABG 2021.    - CAD/CABG 4/26/21 (LIMA-LAD, GIORGIO-D1, SVG-OM1)     - HTN diagnosed about in his 20's. Patient reports good adherence to the current regiment, which includes Diltiazem. He is following low salt diet. Previously on Losartan-HCTZ and stopped due to Hives. He participates in digital blood pressure monitoring which shows that his home readings are consistently in the 120s/70s on his current medications.  Meds; Lisinopril 5 mg and tolerating well.          Review of Systems   Constitutional:  Negative for chills, fever, malaise/fatigue and weight loss.   HENT:  Negative for nosebleeds.    Respiratory:  Negative for cough, hemoptysis and wheezing.    Cardiovascular:  Negative for chest pain, palpitations, orthopnea, leg swelling and PND.   Gastrointestinal:  Negative for abdominal pain, constipation, diarrhea, heartburn, nausea and vomiting.   Genitourinary:  Negative for dysuria and urgency.   Musculoskeletal:  Negative for back pain, falls and neck pain.   Skin:  Negative for  itching and rash.   Neurological:  Negative for dizziness, tremors, seizures, weakness and headaches.       PAST HISTORY:     Past Medical History:   Diagnosis Date    Anemia     Diverticulosis     Hypertension        Past Surgical History:   Procedure Laterality Date    APPENDECTOMY      COLONOSCOPY N/A 01/31/2017    Procedure: COLONOSCOPY;  Surgeon: BASILIO Winn MD;  Location: [facility] ENDO (4TH FLR);  Service: Endoscopy;  Laterality: N/A;    COLONOSCOPY N/A 02/05/2020    Procedure: COLONOSCOPY;  Surgeon: Cody Maria MD;  Location: [facility] ENDO (4TH FLR);  Service: Endoscopy;  Laterality: N/A;  OKay for Crow or ghazala. Needs to be done in Jan 2020    CORONARY ARTERY BYPASS GRAFT  05/26/2021    CORONARY ARTERY BYPASS GRAFT (CABG) N/A 04/26/2021    Procedure: CORONARY ARTERY BYPASS GRAFT (CABG)X3 WITH VEIN HARVEST;  Surgeon: Fidencio Galindo MD;  Location: [facility] OR 2ND FLR;  Service: Cardiovascular;  Laterality: N/A;    ENDOSCOPIC HARVEST OF VEIN Left 04/26/2021    Procedure: HARVEST-VEIN-ENDOVASCULAR FOR CABGX3;  Surgeon: Fidencio Galindo MD;  Location: [facility] OR 2ND FLR;  Service: Cardiovascular;  Laterality: Left;  Vein [place] Start time: 1201pm  Vein [place] Stop time: 1226pm    Vein Prep Start time: 1227pm  Vein Prep Stop time: 1250pm    ESOPHAGOGASTRODUODENOSCOPY N/A 02/05/2020    Procedure: EGD (ESOPHAGOGASTRODUODENOSCOPY);  Surgeon: Cody Maria MD;  Location: [facility] (4TH FLR);  Service: Endoscopy;  Laterality: N/A;    ESOPHAGOGASTRODUODENOSCOPY N/A 04/30/2020    Procedure: EGD (ESOPHAGOGASTRODUODENOSCOPY);  Surgeon: Cody Maria MD;  Location: [facility] (2ND FLR);  Service: Endoscopy;  Laterality: N/A;  schedule 12 weeks from now  4/13/20 - removed from 4/29/20, needs to be rescheduled high priority - pg  4/28/20-scheduled from high priority list-BB  Covid screening test scheduled for 4/29/20-BB  instructions emailed to patient-BB    EXCLUSION OF LEFT ATRIAL APPENDAGE N/A 04/26/2021     Procedure: EXCLUSION, LEFT ATRIAL APPENDAGE;  Surgeon: Fidencio Galindo MD;  [facility];  Service: Cardiovascular;  Laterality: N/A;  Left Atrial Appendage Resection    HERNIA REPAIR  April/May 2022    LEFT HEART CATHETERIZATION Left 2021    Procedure: Left heart cath;  Surgeon: Aric Alvarado MD;  Location: [facility] CATH LAB;  Service: Cardiology;  Laterality: Left;    UMBILICAL HERNIA REPAIR N/A 2022    Procedure: REPAIR, HERNIA, UMBILICAL, AGE 5 YEARS OR OLDER Open With or Without Mesh;  Surgeon: Matt Delgado MD;  Location: [facility] OR [address] [place] [address];  Service: General;  Laterality: N/A;       Family History   Problem Relation Name Age of Onset    Heart disease Mother Thao Kim         , Coronary artery disease    Hypertension Mother Thao Kim     Cancer Father Michael Kim         colon/prostate    Colon polyps Father Michael Kim     Arthritis Father Michael Kim             Heart disease Father Michael Kim         Coronary artery disease, Afib, CHF    Hypertension Father Michael Kim     Hypertension Son x 1     Stroke Neg Hx      Diabetes Neg Hx      Celiac disease Neg Hx      Esophageal cancer Neg Hx      Liver cancer Neg Hx      Liver disease Neg Hx      Stomach cancer Neg Hx      Rectal cancer Neg Hx      Melanoma Neg Hx         Social History     Socioeconomic History    Marital status:    Tobacco Use    Smoking status: Never     Passive exposure: Never    Smokeless tobacco: Never   Substance and Sexual Activity    Alcohol use: Not Currently     Comment: few times a week     Drug use: No    Sexual activity: Yes     Partners: Female     Birth control/protection: Other-see comments     Comment: Wife, tubal ligation     Social Determinants of Health     Financial Resource Strain: Low Risk  (2024)    Overall Financial Resource Strain (CARDIA)     Difficulty of Paying Living Expenses: Not hard at all   Food Insecurity: No Food Insecurity  (1/1/2024)    Hunger Vital Sign     Worried About Running Out of Food in the Last Year: Never true     Ran Out of Food in the Last Year: Never true   Transportation Needs: No Transportation Needs (1/1/2024)    PRAPARE - Transportation     Lack of Transportation (Medical): No     Lack of Transportation (Non-Medical): No   Physical Activity: Sufficiently Active (1/1/2024)    Exercise Vital Sign     Days of Exercise per Week: 6 days     Minutes of Exercise per Session: 50 min   Stress: Stress Concern Present (1/1/2024)    [employer] - Occupational Stress Questionnaire     Feeling of Stress : To some extent   Housing Stability: Low Risk  (1/1/2024)    Housing Stability Vital Sign     Unable to Pay for Housing in the Last Year: No     Number of Places Lived in the Last Year: 1     Unstable Housing in the Last Year: No       MEDICATIONS & ALLERGIES:     Current Outpatient Medications on File Prior to Visit   Medication Sig    aspirin (ECOTRIN) 81 MG EC tablet Take 1 tablet (81 mg total) by mouth once daily.    atorvastatin (LIPITOR) 40 MG tablet Take 1 tablet (40 mg total) by mouth every evening.    cetirizine (ZYRTEC) 10 MG tablet Take 1 tablet by mouth Daily.    diltiaZEM (CARDIZEM CD) 120 MG Cp24 Take 1 capsule (120 mg total) by mouth once daily.    famotidine (PEPCID) 20 MG tablet Take 2 tablets (40 mg total) by mouth once daily.    fluticasone propionate (FLONASE) 50 mcg/actuation nasal spray 1 spray (50 mcg total) by Each Nostril route once daily.    lisinopriL (PRINIVIL,ZESTRIL) 5 MG tablet Take 1 tablet (5 mg total) by mouth once daily.    multivitamin (THERAGRAN) per tablet Take 1 tablet by mouth once daily.    sildenafiL (VIAGRA) 100 MG tablet Take 0.5 tablets (50 mg total) by mouth as needed for Erectile Dysfunction.     No current facility-administered medications on file prior to visit.       Review of patient's allergies indicates:   Allergen Reactions    Allegra-d 12 hour  "[fexofenadine-pseudoephedrine] Other (See Comments)     Trouble urinating    Mucinex d [pseudoephedrine-guaifenesin] Other (See Comments)     Trouble urinating    Losartan-hydrochlorothiazide Hives and Rash     Other reaction(s): Hives       OBJECTIVE:     Vital Signs:  Vitals:    06/20/24 1450   BP: (!) 155/70   Pulse: (!) 48   SpO2: 100%   Weight: 67.3 kg (148 lb 5.9 oz)   Height: 5' 8" (1.727 m)       Body mass index is 22.56 kg/m².     Physical Exam  Constitutional:       General: He is not in acute distress.     Appearance: Normal appearance. He is not ill-appearing or toxic-appearing.   HENT:      Head: Atraumatic.      Right Ear: External ear normal.      Left Ear: External ear normal.      Mouth/Throat:      Mouth: Mucous membranes are moist.   Eyes:      Extraocular Movements: Extraocular movements intact.   Cardiovascular:      Rate and Rhythm: Normal rate and regular rhythm.      Pulses: Normal pulses.      Heart sounds: Murmur heard.   Pulmonary:      Effort: Pulmonary effort is normal. No respiratory distress.      Breath sounds: Normal breath sounds. No wheezing or rales.   Chest:      Chest wall: No tenderness.   Abdominal:      General: Abdomen is flat.      Palpations: Abdomen is soft.   Musculoskeletal:         General: No swelling. Normal range of motion.      Cervical back: Normal range of motion and neck supple.      Right lower leg: No edema.      Left lower leg: No edema.   Skin:     General: Skin is warm.      Capillary Refill: Capillary refill takes less than 2 seconds.      Coloration: Skin is not jaundiced.      Findings: No lesion or rash.   Neurological:      General: No focal deficit present.      Mental Status: He is alert and oriented to person, place, and time.   Psychiatric:         Mood and Affect: Mood normal.         Behavior: Behavior normal.         Laboratory  Creatinine   Date Value Ref Range Status   06/17/2024 1.5 (H) 0.5 - 1.4 mg/dL Final   01/31/2024 1.3 0.5 - 1.4 mg/dL " Final   12/27/2023 1.3 0.5 - 1.4 mg/dL Final   09/05/2023 1.3 0.5 - 1.4 mg/dL Final         Diagnostic Results:      Health Maintenance Due   Topic Date Due    Annual UACr  Never done    COVID-19 Vaccine (8 - 2023-24 season) 03/08/2024         ASSESSMENT & PLAN:   Mr. Abdias Kim is a 68 y.o. male with history of CAD s/p CABG who presents for evaluation of CKD 3a which has remained stable for the past several years. He did have a transient decrease in his eGFR around the time of his CABG in 2021, however was able to recover renal function back to his baseline of 59. At this time, I suspect that his CKD is secondary to his CAD and Hypertension, and has remained stable on his current medications. He would benefit from an ACE/ARB however developed severe hives with Losartan-HCTZ in the past.     Retroperitoneal ultrasound in June 2023 showed a 1.2 cm exophytic isoechoic renal lesion extending from the right midpole.     Chronic kidney disease, stage 3a  -     Renal Function Panel; Future; Expected date: 06/20/2024  -     CBC Auto Differential; Future; Expected date: 06/20/2024  -     PTH, intact; Future; Expected date: 06/20/2024    S/P CABG (coronary artery bypass graft)    Paroxysmal atrial fibrillation    Renal lesion  -     Ambulatory referral/consult to Urology; Future; Expected date: 06/27/2024    Microcytic anemia  -     ferrous sulfate (FEOSOL) 325 mg (65 mg iron) Tab tablet; Take 1 tablet (325 mg total) by mouth daily with breakfast.  Dispense: 30 tablet; Refill: 11  -     Iron and TIBC; Future; Expected date: 06/20/2024  -     Ferritin; Future; Expected date: 06/20/2024        RTC in 1 months    Discussed with Dr. Batres  - staff attestation to follow      Reji Mcfarland MD  Nephrology PGY-4    [address] [place] [address] [phone]

## 2024-06-24 ENCOUNTER — PATIENT MESSAGE (OUTPATIENT)
Dept: UROLOGY | Facility: CLINIC | Age: 69
End: 2024-06-24
Payer: MEDICARE

## 2024-06-24 PROBLEM — N28.89 RIGHT RENAL MASS: Status: ACTIVE | Noted: 2024-06-24

## 2024-06-24 PROBLEM — N18.31 STAGE 3A CHRONIC KIDNEY DISEASE: Status: ACTIVE | Noted: 2024-06-24

## 2024-06-24 NOTE — PROGRESS NOTES
Clinic Note  2024      Subjective:         Chief Complaint:   YUAN Kim is a 68 y.o. male followed for a small right renal mass. Consult from Dr. Mcfarland.  Ultrasound-2023- 1.2 cm RMP isoechoic exophytic lesion.  MRI Abd W WO-2023- 0.9 cm indeterminate hypoenhancing lesion at RMP. Too small to characterize.  Ultrasound-2024- stable 1.1 cm RMP lesion.  Retired from postal service.  eGFR 50 (it was 59 prior to lisinopril).  LUTS- nocturia 1x/noc, frequency-8x/day. Discussed flomax.Patient would like to hold off on medical therapy.  Past Medical History:   Diagnosis Date    Anemia     Diverticulosis     Hypertension      Family History   Problem Relation Name Age of Onset    Heart disease Mother Thao Kim         , Coronary artery disease    Hypertension Mother Thao Kim     Cancer Father Michael Kim         colon/prostate    Colon polyps Father Michael Kim     Arthritis Father Michael Kim             Heart disease Father Michael Kim         Coronary artery disease, Afib, CHF    Hypertension Father Michael Kim     Hypertension Son x 1     Stroke Neg Hx      Diabetes Neg Hx      Celiac disease Neg Hx      Esophageal cancer Neg Hx      Liver cancer Neg Hx      Liver disease Neg Hx      Stomach cancer Neg Hx      Rectal cancer Neg Hx      Melanoma Neg Hx       Social History     Socioeconomic History    Marital status:    Tobacco Use    Smoking status: Never     Passive exposure: Never    Smokeless tobacco: Never   Substance and Sexual Activity    Alcohol use: Not Currently     Comment: few times a week     Drug use: No    Sexual activity: Yes     Partners: Female     Birth control/protection: Other-see comments     Comment: Wife, tubal ligation     Social Determinants of Health     Financial Resource Strain: Low Risk  (2024)    Overall Financial Resource Strain (CARDIA)     Difficulty of Paying Living Expenses:  Not hard at all   Food Insecurity: No Food Insecurity (1/1/2024)    Hunger Vital Sign     Worried About Running Out of Food in the Last Year: Never true     Ran Out of Food in the Last Year: Never true   Transportation Needs: No Transportation Needs (1/1/2024)    PRAPARE - Transportation     Lack of Transportation (Medical): No     Lack of Transportation (Non-Medical): No   Physical Activity: Sufficiently Active (1/1/2024)    Exercise Vital Sign     Days of Exercise per Week: 6 days     Minutes of Exercise per Session: 50 min   Stress: Stress Concern Present (1/1/2024)    [place] [employer] - Occupational Stress Questionnaire     Feeling of Stress : To some extent   Housing Stability: Low Risk  (1/1/2024)    Housing Stability Vital Sign     Unable to Pay for Housing in the Last Year: No     Number of Places Lived in the Last Year: 1     Unstable Housing in the Last Year: No     Past Surgical History:   Procedure Laterality Date    APPENDECTOMY      COLONOSCOPY N/A 01/31/2017    Procedure: COLONOSCOPY;  Surgeon: BASILIO Winn MD;  Location: [facility] ([address] [place] [address]);  Service: Endoscopy;  Laterality: N/A;    COLONOSCOPY N/A 02/05/2020    Procedure: COLONOSCOPY;  Surgeon: Cody Maria MD;  Location: [facility] ENDO ([address] [place] [address]);  Service: Endoscopy;  Laterality: N/A;  OKay for Crow or ghazala. Needs to be done in Jan 2020    CORONARY ARTERY BYPASS GRAFT  05/26/2021    CORONARY ARTERY BYPASS GRAFT (CABG) N/A 04/26/2021    Procedure: CORONARY ARTERY BYPASS GRAFT (CABG)X3 WITH VEIN HARVEST;  Surgeon: Fidencio Galindo MD;  Location: [address];  Service: Cardiovascular;  Laterality: N/A;    ENDOSCOPIC HARVEST OF VEIN Left 04/26/2021    Procedure: HARVEST-VEIN-ENDOVASCULAR FOR CABGX3;  Surgeon: Fidencio Galindo MD;  Location: [facility] OR [address] [place] [address];  Service: Cardiovascular;  Laterality: Left;  Vein [place] Start time: 1201pm  Vein [place] Stop time: 1226pm    Vein Prep Start time:  1227pm  Vein Prep Stop time: 1250pm    ESOPHAGOGASTRODUODENOSCOPY N/A 02/05/2020    Procedure: EGD (ESOPHAGOGASTRODUODENOSCOPY);  Surgeon: Cody Maria MD;  Location: [facility] (4TH FLR);  Service: Endoscopy;  Laterality: N/A;    ESOPHAGOGASTRODUODENOSCOPY N/A 04/30/2020    Procedure: EGD (ESOPHAGOGASTRODUODENOSCOPY);  Surgeon: Cody Maria MD;  Location: [facility] ENDO (2ND FLR);  Service: Endoscopy;  Laterality: N/A;  schedule 12 weeks from now  4/13/20 - removed from 4/29/20, needs to be rescheduled high priority - pg  4/28/20-scheduled from high priority list-BB  Covid screening test scheduled for 4/29/20-BB  instructions emailed to patient-BB    EXCLUSION OF LEFT ATRIAL APPENDAGE N/A 04/26/2021    Procedure: EXCLUSION, LEFT ATRIAL APPENDAGE;  Surgeon: Fidencio Galindo MD;  Location: [facility] OR [address] [place] [address];  Service: Cardiovascular;  Laterality: N/A;  Left Atrial Appendage Resection    HERNIA REPAIR  April/May 2022    LEFT HEART CATHETERIZATION Left 04/21/2021    Procedure: Left heart cath;  Surgeon: Aric Alvarado MD;  Location: [facility] CATH LAB;  Service: Cardiology;  Laterality: Left;    UMBILICAL HERNIA REPAIR N/A 03/31/2022    Procedure: REPAIR, HERNIA, UMBILICAL, AGE 5 YEARS OR OLDER Open With or Without Mesh;  Surgeon: Matt Delgado MD;  Location: [facility] OR [address] [place] [address];  Service: General;  Laterality: N/A;     Patient Active Problem List   Diagnosis    Hypertensive cardiovascular-renal disease, stage 1-4 or unspecified chronic kidney disease, without heart failure    Allergic rhinitis    Diverticulosis    Anemia, iron deficiency    External hemorrhoids    Tubular adenoma of colon    Tongue laceration    ED (erectile dysfunction)    Ventral hernia without obstruction or gangrene    GERD (gastroesophageal reflux disease)    Eosinophilic esophagitis    Abnormal cardiovascular stress test    CAD (coronary artery disease)    Hypophosphatemia    Acute blood loss anemia    Paroxysmal atrial  "fibrillation    Pain of left thigh    S/P CABG (coronary artery bypass graft)    Aortic atherosclerosis    Chronic kidney disease    Right renal mass     Review of Systems   Constitutional:  Negative for appetite change, chills, fatigue, fever and unexpected weight change.   HENT:  Negative for nosebleeds.    Respiratory:  Negative for shortness of breath and wheezing.    Cardiovascular:  Negative for chest pain, palpitations and leg swelling.   Gastrointestinal:  Negative for abdominal distention, abdominal pain, constipation, diarrhea, nausea and vomiting.   Genitourinary:  Positive for frequency. Negative for dysuria and hematuria.   Musculoskeletal:  Negative for arthralgias and back pain.   Skin:  Negative for pallor.   Neurological:  Negative for dizziness, seizures and syncope.   Hematological:  Negative for adenopathy.   Psychiatric/Behavioral:  Negative for dysphoric mood.          Objective:      There were no vitals taken for this visit.  Estimated body mass index is 22.56 kg/m² as calculated from the following:    Height as of 6/20/24: 5' 8" (1.727 m).    Weight as of 6/20/24: 67.3 kg (148 lb 5.9 oz).  Physical Exam  Constitutional:       General: He is not in acute distress.     Appearance: He is well-developed. He is not diaphoretic.   HENT:      Head: Atraumatic.   Neck:      Trachea: No tracheal deviation.   Cardiovascular:      Rate and Rhythm: Normal rate.   Pulmonary:      Effort: Pulmonary effort is normal. No respiratory distress.      Breath sounds: No wheezing.   Abdominal:      General: Bowel sounds are normal. There is no distension.      Palpations: Abdomen is soft. There is no mass.      Tenderness: There is no abdominal tenderness. There is no guarding or rebound.   Skin:     General: Skin is warm and dry.   Neurological:      Mental Status: He is alert and oriented to person, place, and time.   Psychiatric:         Behavior: Behavior normal.         Thought Content: Thought content " normal.         Judgment: Judgment normal.       Assessment and Plan:           Problem List Items Addressed This Visit       Right renal mass - Primary       Follow up:   Discussed surveillance of small renal masses.  Recommend MRI in 6 months.  Letter to Dr Mcfarland.    Fidencio Vivar

## 2024-06-25 ENCOUNTER — OFFICE VISIT (OUTPATIENT)
Dept: UROLOGY | Facility: CLINIC | Age: 69
End: 2024-06-25
Payer: MEDICARE

## 2024-06-25 VITALS
SYSTOLIC BLOOD PRESSURE: 113 MMHG | BODY MASS INDEX: 22.55 KG/M2 | HEIGHT: 68 IN | DIASTOLIC BLOOD PRESSURE: 53 MMHG | HEART RATE: 46 BPM | WEIGHT: 148.81 LBS

## 2024-06-25 DIAGNOSIS — N28.9 RENAL LESION: ICD-10-CM

## 2024-06-25 DIAGNOSIS — I25.10 CORONARY ARTERY DISEASE INVOLVING NATIVE CORONARY ARTERY OF NATIVE HEART WITHOUT ANGINA PECTORIS: ICD-10-CM

## 2024-06-25 DIAGNOSIS — N28.89 OTHER SPECIFIED DISORDERS OF KIDNEY AND URETER: ICD-10-CM

## 2024-06-25 DIAGNOSIS — N28.89 RIGHT RENAL MASS: Primary | ICD-10-CM

## 2024-06-25 DIAGNOSIS — N18.31 STAGE 3A CHRONIC KIDNEY DISEASE: ICD-10-CM

## 2024-06-25 PROCEDURE — 99214 OFFICE O/P EST MOD 30 MIN: CPT | Mod: PBBFAC | Performed by: UROLOGY

## 2024-06-25 PROCEDURE — 99205 OFFICE O/P NEW HI 60 MIN: CPT | Mod: S$PBB,,, | Performed by: UROLOGY

## 2024-06-25 PROCEDURE — 99999 PR PBB SHADOW E&M-EST. PATIENT-LVL IV: CPT | Mod: PBBFAC,,, | Performed by: UROLOGY

## 2024-06-25 RX ORDER — DILTIAZEM HYDROCHLORIDE 120 MG/1
120 CAPSULE, EXTENDED RELEASE ORAL
COMMUNITY
Start: 2023-11-30 | End: 2024-11-28

## 2024-06-25 RX ORDER — LISINOPRIL 5 MG/1
5 TABLET ORAL
COMMUNITY
Start: 2024-01-24 | End: 2025-01-22

## 2024-06-25 RX ORDER — ASPIRIN 81 MG/1
81 TABLET ORAL
COMMUNITY
Start: 2023-09-05 | End: 2024-09-03

## 2024-06-25 RX ORDER — ATORVASTATIN CALCIUM 40 MG/1
40 TABLET, FILM COATED ORAL
COMMUNITY
Start: 2023-09-05 | End: 2024-09-03

## 2024-06-28 ENCOUNTER — HOSPITAL ENCOUNTER (OUTPATIENT)
Dept: CARDIOLOGY | Facility: HOSPITAL | Age: 69
Discharge: HOME | End: 2024-06-28
Attending: INTERNAL MEDICINE
Payer: MEDICARE

## 2024-06-28 DIAGNOSIS — I77.810 AORTIC ROOT DILATATION: ICD-10-CM

## 2024-06-28 LAB
ASCENDING AORTA: 3.51 CM
AV INDEX (PROSTH): 0.86
AV MEAN GRADIENT: 6 MMHG
AV PEAK GRADIENT: 14 MMHG
AV VALVE AREA BY VELOCITY RATIO: 2.72 CM²
AV VALVE AREA: 2.71 CM²
AV VELOCITY RATIO: 0.87
CV ECHO LV RWT: 0.43 CM
DOP CALC AO PEAK VEL: 1.86 M/S
DOP CALC AO VTI: 38.4 CM
DOP CALC LVOT AREA: 3.1 CM2
DOP CALC LVOT DIAMETER: 2 CM
DOP CALC LVOT PEAK VEL: 1.61 M/S
DOP CALC LVOT STROKE VOLUME: 103.93 CM3
DOP CALC MV VTI: 40 CM
DOP CALCLVOT PEAK VEL VTI: 33.1 CM
E WAVE DECELERATION TIME: 167.19 MSEC
E/A RATIO: 1.58
E/E' RATIO: 8.08 M/S
ECHO LV POSTERIOR WALL: 1.07 CM (ref 0.6–1.1)
FRACTIONAL SHORTENING: 41 % (ref 28–44)
INTERVENTRICULAR SEPTUM: 1.18 CM (ref 0.6–1.1)
IVC DIAMETER: 1.63 CM
IVRT: 97.05 MSEC
LA MAJOR: 5.2 CM
LA MINOR: 4.97 CM
LA WIDTH: 5 CM
LEFT ATRIUM SIZE: 4.28 CM
LEFT ATRIUM VOLUME: 92.45 CM3
LEFT INTERNAL DIMENSION IN SYSTOLE: 2.98 CM (ref 2.1–4)
LEFT VENTRICLE DIASTOLIC VOLUME: 118.98 ML
LEFT VENTRICLE SYSTOLIC VOLUME: 34.49 ML
LEFT VENTRICULAR INTERNAL DIMENSION IN DIASTOLE: 5.01 CM (ref 3.5–6)
LEFT VENTRICULAR MASS: 214.35 G
LV LATERAL E/E' RATIO: 6.31 M/S
LV SEPTAL E/E' RATIO: 11.22 M/S
LVED V (TEICH): 118.98 ML
LVES V (TEICH): 34.49 ML
LVOT MG: 5.94 MMHG
LVOT MV: 1.15 CM/S
MV MEAN GRADIENT: 1 MMHG
MV PEAK A VEL: 0.64 M/S
MV PEAK E VEL: 1.01 M/S
MV PEAK GRADIENT: 5 MMHG
MV STENOSIS PRESSURE HALF TIME: 48.48 MS
MV VALVE AREA BY CONTINUITY EQUATION: 2.6 CM2
MV VALVE AREA P 1/2 METHOD: 4.54 CM2
OHS CV RV/LV RATIO: 0.76 CM
PISA TR MAX VEL: 2.65 M/S
PULM VEIN S/D RATIO: 0.8
PV PEAK D VEL: 0.8 M/S
PV PEAK GRADIENT: 8 MMHG
PV PEAK S VEL: 0.64 M/S
PV PEAK VELOCITY: 1.4 M/S
RA MAJOR: 5.1 CM
RA PRESSURE ESTIMATED: 3 MMHG
RA WIDTH: 3.8 CM
RIGHT VENTRICLE DIASTOLIC BASEL DIMENSION: 3.8 CM
RIGHT VENTRICULAR END-DIASTOLIC DIMENSION: 3.81 CM
RV TB RVSP: 6 MMHG
SINUS: 3.62 CM
STJ: 2.94 CM
TDI LATERAL: 0.16 M/S
TDI SEPTAL: 0.09 M/S
TDI: 0.13 M/S
TR MAX PG: 28 MMHG
TRICUSPID ANNULAR PLANE SYSTOLIC EXCURSION: 1.95 CM
TV REST PULMONARY ARTERY PRESSURE: 31 MMHG

## 2024-06-28 PROCEDURE — 93306 TTE W/DOPPLER COMPLETE: CPT

## 2024-06-28 PROCEDURE — 93306 TTE W/DOPPLER COMPLETE: CPT | Mod: 26,,, | Performed by: INTERNAL MEDICINE

## 2024-07-01 ENCOUNTER — PATIENT MESSAGE (OUTPATIENT)
Dept: PRIMARY CARE CLINIC | Facility: CLINIC | Age: 69
End: 2024-07-01
Payer: MEDICARE

## 2024-07-01 ENCOUNTER — TELEPHONE (OUTPATIENT)
Dept: CARDIOLOGY | Facility: CLINIC | Age: 69
End: 2024-07-01
Payer: MEDICARE

## 2024-07-01 NOTE — TELEPHONE ENCOUNTER
----- Message from Edmond Encarnacion MD sent at 6/30/2024  3:04 PM CDT -----  1.  Let pt know his echo is stable.    2.  Is he following up with me or with Dr. Aguilar?  He is a Lauren pt and my last note mentions for him to follow up with Dr. RICKETTS.  Ask pt who he wants to see.

## 2024-07-09 ENCOUNTER — PATIENT MESSAGE (OUTPATIENT)
Dept: NEPHROLOGY | Facility: CLINIC | Age: 69
End: 2024-07-09
Payer: MEDICARE

## 2024-07-11 ENCOUNTER — LAB VISIT (OUTPATIENT)
Dept: LAB | Facility: HOSPITAL | Age: 69
End: 2024-07-11
Payer: MEDICARE

## 2024-07-11 DIAGNOSIS — D50.9 MICROCYTIC ANEMIA: ICD-10-CM

## 2024-07-11 DIAGNOSIS — N18.31 CHRONIC KIDNEY DISEASE, STAGE 3A: ICD-10-CM

## 2024-07-11 LAB
ALBUMIN SERPL BCP-MCNC: 3.9 G/DL (ref 3.5–5.2)
ANION GAP SERPL CALC-SCNC: 7 MMOL/L (ref 8–16)
BASOPHILS # BLD AUTO: 0.03 K/UL (ref 0–0.2)
BASOPHILS NFR BLD: 0.9 % (ref 0–1.9)
BUN SERPL-MCNC: 17 MG/DL (ref 8–23)
CALCIUM SERPL-MCNC: 9.4 MG/DL (ref 8.7–10.5)
CHLORIDE SERPL-SCNC: 107 MMOL/L (ref 95–110)
CO2 SERPL-SCNC: 25 MMOL/L (ref 23–29)
CREAT SERPL-MCNC: 1.5 MG/DL (ref 0.5–1.4)
DIFFERENTIAL METHOD BLD: ABNORMAL
EOSINOPHIL # BLD AUTO: 0.1 K/UL (ref 0–0.5)
EOSINOPHIL NFR BLD: 3.8 % (ref 0–8)
ERYTHROCYTE [DISTWIDTH] IN BLOOD BY AUTOMATED COUNT: 15.5 % (ref 11.5–14.5)
EST. GFR  (NO RACE VARIABLE): 49.8 ML/MIN/1.73 M^2
FERRITIN SERPL-MCNC: 42 NG/ML (ref 20–300)
GLUCOSE SERPL-MCNC: 91 MG/DL (ref 70–110)
HCT VFR BLD AUTO: 33.8 % (ref 40–54)
HGB BLD-MCNC: 10.9 G/DL (ref 14–18)
IMM GRANULOCYTES # BLD AUTO: 0 K/UL (ref 0–0.04)
IMM GRANULOCYTES NFR BLD AUTO: 0 % (ref 0–0.5)
IRON SERPL-MCNC: 69 UG/DL (ref 45–160)
LYMPHOCYTES # BLD AUTO: 1.2 K/UL (ref 1–4.8)
LYMPHOCYTES NFR BLD: 34.8 % (ref 18–48)
MCH RBC QN AUTO: 23 PG (ref 27–31)
MCHC RBC AUTO-ENTMCNC: 32.2 G/DL (ref 32–36)
MCV RBC AUTO: 71 FL (ref 82–98)
MONOCYTES # BLD AUTO: 0.3 K/UL (ref 0.3–1)
MONOCYTES NFR BLD: 8.7 % (ref 4–15)
NEUTROPHILS # BLD AUTO: 1.8 K/UL (ref 1.8–7.7)
NEUTROPHILS NFR BLD: 51.8 % (ref 38–73)
NRBC BLD-RTO: 0 /100 WBC
PHOSPHATE SERPL-MCNC: 2.5 MG/DL (ref 2.7–4.5)
PLATELET # BLD AUTO: 165 K/UL (ref 150–450)
PMV BLD AUTO: 11.8 FL (ref 9.2–12.9)
POTASSIUM SERPL-SCNC: 5 MMOL/L (ref 3.5–5.1)
PTH-INTACT SERPL-MCNC: 71.3 PG/ML (ref 9–77)
RBC # BLD AUTO: 4.74 M/UL (ref 4.6–6.2)
SATURATED IRON: 22 % (ref 20–50)
SODIUM SERPL-SCNC: 139 MMOL/L (ref 136–145)
TOTAL IRON BINDING CAPACITY: 320 UG/DL (ref 250–450)
TRANSFERRIN SERPL-MCNC: 216 MG/DL (ref 200–375)
WBC # BLD AUTO: 3.45 K/UL (ref 3.9–12.7)

## 2024-07-11 PROCEDURE — 85025 COMPLETE CBC W/AUTO DIFF WBC: CPT | Performed by: STUDENT IN AN ORGANIZED HEALTH CARE EDUCATION/TRAINING PROGRAM

## 2024-07-11 PROCEDURE — 82728 ASSAY OF FERRITIN: CPT | Performed by: STUDENT IN AN ORGANIZED HEALTH CARE EDUCATION/TRAINING PROGRAM

## 2024-07-11 PROCEDURE — 83970 ASSAY OF PARATHORMONE: CPT | Performed by: STUDENT IN AN ORGANIZED HEALTH CARE EDUCATION/TRAINING PROGRAM

## 2024-07-11 PROCEDURE — 83540 ASSAY OF IRON: CPT | Performed by: STUDENT IN AN ORGANIZED HEALTH CARE EDUCATION/TRAINING PROGRAM

## 2024-07-11 PROCEDURE — 80069 RENAL FUNCTION PANEL: CPT | Performed by: STUDENT IN AN ORGANIZED HEALTH CARE EDUCATION/TRAINING PROGRAM

## 2024-07-11 PROCEDURE — 36415 COLL VENOUS BLD VENIPUNCTURE: CPT | Mod: PO | Performed by: STUDENT IN AN ORGANIZED HEALTH CARE EDUCATION/TRAINING PROGRAM

## 2024-07-12 ENCOUNTER — PATIENT OUTREACH (OUTPATIENT)
Dept: ADMINISTRATIVE | Facility: HOSPITAL | Age: 69
End: 2024-07-12
Payer: MEDICARE

## 2024-07-12 RX ORDER — FERROUS SULFATE 325(65) MG
TABLET ORAL
COMMUNITY
Start: 2024-06-20 | End: 2025-06-19

## 2024-07-26 ENCOUNTER — PATIENT MESSAGE (OUTPATIENT)
Dept: NEPHROLOGY | Facility: CLINIC | Age: 69
End: 2024-07-26
Payer: MEDICARE

## 2024-07-26 DIAGNOSIS — D50.9 MICROCYTIC ANEMIA: Primary | ICD-10-CM

## 2024-07-26 RX ORDER — FERROUS SULFATE 325(65) MG
325 TABLET ORAL
Qty: 90 TABLET | Refills: 3 | Status: SHIPPED | OUTPATIENT
Start: 2024-07-26 | End: 2025-07-25

## 2024-07-27 NOTE — PROGRESS NOTES
Patient sent me a message through the portal requesting that his iron medications be sent to [facility] pharmacy for a 90 day supple. Medications were filled and sent to the requested pharmacy.     Microcytic anemia  -     ferrous sulfate (FEOSOL) 325 mg (65 mg iron) Tab tablet; Take 1 tablet (325 mg total) by mouth daily with breakfast.  Dispense: 90 tablet; Refill: 3        [facility] - [place], [place] - [address] [place] [id number]  Phone: [phone] Fax: [phone]

## 2024-07-31 ENCOUNTER — PATIENT MESSAGE (OUTPATIENT)
Dept: PRIMARY CARE CLINIC | Facility: CLINIC | Age: 69
End: 2024-07-31
Payer: MEDICARE

## 2024-07-31 DIAGNOSIS — K21.9 GASTROESOPHAGEAL REFLUX DISEASE WITHOUT ESOPHAGITIS: Primary | ICD-10-CM

## 2024-07-31 RX ORDER — FAMOTIDINE 20 MG/1
40 TABLET, FILM COATED ORAL DAILY
Qty: 60 TABLET | Refills: 11 | Status: SHIPPED | OUTPATIENT
Start: 2024-07-31 | End: 2025-07-31

## 2024-07-31 NOTE — TELEPHONE ENCOUNTER
No care due was identified.  [facility] Embedded Care Due Messages. Reference number: [id number].   7/31/2024 1:58:19 PM CDT

## 2024-07-31 NOTE — TELEPHONE ENCOUNTER
Pt requesting med refill, med pended. Last prescribed by Dr Duke NESBITT with Trace Noriega MD , 3/18/2024

## 2024-08-14 DIAGNOSIS — N52.9 ERECTILE DYSFUNCTION, UNSPECIFIED ERECTILE DYSFUNCTION TYPE: ICD-10-CM

## 2024-08-15 ENCOUNTER — OFFICE VISIT (OUTPATIENT)
Dept: NEPHROLOGY | Facility: CLINIC | Age: 69
End: 2024-08-15
Payer: MEDICARE

## 2024-08-15 ENCOUNTER — PATIENT MESSAGE (OUTPATIENT)
Dept: NEPHROLOGY | Facility: CLINIC | Age: 69
End: 2024-08-15

## 2024-08-15 ENCOUNTER — LAB VISIT (OUTPATIENT)
Dept: LAB | Facility: HOSPITAL | Age: 69
End: 2024-08-15
Payer: MEDICARE

## 2024-08-15 VITALS
HEART RATE: 49 BPM | WEIGHT: 147.69 LBS | BODY MASS INDEX: 22.46 KG/M2 | SYSTOLIC BLOOD PRESSURE: 132 MMHG | OXYGEN SATURATION: 96 % | DIASTOLIC BLOOD PRESSURE: 67 MMHG

## 2024-08-15 DIAGNOSIS — I25.10 CORONARY ARTERY DISEASE INVOLVING NATIVE CORONARY ARTERY OF NATIVE HEART WITHOUT ANGINA PECTORIS: ICD-10-CM

## 2024-08-15 DIAGNOSIS — I13.10 HYPERTENSIVE CARDIOVASCULAR-RENAL DISEASE, STAGE 1-4 OR UNSPECIFIED CHRONIC KIDNEY DISEASE, WITHOUT HEART FAILURE: ICD-10-CM

## 2024-08-15 DIAGNOSIS — D50.9 MICROCYTIC ANEMIA: ICD-10-CM

## 2024-08-15 DIAGNOSIS — N28.9 RENAL LESION: ICD-10-CM

## 2024-08-15 DIAGNOSIS — N18.31 CHRONIC KIDNEY DISEASE, STAGE 3A: ICD-10-CM

## 2024-08-15 DIAGNOSIS — I48.0 PAROXYSMAL ATRIAL FIBRILLATION: ICD-10-CM

## 2024-08-15 DIAGNOSIS — N18.31 CHRONIC KIDNEY DISEASE, STAGE 3A: Primary | ICD-10-CM

## 2024-08-15 LAB
ALBUMIN SERPL BCP-MCNC: 4 G/DL (ref 3.5–5.2)
ANION GAP SERPL CALC-SCNC: 8 MMOL/L (ref 8–16)
BASOPHILS # BLD AUTO: 0.02 K/UL (ref 0–0.2)
BASOPHILS NFR BLD: 0.6 % (ref 0–1.9)
BUN SERPL-MCNC: 20 MG/DL (ref 8–23)
CALCIUM SERPL-MCNC: 9.6 MG/DL (ref 8.7–10.5)
CHLORIDE SERPL-SCNC: 109 MMOL/L (ref 95–110)
CO2 SERPL-SCNC: 24 MMOL/L (ref 23–29)
CREAT SERPL-MCNC: 1.3 MG/DL (ref 0.5–1.4)
DIFFERENTIAL METHOD BLD: ABNORMAL
EOSINOPHIL # BLD AUTO: 0.3 K/UL (ref 0–0.5)
EOSINOPHIL NFR BLD: 8.3 % (ref 0–8)
ERYTHROCYTE [DISTWIDTH] IN BLOOD BY AUTOMATED COUNT: 15.4 % (ref 11.5–14.5)
EST. GFR  (NO RACE VARIABLE): 58.7 ML/MIN/1.73 M^2
GLUCOSE SERPL-MCNC: 79 MG/DL (ref 70–110)
HCT VFR BLD AUTO: 34.2 % (ref 40–54)
HGB BLD-MCNC: 11 G/DL (ref 14–18)
IMM GRANULOCYTES # BLD AUTO: 0.01 K/UL (ref 0–0.04)
IMM GRANULOCYTES NFR BLD AUTO: 0.3 % (ref 0–0.5)
LYMPHOCYTES # BLD AUTO: 1.2 K/UL (ref 1–4.8)
LYMPHOCYTES NFR BLD: 31.9 % (ref 18–48)
MCH RBC QN AUTO: 22.9 PG (ref 27–31)
MCHC RBC AUTO-ENTMCNC: 32.2 G/DL (ref 32–36)
MCV RBC AUTO: 71 FL (ref 82–98)
MONOCYTES # BLD AUTO: 0.4 K/UL (ref 0.3–1)
MONOCYTES NFR BLD: 10.8 % (ref 4–15)
NEUTROPHILS # BLD AUTO: 1.7 K/UL (ref 1.8–7.7)
NEUTROPHILS NFR BLD: 48.1 % (ref 38–73)
NRBC BLD-RTO: 0 /100 WBC
PHOSPHATE SERPL-MCNC: 3 MG/DL (ref 2.7–4.5)
PLATELET # BLD AUTO: 127 K/UL (ref 150–450)
PMV BLD AUTO: 11.9 FL (ref 9.2–12.9)
POTASSIUM SERPL-SCNC: 5.1 MMOL/L (ref 3.5–5.1)
RBC # BLD AUTO: 4.81 M/UL (ref 4.6–6.2)
SODIUM SERPL-SCNC: 141 MMOL/L (ref 136–145)
WBC # BLD AUTO: 3.61 K/UL (ref 3.9–12.7)

## 2024-08-15 PROCEDURE — 99999 PR PBB SHADOW E&M-EST. PATIENT-LVL III: CPT | Mod: PBBFAC,GC,, | Performed by: STUDENT IN AN ORGANIZED HEALTH CARE EDUCATION/TRAINING PROGRAM

## 2024-08-15 PROCEDURE — 36415 COLL VENOUS BLD VENIPUNCTURE: CPT | Performed by: STUDENT IN AN ORGANIZED HEALTH CARE EDUCATION/TRAINING PROGRAM

## 2024-08-15 PROCEDURE — 80069 RENAL FUNCTION PANEL: CPT | Performed by: STUDENT IN AN ORGANIZED HEALTH CARE EDUCATION/TRAINING PROGRAM

## 2024-08-15 PROCEDURE — 99213 OFFICE O/P EST LOW 20 MIN: CPT | Mod: PBBFAC | Performed by: STUDENT IN AN ORGANIZED HEALTH CARE EDUCATION/TRAINING PROGRAM

## 2024-08-15 PROCEDURE — 85025 COMPLETE CBC W/AUTO DIFF WBC: CPT | Performed by: STUDENT IN AN ORGANIZED HEALTH CARE EDUCATION/TRAINING PROGRAM

## 2024-08-15 NOTE — PROGRESS NOTES
Subjective     Chief Complaint: CKD 3a    History of Present Illness:  Mr. Abdias Kim is a 69 y.o. male   male who presents for follow up of CKD 3a. He was previously seen by Dr. Wall in June 2023 for initial nephrology evaluation, who now presents to our clinic.     Renal function per chart review with sr cr baseline of 1.3 - 1.5 mg/dL and baseline eGFR around 59.    Clinic 8/15/24; He was started on Lisinopril 5 mg daily by his cardiologist. Since that time his serum creatinine has increased from 1.3 up to 1.5, I have explained that this is expected after starting ACEi. He denies use of NSAIDs, nephrolithiasis or fam Hx of renal disease. He does not notice any difficulty emptying his bladder, or any nocturia, but does note that he will urinate 6-8 times during the day, and sometimes will need to urinate 30 minutes after his last urinary void. During his visit with me in June he was started empirically on oral iron replacement for his microcytic anemia, however iron studies obtained showed adequate iron stores. He does report a family history of unspecified anemia in his mother but his not aware of any other family history of anemia.     #CKD 3a- Creatinine increased from 1.3 up to 1.5 after ACEi was started, has since remained stable at 1.5 range. Denies any foam or blood in the urine.      # PAF on Diltiazem and CAD s/p CABG 2021.    # CAD/CABG 4/26/21 (LIMA-LAD, GIORGIO-D1, SVG-OM1)   Meds; ASA 81 mg, Lipitor 40 mg daily    # HTN diagnosed about in his 20's. Patient reports good adherence to the current regiment, which includes Diltiazem. He is following low salt diet. Previously on Losartan-HCTZ and stopped due to Hives. He participates in digital blood pressure monitoring which shows that his home readings are consistently in the 120s/70s on his current medications.  Meds; Lisinopril 5 mg and tolerating well.     #Microcytic anemia- Patient's iron panel obtained in June and July do not  suggest iron deficiency, and his stage of CKD 3a is unlikely to cause anemia of renal disease. Review of his labs show that he has had anemia dating back to 2004 (age of 49), he is up-to-date on his colonoscopy and denies blood in his stools. His iron supplement was stopped and he was referred to hematology on 8/15/24          Review of Systems   Constitutional:  Negative for chills, fever, malaise/fatigue and weight loss.   HENT:  Negative for nosebleeds.    Respiratory:  Negative for cough, hemoptysis and wheezing.    Cardiovascular:  Negative for chest pain, palpitations, orthopnea, leg swelling and PND.   Gastrointestinal:  Negative for abdominal pain, constipation, diarrhea, heartburn, nausea and vomiting.   Genitourinary:  Negative for dysuria and urgency.   Musculoskeletal:  Negative for back pain, falls and neck pain.   Skin:  Negative for itching and rash.   Neurological:  Negative for dizziness, tremors, seizures, weakness and headaches.       PAST HISTORY:     Past Medical History:   Diagnosis Date    Anemia     Diverticulosis     Hypertension        Past Surgical History:   Procedure Laterality Date    APPENDECTOMY      COLONOSCOPY N/A 01/31/2017    Procedure: COLONOSCOPY;  Surgeon: BASILIO Winn MD;  Location: [facility] ([address] [place] [address]);  Service: Endoscopy;  Laterality: N/A;    COLONOSCOPY N/A 02/05/2020    Procedure: COLONOSCOPY;  Surgeon: Cody Maria MD;  Location: [facility] ([address] [place] [address]);  Service: Endoscopy;  Laterality: N/A;  OKay for Crow or ghazala. Needs to be done in Jan 2020    CORONARY ARTERY BYPASS GRAFT  05/26/2021    CORONARY ARTERY BYPASS GRAFT (CABG) N/A 04/26/2021    Procedure: CORONARY ARTERY BYPASS GRAFT (CABG)X3 WITH VEIN HARVEST;  Surgeon: Fidencio Galindo MD;  Location: [facility] OR [address] [place] [address];  Service: Cardiovascular;  Laterality: N/A;    ENDOSCOPIC HARVEST OF VEIN Left 04/26/2021    Procedure: HARVEST-VEIN-ENDOVASCULAR FOR CABGX3;  Surgeon: Fidencio Galindo MD;  Location: [facility] OR [facility]  [facility];  Service: Cardiovascular;  Laterality: Left;  Vein [place] Start time: 1201pm  Vein [place] Stop time: 1226pm    Vein Prep Start time: 1227pm  Vein Prep Stop time: 1250pm    ESOPHAGOGASTRODUODENOSCOPY N/A 2020    Procedure: EGD (ESOPHAGOGASTRODUODENOSCOPY);  Surgeon: Cody Maria MD;  Location: [facility] ENDO (4TH FLR);  Service: Endoscopy;  Laterality: N/A;    ESOPHAGOGASTRODUODENOSCOPY N/A 2020    Procedure: EGD (ESOPHAGOGASTRODUODENOSCOPY);  Surgeon: Cody Maria MD;  Location: [facility] ENDO (2ND FLR);  Service: Endoscopy;  Laterality: N/A;  schedule 12 weeks from now  20 - removed from 20, needs to be rescheduled high priority - pg  20-scheduled from high priority list-BB  Covid screening test scheduled for 20-BB  instructions emailed to patient-BB    EXCLUSION OF LEFT ATRIAL APPENDAGE N/A 2021    Procedure: EXCLUSION, LEFT ATRIAL APPENDAGE;  Surgeon: Fidencio Galindo MD;  [facility];  Service: Cardiovascular;  Laterality: N/A;  Left Atrial Appendage Resection    HERNIA REPAIR  April/May 2022    LEFT HEART CATHETERIZATION Left 2021    Procedure: Left heart cath;  Surgeon: Aric Alvarado MD;  Location: [facility] CATH LAB;  Service: Cardiology;  Laterality: Left;    UMBILICAL HERNIA REPAIR N/A 2022    Procedure: REPAIR, HERNIA, UMBILICAL, AGE 5 YEARS OR OLDER Open With or Without Mesh;  Surgeon: Matt Delgado MD;  [facility];  Service: General;  Laterality: N/A;       Family History   Problem Relation Name Age of Onset    Heart disease Mother Thao Kim         , Coronary artery disease    Hypertension Mother Thao Kim     Cancer Father Michael Kim         colon/prostate    Colon polyps Father Michael Kim     Arthritis Father Michael Kim             Heart disease Father Michael Kim         Coronary artery disease, Afib, CHF    Hypertension Father Michael Kim     Hypertension Son x 1      Stroke Neg Hx      Diabetes Neg Hx      Celiac disease Neg Hx      Esophageal cancer Neg Hx      Liver cancer Neg Hx      Liver disease Neg Hx      Stomach cancer Neg Hx      Rectal cancer Neg Hx      Melanoma Neg Hx         Social History     Socioeconomic History    Marital status:    Tobacco Use    Smoking status: Never     Passive exposure: Never    Smokeless tobacco: Never   Substance and Sexual Activity    Alcohol use: Not Currently     Comment: few times a week     Drug use: No    Sexual activity: Yes     Partners: Female     Birth control/protection: Other-see comments     Comment: Wife, tubal ligation     Social Determinants of Health     Financial Resource Strain: Low Risk  (1/1/2024)    Overall Financial Resource Strain (CARDIA)     Difficulty of Paying Living Expenses: Not hard at all   Food Insecurity: No Food Insecurity (1/1/2024)    Hunger Vital Sign     Worried About Running Out of Food in the Last Year: Never true     Ran Out of Food in the Last Year: Never true   Transportation Needs: No Transportation Needs (1/1/2024)    PRAPARE - Transportation     Lack of Transportation (Medical): No     Lack of Transportation (Non-Medical): No   Physical Activity: Sufficiently Active (1/1/2024)    Exercise Vital Sign     Days of Exercise per Week: 6 days     Minutes of Exercise per Session: 50 min   Stress: Stress Concern Present (1/1/2024)    [employer] [place] [employer] - Occupational Stress Questionnaire     Feeling of Stress : To some extent   Housing Stability: Low Risk  (1/1/2024)    Housing Stability Vital Sign     Unable to Pay for Housing in the Last Year: No     Number of Places Lived in the Last Year: 1     Unstable Housing in the Last Year: No       MEDICATIONS & ALLERGIES:     Current Outpatient Medications on File Prior to Visit   Medication Sig    aspirin (ECOTRIN) 81 MG EC tablet Take 81 mg by mouth.    atorvastatin (LIPITOR) 40 MG tablet Take 1 tablet (40 mg total) by  mouth every evening.    cetirizine (ZYRTEC) 10 MG tablet Take 1 tablet by mouth Daily.    diltiaZEM (CARDIZEM CD) 120 MG Cp24 Take 1 capsule (120 mg total) by mouth once daily.    diltiaZEM (DILACOR XR) 120 MG CDCR Take 120 mg by mouth.    famotidine (PEPCID) 20 MG tablet Take 2 tablets (40 mg total) by mouth once daily.    fluticasone propionate (FLONASE) 50 mcg/actuation nasal spray 1 spray (50 mcg total) by Each Nostril route once daily.    lisinopriL (PRINIVIL,ZESTRIL) 5 MG tablet 5 mg.    multivitamin (THERAGRAN) per tablet Take 1 tablet by mouth once daily.    sildenafiL (VIAGRA) 100 MG tablet Take 0.5 tablets (50 mg total) by mouth as needed for Erectile Dysfunction.    [DISCONTINUED] ferrous sulfate (FEOSOL) 325 mg (65 mg iron) Tab tablet Take 1 tablet (325 mg total) by mouth daily with breakfast.    aspirin (ECOTRIN) 81 MG EC tablet Take 1 tablet (81 mg total) by mouth once daily. (Patient not taking: Reported on 8/15/2024)    atorvastatin (LIPITOR) 40 MG tablet 40 mg. (Patient not taking: Reported on 8/15/2024)    fluticasone furoate (ARNUITY ELLIPTA) 50 mcg/actuation inhaler 50 mcg by Nasal route. (Patient not taking: Reported on 8/15/2024)    lisinopriL (PRINIVIL,ZESTRIL) 5 MG tablet Take 1 tablet (5 mg total) by mouth once daily. (Patient not taking: Reported on 8/15/2024)     No current facility-administered medications on file prior to visit.       Review of patient's allergies indicates:   Allergen Reactions    Allegra-d 12 hour [fexofenadine-pseudoephedrine] Other (See Comments)     Trouble urinating    Mucinex d [pseudoephedrine-guaifenesin] Other (See Comments)     Trouble urinating    Losartan-hydrochlorothiazide Hives and Rash     Other reaction(s): Hives       OBJECTIVE:     Vital Signs:  Vitals:    08/15/24 1423   BP: 132/67   Pulse: (!) 49   SpO2: 96%   Weight: 67 kg (147 lb 11.3 oz)         Body mass index is 22.46 kg/m².     Physical Exam  Constitutional:       General: He is not in acute  distress.     Appearance: Normal appearance. He is not ill-appearing or toxic-appearing.   HENT:      Head: Atraumatic.      Right Ear: External ear normal.      Left Ear: External ear normal.      Mouth/Throat:      Mouth: Mucous membranes are moist.   Eyes:      Extraocular Movements: Extraocular movements intact.   Cardiovascular:      Rate and Rhythm: Normal rate and regular rhythm.      Pulses: Normal pulses.      Heart sounds: Murmur heard.   Pulmonary:      Effort: Pulmonary effort is normal. No respiratory distress.      Breath sounds: Normal breath sounds. No wheezing or rales.   Chest:      Chest wall: No tenderness.   Abdominal:      General: Abdomen is flat.      Palpations: Abdomen is soft.   Musculoskeletal:         General: No swelling. Normal range of motion.      Cervical back: Normal range of motion and neck supple.      Right lower leg: No edema.      Left lower leg: No edema.   Skin:     General: Skin is warm.      Capillary Refill: Capillary refill takes less than 2 seconds.      Coloration: Skin is not jaundiced.      Findings: No lesion or rash.   Neurological:      General: No focal deficit present.      Mental Status: He is alert and oriented to person, place, and time.   Psychiatric:         Mood and Affect: Mood normal.         Behavior: Behavior normal.         Laboratory  Creatinine   Date Value Ref Range Status   07/11/2024 1.5 (H) 0.5 - 1.4 mg/dL Final   06/17/2024 1.5 (H) 0.5 - 1.4 mg/dL Final   01/31/2024 1.3 0.5 - 1.4 mg/dL Final   12/27/2023 1.3 0.5 - 1.4 mg/dL Final         Diagnostic Results:      Health Maintenance Due   Topic Date Due    Annual UACr  Never done    COVID-19 Vaccine (8 - 2023-24 season) 03/08/2024    Lipid Panel  09/05/2024         ASSESSMENT & PLAN:   Mr. Abdias Kim is a 69 y.o. male with history of CAD s/p CABG who presents for evaluation of CKD 3a which has remained stable for the past several years. He did have a transient decrease in his eGFR around the  time of his CABG in 2021, however was able to recover renal function and eGFr of 59. He was started on Lisinopril 5 mg by his cardiologist in early 2024 which resulted in his creatinine baseline to move to 1.5 and eGFR of 49 where it has remained stable. At this time, I suspect that his CKD is secondary to his CAD and Hypertension, and has remained stable on his current medications.            Chronic kidney disease, stage 3a  -     Renal function stable after starting Lisinopril. Continue to monitor.   - Renal Function Panel; Standing  -     CBC Auto Differential; Standing    Microcytic anemia  - He has had persistent microcytic anemia dating back to 2004 which pre-dates his kidney disease. In June 2024, he was started empirically on iron supplementation for suspected KELVIN while iron studies were being checked. His iron stores checked in June and July do not suggest KELVIN as a cause of his microcytic anemia. I have placed a referral to hematology for more evaluation and consideration of a potential hemoglobinopathy. Patient was instructed to stop his iron supplement at this time.    -     Ambulatory referral/consult to Hematology / Oncology; Future; Expected date: 08/22/2024    Paroxysmal atrial fibrillation    Renal lesion        -     Retroperitoneal ultrasound in June 2023 showed a 1.2 cm exophytic isoechoic renal lesion extending from the right midpole. He saw urology who recommended a MRI in December 2024    Hypertensive cardiovascular-renal disease, stage 1-4 or unspecified chronic kidney disease, without heart failure    Coronary artery disease involving native coronary artery of native heart without angina pectoris          RTC in 6 months    Discussed with Dr. Soto  - staff attestation to follow      Reji Mcfarland MD  Nephrology PGY-4    [address] [place] [address]  [phone]

## 2024-08-16 RX ORDER — SILDENAFIL 100 MG/1
50 TABLET, FILM COATED ORAL
Qty: 10 TABLET | Refills: 3 | Status: SHIPPED | OUTPATIENT
Start: 2024-08-16

## 2024-08-18 DIAGNOSIS — I10 ESSENTIAL HYPERTENSION: ICD-10-CM

## 2024-08-19 ENCOUNTER — LAB VISIT (OUTPATIENT)
Dept: LAB | Facility: HOSPITAL | Age: 69
End: 2024-08-19
Attending: STUDENT IN AN ORGANIZED HEALTH CARE EDUCATION/TRAINING PROGRAM
Payer: MEDICARE

## 2024-08-19 ENCOUNTER — OFFICE VISIT (OUTPATIENT)
Dept: HEMATOLOGY/ONCOLOGY | Facility: CLINIC | Age: 69
End: 2024-08-19
Payer: MEDICARE

## 2024-08-19 VITALS
HEART RATE: 48 BPM | WEIGHT: 149.06 LBS | BODY MASS INDEX: 23.39 KG/M2 | HEIGHT: 67 IN | OXYGEN SATURATION: 99 % | DIASTOLIC BLOOD PRESSURE: 72 MMHG | SYSTOLIC BLOOD PRESSURE: 143 MMHG

## 2024-08-19 DIAGNOSIS — D69.6 THROMBOCYTOPENIA: ICD-10-CM

## 2024-08-19 DIAGNOSIS — I13.10 HYPERTENSIVE CARDIOVASCULAR-RENAL DISEASE, STAGE 1-4 OR UNSPECIFIED CHRONIC KIDNEY DISEASE, WITHOUT HEART FAILURE: ICD-10-CM

## 2024-08-19 DIAGNOSIS — D70.9 NEUTROPENIA, UNSPECIFIED TYPE: ICD-10-CM

## 2024-08-19 DIAGNOSIS — D50.9 MICROCYTIC ANEMIA: Primary | ICD-10-CM

## 2024-08-19 DIAGNOSIS — D50.9 MICROCYTIC ANEMIA: ICD-10-CM

## 2024-08-19 DIAGNOSIS — N18.31 STAGE 3A CHRONIC KIDNEY DISEASE: ICD-10-CM

## 2024-08-19 DIAGNOSIS — Z76.89 ENCOUNTER TO ESTABLISH CARE: ICD-10-CM

## 2024-08-19 DIAGNOSIS — I25.10 CORONARY ARTERY DISEASE INVOLVING NATIVE CORONARY ARTERY OF NATIVE HEART WITHOUT ANGINA PECTORIS: ICD-10-CM

## 2024-08-19 DIAGNOSIS — N28.89 RIGHT RENAL MASS: ICD-10-CM

## 2024-08-19 LAB
BASOPHILS # BLD AUTO: 0.03 K/UL (ref 0–0.2)
BASOPHILS NFR BLD: 0.7 % (ref 0–1.9)
DIFFERENTIAL METHOD BLD: ABNORMAL
EOSINOPHIL # BLD AUTO: 0.3 K/UL (ref 0–0.5)
EOSINOPHIL NFR BLD: 6.5 % (ref 0–8)
ERYTHROCYTE [DISTWIDTH] IN BLOOD BY AUTOMATED COUNT: 15.6 % (ref 11.5–14.5)
ERYTHROCYTE [SEDIMENTATION RATE] IN BLOOD BY PHOTOMETRIC METHOD: 7 MM/HR (ref 0–23)
HCT VFR BLD AUTO: 35.9 % (ref 40–54)
HGB BLD-MCNC: 11.2 G/DL (ref 14–18)
IMM GRANULOCYTES # BLD AUTO: 0.01 K/UL (ref 0–0.04)
IMM GRANULOCYTES NFR BLD AUTO: 0.2 % (ref 0–0.5)
LYMPHOCYTES # BLD AUTO: 1.1 K/UL (ref 1–4.8)
LYMPHOCYTES NFR BLD: 26.4 % (ref 18–48)
MCH RBC QN AUTO: 22.4 PG (ref 27–31)
MCHC RBC AUTO-ENTMCNC: 31.2 G/DL (ref 32–36)
MCV RBC AUTO: 72 FL (ref 82–98)
MONOCYTES # BLD AUTO: 0.3 K/UL (ref 0.3–1)
MONOCYTES NFR BLD: 7.6 % (ref 4–15)
NEUTROPHILS # BLD AUTO: 2.5 K/UL (ref 1.8–7.7)
NEUTROPHILS NFR BLD: 58.6 % (ref 38–73)
NRBC BLD-RTO: 0 /100 WBC
PATH REV BLD -IMP: NORMAL
PLATELET # BLD AUTO: 152 K/UL (ref 150–450)
PMV BLD AUTO: 11.9 FL (ref 9.2–12.9)
RBC # BLD AUTO: 5.01 M/UL (ref 4.6–6.2)
RETICS/RBC NFR AUTO: 1.1 % (ref 0.4–2)
WBC # BLD AUTO: 4.32 K/UL (ref 3.9–12.7)

## 2024-08-19 PROCEDURE — 83921 ORGANIC ACID SINGLE QUANT: CPT | Performed by: STUDENT IN AN ORGANIZED HEALTH CARE EDUCATION/TRAINING PROGRAM

## 2024-08-19 PROCEDURE — 99214 OFFICE O/P EST MOD 30 MIN: CPT | Mod: PBBFAC | Performed by: STUDENT IN AN ORGANIZED HEALTH CARE EDUCATION/TRAINING PROGRAM

## 2024-08-19 PROCEDURE — 84630 ASSAY OF ZINC: CPT | Performed by: STUDENT IN AN ORGANIZED HEALTH CARE EDUCATION/TRAINING PROGRAM

## 2024-08-19 PROCEDURE — 82746 ASSAY OF FOLIC ACID SERUM: CPT | Performed by: STUDENT IN AN ORGANIZED HEALTH CARE EDUCATION/TRAINING PROGRAM

## 2024-08-19 PROCEDURE — 82525 ASSAY OF COPPER: CPT | Performed by: STUDENT IN AN ORGANIZED HEALTH CARE EDUCATION/TRAINING PROGRAM

## 2024-08-19 PROCEDURE — 82607 VITAMIN B-12: CPT | Performed by: STUDENT IN AN ORGANIZED HEALTH CARE EDUCATION/TRAINING PROGRAM

## 2024-08-19 PROCEDURE — 85652 RBC SED RATE AUTOMATED: CPT | Performed by: STUDENT IN AN ORGANIZED HEALTH CARE EDUCATION/TRAINING PROGRAM

## 2024-08-19 PROCEDURE — 85060 BLOOD SMEAR INTERPRETATION: CPT | Mod: ,,, | Performed by: PATHOLOGY

## 2024-08-19 PROCEDURE — 99204 OFFICE O/P NEW MOD 45 MIN: CPT | Mod: S$PBB,,, | Performed by: STUDENT IN AN ORGANIZED HEALTH CARE EDUCATION/TRAINING PROGRAM

## 2024-08-19 PROCEDURE — 99999 PR PBB SHADOW E&M-EST. PATIENT-LVL IV: CPT | Mod: PBBFAC,,, | Performed by: STUDENT IN AN ORGANIZED HEALTH CARE EDUCATION/TRAINING PROGRAM

## 2024-08-19 PROCEDURE — 85025 COMPLETE CBC W/AUTO DIFF WBC: CPT | Performed by: STUDENT IN AN ORGANIZED HEALTH CARE EDUCATION/TRAINING PROGRAM

## 2024-08-19 PROCEDURE — 85045 AUTOMATED RETICULOCYTE COUNT: CPT | Performed by: STUDENT IN AN ORGANIZED HEALTH CARE EDUCATION/TRAINING PROGRAM

## 2024-08-19 RX ORDER — LISINOPRIL 5 MG/1
5 TABLET ORAL DAILY
Qty: 90 TABLET | Refills: 3 | Status: SHIPPED | OUTPATIENT
Start: 2024-08-19 | End: 2025-08-19

## 2024-08-19 NOTE — PROGRESS NOTES
Hematology- Oncology Clinic Note :     8/19/2024    Chief Complaint   Patient presents with    Anemia    Establish Care         HPI  Pt is a 69 y.o. male who  has a past medical history of Anemia, Diverticulosis, and Hypertension. Who presents for follow up of microcytic anemia.      Active Problem List with Overview Notes    Diagnosis Date Noted    Right renal mass 06/24/2024    Stage 3a chronic kidney disease 06/24/2024    Chronic kidney disease 02/22/2023     stable repeat labs reviewed dr jacobs managing       Aortic atherosclerosis 03/22/2022     Stable cont atorvasatin 40 mg       S/P CABG (coronary artery bypass graft) 06/09/2021    Pain of left thigh 05/05/2021    Hypophosphatemia 04/29/2021    Acute blood loss anemia 04/29/2021    Paroxysmal atrial fibrillation 04/29/2021     Cardiology managing only on asa currently will defer to cardiology      CAD (coronary artery disease) 04/21/2021     Dr euceda managing is on good regimen statin asa and cardizem .      Abnormal cardiovascular stress test 04/19/2021    Eosinophilic esophagitis 04/30/2020    GERD (gastroesophageal reflux disease) 02/05/2020     Now off prilosec and is taking pepcid is working well       Ventral hernia without obstruction or gangrene 08/20/2018    ED (erectile dysfunction) 05/01/2017    Tongue laceration 04/08/2016    Tubular adenoma of colon 04/07/2016 12/2013      External hemorrhoids 02/13/2015    Hypertensive cardiovascular-renal disease, stage 1-4 or unspecified chronic kidney disease, without heart failure 07/31/2013     bp well controlled on current regimen lisinopril has been added       Allergic rhinitis 07/31/2013    Diverticulosis 07/31/2013    Anemia, iron deficiency 07/31/2013     Stable          Patient Active Problem List    Diagnosis Date Noted    Right renal mass 06/24/2024    Stage 3a chronic kidney disease 06/24/2024    Chronic kidney disease 02/22/2023    Aortic atherosclerosis 03/22/2022    S/P CABG (coronary  artery bypass graft) 06/09/2021    Pain of left thigh 05/05/2021    Hypophosphatemia 04/29/2021    Acute blood loss anemia 04/29/2021    Paroxysmal atrial fibrillation 04/29/2021    CAD (coronary artery disease) 04/21/2021    Abnormal cardiovascular stress test 04/19/2021    Eosinophilic esophagitis 04/30/2020    GERD (gastroesophageal reflux disease) 02/05/2020    Ventral hernia without obstruction or gangrene 08/20/2018    ED (erectile dysfunction) 05/01/2017    Tongue laceration 04/08/2016    Tubular adenoma of colon 04/07/2016    External hemorrhoids 02/13/2015    Hypertensive cardiovascular-renal disease, stage 1-4 or unspecified chronic kidney disease, without heart failure 07/31/2013    Allergic rhinitis 07/31/2013    Diverticulosis 07/31/2013    Anemia, iron deficiency 07/31/2013     Past Medical History:   Diagnosis Date    Anemia     Diverticulosis     Hypertension       Past Surgical History:   Procedure Laterality Date    APPENDECTOMY      COLONOSCOPY N/A 01/31/2017    Procedure: COLONOSCOPY;  Surgeon: BASILIO Winn MD;  [facility]  Service: Endoscopy;  Laterality: N/A;    COLONOSCOPY N/A 02/05/2020    Procedure: COLONOSCOPY;  Surgeon: Cody Maria MD;  Location: [facility] ENDO (4TH FLR);  Service: Endoscopy;  Laterality: N/A;  OKay for [facility] vivar. Needs to be done in Jan 2020    CORONARY ARTERY BYPASS GRAFT  05/26/2021    CORONARY ARTERY BYPASS GRAFT (CABG) N/A 04/26/2021    Procedure: CORONARY ARTERY BYPASS GRAFT (CABG)X3 WITH VEIN HARVEST;  Surgeon: Fidencio Galindo MD;  [facility];  Service: Cardiovascular;  Laterality: N/A;    ENDOSCOPIC HARVEST OF VEIN Left 04/26/2021    Procedure: HARVEST-VEIN-ENDOVASCULAR FOR CABGX3;  Surgeon: Fidencio Galindo MD;  [facility];  Service: Cardiovascular;  Laterality: Left;  Vein [place] Start time: 1201pm  Vein [place] Stop time: 1226pm    Vein Prep Start time: 1227pm  Vein Prep Stop time: 1250pm     ESOPHAGOGASTRODUODENOSCOPY N/A 02/05/2020    Procedure: EGD (ESOPHAGOGASTRODUODENOSCOPY);  Surgeon: Cody Maria MD;  Location: [facility] (4TH FLR);  Service: Endoscopy;  Laterality: N/A;    ESOPHAGOGASTRODUODENOSCOPY N/A 04/30/2020    Procedure: EGD (ESOPHAGOGASTRODUODENOSCOPY);  Surgeon: Cody Maria MD;  Location: [facility] (2ND FLR);  Service: Endoscopy;  Laterality: N/A;  schedule 12 weeks from now  4/13/20 - removed from 4/29/20, needs to be rescheduled high priority - pg  4/28/20-scheduled from high priority list-BB  Covid screening test scheduled for 4/29/20-BB  instructions emailed to patient-BB    EXCLUSION OF LEFT ATRIAL APPENDAGE N/A 04/26/2021    Procedure: EXCLUSION, LEFT ATRIAL APPENDAGE;  Surgeon: Fidencio Galindo MD;  Location: [facility] OR [address] [place] [address];  Service: Cardiovascular;  Laterality: N/A;  Left Atrial Appendage Resection    HERNIA REPAIR  April/May 2022    LEFT HEART CATHETERIZATION Left 04/21/2021    Procedure: Left heart cath;  Surgeon: Aric Alvarado MD;  Location: [facility] CATH LAB;  Service: Cardiology;  Laterality: Left;    UMBILICAL HERNIA REPAIR N/A 03/31/2022    Procedure: REPAIR, HERNIA, UMBILICAL, AGE 5 YEARS OR OLDER Open With or Without Mesh;  Surgeon: Matt Delgado MD;  Location: [facility] OR [address] [place] [address];  Service: General;  Laterality: N/A;      (Not in a hospital admission)    Review of patient's allergies indicates:   Allergen Reactions    Allegra-d 12 hour [fexofenadine-pseudoephedrine] Other (See Comments)     Trouble urinating    Mucinex d [pseudoephedrine-guaifenesin] Other (See Comments)     Trouble urinating    Losartan-hydrochlorothiazide Hives and Rash     Other reaction(s): Hives      Social History     Tobacco Use    Smoking status: Never     Passive exposure: Never    Smokeless tobacco: Never   Substance Use Topics    Alcohol use: Not Currently     Comment: few times a week       Family History   Problem Relation Name Age of Onset    Heart disease [facility]  Judy         , Coronary artery disease    Hypertension Mother Thao Kim     Cancer Father Michael Kim         colon/prostate    Colon polyps Father Michael Kim     Arthritis Father Michael Kim             Heart disease Father Michael Kim         Coronary artery disease, Afib, CHF    Hypertension Father Michael Kim     Hypertension Son x 1     Stroke Neg Hx      Diabetes Neg Hx      Celiac disease Neg Hx      Esophageal cancer Neg Hx      Liver cancer Neg Hx      Liver disease Neg Hx      Stomach cancer Neg Hx      Rectal cancer Neg Hx      Melanoma Neg Hx          Review of Systems :  Review of Systems   Constitutional:  Negative for fever, malaise/fatigue and weight loss.   HENT:  Negative for congestion, hearing loss and nosebleeds.    Eyes:  Negative for blurred vision.   Respiratory:  Negative for cough, sputum production and shortness of breath.    Cardiovascular:  Negative for chest pain and leg swelling.   Gastrointestinal:  Negative for abdominal pain, blood in stool, constipation, diarrhea, heartburn, melena, nausea and vomiting.   Genitourinary:  Negative for dysuria and hematuria.   Musculoskeletal:  Negative for back pain, joint pain and myalgias.   Skin:  Negative for itching and rash.   Neurological:  Negative for dizziness.   Endo/Heme/Allergies:  Does not bruise/bleed easily.   Psychiatric/Behavioral:  Negative for depression. The patient is not nervous/anxious.        Physical Exam :  Wt Readings from Last 3 Encounters:   08/15/24 67 kg (147 lb 11.3 oz)   24 67.5 kg (148 lb 13 oz)   24 67.3 kg (148 lb 5.9 oz)     Temp Readings from Last 3 Encounters:   24 98 °F (36.7 °C) (Oral)   23 98.2 °F (36.8 °C) (Oral)   23 99.7 °F (37.6 °C) (Oral)     BP Readings from Last 3 Encounters:   08/15/24 132/67   24 (!) 113/53   24 (!) 155/70     Pulse Readings from Last 3 Encounters:   08/15/24 (!) 49   24 (!) 46   24 (!)  48     There is no height or weight on file to calculate BMI.    Physical Exam  Vitals reviewed.   HENT:      Head: Normocephalic and atraumatic.      Nose: Nose normal.      Mouth/Throat:      Mouth: Mucous membranes are moist.   Eyes:      Pupils: Pupils are equal, round, and reactive to light.   Cardiovascular:      Rate and Rhythm: Normal rate and regular rhythm.   Abdominal:      General: Abdomen is flat.   Musculoskeletal:         General: Normal range of motion.      Cervical back: Normal range of motion and neck supple.   Skin:     General: Skin is warm and dry.   Neurological:      Mental Status: He is alert. Mental status is at baseline.   Psychiatric:         Mood and Affect: Mood normal.         Behavior: Behavior normal.           Pertinent Diagnostic studies:            No results found for this or any previous visit (from the past 24 hour(s)).    Assessment/Plan :     Microcytic anemia with neutropenia and thrombocytopenia   -chronic, most likely chronic disease  -up-to-date on his colonoscopy and denies blood in his stools. His iron supplement was stopped and he was referred to hematology on 8/15/24   -Will rule out vitamin and other def/abnormalities with labs today  -UTD with cancer screenings   -Iron levels wnl but ferritin lower end of normal will get sol trans  -Get full anemia and vitamin and path rev CBC workup today  -RTC in 5 weeks for MD visit for results based on results may arrange bmbx      CKD 3a  - Creatinine increased from 1.3 up to 1.5 after ACEi was started, has since remained stable at 1.5 range  -Per nephrology         PAF/HTN  -on Diltiazem and CAD s/p CABG 2021.  -Per cardiology       CAD/CABG 4/26/21 (LIMA-LAD, GIORGIO-D1, SVG-OM1)   -Meds; ASA 81 mg, Lipitor 40 mg daily  -Per cardiology        Small right renal mass. Ultrasound-6/28/2023- 1.2 cm RMP isoechoic exophytic lesion.  -MRI Abd W WO-6/29/2023- 0.9 cm indeterminate hypoenhancing lesion at RMP. Too small to  characterize.  -Ultrasound-1/19/2024- stable 1.1 cm RMP lesion.  -On surveillance with urology       Time spent on case: 45 minutes    Summary of orders placed this encounter:  Orders Placed This Encounter   Procedures    CBC W/ AUTO DIFFERENTIAL    Pathologist Interpretation Differential    COPPER, SERUM    ZINC    Sedimentation rate    METHYLMALONIC ACID, SERUM    RETICULOCYTES    FOLATE    VITAMIN B12    Soluble Transferrin Receptor       Future Appointments   Date Time Provider Department Center   8/20/2024  1:40 PM Edmond Encarnacion MD [facility] CARDIO [place] [facility]   9/19/2024  1:15 PM Trace Noriega MD [facility] PRICRE [place]   10/2/2024  1:00 PM Swathi Saucedo NP [facility] HEMONC [place]   12/18/2024 10:45 AM [facility] OIC-MRI3 [facility]   12/19/2024 11:00 AM Fidencio Vivar MD [facility] UROLOGC Laureano Tatum MD   [place] [facility] [place]

## 2024-08-20 ENCOUNTER — OFFICE VISIT (OUTPATIENT)
Dept: CARDIOLOGY | Facility: CLINIC | Age: 69
End: 2024-08-20
Payer: MEDICARE

## 2024-08-20 VITALS
OXYGEN SATURATION: 98 % | SYSTOLIC BLOOD PRESSURE: 126 MMHG | HEIGHT: 67 IN | RESPIRATION RATE: 18 BRPM | DIASTOLIC BLOOD PRESSURE: 66 MMHG | WEIGHT: 148.81 LBS | HEART RATE: 48 BPM | BODY MASS INDEX: 23.36 KG/M2

## 2024-08-20 DIAGNOSIS — E78.2 MIXED HYPERLIPIDEMIA: ICD-10-CM

## 2024-08-20 DIAGNOSIS — R00.1 BRADYCARDIA: ICD-10-CM

## 2024-08-20 DIAGNOSIS — N18.31 STAGE 3A CHRONIC KIDNEY DISEASE: ICD-10-CM

## 2024-08-20 DIAGNOSIS — I25.810 CORONARY ARTERY DISEASE INVOLVING CORONARY BYPASS GRAFT OF NATIVE HEART WITHOUT ANGINA PECTORIS: Primary | ICD-10-CM

## 2024-08-20 DIAGNOSIS — I70.0 AORTIC ATHEROSCLEROSIS: ICD-10-CM

## 2024-08-20 DIAGNOSIS — I10 ESSENTIAL HYPERTENSION: ICD-10-CM

## 2024-08-20 DIAGNOSIS — R00.2 HEART PALPITATIONS: ICD-10-CM

## 2024-08-20 DIAGNOSIS — Z95.1 S/P CABG (CORONARY ARTERY BYPASS GRAFT): ICD-10-CM

## 2024-08-20 LAB
FOLATE SERPL-MCNC: 17.7 NG/ML (ref 4–24)
OHS QRS DURATION: 82 MS
OHS QTC CALCULATION: 367 MS
PATH REV BLD -IMP: NORMAL
VIT B12 SERPL-MCNC: 684 PG/ML (ref 210–950)

## 2024-08-20 PROCEDURE — 93005 ELECTROCARDIOGRAM TRACING: CPT | Mod: PBBFAC | Performed by: INTERNAL MEDICINE

## 2024-08-20 PROCEDURE — 99214 OFFICE O/P EST MOD 30 MIN: CPT | Mod: S$PBB,,, | Performed by: INTERNAL MEDICINE

## 2024-08-20 PROCEDURE — 99214 OFFICE O/P EST MOD 30 MIN: CPT | Mod: PBBFAC | Performed by: INTERNAL MEDICINE

## 2024-08-20 PROCEDURE — 93010 ELECTROCARDIOGRAM REPORT: CPT | Mod: S$PBB,,, | Performed by: INTERNAL MEDICINE

## 2024-08-20 PROCEDURE — 99999 PR PBB SHADOW E&M-EST. PATIENT-LVL IV: CPT | Mod: PBBFAC,,, | Performed by: INTERNAL MEDICINE

## 2024-08-20 PROCEDURE — G2211 COMPLEX E/M VISIT ADD ON: HCPCS | Mod: S$PBB,,, | Performed by: INTERNAL MEDICINE

## 2024-08-20 RX ORDER — DILTIAZEM HYDROCHLORIDE 120 MG/1
120 CAPSULE, COATED, EXTENDED RELEASE ORAL DAILY
Qty: 90 CAPSULE | Refills: 3 | OUTPATIENT
Start: 2024-08-20 | End: 2025-08-20

## 2024-08-20 RX ORDER — ASPIRIN 81 MG/1
81 TABLET ORAL DAILY
COMMUNITY
Start: 2024-08-20 | End: 2025-08-20

## 2024-08-20 RX ORDER — ATORVASTATIN CALCIUM 40 MG/1
40 TABLET, FILM COATED ORAL NIGHTLY
Qty: 90 TABLET | Refills: 3 | Status: SHIPPED | OUTPATIENT
Start: 2024-08-20 | End: 2025-08-20

## 2024-08-20 NOTE — PROGRESS NOTES
CARDIOVASCULAR PROGRESS NOTE    REASON FOR CONSULT:   Abdias Kim is a 69 y.o. male who presents for follow up of CAD/CABG.    PCP: Hari  Neph: Bartolo (resident)/Harish  HISTORY OF PRESENT ILLNESS:   The patient returns for follow up.  He denies intercurrent angina, dyspnea, or syncope.  He does describe episodic palpitations and orthostasis.  There has been no PND, orthopnea, melena, hematuria, or claudication symptoms.  I reviewed the results of his echocardiogram which were stable without evidence of significant aortic root aneurysm.  His EKG notes a significant sinus bradycardia.  I plan to stop his diltiazem.  He will continue to monitor his blood pressure in the digital medicine program.  I have sent a message to the digital medicine program to titrate his lisinopril p.r.n..    CARDIOVASCULAR HISTORY:   CAD/CABG 4/26/21 (LIMA-LAD, GIORGIO-D1, SVG-OM1)    Ao root dil, 3.7cm (echo 11/2023)    PAST MEDICAL HISTORY:     Past Medical History:   Diagnosis Date    Anemia     Diverticulosis     Hypertension        PAST SURGICAL HISTORY:     Past Surgical History:   Procedure Laterality Date    APPENDECTOMY      COLONOSCOPY N/A 01/31/2017    Procedure: COLONOSCOPY;  Surgeon: BASILIO Winn MD;  Location: [facility] ENDO (4TH FLR);  Service: Endoscopy;  Laterality: N/A;    COLONOSCOPY N/A 02/05/2020    Procedure: COLONOSCOPY;  Surgeon: Cody Maria MD;  Location: [facility] ENDO (4TH FLR);  Service: Endoscopy;  Laterality: N/A;  OKay for Crow or ghazala. Needs to be done in Jan 2020    CORONARY ARTERY BYPASS GRAFT  05/26/2021    CORONARY ARTERY BYPASS GRAFT (CABG) N/A 04/26/2021    Procedure: CORONARY ARTERY BYPASS GRAFT (CABG)X3 WITH VEIN HARVEST;  Surgeon: Fidencio Galindo MD;  [facility];  Service: Cardiovascular;  Laterality: N/A;    ENDOSCOPIC HARVEST OF VEIN Left 04/26/2021    Procedure: HARVEST-VEIN-ENDOVASCULAR FOR CABGX3;  Surgeon: Fidencio Galindo MD;  [facility];  Service:  Cardiovascular;  Laterality: Left;  Vein [place] Start time: 1201pm  Vein [place] Stop time: 1226pm    Vein Prep Start time: 1227pm  Vein Prep Stop time: 1250pm    ESOPHAGOGASTRODUODENOSCOPY N/A 02/05/2020    Procedure: EGD (ESOPHAGOGASTRODUODENOSCOPY);  Surgeon: Cody Maria MD;  Location: [facility] (4TH FLR);  Service: Endoscopy;  Laterality: N/A;    ESOPHAGOGASTRODUODENOSCOPY N/A 04/30/2020    Procedure: EGD (ESOPHAGOGASTRODUODENOSCOPY);  Surgeon: Cody Maria MD;  Location: [facility] (2ND FLR);  Service: Endoscopy;  Laterality: N/A;  schedule 12 weeks from now  4/13/20 - removed from 4/29/20, needs to be rescheduled high priority - pg  4/28/20-scheduled from high priority list-BB  Covid screening test scheduled for 4/29/20-BB  instructions emailed to patient-BB    EXCLUSION OF LEFT ATRIAL APPENDAGE N/A 04/26/2021    Procedure: EXCLUSION, LEFT ATRIAL APPENDAGE;  Surgeon: Fidencio Galindo MD;  Location: [facility] OR [address] [place] [address];  Service: Cardiovascular;  Laterality: N/A;  Left Atrial Appendage Resection    HERNIA REPAIR  April/May 2022    LEFT HEART CATHETERIZATION Left 04/21/2021    Procedure: Left heart cath;  Surgeon: Aric Alvarado MD;  Location: [facility] CATH LAB;  Service: Cardiology;  Laterality: Left;    UMBILICAL HERNIA REPAIR N/A 03/31/2022    Procedure: REPAIR, HERNIA, UMBILICAL, AGE 5 YEARS OR OLDER Open With or Without Mesh;  Surgeon: Matt Delgado MD;  Location: [facility] OR [address] [place] [address];  Service: General;  Laterality: N/A;       ALLERGIES AND MEDICATION:     Review of patient's allergies indicates:   Allergen Reactions    Allegra-d 12 hour [fexofenadine-pseudoephedrine] Other (See Comments)     Trouble urinating    Mucinex d [pseudoephedrine-guaifenesin] Other (See Comments)     Trouble urinating    Losartan-hydrochlorothiazide Hives and Rash     Other reaction(s): Hives        Medication List            Accurate as of August 20, 2024  1:41 PM. If you have any questions, ask your nurse or doctor.                 CHANGE how you take these medications      aspirin 81 MG EC tablet  Commonly known as: ECOTRIN  Take 1 tablet (81 mg total) by mouth once daily.  What changed: Another medication with the same name was removed. Continue taking this medication, and follow the directions you see here.  Changed by: Edmond Encarnacion MD     atorvastatin 40 MG tablet  Commonly known as: LIPITOR  Take 1 tablet (40 mg total) by mouth every evening.  What changed: Another medication with the same name was removed. Continue taking this medication, and follow the directions you see here.  Changed by: Edmond Encarnacion MD     diltiaZEM 120 MG Cp24  Commonly known as: CARDIZEM CD  Take 1 capsule (120 mg total) by mouth once daily.  What changed: Another medication with the same name was removed. Continue taking this medication, and follow the directions you see here.  Changed by: Edmond Encarnacion MD     lisinopriL 5 MG tablet  Commonly known as: PRINIVIL,ZESTRIL  Take 1 tablet (5 mg total) by mouth once daily.  What changed: Another medication with the same name was removed. Continue taking this medication, and follow the directions you see here.  Changed by: Edmond Encarnacion MD            CONTINUE taking these medications      cetirizine 10 MG tablet  Commonly known as: ZYRTEC     famotidine 20 MG tablet  Commonly known as: PEPCID  Take 2 tablets (40 mg total) by mouth once daily.     fluticasone propionate 50 mcg/actuation nasal spray  Commonly known as: FLONASE  1 spray (50 mcg total) by Each Nostril route once daily.     multivitamin per tablet  Commonly known as: THERAGRAN     sildenafiL 100 MG tablet  Commonly known as: VIAGRA  Take 0.5 tablets (50 mg total) by mouth as needed for Erectile Dysfunction.            STOP taking these medications      fluticasone furoate 50 mcg/actuation inhaler  Commonly known as: ARNUITY ELLIPTA  Stopped by: Edmond Encarnacion MD              SOCIAL HISTORY:     Social History      Socioeconomic History    Marital status:    Tobacco Use    Smoking status: Never     Passive exposure: Never    Smokeless tobacco: Never   Substance and Sexual Activity    Alcohol use: Not Currently     Comment: few times a week     Drug use: No    Sexual activity: Yes     Partners: Female     Birth control/protection: Other-see comments     Comment: Wife, tubal ligation     Social Determinants of Health     Financial Resource Strain: Low Risk  (2024)    Overall Financial Resource Strain (CARDIA)     Difficulty of Paying Living Expenses: Not hard at all   Food Insecurity: No Food Insecurity (2024)    Hunger Vital Sign     Worried About Running Out of Food in the Last Year: Never true     Ran Out of Food in the Last Year: Never true   Transportation Needs: No Transportation Needs (2024)    PRAPARE - Transportation     Lack of Transportation (Medical): No     Lack of Transportation (Non-Medical): No   Physical Activity: Sufficiently Active (2024)    Exercise Vital Sign     Days of Exercise per Week: 6 days     Minutes of Exercise per Session: 50 min   Stress: Stress Concern Present (2024)    [employer] - Occupational Stress Questionnaire     Feeling of Stress : To some extent   Housing Stability: Low Risk  (2024)    Housing Stability Vital Sign     Unable to Pay for Housing in the Last Year: No     Number of Places Lived in the Last Year: 1     Unstable Housing in the Last Year: No       FAMILY HISTORY:     Family History   Problem Relation Name Age of Onset    Heart disease Mother Thao Kim         , Coronary artery disease    Hypertension Mother Thao Kim     Cancer Father Michael Kim         colon/prostate    Colon polyps Father Michael Kim     Arthritis Father Michael Kim             Heart disease Father Michael Kim         Coronary artery disease, Afib, CHF    Hypertension Father Michael Kim      "Hypertension Son x 1     Stroke Neg Hx      Diabetes Neg Hx      Celiac disease Neg Hx      Esophageal cancer Neg Hx      Liver cancer Neg Hx      Liver disease Neg Hx      Stomach cancer Neg Hx      Rectal cancer Neg Hx      Melanoma Neg Hx         REVIEW OF SYSTEMS:   Review of Systems   Constitutional:  Negative for chills, diaphoresis and fever.   HENT:  Negative for nosebleeds.    Eyes:  Negative for blurred vision, double vision and photophobia.   Respiratory:  Negative for hemoptysis, shortness of breath and wheezing.    Cardiovascular:  Positive for palpitations. Negative for chest pain, orthopnea, claudication, leg swelling and PND.   Gastrointestinal:  Negative for abdominal pain, blood in stool, heartburn, melena, nausea and vomiting.   Genitourinary:  Negative for flank pain and hematuria.   Musculoskeletal:  Negative for falls, myalgias and neck pain.   Skin:  Negative for rash.   Neurological:  Positive for dizziness (Orthostasis). Negative for seizures, loss of consciousness, weakness and headaches.   Endo/Heme/Allergies:  Negative for polydipsia. Does not bruise/bleed easily.   Psychiatric/Behavioral:  Negative for depression and memory loss. The patient is not nervous/anxious.        PHYSICAL EXAM:     Vitals:    08/20/24 1335   BP: 126/66   Pulse: (!) 48   Resp: 18    Body mass index is 23.31 kg/m².  Weight: 67.5 kg (148 lb 13 oz)   Height: 5' 7" (170.2 cm)     Physical Exam  Vitals reviewed.   Constitutional:       General: He is not in acute distress.     Appearance: Normal appearance. He is well-developed and normal weight. He is not ill-appearing, toxic-appearing or diaphoretic.   HENT:      Head: Normocephalic and atraumatic.   Eyes:      General: No scleral icterus.     Extraocular Movements: Extraocular movements intact.      Conjunctiva/sclera: Conjunctivae normal.      Pupils: Pupils are equal, round, and reactive to light.   Neck:      Thyroid: No thyromegaly.      Vascular: Normal " carotid pulses. No carotid bruit or JVD.      Trachea: Trachea normal.   Cardiovascular:      Rate and Rhythm: Regular rhythm. Bradycardia present.      Heart sounds: S1 normal and S2 normal. No murmur heard.     No friction rub. No gallop.   Pulmonary:      Effort: Pulmonary effort is normal. No respiratory distress.      Breath sounds: Normal breath sounds. No stridor. No wheezing, rhonchi or rales.   Chest:      Chest wall: No tenderness.   Abdominal:      General: There is no distension.      Palpations: Abdomen is soft.   Musculoskeletal:         General: No swelling or tenderness. Normal range of motion.      Cervical back: Normal range of motion and neck supple. No edema or rigidity.      Right lower leg: No edema.      Left lower leg: No edema.   Feet:      Right foot:      Skin integrity: No ulcer.      Left foot:      Skin integrity: No ulcer.   Skin:     General: Skin is warm and dry.      Coloration: Skin is not jaundiced.   Neurological:      General: No focal deficit present.      Mental Status: He is alert and oriented to person, place, and time.      Cranial Nerves: No cranial nerve deficit.   Psychiatric:         Mood and Affect: Mood normal.         Speech: Speech normal.         Behavior: Behavior normal. Behavior is cooperative.         DATA:   EKG: (personally reviewed tracing)  8/20/24 SR 44    Laboratory:  CBC:  Recent Labs   Lab 07/11/24  1247 08/15/24  1510 08/19/24  1347   WBC 3.45 L 3.61 L 4.32   Hemoglobin 10.9 L 11.0 L 11.2 L   Hematocrit 33.8 L 34.2 L 35.9 L   Platelets 165 127 L 152       CHEMISTRIES:  Recent Labs   Lab 03/12/22  1803 08/04/22  1029 12/27/23  1104 01/31/24  0900 06/17/24  1040 07/11/24  1247 08/15/24  1512   Glucose 76   < > 94 83 92 91 79   Sodium 140   < > 141 140 139 139 141   Potassium 4.4   < > 4.6 4.3 4.2 5.0 5.1   BUN 16   < > 16 17 17 17 20   Creatinine 1.3   < > 1.3 1.3 1.5 H 1.5 H 1.3   eGFR if non  56.1 A  --   --   --   --   --   --    eGFR   --    < > 59.1 A 59.1 A 49.8 A 49.8 A 58.7 A   Calcium 9.8   < > 9.1 9.4 9.2 9.4 9.6    < > = values in this interval not displayed.       CARDIAC BIOMARKERS:        COAGS:        LIPIDS/LFTS:  Recent Labs   Lab 11/16/21  0956 03/12/22  1803 08/04/22  1029 09/05/23  0859   Cholesterol 102 L  --  121 108 L   Triglycerides 34  --  30 42   HDL 50  --  59 50   LDL Cholesterol 45.2 L  --  56.0 L 49.6 L   Non-HDL Cholesterol 52  --  62 58   AST  --  25 23 19   ALT  --  26 30 21       Cardiovascular Testing:  Echo 6/28/24    Left Ventricle: The left ventricle is normal in size. There is mild concentric hypertrophy. There is normal systolic function with a visually estimated ejection fraction of 60 - 65%.    Right Ventricle: Normal right ventricular cavity size. Systolic function is normal.    Left Atrium: Left atrium is mildly dilated.    Aorta: Aortic root is mildly dilated measuring 3.62 cm.    Pulmonary Artery: The estimated pulmonary artery systolic pressure is 31 mmHg.    IVC/SVC: Normal venous pressure at 3 mmHg.    Holter 11/8/23    NSR Heart rates varied between 42 and 126 BPM with an average of 63 BPM.    There were very rare PVCs totalling 6 and averaging 0.13 per hour.    There were very rare PACs totalling 85 and averaging 1.77 per hour.    Aortic US 11/8/23  Suprarenal abdominal aorta not well visualized due to overlying bowel gas.    Otherwise, no evidence of AAA.    Carotid US 4/22/21  There is 0-19% right Internal Carotid Stenosis.  There is 0-19% left Internal Carotid Stenosis.    Cath 4/21/21-> CABG 4/26/21 (LIMA-LAD, GIORGIO-D1, SVG-OM1)  The Ost LM to Mid LM lesion was 70% stenosed.  The Ost LAD to Prox LAD lesion was 90% stenosed.  The Dist LAD lesion was 60% stenosed.  The estimated blood loss was none.  Recommend CABG, discussed with Gia.      ASSESSMENT:   # CAD/CABG x3 4/2021  # HTN, controlled  # HLP on atorva 40mg  # palps, persistent, holter (with sxs)/echo 11/2023 neg.  Sinus  chico/orthostasis noted.   # CKD3a, stable  # Ao root dil, 3.7->3.6cm (echo 6/2024)  # aortic atherosclerosis (CT chest 4/21/21)    PLAN:   Cont med rx  Cont ASA 81mg qd  Stop dilt  Message sent to digital htn program (Mruphy Green PharmD) to monitor BP and adjust lisinopril prn.  RTC 1 year (Aug 2025)    The above documents medical care services that are part of ongoing care related to this patient's serious/complex condition (Code ) (HTN/HLP/CAD).        Edmond Encarnacion MD, FACC

## 2024-08-21 ENCOUNTER — PATIENT MESSAGE (OUTPATIENT)
Dept: CARDIOLOGY | Facility: CLINIC | Age: 69
End: 2024-08-21
Payer: MEDICARE

## 2024-08-22 LAB
STFR SERPL-MCNC: 3.2 MG/L (ref 1.8–4.6)
ZINC SERPL-MCNC: 91 UG/DL (ref 60–130)

## 2024-08-23 ENCOUNTER — PATIENT MESSAGE (OUTPATIENT)
Dept: PRIMARY CARE CLINIC | Facility: CLINIC | Age: 69
End: 2024-08-23
Payer: MEDICARE

## 2024-08-23 DIAGNOSIS — I70.0 AORTIC ATHEROSCLEROSIS: Primary | ICD-10-CM

## 2024-08-23 DIAGNOSIS — Z12.5 PROSTATE CANCER SCREENING: ICD-10-CM

## 2024-08-23 LAB
COPPER SERPL-MCNC: 793 UG/L (ref 665–1480)
METHYLMALONATE SERPL-SCNC: 0.15 UMOL/L

## 2024-09-02 ENCOUNTER — PATIENT MESSAGE (OUTPATIENT)
Dept: CARDIOLOGY | Facility: CLINIC | Age: 69
End: 2024-09-02
Payer: MEDICARE

## 2024-09-03 RX ORDER — ASPIRIN 81 MG/1
81 TABLET ORAL DAILY
Qty: 90 TABLET | Refills: 3 | Status: SHIPPED | OUTPATIENT
Start: 2024-09-03 | End: 2025-09-03

## 2024-09-03 RX ORDER — ASPIRIN 81 MG/1
81 TABLET ORAL DAILY
Status: CANCELLED | COMMUNITY
Start: 2024-09-03 | End: 2025-09-03

## 2024-09-05 ENCOUNTER — PATIENT MESSAGE (OUTPATIENT)
Dept: PRIMARY CARE CLINIC | Facility: CLINIC | Age: 69
End: 2024-09-05
Payer: MEDICARE

## 2024-09-13 ENCOUNTER — LAB VISIT (OUTPATIENT)
Dept: LAB | Facility: HOSPITAL | Age: 69
End: 2024-09-13
Attending: INTERNAL MEDICINE
Payer: MEDICARE

## 2024-09-13 DIAGNOSIS — Z12.5 PROSTATE CANCER SCREENING: ICD-10-CM

## 2024-09-13 DIAGNOSIS — I70.0 AORTIC ATHEROSCLEROSIS: ICD-10-CM

## 2024-09-13 LAB
CHOLEST SERPL-MCNC: 111 MG/DL (ref 120–199)
CHOLEST/HDLC SERPL: 2.4 {RATIO} (ref 2–5)
COMPLEXED PSA SERPL-MCNC: 0.5 NG/ML (ref 0–4)
HDLC SERPL-MCNC: 47 MG/DL (ref 40–75)
HDLC SERPL: 42.3 % (ref 20–50)
LDLC SERPL CALC-MCNC: 52.4 MG/DL (ref 63–159)
NONHDLC SERPL-MCNC: 64 MG/DL
TRIGL SERPL-MCNC: 58 MG/DL (ref 30–150)

## 2024-09-13 PROCEDURE — 84153 ASSAY OF PSA TOTAL: CPT | Performed by: INTERNAL MEDICINE

## 2024-09-13 PROCEDURE — 80061 LIPID PANEL: CPT | Performed by: INTERNAL MEDICINE

## 2024-09-13 PROCEDURE — 36415 COLL VENOUS BLD VENIPUNCTURE: CPT | Mod: PO | Performed by: INTERNAL MEDICINE

## 2024-09-19 ENCOUNTER — OFFICE VISIT (OUTPATIENT)
Dept: PRIMARY CARE CLINIC | Facility: CLINIC | Age: 69
End: 2024-09-19
Payer: MEDICARE

## 2024-09-19 VITALS
HEIGHT: 67 IN | DIASTOLIC BLOOD PRESSURE: 74 MMHG | HEART RATE: 56 BPM | SYSTOLIC BLOOD PRESSURE: 110 MMHG | WEIGHT: 152.31 LBS | TEMPERATURE: 97 F | RESPIRATION RATE: 18 BRPM | BODY MASS INDEX: 23.91 KG/M2 | OXYGEN SATURATION: 99 %

## 2024-09-19 DIAGNOSIS — I48.0 PAROXYSMAL ATRIAL FIBRILLATION: ICD-10-CM

## 2024-09-19 DIAGNOSIS — M25.561 ACUTE PAIN OF RIGHT KNEE: ICD-10-CM

## 2024-09-19 DIAGNOSIS — I70.0 AORTIC ATHEROSCLEROSIS: Primary | ICD-10-CM

## 2024-09-19 DIAGNOSIS — I13.10 HYPERTENSIVE CARDIOVASCULAR-RENAL DISEASE, STAGE 1-4 OR UNSPECIFIED CHRONIC KIDNEY DISEASE, WITHOUT HEART FAILURE: ICD-10-CM

## 2024-09-19 DIAGNOSIS — I25.10 CORONARY ARTERY DISEASE INVOLVING NATIVE CORONARY ARTERY OF NATIVE HEART WITHOUT ANGINA PECTORIS: ICD-10-CM

## 2024-09-19 DIAGNOSIS — K20.0 EOSINOPHILIC ESOPHAGITIS: ICD-10-CM

## 2024-09-19 DIAGNOSIS — N18.31 STAGE 3A CHRONIC KIDNEY DISEASE: ICD-10-CM

## 2024-09-19 PROCEDURE — 99214 OFFICE O/P EST MOD 30 MIN: CPT | Mod: S$PBB,,, | Performed by: INTERNAL MEDICINE

## 2024-09-19 PROCEDURE — 99999 PR PBB SHADOW E&M-EST. PATIENT-LVL IV: CPT | Mod: PBBFAC,,, | Performed by: INTERNAL MEDICINE

## 2024-09-19 PROCEDURE — 99214 OFFICE O/P EST MOD 30 MIN: CPT | Mod: PBBFAC | Performed by: INTERNAL MEDICINE

## 2024-09-19 NOTE — PROGRESS NOTES
[facility] Note    Chief Complaint      Chief Complaint   Patient presents with    Follow-up     6m       History of Present Illness      Abdias Kim is a 69 y.o. male who presents today for   Chief Complaint   Patient presents with    Follow-up     6m   .  Patient comes to appointment here for 6 m cehcklup for chiornc issues as below . He is feeling well is complaint ith all meds and speicslit f/u . Will get gflu vaccine today . He continues with his walking regimen for exercise and is eating healthy diet . He complains of pain in his right knee .     HPI    No problem-specific Assessment & Plan notes found for this encounter.       Problem List Items Addressed This Visit          Cardiac/Vascular    Hypertensive cardiovascular-renal disease, stage 1-4 or unspecified chronic kidney disease, without heart failure    Overview     bp well controlled on current regimen lisinopril has been added now off diltiazem         CAD (coronary artery disease)    Overview     Dr jarvis managing is on good regimen statin asa and cardizem .         Paroxysmal atrial fibrillation    Overview     Cardiology managing only on asa currently will defer to cardiology for management          Aortic atherosclerosis - Primary    Overview     Stable cont atorvasatin 40 mg             Renal/    Stage 3a chronic kidney disease    Overview     Stable             GI    Eosinophilic esophagitis    Overview     Stable on pepcid             Past Medical History:  Past Medical History:   Diagnosis Date    Anemia     Diverticulosis     Hypertension        Past Surgical History:  Past Surgical History:   Procedure Laterality Date    APPENDECTOMY      COLONOSCOPY N/A 01/31/2017    Procedure: COLONOSCOPY;  Surgeon: BASILIO Winn MD;  Location: [facility] ([address] [place] [address]);  Service: Endoscopy;  Laterality: N/A;    COLONOSCOPY N/A 02/05/2020    Procedure: COLONOSCOPY;  Surgeon: Cody Maria MD;  Location: [facility]  (4TH FLR);  Service: Endoscopy;  Laterality: N/A;  Luis Maria or ghazala. Needs to be done in Jan 2020    CORONARY ARTERY BYPASS GRAFT  05/26/2021    CORONARY ARTERY BYPASS GRAFT (CABG) N/A 04/26/2021    Procedure: CORONARY ARTERY BYPASS GRAFT (CABG)X3 WITH VEIN HARVEST;  Surgeon: Fidencio Galindo MD;  [facility];  Service: Cardiovascular;  Laterality: N/A;    ENDOSCOPIC HARVEST OF VEIN Left 04/26/2021    Procedure: HARVEST-VEIN-ENDOVASCULAR FOR CABGX3;  Surgeon: Fidencio Galindo MD;  [facility];  Service: Cardiovascular;  Laterality: Left;  Vein [place] Start time: 1201pm  Vein [place] Stop time: 1226pm    Vein Prep Start time: 1227pm  Vein Prep Stop time: 1250pm    ESOPHAGOGASTRODUODENOSCOPY N/A 02/05/2020    Procedure: EGD (ESOPHAGOGASTRODUODENOSCOPY);  Surgeon: Cody Maria MD;  Location: [facility] ENDO (4TH FLR);  Service: Endoscopy;  Laterality: N/A;    ESOPHAGOGASTRODUODENOSCOPY N/A 04/30/2020    Procedure: EGD (ESOPHAGOGASTRODUODENOSCOPY);  Surgeon: Cody Maria MD;  Location: [facility] ENDO (2ND FLR);  Service: Endoscopy;  Laterality: N/A;  schedule 12 weeks from now  4/13/20 - removed from 4/29/20, needs to be rescheduled high priority - pg  4/28/20-scheduled from high priority list-BB  Covid screening test scheduled for 4/29/20-BB  instructions emailed to patient-BB    EXCLUSION OF LEFT ATRIAL APPENDAGE N/A 04/26/2021    Procedure: EXCLUSION, LEFT ATRIAL APPENDAGE;  Surgeon: Fidencio Galindo MD;  [facility];  Service: Cardiovascular;  Laterality: N/A;  Left Atrial Appendage Resection    HERNIA REPAIR  April/May 2022    LEFT HEART CATHETERIZATION Left 04/21/2021    Procedure: Left heart cath;  Surgeon: Aric Alvarado MD;  Location: [facility] CATH LAB;  Service: Cardiology;  Laterality: Left;    UMBILICAL HERNIA REPAIR N/A 03/31/2022    Procedure: REPAIR, HERNIA, UMBILICAL, AGE 5 YEARS OR OLDER Open With or Without Mesh;  Surgeon: aMtt Delgado,  MD;  Location: [facility] OR [address] [place] [address];  Service: General;  Laterality: N/A;       Family History:  family history includes Arthritis in his father; Cancer in his father; Colon polyps in his father; Heart disease in his father and mother; Hypertension in his father, mother, and son.     Social History:  Social History     Socioeconomic History    Marital status:    Tobacco Use    Smoking status: Never     Passive exposure: Never    Smokeless tobacco: Never   Substance and Sexual Activity    Alcohol use: Yes     Alcohol/week: 2.0 standard drinks of alcohol     Types: 2 Cans of beer per week     Comment: few times a week     Drug use: No    Sexual activity: Yes     Partners: Female     Birth control/protection: Other-see comments     Comment: Wife, tubal ligation     Social Determinants of Health     Financial Resource Strain: Low Risk  (1/1/2024)    Overall Financial Resource Strain (CARDIA)     Difficulty of Paying Living Expenses: Not hard at all   Food Insecurity: No Food Insecurity (1/1/2024)    Hunger Vital Sign     Worried About Running Out of Food in the Last Year: Never true     Ran Out of Food in the Last Year: Never true   Transportation Needs: No Transportation Needs (1/1/2024)    PRAPARE - Transportation     Lack of Transportation (Medical): No     Lack of Transportation (Non-Medical): No   Physical Activity: Sufficiently Active (1/1/2024)    Exercise Vital Sign     Days of Exercise per Week: 6 days     Minutes of Exercise per Session: 50 min   Stress: Stress Concern Present (1/1/2024)    [employer] [place] [employer] - Occupational Stress Questionnaire     Feeling of Stress : To some extent   Housing Stability: Low Risk  (1/1/2024)    Housing Stability Vital Sign     Unable to Pay for Housing in the Last Year: No     Number of Places Lived in the Last Year: 1     Unstable Housing in the Last Year: No       Review of Systems:   Review of Systems   Constitutional:  Negative for  fever and weight loss.   HENT:  Negative for congestion, hearing loss and sore throat.    Eyes:  Negative for blurred vision.   Respiratory:  Negative for cough and shortness of breath.    Cardiovascular:  Negative for chest pain, palpitations, claudication and leg swelling.   Gastrointestinal:  Negative for abdominal pain, constipation, diarrhea and heartburn.   Genitourinary:  Negative for dysuria.   Musculoskeletal:  Negative for back pain and myalgias.   Skin:  Negative for rash.   Neurological:  Negative for focal weakness and headaches.   Psychiatric/Behavioral:  Negative for depression and suicidal ideas. The patient is not nervous/anxious.         Medications:  Outpatient Encounter Medications as of 9/19/2024   Medication Sig Note Dispense Refill    aspirin (ECOTRIN) 81 MG EC tablet Take 1 tablet (81 mg total) by mouth once daily.  90 tablet 3    atorvastatin (LIPITOR) 40 MG tablet Take 1 tablet (40 mg total) by mouth every evening.  90 tablet 3    cetirizine (ZYRTEC) 10 MG tablet Take 1 tablet by mouth Daily. 3/30/2022: Hold am of surgery       famotidine (PEPCID) 20 MG tablet Take 2 tablets (40 mg total) by mouth once daily.  180 tablet 0    fluticasone propionate (FLONASE) 50 mcg/actuation nasal spray 1 spray (50 mcg total) by Each Nostril route once daily.  15.8 mL 3    lisinopriL (PRINIVIL,ZESTRIL) 5 MG tablet Take 1 tablet (5 mg total) by mouth once daily.  90 tablet 3    multivitamin (THERAGRAN) per tablet Take 1 tablet by mouth once daily. 10/23/2023: Taking centrum      sildenafiL (VIAGRA) 100 MG tablet Take 0.5 tablets (50 mg total) by mouth as needed for Erectile Dysfunction.  10 tablet 3    [DISCONTINUED] aspirin (ECOTRIN) 81 MG EC tablet Take 1 tablet (81 mg total) by mouth once daily.        No facility-administered encounter medications on file as of 9/19/2024.       Allergies:  Review of patient's allergies indicates:   Allergen Reactions    Allegra-d 12 hour  "[fexofenadine-pseudoephedrine] Other (See Comments)     Trouble urinating    Mucinex d [pseudoephedrine-guaifenesin] Other (See Comments)     Trouble urinating    Losartan-hydrochlorothiazide Hives and Rash     Other reaction(s): Hives         Physical Exam      Vitals:    09/19/24 1313   BP: 110/74   Pulse: (!) 56   Resp: 18   Temp: 97 °F (36.1 °C)        Vital Signs  Temp: 97 °F (36.1 °C)  Temp Source: Oral  Pulse: (!) 56  Resp: 18  SpO2: 99 %  BP: 110/74  BP Location: Right arm  Patient Position: Sitting  Pain Score: 0-No pain  Height and Weight  Height: 5' 7" (170.2 cm)  Weight: 69.1 kg (152 lb 5.4 oz)  BSA (Calculated - sq m): 1.81 sq meters  BMI (Calculated): 23.9  Weight in (lb) to have BMI = 25: 159.3]     Body mass index is 23.86 kg/m².    Physical Exam  Constitutional:       Appearance: He is well-developed.   HENT:      Head: Normocephalic.   Eyes:      Pupils: Pupils are equal, round, and reactive to light.   Neck:      Thyroid: No thyromegaly.   Cardiovascular:      Rate and Rhythm: Normal rate and regular rhythm.      Heart sounds: No murmur heard.     No friction rub. No gallop.   Pulmonary:      Effort: Pulmonary effort is normal.      Breath sounds: Normal breath sounds.   Abdominal:      General: Bowel sounds are normal.      Palpations: Abdomen is soft.   Musculoskeletal:         General: Normal range of motion.      Cervical back: Normal range of motion.   Skin:     General: Skin is warm and dry.   Neurological:      Mental Status: He is alert and oriented to person, place, and time.      Sensory: No sensory deficit.   Psychiatric:         Behavior: Behavior normal.        Laboratory:  CBC:  Recent Labs   Lab Result Units 07/11/24  1247 08/15/24  1510 08/19/24  1347   WBC K/uL 3.45* 3.61* 4.32   RBC M/uL 4.74 4.81 5.01   Hemoglobin g/dL 10.9* 11.0* 11.2*   Hematocrit % 33.8* 34.2* 35.9*   Platelets K/uL 165 127* 152   MCV fL 71* 71* 72*   MCH pg 23.0* 22.9* 22.4*   MCHC g/dL 32.2 32.2 31.2* " "    CMP:  Recent Labs   Lab Result Units 08/15/24  1512   Glucose mg/dL 79   Calcium mg/dL 9.6   Albumin g/dL 4.0   Sodium mmol/L 141   Potassium mmol/L 5.1   CO2 mmol/L 24   Chloride mmol/L 109   BUN mg/dL 20     URINALYSIS:  No results for input(s): "COLORU", "CLARITYU", "SPECGRAV", "PHUR", "PROTEINUA", "GLUCOSEU", "BILIRUBINCON", "BLOODU", "WBCU", "RBCU", "BACTERIA", "MUCUS", "NITRITE", "LEUKOCYTESUR", "UROBILINOGEN", "HYALINECASTS" in the last 2160 hours.   LIPIDS:  Recent Labs   Lab Result Units 09/13/24  1022   HDL mg/dL 47   Cholesterol mg/dL 111*   Triglycerides mg/dL 58   LDL Cholesterol mg/dL 52.4*   HDL/Cholesterol Ratio % 42.3   Non-HDL Cholesterol mg/dL 64   Total Cholesterol/HDL Ratio  2.4     TSH:  No results for input(s): "TSH" in the last 2160 hours.  A1C:  No results for input(s): "HGBA1C" in the last 2160 hours.    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         Paroxysmal atrial fibrillation    Overview     Cardiology managing only on asa currently will defer to cardiology for management          Aortic atherosclerosis - Primary    Overview     Stable cont atorvasatin 40 mg             Renal/    Stage 3a chronic " kidney disease    Overview     Stable             GI    Eosinophilic esophagitis    Overview     Stable on pepcid            As above, continue current medications and maintain follow up with specialists.  Return to clinic in 6 months.      Frederick W Dantagnan [facility]

## 2024-09-20 DIAGNOSIS — M25.561 ACUTE PAIN OF RIGHT KNEE: Primary | ICD-10-CM

## 2024-09-24 ENCOUNTER — HOSPITAL ENCOUNTER (OUTPATIENT)
Dept: RADIOLOGY | Facility: HOSPITAL | Age: 69
Discharge: HOME | End: 2024-09-24
Payer: MEDICARE

## 2024-09-24 ENCOUNTER — OFFICE VISIT (OUTPATIENT)
Dept: ORTHOPEDICS | Facility: CLINIC | Age: 69
End: 2024-09-24
Payer: MEDICARE

## 2024-09-24 VITALS — BODY MASS INDEX: 23.39 KG/M2 | HEIGHT: 67 IN | WEIGHT: 149.06 LBS

## 2024-09-24 DIAGNOSIS — M25.561 ACUTE PAIN OF RIGHT KNEE: Primary | ICD-10-CM

## 2024-09-24 DIAGNOSIS — M25.561 ACUTE PAIN OF RIGHT KNEE: ICD-10-CM

## 2024-09-24 PROCEDURE — 73562 X-RAY EXAM OF KNEE 3: CPT | Mod: 26,59,LT, | Performed by: STUDENT IN AN ORGANIZED HEALTH CARE EDUCATION/TRAINING PROGRAM

## 2024-09-24 PROCEDURE — 99213 OFFICE O/P EST LOW 20 MIN: CPT | Mod: PBBFAC,25

## 2024-09-24 PROCEDURE — 73564 X-RAY EXAM KNEE 4 OR MORE: CPT | Mod: TC,RT

## 2024-09-24 PROCEDURE — 99999 PR PBB SHADOW E&M-EST. PATIENT-LVL III: CPT | Mod: PBBFAC,,,

## 2024-09-24 PROCEDURE — 73564 X-RAY EXAM KNEE 4 OR MORE: CPT | Mod: 26,RT,, | Performed by: STUDENT IN AN ORGANIZED HEALTH CARE EDUCATION/TRAINING PROGRAM

## 2024-09-24 NOTE — PROGRESS NOTES
SUBJECTIVE:     Chief Complaint & History of Present Illness:  Abdias Kim is a 69 y.o. male who is seen here today with a complaint of right knee pain. The pain has been present for about 2 months. The patient describes the pain as a mild-to-moderate sharp pain located in the anterolateral knee. The pain is worse with kneeling and improved by rest. The patient notes no associated injury, effusion, knee giving out, knee locking, crepitus sensation.      Past Medical History:   Diagnosis Date    Anemia     Diverticulosis     Hypertension        Past Surgical History:   Procedure Laterality Date    APPENDECTOMY      COLONOSCOPY N/A 01/31/2017    Procedure: COLONOSCOPY;  Surgeon: BASILIO Winn MD;  Location: [facility] ENDO (4TH FLR);  Service: Endoscopy;  Laterality: N/A;    COLONOSCOPY N/A 02/05/2020    Procedure: COLONOSCOPY;  Surgeon: Cody Maria MD;  Location: [facility] ENDO (4TH FLR);  Service: Endoscopy;  Laterality: N/A;  [place] for [place] or ghazala. Needs to be done in Jan 2020    CORONARY ARTERY BYPASS GRAFT  05/26/2021    CORONARY ARTERY BYPASS GRAFT (CABG) N/A 04/26/2021    Procedure: CORONARY ARTERY BYPASS GRAFT (CABG)X3 WITH VEIN HARVEST;  Surgeon: Fidencio Galindo MD;  Location: [facility] OR 2ND FLR;  Service: Cardiovascular;  Laterality: N/A;    ENDOSCOPIC HARVEST OF VEIN Left 04/26/2021    Procedure: HARVEST-VEIN-ENDOVASCULAR FOR CABGX3;  Surgeon: Fidencio Galindo MD;  Location: [facility] OR 2ND FLR;  Service: Cardiovascular;  Laterality: Left;  Vein [place] Start time: 1201pm  Vein [place] Stop time: 1226pm    Vein Prep Start time: 1227pm  Vein Prep Stop time: 1250pm    ESOPHAGOGASTRODUODENOSCOPY N/A 02/05/2020    Procedure: EGD (ESOPHAGOGASTRODUODENOSCOPY);  Surgeon: Cody Maria MD;  Location: [facility] ENDO (4TH FLR);  Service: Endoscopy;  Laterality: N/A;    ESOPHAGOGASTRODUODENOSCOPY N/A 04/30/2020    Procedure: EGD (ESOPHAGOGASTRODUODENOSCOPY);  Surgeon: Cody Maria MD;  Location: [facility] ENDO (2ND  FLR);  Service: Endoscopy;  Laterality: N/A;  schedule 12 weeks from now  20 - removed from 20, needs to be rescheduled high priority - pg  20-scheduled from high priority list-BB  Covid screening test scheduled for 20-BB  instructions emailed to patient-BB    EXCLUSION OF LEFT ATRIAL APPENDAGE N/A 2021    Procedure: EXCLUSION, LEFT ATRIAL APPENDAGE;  Surgeon: Fidencio Galindo MD;  [facility];  Service: Cardiovascular;  Laterality: N/A;  Left Atrial Appendage Resection    HERNIA REPAIR  April/May 2022    LEFT HEART CATHETERIZATION Left 2021    Procedure: Left heart cath;  Surgeon: Aric Alvarado MD;  Location: [facility] CATH LAB;  Service: Cardiology;  Laterality: Left;    UMBILICAL HERNIA REPAIR N/A 2022    Procedure: REPAIR, HERNIA, UMBILICAL, AGE 5 YEARS OR OLDER Open With or Without Mesh;  Surgeon: Matt Delgado MD;  Location: [facility] OR [address] [place] [address];  Service: General;  Laterality: N/A;       Family History   Problem Relation Name Age of Onset    Heart disease Mother Thao Kim         , Coronary artery disease    Hypertension Mother Thao Kim     Cancer Father Michael Kim         colon/prostate    Colon polyps Father Michael Kim     Arthritis Father Michael Kim             Heart disease Father Michael Kim         Coronary artery disease, Afib, CHF    Hypertension Father Michael Kim     Hypertension Son x 1     Stroke Neg Hx      Diabetes Neg Hx      Celiac disease Neg Hx      Esophageal cancer Neg Hx      Liver cancer Neg Hx      Liver disease Neg Hx      Stomach cancer Neg Hx      Rectal cancer Neg Hx      Melanoma Neg Hx         Review of patient's allergies indicates:   Allergen Reactions    Allegra-d 12 hour [fexofenadine-pseudoephedrine] Other (See Comments)     Trouble urinating    Mucinex d [pseudoephedrine-guaifenesin] Other (See Comments)     Trouble urinating    Losartan-hydrochlorothiazide Hives and Rash      "Other reaction(s): Hives           Current Outpatient Medications:     aspirin (ECOTRIN) 81 MG EC tablet, Take 1 tablet (81 mg total) by mouth once daily., Disp: 90 tablet, Rfl: 3    atorvastatin (LIPITOR) 40 MG tablet, Take 1 tablet (40 mg total) by mouth every evening., Disp: 90 tablet, Rfl: 3    cetirizine (ZYRTEC) 10 MG tablet, Take 1 tablet by mouth Daily., Disp: , Rfl:     famotidine (PEPCID) 20 MG tablet, Take 2 tablets (40 mg total) by mouth once daily., Disp: 180 tablet, Rfl: 0    fluticasone propionate (FLONASE) 50 mcg/actuation nasal spray, 1 spray (50 mcg total) by Each Nostril route once daily., Disp: 15.8 mL, Rfl: 3    lisinopriL (PRINIVIL,ZESTRIL) 5 MG tablet, Take 1 tablet (5 mg total) by mouth once daily., Disp: 90 tablet, Rfl: 3    multivitamin (THERAGRAN) per tablet, Take 1 tablet by mouth once daily., Disp: , Rfl:     sildenafiL (VIAGRA) 100 MG tablet, Take 0.5 tablets (50 mg total) by mouth as needed for Erectile Dysfunction., Disp: 10 tablet, Rfl: 3      Review of Systems:  ROS:  The updated medical history is in the chart and has been reviewed. A review of systems is updated and there is no reported vision changes, ear/nose/mouth/throat complaints, chest pain, shortness of breath, abdominal pain, urological complaints, fevers or chills, psychiatric complaints. Musculoskeletal and neurologcial symptoms are as documented. All other systems are negative.      OBJECTIVE:     PHYSICAL EXAM:  Ht 5' 7" (1.702 m)   Wt 67.6 kg (149 lb 0.5 oz)   BMI 23.34 kg/m²   General: Pleasant, cooperative, NAD.  HEENT: NCAT, sclera nonicteric.  Lungs: Respirations are equal and unlabored.   Abdomen: Soft and non-tender.  CV: 2+ bilateral upper and lower extremity pulses.  Neuro: Sensation intact to light touch.  Skin: Intact throughout LE with no rashes, erythema, or lesions.  Extremities: No LE edema, NVI lower extremities. normal gait.    right Knee Exam:  Knee Range of Motion:  0-130   Effusion: " none  Condition of skin: intact  Location of tenderness: None   Strength: 5/5 quadriceps strength, 5/5 gastroc-soleus strength, 5/5 hamstring strength, and 5/5 tibialis anterior strength  Stability: stable to testing  Knee Alignment: normal  Jarrett: negative    left Knee Exam:  Knee Range of Motion:  0-130   Effusion: none  Condition of skin: intact  Location of tenderness: None   Strength: 5/5 quadriceps strength, 5/5 gastroc-soleus strength, 5/5 hamstring strength, and 5/5 tibialis anterior strength  Stability: stable to testing  Knee Alignment: normal  Jarrett: negative    RADIOGRAPHS:  X-rays of the right knee taken today personally reviewed. Imaging reveals well-maintained joint space with no acute fractures or dislocations.      ASSESSMENT:       ICD-10-CM ICD-9-CM   1. Acute pain of right knee  M25.561 719.46       PLAN:     We discussed with the patient at length all the different treatment options available including anti-inflammatories, acetaminophen, rest, ice, knee strengthening exercise, occasional cortisone injections for temporary relief, Viscosupplimentation injections, arthroscopic debridement, osteotomy, and finally knee arthroplasty.     Due to the pain only occurring when kneeling and fully resolving at all other times of the day, patient instructed to use padding when kneeling.    He may also treat the occasional pain conservatively via rest, ice, compression, elevation, and Tylenol as needed for pain.    Follow up in clinic as needed.      DISCLAIMER: This note was prepared with [employer] voice recognition transcription software. Garbled syntax, mangled pronouns, and other bizarre constructions may be attributed to that software system.    Luis Miguel Watson Jr., HEVER

## 2024-09-25 ENCOUNTER — TELEPHONE (OUTPATIENT)
Dept: ORTHOPEDICS | Facility: CLINIC | Age: 69
End: 2024-09-25
Payer: MEDICARE

## 2024-09-25 ENCOUNTER — PATIENT MESSAGE (OUTPATIENT)
Dept: ORTHOPEDICS | Facility: CLINIC | Age: 69
End: 2024-09-25
Payer: MEDICARE

## 2024-09-25 NOTE — TELEPHONE ENCOUNTER
"Called patient regarding his recent patient portal message.  I assured him the x-rays were of his knees in the "surgical staples" were more than likely artifact.  He denied any surgery or and treated this area of his knee.  He will call with any further questions or concerns.  "

## 2024-09-26 ENCOUNTER — OFFICE VISIT (OUTPATIENT)
Dept: HEMATOLOGY/ONCOLOGY | Facility: CLINIC | Age: 69
End: 2024-09-26
Payer: MEDICARE

## 2024-09-26 VITALS
HEIGHT: 67 IN | DIASTOLIC BLOOD PRESSURE: 70 MMHG | WEIGHT: 151 LBS | HEART RATE: 50 BPM | BODY MASS INDEX: 23.7 KG/M2 | OXYGEN SATURATION: 98 % | SYSTOLIC BLOOD PRESSURE: 130 MMHG

## 2024-09-26 DIAGNOSIS — N28.89 RIGHT RENAL MASS: ICD-10-CM

## 2024-09-26 DIAGNOSIS — D69.6 THROMBOCYTOPENIA: ICD-10-CM

## 2024-09-26 DIAGNOSIS — D50.9 MICROCYTIC ANEMIA: Primary | ICD-10-CM

## 2024-09-26 DIAGNOSIS — N18.31 STAGE 3A CHRONIC KIDNEY DISEASE: ICD-10-CM

## 2024-09-26 DIAGNOSIS — Z71.2 ENCOUNTER TO DISCUSS TEST RESULTS: ICD-10-CM

## 2024-09-26 DIAGNOSIS — D70.9 NEUTROPENIA, UNSPECIFIED TYPE: ICD-10-CM

## 2024-09-26 DIAGNOSIS — I13.10 HYPERTENSIVE CARDIOVASCULAR-RENAL DISEASE, STAGE 1-4 OR UNSPECIFIED CHRONIC KIDNEY DISEASE, WITHOUT HEART FAILURE: ICD-10-CM

## 2024-09-26 DIAGNOSIS — I25.10 CORONARY ARTERY DISEASE INVOLVING NATIVE CORONARY ARTERY OF NATIVE HEART WITHOUT ANGINA PECTORIS: ICD-10-CM

## 2024-09-26 PROCEDURE — 99999 PR PBB SHADOW E&M-EST. PATIENT-LVL IV: CPT | Mod: PBBFAC,,, | Performed by: STUDENT IN AN ORGANIZED HEALTH CARE EDUCATION/TRAINING PROGRAM

## 2024-09-26 PROCEDURE — G2211 COMPLEX E/M VISIT ADD ON: HCPCS | Mod: S$PBB,,, | Performed by: STUDENT IN AN ORGANIZED HEALTH CARE EDUCATION/TRAINING PROGRAM

## 2024-09-26 PROCEDURE — 99213 OFFICE O/P EST LOW 20 MIN: CPT | Mod: S$PBB,,, | Performed by: STUDENT IN AN ORGANIZED HEALTH CARE EDUCATION/TRAINING PROGRAM

## 2024-09-26 PROCEDURE — 99214 OFFICE O/P EST MOD 30 MIN: CPT | Mod: PBBFAC | Performed by: STUDENT IN AN ORGANIZED HEALTH CARE EDUCATION/TRAINING PROGRAM

## 2024-09-26 NOTE — PROGRESS NOTES
Hematology- Oncology Clinic Note :     9/26/2024    Chief Complaint   Patient presents with    Follow-up    Anemia         HPI  Pt is a 69 y.o. male who  has a past medical history of Anemia, Diverticulosis, and Hypertension. Who presents for follow up of microcytic anemia.   Workup reviewed with pt and largely unremarkable and appears to be secondary to CKD.  Pt denied any issues at time of exam and will continue daily multivitamin.  Will continue surveillance and all questions answered to his satisfaction.      Active Problem List with Overview Notes    Diagnosis Date Noted    Acute pain of right knee 09/19/2024     Refer to ortho for eval and treat       Right renal mass 06/24/2024    Stage 3a chronic kidney disease 06/24/2024     Stable       Chronic kidney disease 02/22/2023     stable repeat labs reviewed dr jacobs managing .      Aortic atherosclerosis 03/22/2022     Stable cont atorvasatin 40 mg       S/P CABG (coronary artery bypass graft) 06/09/2021    Pain of left thigh 05/05/2021    Hypophosphatemia 04/29/2021    Acute blood loss anemia 04/29/2021    Paroxysmal atrial fibrillation 04/29/2021     Cardiology managing only on asa currently will defer to cardiology for management       CAD (coronary artery disease) 04/21/2021     Dr jarvis managing is on good regimen statin asa and cardizem .      Abnormal cardiovascular stress test 04/19/2021    Eosinophilic esophagitis 04/30/2020     Stable on pepcid      GERD (gastroesophageal reflux disease) 02/05/2020     Now off prilosec and is taking pepcid is working well       Ventral hernia without obstruction or gangrene 08/20/2018    ED (erectile dysfunction) 05/01/2017    Tongue laceration 04/08/2016    Tubular adenoma of colon 04/07/2016 12/2013      External hemorrhoids 02/13/2015    Hypertensive cardiovascular-renal disease, stage 1-4 or unspecified chronic kidney disease, without heart failure 07/31/2013     bp well controlled on current regimen  lisinopril has been added now off diltiazem      Allergic rhinitis 07/31/2013    Diverticulosis 07/31/2013    Anemia, iron deficiency 07/31/2013     Stable          Patient Active Problem List    Diagnosis Date Noted    Acute pain of right knee 09/19/2024    Right renal mass 06/24/2024    Stage 3a chronic kidney disease 06/24/2024    Chronic kidney disease 02/22/2023    Aortic atherosclerosis 03/22/2022    S/P CABG (coronary artery bypass graft) 06/09/2021    Pain of left thigh 05/05/2021    Hypophosphatemia 04/29/2021    Acute blood loss anemia 04/29/2021    Paroxysmal atrial fibrillation 04/29/2021    CAD (coronary artery disease) 04/21/2021    Abnormal cardiovascular stress test 04/19/2021    Eosinophilic esophagitis 04/30/2020    GERD (gastroesophageal reflux disease) 02/05/2020    Ventral hernia without obstruction or gangrene 08/20/2018    ED (erectile dysfunction) 05/01/2017    Tongue laceration 04/08/2016    Tubular adenoma of colon 04/07/2016    External hemorrhoids 02/13/2015    Hypertensive cardiovascular-renal disease, stage 1-4 or unspecified chronic kidney disease, without heart failure 07/31/2013    Allergic rhinitis 07/31/2013    Diverticulosis 07/31/2013    Anemia, iron deficiency 07/31/2013     Past Medical History:   Diagnosis Date    Anemia     Diverticulosis     Hypertension       Past Surgical History:   Procedure Laterality Date    APPENDECTOMY      COLONOSCOPY N/A 01/31/2017    Procedure: COLONOSCOPY;  Surgeon: BASILIO Winn MD;  Location: [address]);  Service: Endoscopy;  Laterality: N/A;    COLONOSCOPY N/A 02/05/2020    Procedure: COLONOSCOPY;  Surgeon: Cody Maria MD;  [facility]: [facility] ([address] [place] [address]);  Service: Endoscopy;  Laterality: N/A;  OKay for Crow or ghazala. Needs to be done in Jan 2020    CORONARY ARTERY BYPASS GRAFT  05/26/2021    CORONARY ARTERY BYPASS GRAFT (CABG) N/A 04/26/2021    Procedure: CORONARY ARTERY BYPASS GRAFT (CABG)X3 WITH VEIN HARVEST;  Surgeon:  Fidencio Galindo MD;  Location: [address];  Service: Cardiovascular;  Laterality: N/A;    ENDOSCOPIC HARVEST OF VEIN Left 04/26/2021    Procedure: HARVEST-VEIN-ENDOVASCULAR FOR CABGX3;  Surgeon: Fidencio Galindo MD;  Location: [facility] OR [address] [place] [address];  Service: Cardiovascular;  Laterality: Left;  Vein [place] Start time: 1201pm  Vein [place] Stop time: 1226pm    Vein Prep Start time: 1227pm  Vein Prep Stop time: 1250pm    ESOPHAGOGASTRODUODENOSCOPY N/A 02/05/2020    Procedure: EGD (ESOPHAGOGASTRODUODENOSCOPY);  Surgeon: Cody Maria MD;  Location: [facility] ENDO (4TH FLR);  Service: Endoscopy;  Laterality: N/A;    ESOPHAGOGASTRODUODENOSCOPY N/A 04/30/2020    Procedure: EGD (ESOPHAGOGASTRODUODENOSCOPY);  Surgeon: Cody Maria MD;  Location: [facility] ENDO (2ND FLR);  Service: Endoscopy;  Laterality: N/A;  schedule 12 weeks from now  4/13/20 - removed from 4/29/20, needs to be rescheduled high priority - pg  4/28/20-scheduled from high priority list-BB  Covid screening test scheduled for 4/29/20-BB  instructions emailed to patient-BB    EXCLUSION OF LEFT ATRIAL APPENDAGE N/A 04/26/2021    Procedure: EXCLUSION, LEFT ATRIAL APPENDAGE;  Surgeon: Fidencio Galindo MD;  Location: [address];  Service: Cardiovascular;  Laterality: N/A;  Left Atrial Appendage Resection    HERNIA REPAIR  April/May 2022    LEFT HEART CATHETERIZATION Left 04/21/2021    Procedure: Left heart cath;  Surgeon: Aric Alvarado MD;  Location: [facility] CATH LAB;  Service: Cardiology;  Laterality: Left;    UMBILICAL HERNIA REPAIR N/A 03/31/2022    Procedure: REPAIR, HERNIA, UMBILICAL, AGE 5 YEARS OR OLDER Open With or Without Mesh;  Surgeon: Matt Delgado MD;  Location: [facility] OR [address] [place] [address];  Service: General;  Laterality: N/A;      (Not in a hospital admission)    Review of patient's allergies indicates:   Allergen Reactions    Allegra-d 12 hour [fexofenadine-pseudoephedrine] Other (See Comments)     Trouble urinating    Mucinex d  [pseudoephedrine-guaifenesin] Other (See Comments)     Trouble urinating    Losartan-hydrochlorothiazide Hives and Rash     Other reaction(s): Hives      Social History     Tobacco Use    Smoking status: Never     Passive exposure: Never    Smokeless tobacco: Never   Substance Use Topics    Alcohol use: Yes     Alcohol/week: 2.0 standard drinks of alcohol     Types: 2 Cans of beer per week     Comment: few times a week       Family History   Problem Relation Name Age of Onset    Heart disease Mother Thao Kim         , Coronary artery disease    Hypertension Mother Thao Kim     Cancer Father Michael Kim         colon/prostate    Colon polyps Father Michael Kim     Arthritis Father Michael Kim             Heart disease Father Michael Kim         Coronary artery disease, Afib, CHF    Hypertension Father Michael Kim     Hypertension Son x 1     Stroke Neg Hx      Diabetes Neg Hx      Celiac disease Neg Hx      Esophageal cancer Neg Hx      Liver cancer Neg Hx      Liver disease Neg Hx      Stomach cancer Neg Hx      Rectal cancer Neg Hx      Melanoma Neg Hx          Review of Systems :  Review of Systems   Constitutional:  Negative for fever, malaise/fatigue and weight loss.   HENT:  Negative for congestion, hearing loss and nosebleeds.    Eyes:  Negative for blurred vision.   Respiratory:  Negative for cough, sputum production and shortness of breath.    Cardiovascular:  Negative for chest pain and leg swelling.   Gastrointestinal:  Negative for abdominal pain, blood in stool, constipation, diarrhea, heartburn, melena, nausea and vomiting.   Genitourinary:  Negative for dysuria and hematuria.   Musculoskeletal:  Negative for back pain, joint pain and myalgias.   Skin:  Negative for itching and rash.   Neurological:  Negative for dizziness.   Endo/Heme/Allergies:  Does not bruise/bleed easily.   Psychiatric/Behavioral:  Negative for depression. The patient is not  nervous/anxious.        Physical Exam :  Wt Readings from Last 3 Encounters:   09/26/24 68.5 kg (151 lb 0.2 oz)   09/24/24 67.6 kg (149 lb 0.5 oz)   09/19/24 69.1 kg (152 lb 5.4 oz)     Temp Readings from Last 3 Encounters:   09/19/24 97 °F (36.1 °C) (Oral)   03/18/24 98 °F (36.7 °C) (Oral)   11/07/23 98.2 °F (36.8 °C) (Oral)     BP Readings from Last 3 Encounters:   09/26/24 130/70   09/19/24 110/74   08/20/24 126/66     Pulse Readings from Last 3 Encounters:   09/26/24 (!) 50   09/19/24 (!) 56   08/20/24 (!) 48     Body mass index is 23.65 kg/m².    Physical Exam  Vitals reviewed.   HENT:      Head: Normocephalic and atraumatic.      Nose: Nose normal.      Mouth/Throat:      Mouth: Mucous membranes are moist.   Eyes:      Pupils: Pupils are equal, round, and reactive to light.   Cardiovascular:      Rate and Rhythm: Normal rate and regular rhythm.   Abdominal:      General: Abdomen is flat.   Musculoskeletal:         General: Normal range of motion.      Cervical back: Normal range of motion and neck supple.   Skin:     General: Skin is warm and dry.   Neurological:      Mental Status: He is alert. Mental status is at baseline.   Psychiatric:         Mood and Affect: Mood normal.         Behavior: Behavior normal.           Pertinent Diagnostic studies:      Review the peripheral smear reveals a microcytic anemia without   significant anisopoikilocytosis.  Overall leukocytes morphology is   unremarkable.    Platelets are within normal limits.         No results found for this or any previous visit (from the past 24 hour(s)).    Assessment/Plan :     Microcytic anemia with neutropenia and thrombocytopenia   -chronic, most likely chronic disease from CKD, could have mild ITP component  -up-to-date on his colonoscopy and denies blood in his stools. His iron supplement was stopped and he was referred to hematology on 8/15/24   -UTD with cancer screenings   -full anemia and vitamin and path rev CBC largely  unremarkable  -RTC in 6 months for CBC only and in 1 year for MD visit and CBC may arrange bmbx if worsening in future      CKD 3a  - Creatinine increased from 1.3 up to 1.5 after ACEi was started, has since remained stable at 1.5 range  -Per nephrology         PAF/HTN  -on Diltiazem and CAD s/p CABG 2021.  -Per cardiology       CAD/CABG 4/26/21 (LIMA-LAD, GIORGIO-D1, SVG-OM1)   -Meds; ASA 81 mg, Lipitor 40 mg daily  -Per cardiology        Small right renal mass. Ultrasound-6/28/2023- 1.2 cm RMP isoechoic exophytic lesion.  -MRI Abd W WO-6/29/2023- 0.9 cm indeterminate hypoenhancing lesion at RMP. Too small to characterize.  -Ultrasound-1/19/2024- stable 1.1 cm RMP lesion.  -On surveillance with urology       Time spent on case: 20 minutes    Summary of orders placed this encounter:  Orders Placed This Encounter   Procedures    CBC W/ AUTO DIFFERENTIAL       Future Appointments   Date Time Provider Department Center   12/18/2024 10:45 AM [facility]   12/19/2024 11:00 AM Fidencio Vivar MD [facility] UROLOG [place]   2/4/2025 11:20 AM Edi Goldman OD [facility] OPTOMTY [place]   3/24/2025  1:15 PM Trace Noriega MD [facility] PRICRE [place]   3/26/2025  9:00 AM LAB, [facility] [place]   9/26/2025  8:30 AM [facility] [place] Tatum MD   Hematology/oncology, [place]

## 2024-11-06 DIAGNOSIS — K21.9 GASTROESOPHAGEAL REFLUX DISEASE WITHOUT ESOPHAGITIS: ICD-10-CM

## 2024-11-07 RX ORDER — FAMOTIDINE 20 MG/1
40 TABLET, FILM COATED ORAL DAILY
Qty: 180 TABLET | Refills: 0 | Status: SHIPPED | OUTPATIENT
Start: 2024-11-07 | End: 2025-02-05

## 2024-11-07 NOTE — TELEPHONE ENCOUNTER
No care due was identified.  [facility] Embedded Care Due Messages. Reference number: [id number].   11/06/2024 8:11:51 PM CST

## 2024-12-16 ENCOUNTER — PATIENT MESSAGE (OUTPATIENT)
Dept: ADMINISTRATIVE | Facility: HOSPITAL | Age: 69
End: 2024-12-16
Payer: MEDICARE

## 2024-12-18 ENCOUNTER — HOSPITAL ENCOUNTER (OUTPATIENT)
Dept: RADIOLOGY | Facility: HOSPITAL | Age: 69
Discharge: HOME | End: 2024-12-18
Attending: UROLOGY
Payer: MEDICARE

## 2024-12-18 DIAGNOSIS — N28.89 OTHER SPECIFIED DISORDERS OF KIDNEY AND URETER: ICD-10-CM

## 2024-12-18 PROCEDURE — 74183 MRI ABD W/O CNTR FLWD CNTR: CPT | Mod: 26,,, | Performed by: RADIOLOGY

## 2024-12-18 PROCEDURE — A9585 GADOBUTROL INJECTION: HCPCS | Performed by: UROLOGY

## 2024-12-18 PROCEDURE — 25500020 PHARM REV CODE 255: Performed by: UROLOGY

## 2024-12-18 PROCEDURE — 74183 MRI ABD W/O CNTR FLWD CNTR: CPT | Mod: TC

## 2024-12-18 RX ORDER — GADOBUTROL 604.72 MG/ML
10 INJECTION INTRAVENOUS
Status: COMPLETED | OUTPATIENT
Start: 2024-12-18 | End: 2024-12-18

## 2024-12-18 RX ADMIN — GADOBUTROL 10 ML: 604.72 INJECTION INTRAVENOUS at 11:12

## 2024-12-18 NOTE — PROGRESS NOTES
Clinic Note  2024      Subjective:         Chief Complaint:   YUAN Kim is a 69 y.o. male followed for a small right renal mass. Consult from Dr. Mcfarland.  Ultrasound-2023- 1.2 cm RMP isoechoic exophytic lesion.  MRI Abd W WO-2023- 0.9 cm indeterminate hypoenhancing lesion at RMP. Too small to characterize.  Ultrasound-2024- stable 1.1 cm RMP lesion.  Retired from [employer] service.  eGFR 50 (it was 59 prior to lisinopril).  LUTS- nocturia 1x/noc, frequency-8x/day. Discussed flomax.Patient would like to hold off on medical therapy.    2024-MRI abd W WO- stable 1.1 cm right renal lesion.  Denies any FH of  malignancies    Past Medical History:   Diagnosis Date    Anemia     Diverticulosis     Hypertension      Family History   Problem Relation Name Age of Onset    Heart disease Mother Thao Kim         , Coronary artery disease    Hypertension Mother Thao Kim     Cancer Father Michael Kim         colon/prostate    Colon polyps Father Michael Kim     Arthritis Father Michael Kim             Heart disease Father Michael Kim         Coronary artery disease, Afib, CHF    Hypertension Father Michael Kim     Hypertension Son x 1     Stroke Neg Hx      Diabetes Neg Hx      Celiac disease Neg Hx      Esophageal cancer Neg Hx      Liver cancer Neg Hx      Liver disease Neg Hx      Stomach cancer Neg Hx      Rectal cancer Neg Hx      Melanoma Neg Hx       Social History     Socioeconomic History    Marital status:    Tobacco Use    Smoking status: Never     Passive exposure: Never    Smokeless tobacco: Never   Substance and Sexual Activity    Alcohol use: Yes     Alcohol/week: 2.0 standard drinks of alcohol     Types: 2 Cans of beer per week     Comment: few times a week     Drug use: No    Sexual activity: Yes     Partners: Female     Birth control/protection: Other-see comments     Comment: Wife, tubal ligation     Social Drivers of  Health     Financial Resource Strain: Low Risk  (1/1/2024)    Overall Financial Resource Strain (CARDIA)     Difficulty of Paying Living Expenses: Not hard at all   Food Insecurity: No Food Insecurity (1/1/2024)    Hunger Vital Sign     Worried About Running Out of Food in the Last Year: Never true     Ran Out of Food in the Last Year: Never true   Transportation Needs: No Transportation Needs (1/1/2024)    PRAPARE - Transportation     Lack of Transportation (Medical): No     Lack of Transportation (Non-Medical): No   Physical Activity: Sufficiently Active (1/1/2024)    Exercise Vital Sign     Days of Exercise per Week: 6 days     Minutes of Exercise per Session: 50 min   Stress: Stress Concern Present (1/1/2024)    [employer] - Occupational Stress Questionnaire     Feeling of Stress : To some extent   Housing Stability: Low Risk  (1/1/2024)    Housing Stability Vital Sign     Unable to Pay for Housing in the Last Year: No     Number of Places Lived in the Last Year: 1     Unstable Housing in the Last Year: No     Past Surgical History:   Procedure Laterality Date    APPENDECTOMY      COLONOSCOPY N/A 01/31/2017    Procedure: COLONOSCOPY;  Surgeon: BASILIO Winn MD;  Location: [facility] ([address] [place] [address]);  Service: Endoscopy;  Laterality: N/A;    COLONOSCOPY N/A 02/05/2020    Procedure: COLONOSCOPY;  Surgeon: Cody Maria MD;  Location: [facility] ([address] [place] [address]);  Service: Endoscopy;  Laterality: N/A;  OKay for Crow or ghazala. Needs to be done in Jan 2020    CORONARY ARTERY BYPASS GRAFT  05/26/2021    CORONARY ARTERY BYPASS GRAFT (CABG) N/A 04/26/2021    Procedure: CORONARY ARTERY BYPASS GRAFT (CABG)X3 WITH VEIN HARVEST;  Surgeon: Fidencio Galindo MD;  Location: [facility] OR [address] [place] [address];  Service: Cardiovascular;  Laterality: N/A;    ENDOSCOPIC HARVEST OF VEIN Left 04/26/2021    Procedure: HARVEST-VEIN-ENDOVASCULAR FOR CABGX3;  Surgeon: Fidencio Galindo MD;  Location: [facility] OR [address] [place] [address];  Service:  Cardiovascular;  Laterality: Left;  Vein [place] Start time: 1201pm  Vein [place] Stop time: 1226pm    Vein Prep Start time: 1227pm  Vein Prep Stop time: 1250pm    ESOPHAGOGASTRODUODENOSCOPY N/A 02/05/2020    Procedure: EGD (ESOPHAGOGASTRODUODENOSCOPY);  Surgeon: Cody Maria MD;  Location: [facility] (4TH FLR);  Service: Endoscopy;  Laterality: N/A;    ESOPHAGOGASTRODUODENOSCOPY N/A 04/30/2020    Procedure: EGD (ESOPHAGOGASTRODUODENOSCOPY);  Surgeon: Cody Maria MD;  Location: [facility] (2ND FLR);  Service: Endoscopy;  Laterality: N/A;  schedule 12 weeks from now  4/13/20 - removed from 4/29/20, needs to be rescheduled high priority - pg  4/28/20-scheduled from high priority list-BB  Covid screening test scheduled for 4/29/20-BB  instructions emailed to patient-BB    EXCLUSION OF LEFT ATRIAL APPENDAGE N/A 04/26/2021    Procedure: EXCLUSION, LEFT ATRIAL APPENDAGE;  Surgeon: Fidencio Galindo MD;  Location: [facility] OR [address] [place] [address];  Service: Cardiovascular;  Laterality: N/A;  Left Atrial Appendage Resection    HERNIA REPAIR  April/May 2022    LEFT HEART CATHETERIZATION Left 04/21/2021    Procedure: Left heart cath;  Surgeon: Aric Alvarado MD;  Location: [facility] CATH LAB;  Service: Cardiology;  Laterality: Left;    UMBILICAL HERNIA REPAIR N/A 03/31/2022    Procedure: REPAIR, HERNIA, UMBILICAL, AGE 5 YEARS OR OLDER Open With or Without Mesh;  Surgeon: Matt Delgado MD;  Location: [facility] OR [address] [place] [address];  Service: General;  Laterality: N/A;     Patient Active Problem List   Diagnosis    Hypertensive cardiovascular-renal disease, stage 1-4 or unspecified chronic kidney disease, without heart failure    Allergic rhinitis    Diverticulosis    Anemia, iron deficiency    External hemorrhoids    Tubular adenoma of colon    Tongue laceration    ED (erectile dysfunction)    Ventral hernia without obstruction or gangrene    GERD (gastroesophageal reflux disease)    Eosinophilic esophagitis    Abnormal cardiovascular stress test  "   CAD (coronary artery disease)    Hypophosphatemia    Acute blood loss anemia    Paroxysmal atrial fibrillation    Pain of left thigh    S/P CABG (coronary artery bypass graft)    Aortic atherosclerosis    Chronic kidney disease    Right renal mass    Stage 3a chronic kidney disease    Acute pain of right knee     Review of Systems   Constitutional:  Negative for appetite change, fatigue, fever and unexpected weight change.   Genitourinary:  Negative for dysuria, flank pain and hematuria.       Objective:      There were no vitals taken for this visit.  Estimated body mass index is 23.65 kg/m² as calculated from the following:    Height as of 9/26/24: 5' 7" (1.702 m).    Weight as of 9/26/24: 68.5 kg (151 lb 0.2 oz).  Physical Exam  Eyes:      Pupils: Pupils are equal, round, and reactive to light.   Cardiovascular:      Rate and Rhythm: Normal rate.   Abdominal:      General: Abdomen is flat.      Comments: Midline periumbilical scar from previous hernia repair, appendectomy care RLQ   Musculoskeletal:      Cervical back: Normal range of motion.   Neurological:      Mental Status: He is alert.       Assessment and Plan:   Stable 1.1 cm enhancing right renal mass   We discussed renal masses and reviewed the imaging in detail. About 80% of enhancing renal masses < 4 cm represent a renal cell carcinoma, and masses >4 cm are more likely to be malignant (up to 90%). We discussed the different management options including renal mass biopsy along with indications and rationale for its role, renal mass ablation which is ideal for masses < 3 cm, partial nephrectomy when amenable and radical nephrectomy. We also discussed the concept of active surveillance of renal masses, including its risks and benefits. This is typically recommended for older and/or more co-morbid patients who have a higher surgical risk. Fortunately, there is long-term data to suggest that the risk of metastasis with a mass less than 4 cm while on " active surveillance approaches 0%.  We discussed the robotic partial nephrectomy procedure, recuperation, and recovery. We discussed the possibility of conversion to either a total nephrectomy or converting from a robotic approach to an open approach, depending on intra-operative variables. Although partial nephrectomies do maximize long-term renal function, they are associated with a slightly increased risk of lizzie-procedural complications (bleeding and urinary fistulas). However, these short-term complications are generally outweighed by the benefits of renal preservation.   I answered questions and addressed concerns.   Continue AS (active surveillance).     code applied: patient requires or will require a continuous, longitudinal, and active collaborative plan of care related to this patient's health condition, the management of which requires the direction of a practitioner with specialized clinical knowledge, skill, and expertise.    Problem List Items Addressed This Visit       Right renal mass - Primary       Follow up:       Fidencio Vivar

## 2024-12-19 ENCOUNTER — OFFICE VISIT (OUTPATIENT)
Dept: UROLOGY | Facility: CLINIC | Age: 69
End: 2024-12-19
Payer: MEDICARE

## 2024-12-19 VITALS
DIASTOLIC BLOOD PRESSURE: 67 MMHG | SYSTOLIC BLOOD PRESSURE: 165 MMHG | HEIGHT: 67 IN | BODY MASS INDEX: 23.84 KG/M2 | WEIGHT: 151.88 LBS | HEART RATE: 50 BPM

## 2024-12-19 DIAGNOSIS — N28.89 RIGHT RENAL MASS: Primary | ICD-10-CM

## 2024-12-19 PROCEDURE — 99213 OFFICE O/P EST LOW 20 MIN: CPT | Mod: PBBFAC | Performed by: UROLOGY

## 2024-12-19 PROCEDURE — G2211 COMPLEX E/M VISIT ADD ON: HCPCS | Mod: S$PBB,,, | Performed by: UROLOGY

## 2024-12-19 PROCEDURE — 99214 OFFICE O/P EST MOD 30 MIN: CPT | Mod: S$PBB,,, | Performed by: UROLOGY

## 2024-12-19 PROCEDURE — 99999 PR PBB SHADOW E&M-EST. PATIENT-LVL III: CPT | Mod: PBBFAC,,, | Performed by: UROLOGY

## 2025-01-05 ENCOUNTER — PATIENT MESSAGE (OUTPATIENT)
Dept: PRIMARY CARE CLINIC | Facility: CLINIC | Age: 70
End: 2025-01-05
Payer: MEDICARE

## 2025-01-05 DIAGNOSIS — Z12.12 ENCOUNTER FOR COLORECTAL CANCER SCREENING: Primary | ICD-10-CM

## 2025-01-05 DIAGNOSIS — Z12.11 ENCOUNTER FOR COLORECTAL CANCER SCREENING: Primary | ICD-10-CM

## 2025-02-04 ENCOUNTER — OFFICE VISIT (OUTPATIENT)
Dept: OPTOMETRY | Facility: CLINIC | Age: 70
End: 2025-02-04
Payer: MEDICARE

## 2025-02-04 DIAGNOSIS — H52.03 HYPEROPIA OF BOTH EYES WITH REGULAR ASTIGMATISM AND PRESBYOPIA: ICD-10-CM

## 2025-02-04 DIAGNOSIS — H25.13 NUCLEAR SCLEROSIS, BILATERAL: Primary | ICD-10-CM

## 2025-02-04 DIAGNOSIS — H52.223 HYPEROPIA OF BOTH EYES WITH REGULAR ASTIGMATISM AND PRESBYOPIA: ICD-10-CM

## 2025-02-04 DIAGNOSIS — Z13.5 GLAUCOMA SCREENING: ICD-10-CM

## 2025-02-04 DIAGNOSIS — H52.4 HYPEROPIA OF BOTH EYES WITH REGULAR ASTIGMATISM AND PRESBYOPIA: ICD-10-CM

## 2025-02-04 PROCEDURE — 92015 DETERMINE REFRACTIVE STATE: CPT | Mod: ,,, | Performed by: OPTOMETRIST

## 2025-02-04 PROCEDURE — 99211 OFF/OP EST MAY X REQ PHY/QHP: CPT | Mod: PBBFAC,PO | Performed by: OPTOMETRIST

## 2025-02-04 PROCEDURE — 99999 PR PBB SHADOW E&M-EST. PATIENT-LVL I: CPT | Mod: PBBFAC,,, | Performed by: OPTOMETRIST

## 2025-02-04 PROCEDURE — 92014 COMPRE OPH EXAM EST PT 1/>: CPT | Mod: S$PBB,,, | Performed by: OPTOMETRIST

## 2025-02-04 NOTE — PROGRESS NOTES
HPI    Here for annual exam     Eye med: AT [facility] PRN    69 year old male states vision is blurry [facility] with glasses. States he   struggles to see while driving at night due to glare. Denies ocular pain   Pt c/o of oncoming headlights   Last edited by Modesta Law on 2/4/2025 11:37 AM.            Assessment /Plan     For exam results, see Encounter Report.    Nuclear sclerosis, bilateral  -Educated patient on presence of cataracts at today's exam, monitor at annual dilated fundus exam. 5+ years surgical estimate.    Glaucoma screening  -Monitor with annual eye exam and IOP check    Hyperopia of both eyes with regular astigmatism and presbyopia  Eyeglass Final Rx       Eyeglass Final Rx         Sphere Cylinder Axis Dist VA Add    Right +0.50 +1.75 180 20/30 +2.50    Left +1.25 +1.00 005 20/30++ +2.50      Type: PAL    Expiration Date: 2/4/2026                      RTC 1 yr

## 2025-02-10 DIAGNOSIS — N18.31 CHRONIC KIDNEY DISEASE, STAGE 3A: Primary | ICD-10-CM

## 2025-02-21 DIAGNOSIS — Z00.00 ENCOUNTER FOR MEDICARE ANNUAL WELLNESS EXAM: ICD-10-CM

## 2025-02-24 DIAGNOSIS — K21.9 GASTROESOPHAGEAL REFLUX DISEASE WITHOUT ESOPHAGITIS: ICD-10-CM

## 2025-02-24 RX ORDER — FAMOTIDINE 20 MG/1
40 TABLET, FILM COATED ORAL DAILY
Qty: 180 TABLET | Refills: 0 | Status: SHIPPED | OUTPATIENT
Start: 2025-02-24 | End: 2025-05-25

## 2025-02-24 NOTE — TELEPHONE ENCOUNTER
No care due was identified.  [facility] Embedded Care Due Messages. Reference number: [id number].   2/24/2025 12:34:54 AM CST

## 2025-02-25 ENCOUNTER — LAB VISIT (OUTPATIENT)
Dept: LAB | Facility: HOSPITAL | Age: 70
End: 2025-02-25
Attending: STUDENT IN AN ORGANIZED HEALTH CARE EDUCATION/TRAINING PROGRAM
Payer: MEDICARE

## 2025-02-25 DIAGNOSIS — N18.31 CHRONIC KIDNEY DISEASE, STAGE 3A: ICD-10-CM

## 2025-02-25 LAB
BILIRUB UR QL STRIP: NEGATIVE
CLARITY UR REFRACT.AUTO: CLEAR
COLOR UR AUTO: YELLOW
CREAT UR-MCNC: 194 MG/DL (ref 23–375)
GLUCOSE UR QL STRIP: NEGATIVE
HGB UR QL STRIP: NEGATIVE
KETONES UR QL STRIP: NEGATIVE
LEUKOCYTE ESTERASE UR QL STRIP: NEGATIVE
NITRITE UR QL STRIP: NEGATIVE
PH UR STRIP: 6 [PH] (ref 5–8)
PROT UR QL STRIP: NEGATIVE
PROT UR-MCNC: 7 MG/DL (ref 0–15)
PROT/CREAT UR: 0.04 MG/G{CREAT} (ref 0–0.2)
SP GR UR STRIP: 1.02 (ref 1–1.03)
URN SPEC COLLECT METH UR: NORMAL

## 2025-02-25 PROCEDURE — 84156 ASSAY OF PROTEIN URINE: CPT | Performed by: STUDENT IN AN ORGANIZED HEALTH CARE EDUCATION/TRAINING PROGRAM

## 2025-02-25 PROCEDURE — 81003 URINALYSIS AUTO W/O SCOPE: CPT | Performed by: STUDENT IN AN ORGANIZED HEALTH CARE EDUCATION/TRAINING PROGRAM

## 2025-02-27 ENCOUNTER — TELEPHONE (OUTPATIENT)
Dept: NEPHROLOGY | Facility: CLINIC | Age: 70
End: 2025-02-27

## 2025-02-27 ENCOUNTER — OFFICE VISIT (OUTPATIENT)
Dept: NEPHROLOGY | Facility: CLINIC | Age: 70
End: 2025-02-27
Payer: MEDICARE

## 2025-02-27 ENCOUNTER — HOSPITAL ENCOUNTER (EMERGENCY)
Facility: HOSPITAL | Age: 70
Discharge: HOME | End: 2025-02-27
Attending: EMERGENCY MEDICINE
Payer: MEDICARE

## 2025-02-27 ENCOUNTER — PATIENT MESSAGE (OUTPATIENT)
Dept: NEPHROLOGY | Facility: CLINIC | Age: 70
End: 2025-02-27

## 2025-02-27 VITALS
HEIGHT: 67 IN | WEIGHT: 150.56 LBS | SYSTOLIC BLOOD PRESSURE: 150 MMHG | OXYGEN SATURATION: 100 % | BODY MASS INDEX: 23.63 KG/M2 | HEART RATE: 47 BPM | DIASTOLIC BLOOD PRESSURE: 69 MMHG

## 2025-02-27 VITALS
OXYGEN SATURATION: 99 % | TEMPERATURE: 98 F | RESPIRATION RATE: 18 BRPM | SYSTOLIC BLOOD PRESSURE: 155 MMHG | HEIGHT: 67 IN | HEART RATE: 57 BPM | DIASTOLIC BLOOD PRESSURE: 73 MMHG | WEIGHT: 150 LBS | BODY MASS INDEX: 23.54 KG/M2

## 2025-02-27 DIAGNOSIS — I10 HYPERTENSION, UNSPECIFIED TYPE: ICD-10-CM

## 2025-02-27 DIAGNOSIS — N18.31 CHRONIC KIDNEY DISEASE-MINERAL BONE DISORDER (CKD-MBD) WITH STAGE 3A CHRONIC KIDNEY DISEASE: ICD-10-CM

## 2025-02-27 DIAGNOSIS — M89.9 CHRONIC KIDNEY DISEASE-MINERAL BONE DISORDER (CKD-MBD) WITH STAGE 3A CHRONIC KIDNEY DISEASE: ICD-10-CM

## 2025-02-27 DIAGNOSIS — E83.39 HYPOPHOSPHATEMIA: ICD-10-CM

## 2025-02-27 DIAGNOSIS — D50.9 MICROCYTIC ANEMIA: ICD-10-CM

## 2025-02-27 DIAGNOSIS — R89.9 ABNORMAL LABORATORY TEST RESULT: Primary | ICD-10-CM

## 2025-02-27 DIAGNOSIS — N28.9 RENAL LESION: ICD-10-CM

## 2025-02-27 DIAGNOSIS — I25.10 CORONARY ARTERY DISEASE INVOLVING NATIVE CORONARY ARTERY OF NATIVE HEART WITHOUT ANGINA PECTORIS: ICD-10-CM

## 2025-02-27 DIAGNOSIS — E83.9 CHRONIC KIDNEY DISEASE-MINERAL BONE DISORDER (CKD-MBD) WITH STAGE 3A CHRONIC KIDNEY DISEASE: ICD-10-CM

## 2025-02-27 DIAGNOSIS — N18.31 CHRONIC KIDNEY DISEASE, STAGE 3A: Primary | ICD-10-CM

## 2025-02-27 DIAGNOSIS — R07.9 CHEST PAIN: ICD-10-CM

## 2025-02-27 LAB
ALBUMIN SERPL-MCNC: 4.2 G/DL (ref 3.3–5.5)
ALLENS TEST: ABNORMAL
ALP SERPL-CCNC: 64 U/L (ref 42–141)
BILIRUB SERPL-MCNC: 1.3 MG/DL (ref 0.2–1.6)
BUN SERPL-MCNC: 14 MG/DL (ref 7–22)
CALCIUM SERPL-MCNC: 10 MG/DL (ref 8–10.3)
CHLORIDE SERPL-SCNC: 112 MMOL/L (ref 98–108)
CREAT SERPL-MCNC: 1.4 MG/DL (ref 0.6–1.2)
GLUCOSE SERPL-MCNC: 111 MG/DL (ref 73–118)
HCO3 UR-SCNC: 28.6 MMOL/L (ref 24–28)
HCT, POC: NORMAL
HGB, POC: NORMAL (ref 14–18)
LDH SERPL L TO P-CCNC: 0.98 MMOL/L (ref 0.5–2.2)
MCH, POC: NORMAL
MCHC, POC: NORMAL
MCV, POC: NORMAL
MPV, POC: NORMAL
PCO2 BLDA: 55.3 MMHG (ref 35–45)
PH SMN: 7.32 [PH] (ref 7.35–7.45)
PO2 BLDA: 19 MMHG (ref 40–60)
POC ALT (SGPT): 27 U/L (ref 10–47)
POC AST (SGOT): 29 U/L (ref 11–38)
POC BE: 1 MMOL/L
POC PLATELET COUNT: NORMAL
POC SATURATED O2: 25 % (ref 95–100)
POC TCO2: 30 MMOL/L (ref 24–29)
POC TCO2: 31 MMOL/L (ref 18–33)
POTASSIUM BLD-SCNC: 5 MMOL/L (ref 3.6–5.1)
PROTEIN, POC: 7.8 G/DL (ref 6.4–8.1)
RBC, POC: NORMAL
RDW, POC: NORMAL
SAMPLE: ABNORMAL
SITE: ABNORMAL
SODIUM BLD-SCNC: 143 MMOL/L (ref 128–145)
WBC, POC: NORMAL

## 2025-02-27 PROCEDURE — 99283 EMERGENCY DEPT VISIT LOW MDM: CPT | Mod: 25,27,ER

## 2025-02-27 PROCEDURE — 80053 COMPREHEN METABOLIC PANEL: CPT | Mod: ER

## 2025-02-27 PROCEDURE — 99999 PR PBB SHADOW E&M-EST. PATIENT-LVL III: CPT | Mod: PBBFAC,GC,, | Performed by: STUDENT IN AN ORGANIZED HEALTH CARE EDUCATION/TRAINING PROGRAM

## 2025-02-27 PROCEDURE — 82803 BLOOD GASES ANY COMBINATION: CPT | Mod: ER

## 2025-02-27 PROCEDURE — 93005 ELECTROCARDIOGRAM TRACING: CPT | Mod: ER

## 2025-02-27 PROCEDURE — 85025 COMPLETE CBC W/AUTO DIFF WBC: CPT | Mod: ER

## 2025-02-27 PROCEDURE — 93010 ELECTROCARDIOGRAM REPORT: CPT | Mod: ,,, | Performed by: INTERNAL MEDICINE

## 2025-02-27 PROCEDURE — 99213 OFFICE O/P EST LOW 20 MIN: CPT | Mod: PBBFAC | Performed by: STUDENT IN AN ORGANIZED HEALTH CARE EDUCATION/TRAINING PROGRAM

## 2025-02-27 PROCEDURE — 83605 ASSAY OF LACTIC ACID: CPT | Mod: ER

## 2025-02-27 RX ORDER — CALCITRIOL 0.25 UG/1
0.25 CAPSULE ORAL DAILY
Qty: 30 CAPSULE | Refills: 2 | Status: SHIPPED | OUTPATIENT
Start: 2025-02-27 | End: 2025-05-27

## 2025-02-27 RX ORDER — AMLODIPINE BESYLATE 5 MG/1
5 TABLET ORAL DAILY
Qty: 90 TABLET | Refills: 3 | Status: SHIPPED | OUTPATIENT
Start: 2025-02-27 | End: 2026-02-27

## 2025-02-27 NOTE — TELEPHONE ENCOUNTER
Following up on his labs that he had drawn today after his nephrology office visit. His potassium was elevated at 6.1 without visible hemolysis. Other labs are similar to previous readings two days ago. He does eat a banana every morning for breakfast and is on lisinopril    I called Mr. Kim to inform him of his critical hyperkalemia as well as the risk that this places on him of sudden cardiac death. He has been educated on avoiding high potassium foods previously. I have asked that he stop lisinopril, and that I will add Amlodipine 5mg in place of this ACEi.     I have advised that he proceed immediately to the ER for confirmation of his hyperkalemia and treatment if truly elevated. He expressed understanding and will proceed to the ER. If he is truly hyperkalemic then he will need Lokelma as well at discharge.

## 2025-02-27 NOTE — DISCHARGE INSTRUCTIONS
Your potassium is normal on your labs drawn in the ER today. Contact your nephrologist about what is next for you. Return to ER for any concerns or worsening symptoms. Read attached handout on low potassium foods.

## 2025-02-27 NOTE — PROGRESS NOTES
Subjective     Chief Complaint: CKD 3a    History of Present Illness:  Mr. Abdias Kim is a 69 y.o. male   male who presents for follow up of CKD 3a. He was previously seen by Dr. Wall in June 2023 for initial nephrology evaluation, who now presents to our clinic.     Renal function per chart review with sr cr baseline of 1.3 - 1.5 mg/dL and baseline eGFR around 59.    Clinic 02/27/2025; He was started on Lisinopril 5 mg daily by his cardiologist. Since that time his serum creatinine has increased from 1.3 up to 1.5, I have explained that this is expected after starting ACEi. He denies use of NSAIDs, nephrolithiasis or fam Hx of renal disease. He does not notice any difficulty emptying his bladder, or any nocturia, but does note that he will urinate 6-8 times during the day, and sometimes will need to urinate 30 minutes after his last urinary void. During his visit with me in June he was started empirically on oral iron replacement for his microcytic anemia, however iron studies obtained showed adequate iron stores. He does report a family history of unspecified anemia in his mother but his not aware of any other family history of anemia.     #CKD 3a- Creatinine stable at 1.3 Denies any blood in the urine.      # PAF    # CAD/CABG 4/26/21 (LIMA-LAD, GIORGIO-D1, SVG-OM1)   Meds; ASA 81 mg, Lipitor 40 mg daily    # HTN diagnosed about in his 20's. Patient reports good adherence to the current regiment. He is following low salt diet. Previously on Losartan-HCTZ and stopped due to Hives. He participates in digital blood pressure monitoring which shows that his home readings are consistently in the 110s/60s on his current medications.  Meds; Lisinopril 5 mg and tolerating well.     #Microcytic anemia- Patient's iron panel obtained in June and July do not suggest iron deficiency. Review of his labs show that he has had anemia dating back to 2004 (age of 49), which predates his CKD. He is up-to-date on  his colonoscopy and denies blood in his stools. He is repeating his colonoscopy in March this year. He saw hematology in September 2024 who does not feel that he has a hemoglobinopathy.          Review of Systems   Constitutional:  Negative for chills, fever, malaise/fatigue and weight loss.   HENT:  Negative for nosebleeds.    Respiratory:  Negative for cough, hemoptysis and wheezing.    Cardiovascular:  Negative for chest pain, palpitations, orthopnea, leg swelling and PND.   Gastrointestinal:  Negative for abdominal pain, constipation, diarrhea, heartburn, nausea and vomiting.   Genitourinary:  Negative for dysuria and urgency.   Musculoskeletal:  Negative for back pain, falls and neck pain.   Skin:  Negative for itching and rash.   Neurological:  Negative for dizziness, tremors, seizures, weakness and headaches.       PAST HISTORY:     Past Medical History:   Diagnosis Date    Anemia     Diverticulosis     Hypertension        Past Surgical History:   Procedure Laterality Date    APPENDECTOMY      COLONOSCOPY N/A 01/31/2017    Procedure: COLONOSCOPY;  Surgeon: BASILIO Winn MD;  Location: [facility] ([address] [place] [address]);  Service: Endoscopy;  Laterality: N/A;    COLONOSCOPY N/A 02/05/2020    Procedure: COLONOSCOPY;  Surgeon: Cody Maria MD;  Location: [facility] ([address] [place] [address]);  Service: Endoscopy;  Laterality: N/A;  OKay for Crow or ghazala. Needs to be done in Jan 2020    CORONARY ARTERY BYPASS GRAFT  05/26/2021    CORONARY ARTERY BYPASS GRAFT (CABG) N/A 04/26/2021    Procedure: CORONARY ARTERY BYPASS GRAFT (CABG)X3 WITH VEIN HARVEST;  Surgeon: Fidencio Galindo MD;  Location: [facility] OR [address] [place] [address];  Service: Cardiovascular;  Laterality: N/A;    ENDOSCOPIC HARVEST OF VEIN Left 04/26/2021    Procedure: HARVEST-VEIN-ENDOVASCULAR FOR CABGX3;  Surgeon: Fidencio Galindo MD;  Location: [facility] OR [address] [place] [address];  Service: Cardiovascular;  Laterality: Left;  Vein Latham Start time: 1201pm  Vein Latham Stop time: 1226pm    Vein Prep Start  time: 1227pm  Vein Prep Stop time: 1250pm    ESOPHAGOGASTRODUODENOSCOPY N/A 2020    Procedure: EGD (ESOPHAGOGASTRODUODENOSCOPY);  Surgeon: Cody Maria MD;  Location: [facility] (4TH FLR);  Service: Endoscopy;  Laterality: N/A;    ESOPHAGOGASTRODUODENOSCOPY N/A 2020    Procedure: EGD (ESOPHAGOGASTRODUODENOSCOPY);  Surgeon: Cody Maria MD;  Location: [facility] ENDO (2ND FLR);  Service: Endoscopy;  Laterality: N/A;  schedule 12 weeks from now  20 - removed from 20, needs to be rescheduled high priority - pg  20-scheduled from high priority list-BB  Covid screening test scheduled for 20-BB  instructions emailed to patient-BB    EXCLUSION OF LEFT ATRIAL APPENDAGE N/A 2021    Procedure: EXCLUSION, LEFT ATRIAL APPENDAGE;  Surgeon: Fidencio Galindo MD;  Location: [facility] OR 2ND FLR;  Service: Cardiovascular;  Laterality: N/A;  Left Atrial Appendage Resection    HERNIA REPAIR  April/May 2022    LEFT HEART CATHETERIZATION Left 2021    Procedure: Left heart cath;  Surgeon: Aric Alvarado MD;  Location: [facility] CATH LAB;  Service: Cardiology;  Laterality: Left;    UMBILICAL HERNIA REPAIR N/A 2022    Procedure: REPAIR, HERNIA, UMBILICAL, AGE 5 YEARS OR OLDER Open With or Without Mesh;  Surgeon: Matt Delgado MD;  [facility];  Service: General;  Laterality: N/A;       Family History   Problem Relation Name Age of Onset    Heart disease Mother Thao Kim         , Coronary artery disease    Hypertension Mother hTao Kim     Cancer Father Michael Kim         colon/prostate    Colon polyps Father Michael Kim     Arthritis Father Michael Kim             Heart disease Father Michael Kim         Coronary artery disease, Afib, CHF    Hypertension Father Michael Kim     Hypertension Son x 1     Stroke Neg Hx      Diabetes Neg Hx      Celiac disease Neg Hx      Esophageal cancer Neg Hx      Liver cancer Neg Hx      Liver  disease Neg Hx      Stomach cancer Neg Hx      Rectal cancer Neg Hx      Melanoma Neg Hx         Social History     Socioeconomic History    Marital status:    Tobacco Use    Smoking status: Never     Passive exposure: Never    Smokeless tobacco: Never   Substance and Sexual Activity    Alcohol use: Yes     Alcohol/week: 2.0 standard drinks of alcohol     Types: 2 Cans of beer per week     Comment: few times a week     Drug use: No    Sexual activity: Yes     Partners: Female     Birth control/protection: Other-see comments     Comment: Wife, tubal ligation     Social Drivers of Health     Financial Resource Strain: Low Risk  (2/21/2025)    Overall Financial Resource Strain (CARDIA)     Difficulty of Paying Living Expenses: Not hard at all   Food Insecurity: No Food Insecurity (2/21/2025)    Hunger Vital Sign     Worried About Running Out of Food in the Last Year: Never true     Ran Out of Food in the Last Year: Never true   Transportation Needs: No Transportation Needs (2/21/2025)    PRAPARE - Transportation     Lack of Transportation (Medical): No     Lack of Transportation (Non-Medical): No   Physical Activity: Sufficiently Active (2/21/2025)    Exercise Vital Sign     Days of Exercise per Week: 6 days     Minutes of Exercise per Session: 50 min   Stress: Stress Concern Present (2/21/2025)    [employer] - Occupational Stress Questionnaire     Feeling of Stress : To some extent   Housing Stability: Low Risk  (2/21/2025)    Housing Stability Vital Sign     Unable to Pay for Housing in the Last Year: No     Homeless in the Last Year: No       MEDICATIONS & ALLERGIES:     Current Outpatient Medications on File Prior to Visit   Medication Sig    aspirin (ECOTRIN) 81 MG EC tablet Take 1 tablet (81 mg total) by mouth once daily.    atorvastatin (LIPITOR) 40 MG tablet Take 1 tablet (40 mg total) by mouth every evening.    cetirizine (ZYRTEC) 10 MG tablet Take 1 tablet by mouth Daily.  "   famotidine (PEPCID) 20 MG tablet Take 2 tablets (40 mg total) by mouth once daily. (Patient taking differently: Take 40 mg by mouth once daily. Taking one in the morning one in the evening to total 40 mg)    fluticasone propionate (FLONASE) 50 mcg/actuation nasal spray 1 spray (50 mcg total) by Each Nostril route once daily.    lisinopriL (PRINIVIL,ZESTRIL) 5 MG tablet Take 1 tablet (5 mg total) by mouth once daily.    multivitamin (THERAGRAN) per tablet Take 1 tablet by mouth once daily.    sildenafiL (VIAGRA) 100 MG tablet Take 0.5 tablets (50 mg total) by mouth as needed for Erectile Dysfunction.     No current facility-administered medications on file prior to visit.       Review of patient's allergies indicates:   Allergen Reactions    Allegra-d 12 hour [fexofenadine-pseudoephedrine] Other (See Comments)     Trouble urinating    Mucinex d [pseudoephedrine-guaifenesin] Other (See Comments)     Trouble urinating    Losartan-hydrochlorothiazide Hives and Rash     Other reaction(s): Hives       OBJECTIVE:     Vital Signs:  Vitals:    02/27/25 1309   BP: (!) 150/69   Pulse: (!) 47   SpO2: 100%   Weight: 68.3 kg (150 lb 9.2 oz)   Height: 5' 7" (1.702 m)           Body mass index is 23.58 kg/m².     Physical Exam  Constitutional:       General: He is not in acute distress.     Appearance: Normal appearance. He is not ill-appearing or toxic-appearing.   HENT:      Head: Atraumatic.      Right Ear: External ear normal.      Left Ear: External ear normal.      Mouth/Throat:      Mouth: Mucous membranes are moist.   Eyes:      Extraocular Movements: Extraocular movements intact.   Cardiovascular:      Rate and Rhythm: Normal rate and regular rhythm.      Pulses: Normal pulses.      Heart sounds: Murmur heard.   Pulmonary:      Effort: Pulmonary effort is normal. No respiratory distress.      Breath sounds: Normal breath sounds. No wheezing or rales.   Chest:      Chest wall: No tenderness.   Abdominal:      General: " Abdomen is flat.      Palpations: Abdomen is soft.   Musculoskeletal:         General: No swelling. Normal range of motion.      Cervical back: Normal range of motion and neck supple.      Right lower leg: No edema.      Left lower leg: No edema.   Skin:     General: Skin is warm.      Capillary Refill: Capillary refill takes less than 2 seconds.      Coloration: Skin is not jaundiced.      Findings: No lesion or rash.   Neurological:      General: No focal deficit present.      Mental Status: He is alert and oriented to person, place, and time.   Psychiatric:         Mood and Affect: Mood normal.         Behavior: Behavior normal.         Laboratory  Creatinine   Date Value Ref Range Status   02/25/2025 1.3 0.5 - 1.4 mg/dL Final   08/15/2024 1.3 0.5 - 1.4 mg/dL Final   07/11/2024 1.5 (H) 0.5 - 1.4 mg/dL Final   06/17/2024 1.5 (H) 0.5 - 1.4 mg/dL Final         Diagnostic Results:      Health Maintenance Due   Topic Date Due    Annual UACr  Never done    Colorectal Cancer Screening  02/05/2025         ASSESSMENT & PLAN:   Mr. Abdias Kim is a 69 y.o. male with history of CAD s/p CABG who presents for evaluation of CKD 3a which has remained stable for the past several years. He did have a transient decrease in his eGFR around the time of his CABG in 2021, however was able to recover renal function and eGFr of 59. He was started on Lisinopril 5 mg by his cardiologist in early 2024 which resulted in his creatinine baseline to move to 1.5 and eGFR of 49 where it has remained stable. At this time, I suspect that his CKD is secondary to his CAD and Hypertension, and has remained stable on his current medications.        Chronic kidney disease, stage 3a  -     Renal function stable  - Renal Function Panel; Future; Expected date: 08/27/2025  -     Magnesium; Future; Expected date: 08/27/2025  -     PHOSPHORUS; Future; Expected date: 08/27/2025  -     Vitamin D; Future; Expected date: 08/27/2025    Microcytic anemia  -      Anemia pre-dates his CKD. Iron studies show adequate iron stores and ferretin is in acceptable limits. Will check hemoglobin electrophoresis to evaluate for thalassemia and other hemoglobinopathies.   - Hemoglobin Electrophoresis Cascade, Blood; Future; Expected date: 02/27/2025    Renal lesion        -     Following with urology for active surveillance.    Coronary artery disease involving native coronary artery of native heart without angina pectoris    Chronic kidney disease-mineral bone disorder (CKD-MBD) with stage 3a chronic kidney disease  -     calcitRIOL (ROCALTROL) 0.25 MCG Cap; Take 1 capsule (0.25 mcg total) by mouth once daily.  Dispense: 30 capsule; Refill: 2    Hypophosphatemia    Hypertension       -   Review of his home BP readings shows values in the 100s systolic and 6-s diastolic. I have asked that he measure his blood pressures three times daily. If his blood pressure is truly this low at all times during the day then I plan to reduce his lisinopril to 2.5 mg dialy.    Hyperkalemia      -   Noted on blood work obtained after his office visit. I called Mr. Kim to inform him of the potassium of 6.0 and advised that he proceed to the ER.      -   I have discontinued the Lisinopril and added Amlodipine 5 mg in it's place.          RTC in 6 months    Discussed with Dr. Watkins  - staff attestation to follow      Reji Mcfarland MD  Nephrology PGY-4    [address] [place], [place] [id number] [phone]

## 2025-02-27 NOTE — ED TRIAGE NOTES
Pt presents with concern for abnormal potassium level that was drawn today. Pt denies any complaints, no symptoms

## 2025-02-27 NOTE — ED PROVIDER NOTES
Encounter Date: 2/27/2025    SCRIBE #1 NOTE: I, Pariswanda Seymour, am scribing for, and in the presence of,  Rose Muñoz MD.       History     Chief Complaint   Patient presents with    Abnormal Lab     Patient reports abnormal lab at doctor's office today.      Abdias Kim is a 69 y.o. male, with a past medical history of HTN, CKD, CAD, anemia, and s/p CABG, who presents to the ED with hyperkalemia noted on labs today. Patient reports he was seen by his nephrologist (Reji Mcfarland MD) this morning and had blood work done. Patient states Dr. Johnathon called him around 4 pm and reported a potassium of 6.1 and he was advised to come to ED for repeat blood work and treatment. Patient notes he also had blood work done on 2/25/25 and reports normal potassium levels at that time. Patient denies any associated leg swelling, abdominal pain, nausea, vomiting, diarrhea, constipation, or palpitations. Patient admits to EtOH usage (twice a week), denies smoking tobacco or illicit drug usage.     The history is provided by the patient. No  was used.     Review of patient's allergies indicates:   Allergen Reactions    Allegra-d 12 hour [fexofenadine-pseudoephedrine] Other (See Comments)     Trouble urinating    Mucinex d [pseudoephedrine-guaifenesin] Other (See Comments)     Trouble urinating    Losartan-hydrochlorothiazide Hives and Rash     Other reaction(s): Hives     Past Medical History:   Diagnosis Date    Anemia     Diverticulosis     Hypertension      Past Surgical History:   Procedure Laterality Date    APPENDECTOMY      COLONOSCOPY N/A 01/31/2017    Procedure: COLONOSCOPY;  Surgeon: BASILIO Winn MD;  Location: [facility] ([address] [place] [address]);  Service: Endoscopy;  Laterality: N/A;    COLONOSCOPY N/A 02/05/2020    Procedure: COLONOSCOPY;  Surgeon: Cody Maria MD;  Location: [facility] ([address] [place] [address]);  Service: Endoscopy;  Laterality: N/A;  OKay for Crow vivar. Needs to be done in Jan 2020     CORONARY ARTERY BYPASS GRAFT  05/26/2021    CORONARY ARTERY BYPASS GRAFT (CABG) N/A 04/26/2021    Procedure: CORONARY ARTERY BYPASS GRAFT (CABG)X3 WITH VEIN HARVEST;  Surgeon: Fidencio Galindo MD;  Location: [facility] OR [address] [place] [address];  Service: Cardiovascular;  Laterality: N/A;    ENDOSCOPIC HARVEST OF VEIN Left 04/26/2021    Procedure: HARVEST-VEIN-ENDOVASCULAR FOR CABGX3;  Surgeon: Fidencio Galindo MD;  [facility];  Service: Cardiovascular;  Laterality: Left;  Vein Buckner Start time: 1201pm  Vein Buckner Stop time: 1226pm    Vein Prep Start time: 1227pm  Vein Prep Stop time: 1250pm    ESOPHAGOGASTRODUODENOSCOPY N/A 02/05/2020    Procedure: EGD (ESOPHAGOGASTRODUODENOSCOPY);  Surgeon: Cody Maria MD;  Location: [facility] (4TH FLR);  Service: Endoscopy;  Laterality: N/A;    ESOPHAGOGASTRODUODENOSCOPY N/A 04/30/2020    Procedure: EGD (ESOPHAGOGASTRODUODENOSCOPY);  Surgeon: Cody Maria MD;  Location: [facility] (2ND FLR);  Service: Endoscopy;  Laterality: N/A;  schedule 12 weeks from now  4/13/20 - removed from 4/29/20, needs to be rescheduled high priority - pg  4/28/20-scheduled from high priority list-BB  Covid screening test scheduled for 4/29/20-BB  instructions emailed to patient-BB    EXCLUSION OF LEFT ATRIAL APPENDAGE N/A 04/26/2021    Procedure: EXCLUSION, LEFT ATRIAL APPENDAGE;  Surgeon: Fidencio Galindo MD;  Location: [facility] OR [address] [place] [address];  Service: Cardiovascular;  Laterality: N/A;  Left Atrial Appendage Resection    HERNIA REPAIR  April/May 2022    LEFT HEART CATHETERIZATION Left 04/21/2021    Procedure: Left heart cath;  Surgeon: Aric Alvarado MD;  Location: [facility] CATH LAB;  Service: Cardiology;  Laterality: Left;    UMBILICAL HERNIA REPAIR N/A 03/31/2022    Procedure: REPAIR, HERNIA, UMBILICAL, AGE 5 YEARS OR OLDER Open With or Without Mesh;  Surgeon: Matt Delgado MD;  [facility];  Service: General;  Laterality: N/A;     Family History   Problem Relation Name Age  of Onset    Heart disease Mother Thao Kim         , Coronary artery disease    Hypertension Mother Thao Kim     Cancer Father Michael iKm         colon/prostate    Colon polyps Father Michael Kim     Arthritis Father Michael Kim             Heart disease Father Michael Kim         Coronary artery disease, Afib, CHF    Hypertension Father Michael Kim     Hypertension Son x 1     Stroke Neg Hx      Diabetes Neg Hx      Celiac disease Neg Hx      Esophageal cancer Neg Hx      Liver cancer Neg Hx      Liver disease Neg Hx      Stomach cancer Neg Hx      Rectal cancer Neg Hx      Melanoma Neg Hx       Social History[1]  Review of Systems   Constitutional:  Negative for activity change, appetite change, chills and fever.   HENT:  Negative for congestion, rhinorrhea, sneezing and sore throat.    Respiratory:  Negative for cough, chest tightness, shortness of breath and wheezing.    Cardiovascular:  Negative for chest pain, palpitations and leg swelling.        (+) hyperkalemia    Gastrointestinal:  Negative for abdominal pain, constipation, diarrhea, nausea and vomiting.   Skin:  Negative for rash.   Neurological:  Negative for dizziness, light-headedness and headaches.   All other systems reviewed and are negative.      Physical Exam     Initial Vitals [25 1654]   BP Pulse Resp Temp SpO2   (!) 168/81 64 16 98.1 °F (36.7 °C) 100 %      MAP       --         Physical Exam    Nursing note and vitals reviewed.  Constitutional: He appears well-developed and well-nourished. No distress.   HENT:   Head: Normocephalic and atraumatic.   Eyes: Conjunctivae are normal.   Neck:   Normal range of motion.  Cardiovascular:  Normal rate and regular rhythm.           No murmur heard.  Pulmonary/Chest: Effort normal and breath sounds normal. No respiratory distress.   Abdominal: Bowel sounds are normal. He exhibits no distension. There is no abdominal tenderness.   Musculoskeletal:          General: No tenderness or edema. Normal range of motion.      Cervical back: Normal range of motion.     Neurological: He is alert and oriented to person, place, and time.   Skin: Skin is warm and dry. No rash noted.   Psychiatric: He has a normal mood and affect. His behavior is normal.         ED Course   Procedures  Labs Reviewed   ISTAT PROCEDURE - Abnormal       Result Value    POC PH 7.321 (*)     POC PCO2 55.3 (*)     POC PO2 19 (*)     POC HCO3 28.6 (*)     POC BE 1      POC SATURATED O2 25      POC Lactate 0.98      POC TCO2 30 (*)     Sample VENOUS      Site Other      Allens Test N/A     POCT CMP - Abnormal    Albumin, POC 4.2      Alkaline Phosphatase, POC 64      ALT (SGPT), POC 27      AST (SGOT), POC 29      POC BUN 14      Calcium, POC 10.0      POC Chloride 112 (*)     POC Creatinine 1.4 (*)     POC Glucose 111      POC Potassium 5.0      POC Sodium 143      Bilirubin, POC 1.3      POC TCO2 31      Protein, POC 7.8     POCT CBC    Hematocrit        Hemoglobin        RBC        WBC        MCV        MCH, POC        MCHC        RDW-CV        Platelet Count, POC        MPV       POCT CMP     EKG Readings: (Independently Interpreted)   Initial Reading: No STEMI. Rhythm: Sinus Bradycardia. Heart Rate: 48. Ectopy: No Ectopy. Conduction: Normal. T Waves: Normal. Clinical Impression: Sinus Bradycardia Other Impression: independently interpreted by me       Imaging Results    None          Medications - No data to display  Medical Decision Making  69 M with HTN, CKD, CAD, anemia, and s/p CABG, presents due to elevated potassium, asymptomatic. On exam, there are no significant findings. In shared decision making with the patient I will order CBC, CMP, and EKG. CMP shows potassium of 5.0. NO EKG changes. Pt reassured, advised of low potassium diet, and to follow up with his nephrologist tomorrow with a phone call.     Amount and/or Complexity of Data Reviewed  External Data Reviewed: notes.  Labs: ordered.  Decision-making details documented in ED Course.  ECG/medicine tests: ordered and independent interpretation performed. Decision-making details documented in ED Course.            Scribe Attestation:   Scribe #1: I performed the above scribed service and the documentation accurately describes the services I performed. I attest to the accuracy of the note.                             I, Dr. Rose Muñoz, personally performed the services described in this documentation.   All medical record entries made by the scribe were at my direction and in my presence.   I have reviewed the chart and agree that the record is accurate and complete.   Rose Muñoz MD.  5:08 PM 02/27/2025     Clinical Impression:  Final diagnoses:  [R07.9] Chest pain  [R89.9] Abnormal laboratory test result (Primary)          ED Disposition Condition    Discharge Stable          ED Prescriptions    None       Follow-up Information    None              [1]   Social History  Tobacco Use    Smoking status: Never     Passive exposure: Never    Smokeless tobacco: Never   Substance Use Topics    Alcohol use: Yes     Alcohol/week: 2.0 standard drinks of alcohol     Types: 2 Cans of beer per week     Comment: few times a week     Drug use: No        Rose Muñoz MD  02/27/25 [id number]

## 2025-02-28 ENCOUNTER — PATIENT MESSAGE (OUTPATIENT)
Dept: NEPHROLOGY | Facility: CLINIC | Age: 70
End: 2025-02-28
Payer: MEDICARE

## 2025-02-28 LAB
OHS QRS DURATION: 88 MS
OHS QTC CALCULATION: 376 MS

## 2025-03-10 ENCOUNTER — CLINICAL SUPPORT (OUTPATIENT)
Dept: ENDOSCOPY | Facility: HOSPITAL | Age: 70
End: 2025-03-10
Attending: INTERNAL MEDICINE
Payer: MEDICARE

## 2025-03-10 ENCOUNTER — TELEPHONE (OUTPATIENT)
Dept: ENDOSCOPY | Facility: HOSPITAL | Age: 70
End: 2025-03-10

## 2025-03-10 VITALS — WEIGHT: 150 LBS | HEIGHT: 67 IN | BODY MASS INDEX: 23.54 KG/M2

## 2025-03-10 DIAGNOSIS — Z12.11 ENCOUNTER FOR COLORECTAL CANCER SCREENING: ICD-10-CM

## 2025-03-10 DIAGNOSIS — Z12.11 SPECIAL SCREENING FOR MALIGNANT NEOPLASMS, COLON: Primary | ICD-10-CM

## 2025-03-10 DIAGNOSIS — Z12.12 ENCOUNTER FOR COLORECTAL CANCER SCREENING: ICD-10-CM

## 2025-03-10 NOTE — TELEPHONE ENCOUNTER
Referral for procedure from PAT appointment      Spoke to patient to schedule procedure(s) Colonoscopy       Physician to perform procedure(s) Dr. NANCY Mercedes  Date of Procedure (s) 4/3/25  Arrival Time 2:00 PM  Time of Procedure(s) 3:00 PM   Location of Procedure(s) [place] 4th Floor  Type of Rx Prep sent to patient: PEG  Instructions provided to patient via [facility]    Patient was informed on the following information and verbalized understanding. Screening questionnaire reviewed with patient and complete. If procedure requires anesthesia, a responsible adult needs to be present to accompany the patient home, patient cannot drive after receiving anesthesia. Appointment details are tentative, especially check-in time. Patient will receive a prep-op call 7 days prior to confirm check-in time for procedure. If applicable the patient should contact their pharmacy to verify Rx for procedure prep is ready for pick-up. Patient was advised to call the scheduling department at [phone] if pharmacy states no Rx is available. Patient was advised to call the endoscopy scheduling department if any questions or concerns arise.      [facility] Scheduling Department

## 2025-03-17 ENCOUNTER — TELEPHONE (OUTPATIENT)
Dept: ENDOSCOPY | Facility: HOSPITAL | Age: 70
End: 2025-03-17
Payer: MEDICARE

## 2025-03-17 NOTE — TELEPHONE ENCOUNTER
Patient rescheduled.  Original referral for procedure from PAT appointment    Spoke to Abdias Kim to reschedule Colonoscopy       Physician to perform procedure(s) Dr. CHANTAL Carver  Date of Procedure (s) 4/8/25  Arrival Time 1:00 PM  Time of Procedure(s) 2:00 PM   Location of Procedure(s) [place] 4th Floor  Type of Rx Prep sent to patient's pharmacy: PEG  Instructions provided to patient via [facility]  Patient denies use of blood thinners, GLP-1 medications, and weight loss medications.  The following information was discussed with patient, and patient verbalized understanding:  Screening questionnaire reviewed with patient and complete. If procedure requires anesthesia, a responsible adult needs to be present to accompany the patient home. Appointment details are tentative, especially check-in time. Patient will receive a pre-op call 7 days prior to appointment to confirm check-in time for procedure. If applicable the patient should contact their pharmacy to verify Rx for procedure prep is ready for pick-up. Patient was instructed to call the scheduling department at [phone] if pharmacy states no Rx is available. Patient was also advised to call the endoscopy scheduling department if any questions or concerns arise.       Endoscopy Scheduling Department

## 2025-03-24 ENCOUNTER — OFFICE VISIT (OUTPATIENT)
Dept: PRIMARY CARE CLINIC | Facility: CLINIC | Age: 70
End: 2025-03-24
Payer: MEDICARE

## 2025-03-24 VITALS
BODY MASS INDEX: 24.05 KG/M2 | HEIGHT: 67 IN | OXYGEN SATURATION: 99 % | RESPIRATION RATE: 18 BRPM | HEART RATE: 56 BPM | WEIGHT: 153.25 LBS | SYSTOLIC BLOOD PRESSURE: 124 MMHG | DIASTOLIC BLOOD PRESSURE: 80 MMHG

## 2025-03-24 DIAGNOSIS — I13.10 HYPERTENSIVE CARDIOVASCULAR-RENAL DISEASE, STAGE 1-4 OR UNSPECIFIED CHRONIC KIDNEY DISEASE, WITHOUT HEART FAILURE: Primary | ICD-10-CM

## 2025-03-24 DIAGNOSIS — I25.10 CORONARY ARTERY DISEASE INVOLVING NATIVE CORONARY ARTERY OF NATIVE HEART WITHOUT ANGINA PECTORIS: ICD-10-CM

## 2025-03-24 DIAGNOSIS — N18.31 STAGE 3A CHRONIC KIDNEY DISEASE: ICD-10-CM

## 2025-03-24 DIAGNOSIS — K20.0 EOSINOPHILIC ESOPHAGITIS: ICD-10-CM

## 2025-03-24 PROCEDURE — 99999 PR PBB SHADOW E&M-EST. PATIENT-LVL III: CPT | Mod: PBBFAC,,, | Performed by: INTERNAL MEDICINE

## 2025-03-24 PROCEDURE — 99213 OFFICE O/P EST LOW 20 MIN: CPT | Mod: PBBFAC | Performed by: INTERNAL MEDICINE

## 2025-03-24 PROCEDURE — 99214 OFFICE O/P EST MOD 30 MIN: CPT | Mod: S$PBB,,, | Performed by: INTERNAL MEDICINE

## 2025-03-24 NOTE — PROGRESS NOTES
[facility] Note    Chief Complaint      Chief Complaint   Patient presents with    Follow-up     6m       History of Present Illness      Abdias Kim is a 69 y.o. male who presents today for   Chief Complaint   Patient presents with    Follow-up     6m   .  Patient comes to appointment here for checkup for chronic issues as outlined in detail . He continuous f/u with nephrology and cardiology . I have reviewed all labs from 2/25 . He had lasb done for his mild anemia . Consistent with b thalassemia . He is asymptomatic     HPI    No problem-specific Assessment & Plan notes found for this encounter.       Problem List Items Addressed This Visit       Hypertensive cardiovascular-renal disease, stage 1-4 or unspecified chronic kidney disease, without heart failure - Primary    Overview   Now on amlodipine bp well controlled          Eosinophilic esophagitis    Overview   Stable on pepcid         CAD (coronary artery disease)    Overview   Dr jarvis managing is on good regimen statin asa and cardizem .         Stage 3a chronic kidney disease    Overview   Stable              Past Medical History:  Past Medical History:   Diagnosis Date    Allergy Childhood    Hay fever    Anemia     Arthritis     Disorder of kidney and ureter     Diverticulosis     Food allergy     GERD (gastroesophageal reflux disease)     Hearing loss     Hypertension     Joint pain     Seasonal allergies        Past Surgical History:  Past Surgical History:   Procedure Laterality Date    APPENDECTOMY      CARDIAC SURGERY      COLONOSCOPY N/A 01/31/2017    Procedure: COLONOSCOPY;  Surgeon: BASILIO Winn MD;  Location: [facility] ([address] [place] [address]);  Service: Endoscopy;  Laterality: N/A;    COLONOSCOPY N/A 02/05/2020    Procedure: COLONOSCOPY;  Surgeon: Cody Maria MD;  Location: [facility] ENDO ([address] [place] [address]);  Service: Endoscopy;  Laterality: N/A;  OKay for Crow or ghazala. Needs to be done in Jan 2020    CORONARY ARTERY BYPASS GRAFT   05/26/2021    CORONARY ARTERY BYPASS GRAFT (CABG) N/A 04/26/2021    Procedure: CORONARY ARTERY BYPASS GRAFT (CABG)X3 WITH VEIN HARVEST;  Surgeon: Fidencio Galindo MD;  Location: [facility] OR [address] [place] [address];  Service: Cardiovascular;  Laterality: N/A;    ENDOSCOPIC HARVEST OF VEIN Left 04/26/2021    Procedure: HARVEST-VEIN-ENDOVASCULAR FOR CABGX3;  Surgeon: Fidencio Galindo MD;  [facility];  Service: Cardiovascular;  Laterality: Left;  Vein [place] Start time: 1201pm  Vein [place] Stop time: 1226pm    Vein Prep Start time: 1227pm  Vein Prep Stop time: 1250pm    ESOPHAGOGASTRODUODENOSCOPY N/A 02/05/2020    Procedure: EGD (ESOPHAGOGASTRODUODENOSCOPY);  Surgeon: Cody Maria MD;  Location: [facility] ENDO (4TH FLR);  Service: Endoscopy;  Laterality: N/A;    ESOPHAGOGASTRODUODENOSCOPY N/A 04/30/2020    Procedure: EGD (ESOPHAGOGASTRODUODENOSCOPY);  Surgeon: Cody Maria MD;  Location: [facility] (2ND FLR);  Service: Endoscopy;  Laterality: N/A;  schedule 12 weeks from now  4/13/20 - removed from 4/29/20, needs to be rescheduled high priority - pg  4/28/20-scheduled from high priority list-BB  Covid screening test scheduled for 4/29/20-BB  instructions emailed to patient-BB    EXCLUSION OF LEFT ATRIAL APPENDAGE N/A 04/26/2021    Procedure: EXCLUSION, LEFT ATRIAL APPENDAGE;  Surgeon: Fidencio Galindo MD;  Location: [facility] OR [address] [place] [address];  Service: Cardiovascular;  Laterality: N/A;  Left Atrial Appendage Resection    HERNIA REPAIR  April/May 2022    LEFT HEART CATHETERIZATION Left 04/21/2021    Procedure: Left heart cath;  Surgeon: Aric Alvarado MD;  Location: [facility] CATH LAB;  Service: Cardiology;  Laterality: Left;    UMBILICAL HERNIA REPAIR N/A 03/31/2022    Procedure: REPAIR, HERNIA, UMBILICAL, AGE 5 YEARS OR OLDER Open With or Without Mesh;  Surgeon: Matt Delgado MD;  Location: [facility] OR [address] [place] [address];  Service: General;  Laterality: N/A;       Family History:  family history includes Arthritis in his father;  "Cancer in his father; Colon polyps in his father; Heart disease in his father and mother; Heart failure in his father; Hypertension in his father, mother, and son.     Social History:  Social History[1]    Review of Systems:   Review of Systems   Constitutional:  Negative for fever and weight loss.   HENT:  Negative for congestion, hearing loss and sore throat.    Eyes:  Negative for blurred vision.   Respiratory:  Negative for cough and shortness of breath.    Cardiovascular:  Negative for chest pain, palpitations, claudication and leg swelling.   Gastrointestinal:  Negative for abdominal pain, constipation, diarrhea and heartburn.   Genitourinary:  Negative for dysuria.   Musculoskeletal:  Negative for back pain and myalgias.   Skin:  Negative for rash.   Neurological:  Negative for focal weakness and headaches.   Psychiatric/Behavioral:  Negative for depression, memory loss and suicidal ideas. The patient is not nervous/anxious.         Medications:  Encounter Medications[2]    Allergies:  Review of patient's allergies indicates:   Allergen Reactions    Allegra-d 12 hour [fexofenadine-pseudoephedrine] Other (See Comments)     Trouble urinating    Mucinex d [pseudoephedrine-guaifenesin] Other (See Comments)     Trouble urinating    Losartan-hydrochlorothiazide Hives and Rash     Other reaction(s): Hives         Physical Exam      Vitals:    03/24/25 1518   BP: 124/80   Pulse: (!) 56   Resp: 18        Vital Signs  Pulse: (!) 56  Resp: 18  SpO2: 99 %  BP: 124/80  BP Location: Right arm  Patient Position: Sitting  Pain Score: 0-No pain  Height and Weight  Height: 5' 7" (170.2 cm)  Weight: 69.5 kg (153 lb 3.5 oz)  BSA (Calculated - sq m): 1.81 sq meters  BMI (Calculated): 24  Weight in (lb) to have BMI = 25: 159.3]     Body mass index is 24 kg/m².    Physical Exam  Constitutional:       Appearance: He is well-developed.   HENT:      Head: Normocephalic.   Eyes:      Pupils: Pupils are equal, round, and reactive to " "light.   Neck:      Thyroid: No thyromegaly.   Cardiovascular:      Rate and Rhythm: Normal rate and regular rhythm.      Heart sounds: Murmur heard.      Systolic murmur is present with a grade of 2/6.      No friction rub. No gallop.   Pulmonary:      Effort: Pulmonary effort is normal.      Breath sounds: Normal breath sounds.   Abdominal:      General: Bowel sounds are normal.      Palpations: Abdomen is soft.   Musculoskeletal:         General: Normal range of motion.      Cervical back: Normal range of motion.   Skin:     General: Skin is warm and dry.   Neurological:      Mental Status: He is alert and oriented to person, place, and time.      Sensory: No sensory deficit.   Psychiatric:         Behavior: Behavior normal.          Laboratory:  CBC:  Recent Labs   Lab Result Units 02/25/25  1206   WBC K/uL 4.02   RBC M/uL 4.95   Hemoglobin g/dL 11.0*   Hematocrit % 34.8*   Platelets K/uL 146*   MCV fL 70*   MCH pg 22.2*   MCHC g/dL 31.6*     CMP:  Recent Labs   Lab Result Units 02/27/25  1404   Glucose mg/dL 88   Calcium mg/dL 9.9   Albumin g/dL 4.2   Sodium mmol/L 141   Potassium mmol/L 6.1*   CO2 mmol/L 27   Chloride mmol/L 108   BUN mg/dL 15     URINALYSIS:  Recent Labs   Lab Result Units 02/25/25  1156   Color, UA  Yellow   Specific Gravity, UA  1.020   pH, UA  6.0   Protein, UA  Negative   Nitrite, UA  Negative   Leukocytes, UA  Negative      LIPIDS:  No results for input(s): "TSH", "HDL", "CHOL", "TRIG", "LDLCALC", "CHOLHDL", "NONHDLCHOL", "TOTALCHOLEST" in the last 2160 hours.  TSH:  No results for input(s): "TSH" in the last 2160 hours.  A1C:  No results for input(s): "HGBA1C" in the last 2160 hours.    Radiology:        Assessment:     Abdias Kim is a 69 y.o.male with:    Hypertensive cardiovascular-renal disease, stage 1-4 or unspecified chronic kidney disease, without heart failure    Eosinophilic esophagitis    Coronary artery disease involving native coronary artery of native heart without " angina pectoris    Stage 3a chronic kidney disease  -     Basic Metabolic Panel; Future; Expected date: 03/24/2025                Plan:     Problem List Items Addressed This Visit       Hypertensive cardiovascular-renal disease, stage 1-4 or unspecified chronic kidney disease, without heart failure - Primary    Overview   Now on amlodipine bp well controlled          Eosinophilic esophagitis    Overview   Stable on pepcid         CAD (coronary artery disease)    Overview   Dr jarvis managing is on good regimen statin asa and cardizem .         Stage 3a chronic kidney disease    Overview   Stable             As above, continue current medications and maintain follow up with specialists.  Return to clinic in 6 months.      Frederick W Dantagnan [facility]                       [1]   Social History  Socioeconomic History    Marital status:    Tobacco Use    Smoking status: Never     Passive exposure: Never    Smokeless tobacco: Never   Substance and Sexual Activity    Alcohol use: Yes     Alcohol/week: 2.0 standard drinks of alcohol     Types: 2 Cans of beer per week     Comment: few times a week     Drug use: No    Sexual activity: Yes     Partners: Female     Birth control/protection: Other-see comments     Comment: Wife, tubal ligation     Social Drivers of Health     Financial Resource Strain: Low Risk  (2/21/2025)    Overall Financial Resource Strain (CARDIA)     Difficulty of Paying Living Expenses: Not hard at all   Food Insecurity: No Food Insecurity (2/21/2025)    Hunger Vital Sign     Worried About Running Out of Food in the Last Year: Never true     Ran Out of Food in the Last Year: Never true   Transportation Needs: No Transportation Needs (2/21/2025)    PRAPARE - Transportation     Lack of Transportation (Medical): No     Lack of Transportation (Non-Medical): No   Physical Activity: Sufficiently Active (2/21/2025)    Exercise Vital Sign     Days of Exercise  per Week: 6 days     Minutes of Exercise per Session: 50 min   Stress: Stress Concern Present (2025)    [employer] - Occupational Stress Questionnaire     Feeling of Stress : To some extent   Housing Stability: Low Risk  (2025)    Housing Stability Vital Sign     Unable to Pay for Housing in the Last Year: No     Homeless in the Last Year: No   [2]   Outpatient Encounter Medications as of 3/24/2025   Medication Sig Note Dispense Refill    amLODIPine (NORVASC) 5 MG tablet Take 1 tablet (5 mg total) by mouth once daily.  90 tablet 3    aspirin (ECOTRIN) 81 MG EC tablet Take 1 tablet (81 mg total) by mouth once daily.  90 tablet 3    atorvastatin (LIPITOR) 40 MG tablet Take 1 tablet (40 mg total) by mouth every evening.  90 tablet 3    calcitRIOL (ROCALTROL) 0.25 MCG Cap Take 1 capsule (0.25 mcg total) by mouth once daily.  30 capsule 2    cetirizine (ZYRTEC) 10 MG tablet Take 1 tablet by mouth Daily. 3/30/2022: Hold am of surgery       famotidine (PEPCID) 20 MG tablet Take 2 tablets (40 mg total) by mouth once daily.  180 tablet 0    fluticasone propionate (FLONASE) 50 mcg/actuation nasal spray 1 spray (50 mcg total) by Each Nostril route once daily.  15.8 mL 3    multivitamin (THERAGRAN) per tablet Take 1 tablet by mouth once daily. 10/23/2023: Taking centrum      sildenafiL (VIAGRA) 100 MG tablet Take 0.5 tablets (50 mg total) by mouth as needed for Erectile Dysfunction.  10 tablet 3    [] polyethylene glycol (COLYTE) 240-22.72-6.72 -5.84 gram SolR Take 4,000 mLs by mouth once. for 1 dose  4000 mL 0    [DISCONTINUED] famotidine (PEPCID) 20 MG tablet Take 2 tablets (40 mg total) by mouth once daily.  180 tablet 0    [DISCONTINUED] lisinopriL (PRINIVIL,ZESTRIL) 5 MG tablet Take 1 tablet (5 mg total) by mouth once daily. 2025: Hyperkalemia 90 tablet 3     No facility-administered encounter medications on file as of 3/24/2025.

## 2025-03-26 ENCOUNTER — PATIENT MESSAGE (OUTPATIENT)
Dept: PRIMARY CARE CLINIC | Facility: CLINIC | Age: 70
End: 2025-03-26
Payer: MEDICARE

## 2025-03-26 ENCOUNTER — PATIENT MESSAGE (OUTPATIENT)
Dept: NEPHROLOGY | Facility: CLINIC | Age: 70
End: 2025-03-26
Payer: MEDICARE

## 2025-03-26 ENCOUNTER — LAB VISIT (OUTPATIENT)
Dept: LAB | Facility: HOSPITAL | Age: 70
End: 2025-03-26
Payer: MEDICARE

## 2025-03-26 DIAGNOSIS — D50.9 MICROCYTIC ANEMIA: ICD-10-CM

## 2025-03-26 DIAGNOSIS — N18.31 STAGE 3A CHRONIC KIDNEY DISEASE: ICD-10-CM

## 2025-03-26 LAB
ABSOLUTE EOSINOPHIL (OHS): 0.18 K/UL
ABSOLUTE MONOCYTE (OHS): 0.27 K/UL (ref 0.3–1)
ABSOLUTE NEUTROPHIL COUNT (OHS): 2.24 K/UL (ref 1.8–7.7)
ANION GAP (OHS): 11 MMOL/L (ref 8–16)
BASOPHILS # BLD AUTO: 0.03 K/UL
BASOPHILS NFR BLD AUTO: 0.8 %
BUN SERPL-MCNC: 15 MG/DL (ref 8–23)
CALCIUM SERPL-MCNC: 9 MG/DL (ref 8.7–10.5)
CHLORIDE SERPL-SCNC: 109 MMOL/L (ref 95–110)
CO2 SERPL-SCNC: 20 MMOL/L (ref 23–29)
CREAT SERPL-MCNC: 1.3 MG/DL (ref 0.5–1.4)
ERYTHROCYTE [DISTWIDTH] IN BLOOD BY AUTOMATED COUNT: 15.5 % (ref 11.5–14.5)
GFR SERPLBLD CREATININE-BSD FMLA CKD-EPI: 59 ML/MIN/1.73/M2
GLUCOSE SERPL-MCNC: 102 MG/DL (ref 70–110)
HCT VFR BLD AUTO: 32.5 % (ref 40–54)
HGB BLD-MCNC: 10.7 GM/DL (ref 14–18)
IMM GRANULOCYTES # BLD AUTO: 0.01 K/UL (ref 0–0.04)
IMM GRANULOCYTES NFR BLD AUTO: 0.3 % (ref 0–0.5)
LYMPHOCYTES # BLD AUTO: 1.25 K/UL (ref 1–4.8)
MCH RBC QN AUTO: 22.9 PG (ref 27–50)
MCHC RBC AUTO-ENTMCNC: 32.9 G/DL (ref 32–36)
MCV RBC AUTO: 69 FL (ref 82–98)
NUCLEATED RBC (/100WBC) (OHS): 0 /100 WBC
PLATELET # BLD AUTO: 146 K/UL (ref 150–450)
PMV BLD AUTO: 11.7 FL (ref 9.2–12.9)
POTASSIUM SERPL-SCNC: 4.2 MMOL/L (ref 3.5–5.1)
RBC # BLD AUTO: 4.68 M/UL (ref 4.6–6.2)
RELATIVE EOSINOPHIL (OHS): 4.5 %
RELATIVE LYMPHOCYTE (OHS): 31.4 % (ref 18–48)
RELATIVE MONOCYTE (OHS): 6.8 % (ref 4–15)
RELATIVE NEUTROPHIL (OHS): 56.2 % (ref 38–73)
SODIUM SERPL-SCNC: 140 MMOL/L (ref 136–145)
WBC # BLD AUTO: 3.98 K/UL (ref 3.9–12.7)

## 2025-03-26 PROCEDURE — 84520 ASSAY OF UREA NITROGEN: CPT

## 2025-03-26 PROCEDURE — 36415 COLL VENOUS BLD VENIPUNCTURE: CPT | Mod: PO

## 2025-03-26 PROCEDURE — 85025 COMPLETE CBC W/AUTO DIFF WBC: CPT

## 2025-03-27 ENCOUNTER — RESULTS FOLLOW-UP (OUTPATIENT)
Dept: PRIMARY CARE CLINIC | Facility: CLINIC | Age: 70
End: 2025-03-27
Payer: MEDICARE

## 2025-03-27 ENCOUNTER — OFFICE VISIT (OUTPATIENT)
Dept: FAMILY MEDICINE | Facility: CLINIC | Age: 70
End: 2025-03-27
Payer: MEDICARE

## 2025-03-27 ENCOUNTER — RESULTS FOLLOW-UP (OUTPATIENT)
Dept: HEMATOLOGY/ONCOLOGY | Facility: CLINIC | Age: 70
End: 2025-03-27

## 2025-03-27 VITALS
OXYGEN SATURATION: 99 % | HEIGHT: 67 IN | HEART RATE: 58 BPM | SYSTOLIC BLOOD PRESSURE: 114 MMHG | BODY MASS INDEX: 23.77 KG/M2 | TEMPERATURE: 98 F | DIASTOLIC BLOOD PRESSURE: 60 MMHG | WEIGHT: 151.44 LBS

## 2025-03-27 DIAGNOSIS — I48.0 PAROXYSMAL ATRIAL FIBRILLATION: ICD-10-CM

## 2025-03-27 DIAGNOSIS — N18.31 STAGE 3A CHRONIC KIDNEY DISEASE: ICD-10-CM

## 2025-03-27 DIAGNOSIS — K21.9 GASTROESOPHAGEAL REFLUX DISEASE, UNSPECIFIED WHETHER ESOPHAGITIS PRESENT: ICD-10-CM

## 2025-03-27 DIAGNOSIS — Z00.00 ENCOUNTER FOR MEDICARE ANNUAL WELLNESS EXAM: Primary | ICD-10-CM

## 2025-03-27 DIAGNOSIS — I70.0 AORTIC ATHEROSCLEROSIS: ICD-10-CM

## 2025-03-27 DIAGNOSIS — Z71.89 ADVANCE DIRECTIVE DISCUSSED WITH PATIENT: ICD-10-CM

## 2025-03-27 PROCEDURE — 99999 PR PBB SHADOW E&M-EST. PATIENT-LVL V: CPT | Mod: PBBFAC,,,

## 2025-03-27 PROCEDURE — 99215 OFFICE O/P EST HI 40 MIN: CPT | Mod: PBBFAC,PO

## 2025-03-27 NOTE — PATIENT INSTRUCTIONS
Counseling and Referral of Other Preventative  (Italic type indicates deductible and co-insurance are waived)    Patient Name: Abdias Kim  Today's Date: 3/27/2025    GERD  -- Ensure to avoid lying down or reclining for at least 2 hours following your last meal  -- May elevate the head of your bed (or sleep on an extra pillow) to minimize gravity-driven reflux of food from your stomach up into your esophagus.  -- May take 1/2 or full dose of Tums after each meal to neutralize stomach acidity (especially after last meal of the day).   -- Take famotidine in the morning, possibly before breakfast    Dietary changes to minimize symptoms of GERD:  certain foods can trigger symptoms like heartburn and acid reflux. Heres a list of foods commonly recommended to avoid:    1. **Spicy foods** - Such as chili peppers, hot sauces, and spicy seasonings.  2. **Citrus fruits** - Including oranges, travis, grapefruit, and limes, as their acidity can exacerbate GERD symptoms.  3. **Tomato-based products** - Tomato sauces, ketchup, and other foods with high tomato content can trigger heartburn due to their acidity.  4. **Fried or fatty foods** - These foods can relax the lower esophageal sphincter (LES), allowing stomach acid to rise.  5. **Chocolate** - Contains caffeine and other compounds that may weaken the LES and trigger reflux.  6. **Caffeinated beverages** - Coffee, tea, and some soft drinks can increase acid production.  7. **Alcohol** - Especially wine and beer, can irritate the esophagus and relax the LES.  8. **Carbonated beverages** - These can cause bloating and put pressure on the LES, increasing the chance of acid reflux.  9. **Onions and garlic** - Both raw and cooked can be triggers for many GERD sufferers.  10. **Peppermint and spearmint** - These can relax the LES, leading to more frequent reflux episodes.    Modifying your diet by avoiding these foods and incorporating low-acid, non-spicy, and less fatty  alternatives can help manage GERD symptoms.      Health Maintenance         Date Due Completion Date    Annual UACr Never done ---    Colorectal Cancer Screening 02/05/2025 2/5/2020    PROSTATE-SPECIFIC ANTIGEN 09/13/2025 9/13/2024    Lipid Panel 09/13/2025 9/13/2024    Aspirin/Antiplatelet Therapy 03/24/2026 3/24/2025    TETANUS VACCINE 04/08/2026 4/8/2016          No orders of the defined types were placed in this encounter.      The following information is provided to all patients.  This information is to help you find resources for any of the problems found today that may be affecting your health:                  Living healthy guide: www.[facility].[place].[facility]      Understanding Diabetes: www.diabetes.org      Eating healthy: www.cdc.gov/healthyweight      [facility] home safety checklist: www.cdc.gov/steadi/patient.html      Agency on Aging: www.goea.[place].[facility]      Alcoholics anonymous (AA): www.aa.org      Physical Activity: www.saurabh.nih.gov/pd0bthf      Tobacco use: www.quitwithusla.org

## 2025-03-27 NOTE — PROGRESS NOTES
"  Abdias Kim presented for a follow-up Medicare AWV today. The following components were reviewed and updated:    Medical history  Family History  Social history  Allergies and Current Medications  Health Risk Assessment  Health Maintenance  Care Team    **See Completed Assessments for Annual Wellness visit with in the encounter summary    The following assessments were completed:  Depression Screening  Cognitive function Screening  Timed Get Up Test  Whisper Test      Opioid documentation:      Patient does not have a current opioid prescription.          Vitals:    03/27/25 0957   BP: 114/60   Pulse: (!) 58   Temp: 98.2 °F (36.8 °C)   TempSrc: Oral   SpO2: 99%   Weight: 68.7 kg (151 lb 7.3 oz)   Height: 5' 7" (1.702 m)     Body mass index is 23.72 kg/m².        Physical Exam   Vital signs reviewed.   Body mass index is 23.72 kg/m².   General:  Well-developed, well-nourished. NAD.   Skin:  Warm, dry.   Eyes:  Conjunctivae w/o exudates or hemorrhage.  Non-icteric sclerae.    Heart:  Normal S1 & S2.  No extra heart sounds.    Lungs:  CTAB without rales, rhonchi or wheezing.  Breathing comfortably on RA.  Extremities:  Radial pulses 2+ and symmetric  Neuro:  A&O4.  No obvious focal deficits. Negative Gomez's sign.    Psychiatric:  Appropriate affect.    Clock:       Assessment & Plan     Encounter for Medicare annual wellness exam  Pt was seen today for an Annual Wellness visit.  Healthcare maintenance and screening recommendations were discussed and updated as indicated.  Patient asked to return in one year for routine AWV.  -- Last saw PCP 03/24/2025, has follow up scheduled for 09/2025  -     Referral to Enhanced Annual Wellness Visit (eAWV) W+1    Advance directive discussed with patient  I offered to discuss advanced care planning, including how to pick a person who would make decisions for you if you were unable to make them for yourself, called a health care power of , and what kind of decisions " you might make such as use of life sustaining treatments such as ventilators and tube feeding when faced with a life limiting illness recorded on a living will that they will need to know. (How you want to be cared for as you near the end of your natural life)  -- Patient is interested in learning more about how to make advanced directives.  I provided them paperwork and discussed the rationale and logistics of its completion and return at next OV.      Paroxysmal atrial fibrillation  S/p coronary bypass procedure, has not been an issue.   Not on beta-blockers, but is taking Lipitor 40  Will continue to monitor      Aortic atherosclerosis  Asymptomatic, taking Lipitor 40, continue to monitor      Stage 3a chronic kidney disease  Stable in CKD3a range.  Recently taken off of lisinopril due to transient hyperkalemia which resolved by the time patient arrived in ED - suspect hemolysis vs. true elevation.  Nevertheless, nephrologist Dr. Mcfarland put him on amlodipine 5 instead - BP today looks excellent.  Will continue to monitor.    -- Advised continuing total fluid intake of approximately 50-60 ounces per day      Gastroesophageal reflux disease, unspecified whether esophagitis present  Symptoms well-controlled Pepcid 20 BID (although it is prescribed daily), but HR is 58 and AV block per UTD is a possible AE of over-medication with Pepcid.    -- discussed supplementing with antacids and lifestyle modifications         Provided [place] with a 5-10 year written screening schedule and personal prevention plan. Recommendations were developed using the USPSTF age appropriate recommendations. Education, counseling, and referrals were provided as needed.  After Visit Summary printed and given to patient which includes a list of additional screenings\tests needed.    No follow-ups on file.      Martin Pham PA-C  I offered to discuss advanced care planning, including how to pick a person who would make decisions for you if  you were unable to make them for yourself, called a health care power of , and what kind of decisions you might make such as use of life sustaining treatments such as ventilators and tube feeding when faced with a life limiting illness recorded on a living will that they will need to know. (How you want to be cared for as you near the end of your natural life)     X Patient is interested in learning more about how to make advanced directives.  I provided them paperwork and offered to discuss this with them.

## 2025-03-27 NOTE — ASSESSMENT & PLAN NOTE
S/p coronary bypass procedure, has not been an issue.   Not on beta-blockers, but is taking Lipitor 40  Will continue to monitor

## 2025-03-27 NOTE — ASSESSMENT & PLAN NOTE
Symptoms well-controlled Pepcid 20 BID (although it is prescribed daily), but HR is 58 and AV block per UTD is a possible AE of over-medication with Pepcid.    -- discussed supplementing with antacids and lifestyle modifications

## 2025-03-27 NOTE — TELEPHONE ENCOUNTER
Dup msg. Staff sent msg to patient this afternoon informing that provider is not concerned with the low Co2 level

## 2025-03-27 NOTE — ASSESSMENT & PLAN NOTE
Stable in CKD3a range.  Recently taken off of lisinopril due to transient hyperkalemia which resolved by the time patient arrived in ED - suspect hemolysis vs. true elevation.  Nevertheless, nephrologist Dr. Mcfarland put him on amlodipine 5 instead - BP today looks excellent.  Will continue to monitor.    -- Advised continuing total fluid intake of approximately 50-60 ounces per day

## 2025-04-05 NOTE — TELEPHONE ENCOUNTER
No care due was identified.  [facility] Embedded Care Due Messages. Reference number: [id number].   4/05/2025 4:08:47 PM CDT

## 2025-04-07 RX ORDER — FLUTICASONE PROPIONATE 50 MCG
1 SPRAY, SUSPENSION (ML) NASAL DAILY
Qty: 15.8 ML | Refills: 3 | Status: SHIPPED | OUTPATIENT
Start: 2025-04-07

## 2025-04-08 ENCOUNTER — HOSPITAL ENCOUNTER (OUTPATIENT)
Facility: HOSPITAL | Age: 70
Discharge: HOME | End: 2025-04-08
Attending: INTERNAL MEDICINE | Admitting: INTERNAL MEDICINE
Payer: MEDICARE

## 2025-04-08 ENCOUNTER — ANESTHESIA (OUTPATIENT)
Dept: ENDOSCOPY | Facility: HOSPITAL | Age: 70
End: 2025-04-08
Payer: MEDICARE

## 2025-04-08 ENCOUNTER — ANESTHESIA EVENT (OUTPATIENT)
Dept: ENDOSCOPY | Facility: HOSPITAL | Age: 70
End: 2025-04-08
Payer: MEDICARE

## 2025-04-08 VITALS
RESPIRATION RATE: 16 BRPM | HEIGHT: 67 IN | HEART RATE: 53 BPM | BODY MASS INDEX: 23.6 KG/M2 | DIASTOLIC BLOOD PRESSURE: 75 MMHG | WEIGHT: 150.38 LBS | TEMPERATURE: 98 F | SYSTOLIC BLOOD PRESSURE: 145 MMHG | OXYGEN SATURATION: 100 %

## 2025-04-08 DIAGNOSIS — Z12.12 ENCOUNTER FOR COLORECTAL CANCER SCREENING: ICD-10-CM

## 2025-04-08 DIAGNOSIS — Z86.0100 HISTORY OF COLON POLYPS: ICD-10-CM

## 2025-04-08 DIAGNOSIS — Z12.11 ENCOUNTER FOR COLORECTAL CANCER SCREENING: ICD-10-CM

## 2025-04-08 PROCEDURE — 88305 TISSUE EXAM BY PATHOLOGIST: CPT | Mod: 26,,, | Performed by: PATHOLOGY

## 2025-04-08 PROCEDURE — 63600175 PHARM REV CODE 636 W HCPCS: Performed by: NURSE ANESTHETIST, CERTIFIED REGISTERED

## 2025-04-08 PROCEDURE — 45380 COLONOSCOPY AND BIOPSY: CPT | Mod: PT,,, | Performed by: STUDENT IN AN ORGANIZED HEALTH CARE EDUCATION/TRAINING PROGRAM

## 2025-04-08 PROCEDURE — 37000008 HC ANESTHESIA 1ST 15 MINUTES: Performed by: STUDENT IN AN ORGANIZED HEALTH CARE EDUCATION/TRAINING PROGRAM

## 2025-04-08 PROCEDURE — 45380 COLONOSCOPY AND BIOPSY: CPT | Mod: PT | Performed by: STUDENT IN AN ORGANIZED HEALTH CARE EDUCATION/TRAINING PROGRAM

## 2025-04-08 PROCEDURE — 63600175 PHARM REV CODE 636 W HCPCS

## 2025-04-08 PROCEDURE — 25000003 PHARM REV CODE 250

## 2025-04-08 PROCEDURE — 27201012 HC FORCEPS, HOT/COLD, DISP: Performed by: STUDENT IN AN ORGANIZED HEALTH CARE EDUCATION/TRAINING PROGRAM

## 2025-04-08 PROCEDURE — 37000009 HC ANESTHESIA EA ADD 15 MINS: Performed by: STUDENT IN AN ORGANIZED HEALTH CARE EDUCATION/TRAINING PROGRAM

## 2025-04-08 PROCEDURE — 88305 TISSUE EXAM BY PATHOLOGIST: CPT | Mod: TC | Performed by: STUDENT IN AN ORGANIZED HEALTH CARE EDUCATION/TRAINING PROGRAM

## 2025-04-08 RX ORDER — SODIUM CHLORIDE 9 MG/ML
INJECTION, SOLUTION INTRAVENOUS CONTINUOUS
Status: DISCONTINUED | OUTPATIENT
Start: 2025-04-08 | End: 2025-04-08 | Stop reason: HOSPADM

## 2025-04-08 RX ORDER — PROPOFOL 10 MG/ML
VIAL (ML) INTRAVENOUS
Status: DISCONTINUED | OUTPATIENT
Start: 2025-04-08 | End: 2025-04-08

## 2025-04-08 RX ORDER — PROPOFOL 10 MG/ML
VIAL (ML) INTRAVENOUS CONTINUOUS PRN
Status: DISCONTINUED | OUTPATIENT
Start: 2025-04-08 | End: 2025-04-08

## 2025-04-08 RX ORDER — LIDOCAINE HYDROCHLORIDE 20 MG/ML
INJECTION INTRAVENOUS
Status: DISCONTINUED | OUTPATIENT
Start: 2025-04-08 | End: 2025-04-08

## 2025-04-08 RX ADMIN — SODIUM CHLORIDE: 9 INJECTION, SOLUTION INTRAVENOUS at 02:04

## 2025-04-08 RX ADMIN — PROPOFOL 150 MCG/KG/MIN: 10 INJECTION, EMULSION INTRAVENOUS at 02:04

## 2025-04-08 RX ADMIN — PROPOFOL 20 MG: 10 INJECTION, EMULSION INTRAVENOUS at 02:04

## 2025-04-08 RX ADMIN — LIDOCAINE HYDROCHLORIDE 60 MG: 20 INJECTION INTRAVENOUS at 02:04

## 2025-04-08 RX ADMIN — GLYCOPYRROLATE 0.2 MG: 0.2 INJECTION, SOLUTION INTRAMUSCULAR; INTRAVENOUS at 03:04

## 2025-04-08 RX ADMIN — PROPOFOL 80 MG: 10 INJECTION, EMULSION INTRAVENOUS at 02:04

## 2025-04-08 NOTE — TRANSFER OF CARE
"Anesthesia Transfer of Care Note    Patient: Abdias Kim    Procedure(s) Performed: Procedure(s) (LRB):  COLONOSCOPY, SCREENING, LOW RISK PATIENT (N/A)    Patient location: GI    Anesthesia Type: general    Transport from OR: Transported from OR on room air with adequate spontaneous ventilation    Post pain: adequate analgesia    Post assessment: no apparent anesthetic complications and tolerated procedure well    Post vital signs: stable    Level of consciousness: sedated    Nausea/Vomiting: no nausea/vomiting    Complications: none    Transfer of care protocol was followed    Last vitals: Visit Vitals  BP (!) 157/74 (BP Location: Left arm, Patient Position: Lying)   Pulse 69   Temp 36.9 °C (98.4 °F) (Temporal)   Resp 16   Ht 5' 7" (1.702 m)   Wt 68.2 kg (150 lb 5.7 oz)   SpO2 100%   BMI 23.55 kg/m²     "

## 2025-04-08 NOTE — ANESTHESIA PREPROCEDURE EVALUATION
04/08/2025  Abdias Kim is a 69 y.o., male.    Pre-operative evaluation for Procedure(s) (LRB):  COLONOSCOPY, SCREENING, LOW RISK PATIENT (N/A)    Abdias Kim is a 69 y.o. male with PMH HTN, CAD s/p CABG 2021, CKD here for above procedure       2D Echo: 06/28/2024    Left Ventricle: The left ventricle is normal in size. There is mild concentric hypertrophy. There is normal systolic function with a visually estimated ejection fraction of 60 - 65%.    Right Ventricle: Normal right ventricular cavity size. Systolic function is normal.    Left Atrium: Left atrium is mildly dilated.    Aorta: Aortic root is mildly dilated measuring 3.62 cm.    Pulmonary Artery: The estimated pulmonary artery systolic pressure is 31 mmHg.    IVC/SVC: Normal venous pressure at 3 mmHg.      EKG:   Vent. Rate :  48 BPM     Atrial Rate :  55 BPM      P-R Int : 178 ms          QRS Dur :  88 ms       QT Int : 422 ms       P-R-T Axes :  46  12  23 degrees     QTcB Int : 376 ms     Sinus bradycardia   Nonspecific ST abnormality   Abnormal ECG   When compared with ECG of 20-Aug-2024 13:21,   Criteria for Anterior infarct are no longer Present   Confirmed by Edmond Encarnacion ([id number]) on 2/28/2025 5:56:06 PM       Problem List[1]    Review of patient's allergies indicates:   Allergen Reactions    Allegra-d 12 hour [fexofenadine-pseudoephedrine] Other (See Comments)     Trouble urinating    Mucinex d [pseudoephedrine-guaifenesin] Other (See Comments)     Trouble urinating    Losartan-hydrochlorothiazide Hives and Rash     Other reaction(s): Hives       Past Surgical History:   Procedure Laterality Date    APPENDECTOMY      CARDIAC SURGERY      COLONOSCOPY N/A 01/31/2017    Procedure: COLONOSCOPY;  Surgeon: BASILIO Winn MD;  Location: [address]);  Service: Endoscopy;  Laterality: N/A;    COLONOSCOPY N/A 02/05/2020    Procedure:  COLONOSCOPY;  Surgeon: Cody Maria MD;  Location: [facility] ENDO (4TH FLR);  Service: Endoscopy;  Laterality: N/A;  Luis Maria or ghazala. Needs to be done in Jan 2020    CORONARY ARTERY BYPASS GRAFT  05/26/2021    CORONARY ARTERY BYPASS GRAFT (CABG) N/A 04/26/2021    Procedure: CORONARY ARTERY BYPASS GRAFT (CABG)X3 WITH VEIN HARVEST;  Surgeon: Fidencio Galindo MD;  [facility];  Service: Cardiovascular;  Laterality: N/A;    ENDOSCOPIC HARVEST OF VEIN Left 04/26/2021    Procedure: HARVEST-VEIN-ENDOVASCULAR FOR CABGX3;  Surgeon: Fidencio Galindo MD;  [facility];  Service: Cardiovascular;  Laterality: Left;  Vein [place] Start time: 1201pm  Vein [place] Stop time: 1226pm    Vein Prep Start time: 1227pm  Vein Prep Stop time: 1250pm    ESOPHAGOGASTRODUODENOSCOPY N/A 02/05/2020    Procedure: EGD (ESOPHAGOGASTRODUODENOSCOPY);  Surgeon: Cody Maria MD;  Location: [facility] ENDO (4TH FLR);  Service: Endoscopy;  Laterality: N/A;    ESOPHAGOGASTRODUODENOSCOPY N/A 04/30/2020    Procedure: EGD (ESOPHAGOGASTRODUODENOSCOPY);  Surgeon: Cody Maria MD;  Location: [facility] (2ND FLR);  Service: Endoscopy;  Laterality: N/A;  schedule 12 weeks from now  4/13/20 - removed from 4/29/20, needs to be rescheduled high priority - pg  4/28/20-scheduled from high priority list-BB  Covid screening test scheduled for 4/29/20-BB  instructions emailed to patient-BB    EXCLUSION OF LEFT ATRIAL APPENDAGE N/A 04/26/2021    Procedure: EXCLUSION, LEFT ATRIAL APPENDAGE;  Surgeon: Fidencio Galindo MD;  [facility];  Service: Cardiovascular;  Laterality: N/A;  Left Atrial Appendage Resection    HERNIA REPAIR  April/May 2022    LEFT HEART CATHETERIZATION Left 04/21/2021    Procedure: Left heart cath;  Surgeon: Aric Alvarado MD;  Location: [facility] CATH LAB;  Service: Cardiology;  Laterality: Left;    UMBILICAL HERNIA REPAIR N/A 03/31/2022    Procedure: REPAIR, HERNIA, UMBILICAL, AGE 5 YEARS OR  "OLDER Open With or Without Mesh;  Surgeon: Matt Delgado MD;  Location: [facility] OR [address] [place] [address];  Service: General;  Laterality: N/A;         Vital Signs:  Temp:  [36.9 °C (98.4 °F)]   Pulse:  [69]   Resp:  [16]   BP: (157)/(74)   SpO2:  [100 %]       CBC: No results for input(s): "WBC", "RBC", "HGB", "HCT", "PLT", "MCV", "MCH", "MCHC" in the last 72 hours.    CMP: No results for input(s): "NA", "K", "CL", "CO2", "BUN", "CREATININE", "GLU", "MG", "PHOS", "CALCIUM", "ALBUMIN", "PROT", "ALKPHOS", "ALT", "AST", "BILITOT" in the last 72 hours.    INR  No results for input(s): "PT", "INR", "PROTIME", "APTT" in the last 72 hours.                Pre-op Assessment    I have reviewed the Patient Summary Reports.     I have reviewed the Nursing Notes. I have reviewed the NPO Status.   I have reviewed the Medications.     Review of Systems  Anesthesia Hx:  No problems with previous Anesthesia   History of prior surgery of interest to airway management or planning:          Denies Family Hx of Anesthesia complications.    Denies Personal Hx of Anesthesia complications.                    Cardiovascular:     Hypertension   CAD  asymptomatic CABG/stent  Denies Dysrhythmias.   Denies Angina.   Denies Orthopnea.            Coronary Artery Disease:                            Hypertension     Atrial Fibrillation     Pulmonary:     Denies Asthma.     Denies Sleep Apnea.                Renal/:  Chronic Renal Disease, CKD        Kidney Function/Disease             Hepatic/GI:  Bowel Prep.   GERD         Gerd          Neurological:  Denies TIA.     Denies Seizures.                                    Physical Exam  General: Well nourished, Cooperative and Alert    Airway:  Mallampati: II / II  Mouth Opening: Normal  TM Distance: Normal  Neck ROM: Normal ROM    Dental:  Intact        Anesthesia Plan  Type of Anesthesia, risks & benefits discussed:    Anesthesia Type: Gen Natural Airway  Intra-op Monitoring Plan: Standard ASA Monitors  Post " Op Pain Control Plan: multimodal analgesia  Induction:  IV  Informed Consent: Informed consent signed with the Patient and all parties understand the risks and agree with anesthesia plan.  All questions answered.   ASA Score: 3  Day of Surgery Review of History & Physical: H&P Update referred to the surgeon/provider.    Ready For Surgery From Anesthesia Perspective.     .           [1]   Patient Active Problem List  Diagnosis    Hypertensive cardiovascular-renal disease, stage 1-4 or unspecified chronic kidney disease, without heart failure    Allergic rhinitis    Diverticulosis    Anemia, iron deficiency    External hemorrhoids    Tubular adenoma of colon    Tongue laceration    ED (erectile dysfunction)    Ventral hernia without obstruction or gangrene    GERD (gastroesophageal reflux disease)    Eosinophilic esophagitis    Abnormal cardiovascular stress test    CAD (coronary artery disease)    Hypophosphatemia    Acute blood loss anemia    Paroxysmal atrial fibrillation    Pain of left thigh    S/P CABG (coronary artery bypass graft)    Aortic atherosclerosis    Chronic kidney disease    Right renal mass    Stage 3a chronic kidney disease    Acute pain of right knee    Encounter for Medicare annual wellness exam    Advance directive discussed with patient

## 2025-04-08 NOTE — H&P
Short Stay Endoscopy History and Physical    PCP - Trace Noriega MD  Referring Physician - Prem Mercedes MD  [address] [place] [address]    Procedure - colonoscopy  ASA - per anesthesia  Mallampati - per anesthesia  History of Anesthesia problems - no  Family history Anesthesia problems -  no   Plan of anesthesia - General    HPI:  This is a 69 y.o. male here for evaluation of: personal history of colon polyps.    Reflux - no  Dysphagia - no  Abdominal pain - no  Diarrhea - no    ROS:  Constitutional: No fevers, chills, No weight loss  CV: No chest pain  Pulm: No cough, No shortness of breath  GI: see HPI    Medical History:  has a past medical history of Allergy (Childhood), Anemia, Arthritis, Disorder of kidney and ureter, Diverticulosis, Food allergy, GERD (gastroesophageal reflux disease), Hearing loss, Hypertension, Joint pain, and Seasonal allergies.    Surgical History:  has a past surgical history that includes Appendectomy; Colonoscopy (N/A, 01/31/2017); Esophagogastroduodenoscopy (N/A, 02/05/2020); Colonoscopy (N/A, 02/05/2020); Esophagogastroduodenoscopy (N/A, 04/30/2020); Left heart catheterization (Left, 04/21/2021); Coronary Artery Bypass Graft (CABG) (N/A, 04/26/2021); Endoscopic harvest of vein (Left, 04/26/2021); Exclusion of left atrial appendage (N/A, 04/26/2021); Umbilical hernia repair (N/A, 03/31/2022); Hernia repair (April/May 2022); Coronary artery bypass graft (05/26/2021); and Cardiac surgery.    Family History: family history includes Arthritis in his father; Cancer in his father; Colon polyps in his father; Heart disease in his father and mother; Heart failure in his father; Hypertension in his father, mother, and son..    Social History:  reports that he has never smoked. He has never been exposed to tobacco smoke. He has never used smokeless tobacco. He reports current alcohol use of about 2.0 standard drinks of alcohol per week. He reports that he does not  use drugs.    Review of patient's allergies indicates:   Allergen Reactions    Allegra-d 12 hour [fexofenadine-pseudoephedrine] Other (See Comments)     Trouble urinating    Mucinex d [pseudoephedrine-guaifenesin] Other (See Comments)     Trouble urinating    Losartan-hydrochlorothiazide Hives and Rash     Other reaction(s): Hives       Medications:   Prescriptions Prior to Admission[1]    Physical Exam:    Vital Signs:   Vitals:    04/08/25 1317   BP: (!) 157/74   Pulse: 69   Resp: 16   Temp: 98.4 °F (36.9 °C)       General Appearance: Well appearing in no acute distress  Lungs: no labored breathing  CVS:  regular rate  Abdomen: non tender    Labs:  Lab Results   Component Value Date    WBC 3.98 03/26/2025    HGB 10.7 (L) 03/26/2025    HCT 32.5 (L) 03/26/2025     (L) 03/26/2025    CHOL 111 (L) 09/13/2024    TRIG 58 09/13/2024    HDL 47 09/13/2024    ALT 21 09/05/2023    AST 19 09/05/2023     03/26/2025    K 4.2 03/26/2025     03/26/2025    CREATININE 1.3 03/26/2025    BUN 15 03/26/2025    CO2 20 (L) 03/26/2025    TSH 0.715 10/26/2021    PSA 0.50 09/13/2024    INR 1.0 05/05/2021    GLUF 89 11/16/2021       I have explained the risks and benefits of this endoscopic procedure to the patient including but not limited to bleeding, inflammation, infection, perforation, and death.      TRUDY GREGG MD         [1]   Medications Prior to Admission   Medication Sig Dispense Refill Last Dose/Taking    amLODIPine (NORVASC) 5 MG tablet Take 1 tablet (5 mg total) by mouth once daily. 90 tablet 3 4/8/2025    aspirin (ECOTRIN) 81 MG EC tablet Take 1 tablet (81 mg total) by mouth once daily. 90 tablet 3 4/7/2025    atorvastatin (LIPITOR) 40 MG tablet Take 1 tablet (40 mg total) by mouth every evening. 90 tablet 3 4/7/2025    calcitRIOL (ROCALTROL) 0.25 MCG Cap Take 1 capsule (0.25 mcg total) by mouth once daily. 30 capsule 2 4/7/2025    cetirizine (ZYRTEC) 10 MG tablet Take 1 tablet by mouth Daily.   4/8/2025     famotidine (PEPCID) 20 MG tablet Take 2 tablets (40 mg total) by mouth once daily. 180 tablet 0 4/8/2025    fluticasone propionate (FLONASE) 50 mcg/actuation nasal spray 1 spray (50 mcg total) by Each Nostril route once daily. 15.8 mL 3     multivitamin (THERAGRAN) per tablet Take 1 tablet by mouth once daily.       sildenafiL (VIAGRA) 100 MG tablet Take 0.5 tablets (50 mg total) by mouth as needed for Erectile Dysfunction. 10 tablet 3

## 2025-04-08 NOTE — PROVATION PATIENT INSTRUCTIONS
Discharge Summary/Instructions after an Endoscopic Procedure  Patient Name: Abdias Kim  Patient MRN: [id number]  Patient YOB: 1955  Tuesday, April 8, 2025  Fabiana Carver MD  Dear patient,  As a result of recent federal legislation (The Federal Cures Act), you may   receive lab or pathology results from your procedure in your [facility]   account before your physician is able to contact you. Your physician or   their representative will relay the results to you with their   recommendations at their soonest availability.  Thank you,  RESTRICTIONS:  During your procedure today, you received medications for sedation.  These   medications may affect your judgment, balance and coordination.  Therefore,   for 24 hours, you have the following restrictions:   - DO NOT drive a car, operate machinery, make legal/financial decisions,   sign important papers or drink alcohol.    ACTIVITY:  Today: no heavy lifting, straining or running due to procedural   sedation/anesthesia.  The following day: return to full activity including work.  DIET:  Eat and drink normally unless instructed otherwise.     TREATMENT FOR COMMON SIDE EFFECTS:  - Mild abdominal pain, nausea, belching, bloating or excessive gas:  rest,   eat lightly and use a heating pad.  - Sore Throat: treat with throat lozenges and/or gargle with warm salt   water.  - Because air was used during the procedure, expelling large amounts of air   from your rectum or belching is normal.  - If a bowel prep was taken, you may not have a bowel movement for 1-3 days.    This is normal.  SYMPTOMS TO WATCH FOR AND REPORT TO YOUR PHYSICIAN:  1. Abdominal pain or bloating, other than gas cramps.  2. Chest pain.  3. Back pain.  4. Signs of infection such as: chills or fever occurring within 24 hours   after the procedure.  5. Rectal bleeding, which would show as bright red, maroon, or black stools.   (A tablespoon of blood from the rectum is not serious, especially if    hemorrhoids are present.)  6. Vomiting.  7. Weakness or dizziness.  GO DIRECTLY TO THE NEAREST EMERGENCY ROOM IF YOU HAVE ANY OF THE FOLLOWING:      Difficulty breathing              Chills and/or fever over 101 F   Persistent vomiting and/or vomiting blood   Severe abdominal pain   Severe chest pain   Black, tarry stools   Bleeding- more than one tablespoon   Any other symptom or condition that you feel may need urgent attention  Your doctor recommends these additional instructions:  If any biopsies were taken, your doctors clinic will contact you in 1 to 2   weeks with any results.  - Repeat colonoscopy in 5 years for surveillance.   - Await pathology results.   - Further recommendations pending pathology results.  - Discharge patient to home (with escort).  For questions, problems or results please call your physician - Fabiana Carver MD at Work:  [phone].  [facility], EMERGENCY ROOM PHONE NUMBER: [phone]  IF A COMPLICATION OR EMERGENCY SITUATION ARISES AND YOU ARE UNABLE TO REACH   YOUR PHYSICIAN - GO DIRECTLY TO THE EMERGENCY ROOM.  MD Fabiana Tanner MD  4/8/2025 3:30:55 PM  This report has been verified and signed electronically.  Dear patient,  As a result of recent federal legislation (The Federal Cures Act), you may   receive lab or pathology results from your procedure in your [facility]   account before your physician is able to contact you. Your physician or   their representative will relay the results to you with their   recommendations at their soonest availability.  Thank you,  PROVATION

## 2025-04-09 NOTE — ANESTHESIA POSTPROCEDURE EVALUATION
Anesthesia Post Evaluation    Patient: Abdias Kim    Procedure(s) Performed: Procedure(s) (LRB):  COLONOSCOPY, SCREENING, LOW RISK PATIENT (N/A)    Final Anesthesia Type: general      Patient location during evaluation: PACU  Patient participation: Yes- Able to Participate  Level of consciousness: awake and alert  Post-procedure vital signs: reviewed and stable  Pain management: adequate  Airway patency: patent    PONV status at discharge: No PONV  Anesthetic complications: no      Cardiovascular status: blood pressure returned to baseline  Respiratory status: unassisted, spontaneous ventilation and room air  Hydration status: euvolemic  Follow-up not needed.              Vitals Value Taken Time   /75 04/08/25 15:55   Temp 36.7 °C (98.1 °F) 04/08/25 15:25   Pulse 53 04/08/25 15:55   Resp 16 04/08/25 15:55   SpO2 100 % 04/08/25 15:55         No case tracking events are documented in the log.      Pain/Terence Score: Terence Score: 10 (4/8/2025  3:41 PM)

## 2025-04-11 LAB
ESTROGEN SERPL-MCNC: NORMAL PG/ML
INSULIN SERPL-ACNC: NORMAL U[IU]/ML
LAB AP CLINICAL INFORMATION: NORMAL
LAB AP GROSS DESCRIPTION: NORMAL
LAB AP PERFORMING LOCATION(S): NORMAL
LAB AP REPORT FOOTNOTES: NORMAL
T3RU NFR SERPL: NORMAL %

## 2025-04-18 ENCOUNTER — RESULTS FOLLOW-UP (OUTPATIENT)
Dept: GASTROENTEROLOGY | Facility: HOSPITAL | Age: 70
End: 2025-04-18

## 2025-05-29 ENCOUNTER — PATIENT MESSAGE (OUTPATIENT)
Dept: NEPHROLOGY | Facility: CLINIC | Age: 70
End: 2025-05-29
Payer: MEDICARE

## 2025-06-03 DIAGNOSIS — E83.9 CHRONIC KIDNEY DISEASE-MINERAL BONE DISORDER (CKD-MBD) WITH STAGE 3A CHRONIC KIDNEY DISEASE: Primary | ICD-10-CM

## 2025-06-03 DIAGNOSIS — N18.31 CHRONIC KIDNEY DISEASE-MINERAL BONE DISORDER (CKD-MBD) WITH STAGE 3A CHRONIC KIDNEY DISEASE: Primary | ICD-10-CM

## 2025-06-03 DIAGNOSIS — M89.9 CHRONIC KIDNEY DISEASE-MINERAL BONE DISORDER (CKD-MBD) WITH STAGE 3A CHRONIC KIDNEY DISEASE: Primary | ICD-10-CM

## 2025-06-03 NOTE — PROGRESS NOTES
Discuss at visit  Phone call to patient.  Notified him of culture results and treatment plan. He verbalized understanding.   Order sent to [facility] in [place].

## 2025-06-04 ENCOUNTER — TELEPHONE (OUTPATIENT)
Dept: RESEARCH | Facility: HOSPITAL | Age: 70
End: 2025-06-04
Payer: MEDICARE

## 2025-06-04 ENCOUNTER — RESEARCH ENCOUNTER (OUTPATIENT)
Dept: RESEARCH | Facility: HOSPITAL | Age: 70
End: 2025-06-04
Payer: MEDICARE

## 2025-06-04 ENCOUNTER — APPOINTMENT (OUTPATIENT)
Dept: LAB | Facility: HOSPITAL | Age: 70
End: 2025-06-04
Payer: MEDICARE

## 2025-06-04 DIAGNOSIS — E83.9 CHRONIC KIDNEY DISEASE-MINERAL BONE DISORDER (CKD-MBD) WITH STAGE 3A CHRONIC KIDNEY DISEASE: ICD-10-CM

## 2025-06-04 DIAGNOSIS — N18.31 CHRONIC KIDNEY DISEASE-MINERAL BONE DISORDER (CKD-MBD) WITH STAGE 3A CHRONIC KIDNEY DISEASE: ICD-10-CM

## 2025-06-04 DIAGNOSIS — M89.9 CHRONIC KIDNEY DISEASE-MINERAL BONE DISORDER (CKD-MBD) WITH STAGE 3A CHRONIC KIDNEY DISEASE: ICD-10-CM

## 2025-06-04 LAB
25(OH)D3+25(OH)D2 SERPL-MCNC: 46 NG/ML (ref 30–96)
PTH-INTACT SERPL-MCNC: 56.6 PG/ML (ref 9–77)

## 2025-06-04 PROCEDURE — 83970 ASSAY OF PARATHORMONE: CPT

## 2025-06-04 PROCEDURE — 82306 VITAMIN D 25 HYDROXY: CPT

## 2025-06-04 PROCEDURE — 36415 COLL VENOUS BLD VENIPUNCTURE: CPT

## 2025-06-04 PROCEDURE — 82652 VIT D 1 25-DIHYDROXY: CPT

## 2025-06-06 LAB — W VITAMIN D, 1, 25-DIHYDROXY: 55 PG/ML

## 2025-06-16 ENCOUNTER — HOSPITAL ENCOUNTER (OUTPATIENT)
Dept: RADIOLOGY | Facility: HOSPITAL | Age: 70
Discharge: HOME | End: 2025-06-16
Attending: UROLOGY
Payer: MEDICARE

## 2025-06-16 DIAGNOSIS — N28.89 RIGHT RENAL MASS: ICD-10-CM

## 2025-06-16 PROCEDURE — 76775 US EXAM ABDO BACK WALL LIM: CPT | Mod: TC

## 2025-06-16 PROCEDURE — 76775 US EXAM ABDO BACK WALL LIM: CPT | Mod: 26,,, | Performed by: STUDENT IN AN ORGANIZED HEALTH CARE EDUCATION/TRAINING PROGRAM

## 2025-06-17 ENCOUNTER — RESEARCH ENCOUNTER (OUTPATIENT)
Dept: RESEARCH | Facility: HOSPITAL | Age: 70
End: 2025-06-17
Payer: MEDICARE

## 2025-06-17 NOTE — PROGRESS NOTES
Study Title: LENARD: Real-world Evidence to Advance Multi-Cancer Early Detection [employer] Equ[employer] (LENARD/[employer]-Medic[employer] study)  Protocol IRB #: [id number]  IRB Approval Date: 02 July 2024  Sponsor: TOMMY  : Martin Gonzalez MD     [facility] ID: [id number]     Results of the Tommy Rojas Multi Cancer Early Detection test and were reviewed by PI Dr Gonzalez. Patient was called and notified of test results, there was no signal detected.     Patient reminded, this was a screening test, so we still highly encourage them to continue other normal health screenings  and to continue to adhere to guideline-recommended cancer screenings.     Patient was asked about completing follow-up questionnaire online and was agreeable.

## 2025-06-18 NOTE — PROGRESS NOTES
Clinic Note  2025      Subjective:         Chief Complaint:   YUAN Kim is a 69 y.o. male followed for a small right renal mass. Consult from Dr. Mcfarland.  Ultrasound-2023- 1.2 cm RMP isoechoic exophytic lesion.  MRI Abd W WO-2023- 0.9 cm indeterminate hypoenhancing lesion at RMP. Too small to characterize.  Ultrasound-2024- stable 1.1 cm RMP lesion.  Retired from postal service.  eGFR 50 (it was 59 prior to lisinopril).  LUTS- nocturia 1x/noc, frequency-8x/day. Discussed flomax.Patient would like to hold off on medical therapy.     2024-MRI abd W WO- stable 1.1 cm right renal lesion.  Denies any FH of  malignancies    2025-Renal sonogram-2025- stable 1.1 cm right renal mass.  Staying active- elliptical 6 days/week.    Past Medical History:   Diagnosis Date    Allergy Childhood    Hay fever    Anemia     Arthritis     Disorder of kidney and ureter     Diverticulosis     Food allergy     GERD (gastroesophageal reflux disease)     Hearing loss     Hypertension     Joint pain     Seasonal allergies      Family History   Problem Relation Name Age of Onset    Heart disease Mother Thao Kim         , Coronary artery disease    Hypertension Mother Thao Kim     Cancer Father Michael Kim         colon/prostate    Colon polyps Father Michael Kim     Arthritis Father Michael Kim             Heart disease Father Michael Kim         Coronary artery disease, Afib, CHF    Hypertension Father Michael Kim     Heart failure Father Michael Kim     Hypertension Son x 1     Stroke Neg Hx      Diabetes Neg Hx      Celiac disease Neg Hx      Esophageal cancer Neg Hx      Liver cancer Neg Hx      Liver disease Neg Hx      Stomach cancer Neg Hx      Rectal cancer Neg Hx      Melanoma Neg Hx       Social History[1]  Past Surgical History:   Procedure Laterality Date    APPENDECTOMY      CARDIAC SURGERY      COLONOSCOPY N/A 2017    Procedure:  COLONOSCOPY;  Surgeon: BASILIO Winn MD;  Location: [facility] ENDO (4TH FLR);  Service: Endoscopy;  Laterality: N/A;    COLONOSCOPY N/A 02/05/2020    Procedure: COLONOSCOPY;  Surgeon: Cody Maria MD;  Location: [facility] ENDO (4TH FLR);  Service: Endoscopy;  Laterality: N/A;  OKay for Crow or ghazala. Needs to be done in Jan 2020    COLONOSCOPY, SCREENING, LOW RISK PATIENT N/A 4/8/2025    Procedure: COLONOSCOPY, SCREENING, LOW RISK PATIENT;  Surgeon: Fabiana Carver MD;  Location: [facility] ENDO (4TH FLR);  Service: Endoscopy;  Laterality: N/A;  3/10 ref by   Trace Noriega MD, PEG, portal-gg  3/17/25- r/s, has prep, instr to portal. DBM  3/31 precall attempted/LVM/mleone  4-2-25- pre call completed- MMG    CORONARY ARTERY BYPASS GRAFT  05/26/2021    CORONARY ARTERY BYPASS GRAFT (CABG) N/A 04/26/2021    Procedure: CORONARY ARTERY BYPASS GRAFT (CABG)X3 WITH VEIN HARVEST;  Surgeon: Fidencio Galindo MD;  Location: [facility] OR 2ND FLR;  Service: Cardiovascular;  Laterality: N/A;    ENDOSCOPIC HARVEST OF VEIN Left 04/26/2021    Procedure: HARVEST-VEIN-ENDOVASCULAR FOR CABGX3;  Surgeon: Fidencio Galindo MD;  Location: [facility] OR 2ND FLR;  Service: Cardiovascular;  Laterality: Left;  Vein [place] Start time: 1201pm  Vein [place] Stop time: 1226pm    Vein Prep Start time: 1227pm  Vein Prep Stop time: 1250pm    ESOPHAGOGASTRODUODENOSCOPY N/A 02/05/2020    Procedure: EGD (ESOPHAGOGASTRODUODENOSCOPY);  Surgeon: Cody Maria MD;  Location: [facility] ENDO (4TH FLR);  Service: Endoscopy;  Laterality: N/A;    ESOPHAGOGASTRODUODENOSCOPY N/A 04/30/2020    Procedure: EGD (ESOPHAGOGASTRODUODENOSCOPY);  Surgeon: Cody Maria MD;  Location: [facility] ENDO (2ND FLR);  Service: Endoscopy;  Laterality: N/A;  schedule 12 weeks from now  4/13/20 - removed from 4/29/20, needs to be rescheduled high priority - pg  4/28/20-scheduled from high priority list-BB  Covid screening test scheduled for 4/29/20-BB  instructions emailed to patient-BB     "EXCLUSION OF LEFT ATRIAL APPENDAGE N/A 04/26/2021    Procedure: EXCLUSION, LEFT ATRIAL APPENDAGE;  Surgeon: Fidencio Galindo MD;  Location: [facility] OR [address] [place] [address];  Service: Cardiovascular;  Laterality: N/A;  Left Atrial Appendage Resection    HERNIA REPAIR  April/May 2022    LEFT HEART CATHETERIZATION Left 04/21/2021    Procedure: Left heart cath;  Surgeon: Aric Alvarado MD;  Location: [facility] CATH LAB;  Service: Cardiology;  Laterality: Left;    UMBILICAL HERNIA REPAIR N/A 03/31/2022    Procedure: REPAIR, HERNIA, UMBILICAL, AGE 5 YEARS OR OLDER Open With or Without Mesh;  Surgeon: Matt Delgado MD;  Location: [facility] OR [address] [place] [address];  Service: General;  Laterality: N/A;     Problem List[2]  Review of Systems   Constitutional:  Negative for appetite change, chills, fatigue, fever and unexpected weight change.   HENT:  Negative for nosebleeds.    Respiratory:  Negative for shortness of breath and wheezing.    Cardiovascular:  Negative for chest pain, palpitations and leg swelling.   Gastrointestinal:  Negative for abdominal distention, abdominal pain, constipation, diarrhea, nausea and vomiting.   Genitourinary:  Negative for dysuria and hematuria.   Musculoskeletal:  Negative for arthralgias and back pain.   Skin:  Negative for pallor.   Neurological:  Negative for dizziness, seizures and syncope.   Hematological:  Negative for adenopathy.   Psychiatric/Behavioral:  Negative for dysphoric mood.          Objective:      There were no vitals taken for this visit.  Estimated body mass index is 23.55 kg/m² as calculated from the following:    Height as of 4/8/25: 5' 7" (1.702 m).    Weight as of 4/8/25: 68.2 kg (150 lb 5.7 oz).  Physical Exam      Assessment and Plan:   Patient confused about best diet ( CAD vs CKD). Recommended he discuss with Dr. Noriega about a nutrition consult.   We discussed renal masses and reviewed the imaging in detail. About 80% of enhancing renal masses < 4 cm represent a renal cell carcinoma, " and masses >4 cm are more likely to be malignant (up to 90%). We discussed the different management options including (AS (active surveillance), biopsy, ablation and resection. Discussed renal mass biopsy along with indications and rationale for its role. Discussed different ablative options (high frequency radio-ablation, cryo, histotripsy, electroporation). Discussed that renal mass ablation is ideal for masses < 3 cm, partial nephrectomy when amenable and radical nephrectomy. We also discussed the concept of active surveillance of renal masses, including its risks and benefits. This is typically recommended for older and/or more co-morbid patients who have a higher surgical risk. Fortunately, there is long-term data to suggest that the risk of metastasis with a mass less than 4 cm while on active surveillance approaches 0%.  We discussed the robotic partial nephrectomy procedure, recuperation, and recovery. We discussed the possibility of conversion to either a total nephrectomy or converting from a robotic approach to an open approach, depending on intra-operative variables. Although partial nephrectomies do maximize long-term renal function, they are associated with a slightly increased risk of lizzie-procedural complications (bleeding and urinary fistulas). However, these short-term complications are generally outweighed by the benefits of renal preservation.   I answered questions and addressed concerns.   Wants to pursue AS (active surveillance).  1 year with renal sonogram.      Problem List Items Addressed This Visit       Hypertensive cardiovascular-renal disease, stage 1-4 or unspecified chronic kidney disease, without heart failure    Overview   Now on amlodipine bp well controlled          Right renal mass - Primary    Stage 3a chronic kidney disease    Overview   Stable             Follow up:       Fidencio Vivar             [1]   Social History  Socioeconomic History    Marital status:     Tobacco Use    Smoking status: Never     Passive exposure: Never    Smokeless tobacco: Never   Substance and Sexual Activity    Alcohol use: Yes     Alcohol/week: 2.0 standard drinks of alcohol     Types: 2 Cans of beer per week     Comment: few times a week     Drug use: No    Sexual activity: Yes     Partners: Female     Birth control/protection: Other-see comments     Comment: Wife, tubal ligation     Social Drivers of Health     Financial Resource Strain: Low Risk  (3/27/2025)    Overall Financial Resource Strain (CARDIA)     Difficulty of Paying Living Expenses: Not hard at all   Food Insecurity: No Food Insecurity (3/27/2025)    Hunger Vital Sign     Worried About Running Out of Food in the Last Year: Never true     Ran Out of Food in the Last Year: Never true   Transportation Needs: No Transportation Needs (3/27/2025)    PRAPARE - Transportation     Lack of Transportation (Medical): No     Lack of Transportation (Non-Medical): No   Physical Activity: Sufficiently Active (3/27/2025)    Exercise Vital Sign     Days of Exercise per Week: 6 days     Minutes of Exercise per Session: 50 min   Stress: Stress Concern Present (3/27/2025)    [employer] [place] [employer] - Occupational Stress Questionnaire     Feeling of Stress : To some extent   Housing Stability: Low Risk  (3/27/2025)    Housing Stability Vital Sign     Unable to Pay for Housing in the Last Year: No     Number of Times Moved in the Last Year: 1     Homeless in the Last Year: No   [2]   Patient Active Problem List  Diagnosis    Hypertensive cardiovascular-renal disease, stage 1-4 or unspecified chronic kidney disease, without heart failure    Allergic rhinitis    Diverticulosis    Anemia, iron deficiency    External hemorrhoids    Tubular adenoma of colon    Tongue laceration    ED (erectile dysfunction)    Ventral hernia without obstruction or gangrene    GERD (gastroesophageal reflux disease)    Eosinophilic esophagitis    Abnormal  cardiovascular stress test    CAD (coronary artery disease)    Hypophosphatemia    Acute blood loss anemia    Paroxysmal atrial fibrillation    Pain of left thigh    S/P CABG (coronary artery bypass graft)    Aortic atherosclerosis    Chronic kidney disease    Right renal mass    Stage 3a chronic kidney disease    Acute pain of right knee    Encounter for Medicare annual wellness exam    Advance directive discussed with patient

## 2025-06-19 ENCOUNTER — OFFICE VISIT (OUTPATIENT)
Dept: UROLOGY | Facility: CLINIC | Age: 70
End: 2025-06-19
Payer: MEDICARE

## 2025-06-19 VITALS
WEIGHT: 149.69 LBS | HEIGHT: 67 IN | BODY MASS INDEX: 23.49 KG/M2 | HEART RATE: 51 BPM | SYSTOLIC BLOOD PRESSURE: 148 MMHG | DIASTOLIC BLOOD PRESSURE: 64 MMHG

## 2025-06-19 DIAGNOSIS — N18.31 STAGE 3A CHRONIC KIDNEY DISEASE: ICD-10-CM

## 2025-06-19 DIAGNOSIS — Z95.1 S/P CABG (CORONARY ARTERY BYPASS GRAFT): ICD-10-CM

## 2025-06-19 DIAGNOSIS — I25.10 CORONARY ARTERY DISEASE INVOLVING NATIVE CORONARY ARTERY OF NATIVE HEART WITHOUT ANGINA PECTORIS: ICD-10-CM

## 2025-06-19 DIAGNOSIS — N52.9 ERECTILE DYSFUNCTION, UNSPECIFIED ERECTILE DYSFUNCTION TYPE: ICD-10-CM

## 2025-06-19 DIAGNOSIS — N28.89 RIGHT RENAL MASS: Primary | ICD-10-CM

## 2025-06-19 DIAGNOSIS — I13.10 HYPERTENSIVE CARDIOVASCULAR-RENAL DISEASE, STAGE 1-4 OR UNSPECIFIED CHRONIC KIDNEY DISEASE, WITHOUT HEART FAILURE: ICD-10-CM

## 2025-06-19 PROCEDURE — G2211 COMPLEX E/M VISIT ADD ON: HCPCS | Mod: ,,, | Performed by: UROLOGY

## 2025-06-19 PROCEDURE — 99214 OFFICE O/P EST MOD 30 MIN: CPT | Mod: S$PBB,,, | Performed by: UROLOGY

## 2025-06-19 PROCEDURE — 99213 OFFICE O/P EST LOW 20 MIN: CPT | Mod: PBBFAC | Performed by: UROLOGY

## 2025-06-19 PROCEDURE — 99999 PR PBB SHADOW E&M-EST. PATIENT-LVL III: CPT | Mod: PBBFAC,,, | Performed by: UROLOGY

## 2025-06-19 RX ORDER — SILDENAFIL 100 MG/1
50 TABLET, FILM COATED ORAL
Qty: 10 TABLET | Refills: 3 | Status: SHIPPED | OUTPATIENT
Start: 2025-06-19

## 2025-06-19 NOTE — LETTER
June 19, 2025        Trace Noriega MD  [address]  [address]th Floor  [facility] [address]             [facility] - Urology 2nd Fl  [address]  [facility] [address]  Phone: [phone]   Patient: Abdias Kim   MR Number: [id number]   YOB: 1955   Date of Visit: 6/19/2025       Dear Dr. Noriega:    Thank you for referring Abdias Kim to me for evaluation. Attached you will find relevant portions of my assessment and plan of care.    If you have questions, please do not hesitate to call me. I look forward to following Abdias Kim along with you.    Sincerely,      Fidencio Vivar MD            CC  No Recipients    Enclosure

## 2025-06-21 ENCOUNTER — PATIENT MESSAGE (OUTPATIENT)
Dept: NEPHROLOGY | Facility: CLINIC | Age: 70
End: 2025-06-21
Payer: MEDICARE

## 2025-07-09 NOTE — TELEPHONE ENCOUNTER
No care due was identified.  [facility] Embedded Care Due Messages. Reference number: [id number].   7/09/2025 3:46:16 PM CDT

## 2025-07-11 RX ORDER — FLUTICASONE PROPIONATE 50 MCG
1 SPRAY, SUSPENSION (ML) NASAL DAILY
Qty: 15.8 ML | Refills: 3 | Status: SHIPPED | OUTPATIENT
Start: 2025-07-11

## 2025-07-24 DIAGNOSIS — K21.9 GASTROESOPHAGEAL REFLUX DISEASE WITHOUT ESOPHAGITIS: ICD-10-CM

## 2025-07-25 RX ORDER — FAMOTIDINE 20 MG/1
40 TABLET, FILM COATED ORAL DAILY
Qty: 180 TABLET | Refills: 0 | Status: SHIPPED | OUTPATIENT
Start: 2025-07-25 | End: 2025-10-23

## 2025-07-25 NOTE — TELEPHONE ENCOUNTER
No care due was identified.  [facility] Embedded Care Due Messages. Reference number: [id number].   7/24/2025 9:16:11 PM CDT

## 2025-08-01 ENCOUNTER — LAB VISIT (OUTPATIENT)
Dept: LAB | Facility: HOSPITAL | Age: 70
End: 2025-08-01
Payer: MEDICARE

## 2025-08-01 DIAGNOSIS — N18.31 CHRONIC KIDNEY DISEASE-MINERAL BONE DISORDER (CKD-MBD) WITH STAGE 3A CHRONIC KIDNEY DISEASE: Primary | ICD-10-CM

## 2025-08-01 DIAGNOSIS — E83.9 CHRONIC KIDNEY DISEASE-MINERAL BONE DISORDER (CKD-MBD) WITH STAGE 3A CHRONIC KIDNEY DISEASE: Primary | ICD-10-CM

## 2025-08-01 DIAGNOSIS — E83.9 CHRONIC KIDNEY DISEASE-MINERAL BONE DISORDER (CKD-MBD) WITH STAGE 3A CHRONIC KIDNEY DISEASE: ICD-10-CM

## 2025-08-01 DIAGNOSIS — M89.9 CHRONIC KIDNEY DISEASE-MINERAL BONE DISORDER (CKD-MBD) WITH STAGE 3A CHRONIC KIDNEY DISEASE: Primary | ICD-10-CM

## 2025-08-01 DIAGNOSIS — M89.9 CHRONIC KIDNEY DISEASE-MINERAL BONE DISORDER (CKD-MBD) WITH STAGE 3A CHRONIC KIDNEY DISEASE: ICD-10-CM

## 2025-08-01 DIAGNOSIS — N18.31 CHRONIC KIDNEY DISEASE-MINERAL BONE DISORDER (CKD-MBD) WITH STAGE 3A CHRONIC KIDNEY DISEASE: ICD-10-CM

## 2025-08-01 LAB
ABSOLUTE EOSINOPHIL (OHS): 0.2 K/UL
ABSOLUTE MONOCYTE (OHS): 0.39 K/UL (ref 0.3–1)
ABSOLUTE NEUTROPHIL COUNT (OHS): 1.95 K/UL (ref 1.8–7.7)
ALBUMIN SERPL BCP-MCNC: 4.2 G/DL (ref 3.5–5.2)
ANION GAP (OHS): 5 MMOL/L (ref 8–16)
BASOPHILS # BLD AUTO: 0.02 K/UL
BASOPHILS NFR BLD AUTO: 0.5 %
BUN SERPL-MCNC: 11 MG/DL (ref 8–23)
CALCIUM SERPL-MCNC: 9.1 MG/DL (ref 8.7–10.5)
CHLORIDE SERPL-SCNC: 107 MMOL/L (ref 95–110)
CO2 SERPL-SCNC: 27 MMOL/L (ref 23–29)
CREAT SERPL-MCNC: 1.3 MG/DL (ref 0.5–1.4)
CREAT UR-MCNC: 33 MG/DL (ref 23–375)
ERYTHROCYTE [DISTWIDTH] IN BLOOD BY AUTOMATED COUNT: 16.1 % (ref 11.5–14.5)
GFR SERPLBLD CREATININE-BSD FMLA CKD-EPI: 59 ML/MIN/1.73/M2
GLUCOSE SERPL-MCNC: 99 MG/DL (ref 70–110)
HCT VFR BLD AUTO: 34.5 % (ref 40–54)
HGB BLD-MCNC: 11.1 GM/DL (ref 14–18)
IMM GRANULOCYTES # BLD AUTO: 0 K/UL (ref 0–0.04)
IMM GRANULOCYTES NFR BLD AUTO: 0 % (ref 0–0.5)
LYMPHOCYTES # BLD AUTO: 1.32 K/UL (ref 1–4.8)
MCH RBC QN AUTO: 22.6 PG (ref 27–31)
MCHC RBC AUTO-ENTMCNC: 32.2 G/DL (ref 32–36)
MCV RBC AUTO: 70 FL (ref 82–98)
NUCLEATED RBC (/100WBC) (OHS): 0 /100 WBC
PHOSPHATE SERPL-MCNC: 2.2 MG/DL (ref 2.7–4.5)
PLATELET # BLD AUTO: 140 K/UL (ref 150–450)
PMV BLD AUTO: 12.1 FL (ref 9.2–12.9)
POTASSIUM SERPL-SCNC: 4.4 MMOL/L (ref 3.5–5.1)
PROT UR-MCNC: <7 MG/DL
PROT/CREAT UR: NORMAL MG/G{CREAT}
RBC # BLD AUTO: 4.92 M/UL (ref 4.6–6.2)
RELATIVE EOSINOPHIL (OHS): 5.2 %
RELATIVE LYMPHOCYTE (OHS): 34 % (ref 18–48)
RELATIVE MONOCYTE (OHS): 10.1 % (ref 4–15)
RELATIVE NEUTROPHIL (OHS): 50.2 % (ref 38–73)
SODIUM SERPL-SCNC: 139 MMOL/L (ref 136–145)
WBC # BLD AUTO: 3.88 K/UL (ref 3.9–12.7)

## 2025-08-01 PROCEDURE — 36415 COLL VENOUS BLD VENIPUNCTURE: CPT | Mod: PO

## 2025-08-01 PROCEDURE — 81003 URINALYSIS AUTO W/O SCOPE: CPT

## 2025-08-01 PROCEDURE — 84156 ASSAY OF PROTEIN URINE: CPT

## 2025-08-01 PROCEDURE — 85025 COMPLETE CBC W/AUTO DIFF WBC: CPT

## 2025-08-01 PROCEDURE — 82040 ASSAY OF SERUM ALBUMIN: CPT

## 2025-08-02 LAB
BILIRUB UR QL STRIP.AUTO: NEGATIVE
CLARITY UR: CLEAR
COLOR UR AUTO: YELLOW
GLUCOSE UR QL STRIP: NEGATIVE
HGB UR QL STRIP: NEGATIVE
KETONES UR QL STRIP: NEGATIVE
LEUKOCYTE ESTERASE UR QL STRIP: NEGATIVE
NITRITE UR QL STRIP: NEGATIVE
PH UR STRIP: 6 [PH]
PROT UR QL STRIP: NEGATIVE
SP GR UR STRIP: <1.005
UROBILINOGEN UR STRIP-ACNC: NEGATIVE EU/DL

## 2025-08-07 ENCOUNTER — OFFICE VISIT (OUTPATIENT)
Dept: NEPHROLOGY | Facility: CLINIC | Age: 70
End: 2025-08-07
Payer: MEDICARE

## 2025-08-07 VITALS
DIASTOLIC BLOOD PRESSURE: 69 MMHG | OXYGEN SATURATION: 95 % | HEART RATE: 62 BPM | BODY MASS INDEX: 23.48 KG/M2 | WEIGHT: 149.94 LBS | SYSTOLIC BLOOD PRESSURE: 157 MMHG

## 2025-08-07 DIAGNOSIS — N18.31 CHRONIC KIDNEY DISEASE, STAGE 3A: Primary | ICD-10-CM

## 2025-08-07 DIAGNOSIS — I10 HYPERTENSION, UNSPECIFIED TYPE: ICD-10-CM

## 2025-08-07 DIAGNOSIS — I48.0 PAROXYSMAL ATRIAL FIBRILLATION: ICD-10-CM

## 2025-08-07 DIAGNOSIS — D50.9 MICROCYTIC ANEMIA: ICD-10-CM

## 2025-08-07 DIAGNOSIS — I25.10 CORONARY ARTERY DISEASE INVOLVING NATIVE CORONARY ARTERY OF NATIVE HEART WITHOUT ANGINA PECTORIS: ICD-10-CM

## 2025-08-07 DIAGNOSIS — N28.9 RENAL LESION: ICD-10-CM

## 2025-08-07 PROCEDURE — 99213 OFFICE O/P EST LOW 20 MIN: CPT | Mod: PBBFAC

## 2025-08-07 PROCEDURE — 99999 PR PBB SHADOW E&M-EST. PATIENT-LVL III: CPT | Mod: PBBFAC,GC,,

## 2025-08-07 NOTE — PROGRESS NOTES
Subjective     Chief Complaint: CKD    History of Present Illness:  Mr. Abdias Kim is a 70 y.o. male who presents for follow up of CKD 3a. He was previously seen by Dr. Wall in June 2023 for initial nephrology evaluation, who now presents to our clinic.     Renal function per chart review with sr cr baseline of 1.3 - 1.5 mg/dL and baseline eGFR around 59.    Interval History:  No complaints today. Denies CP, SOB, LE edema. Reports frequent urination. Denies hesitancy or dysuria. Creatinine stable at 1.3. BP not at goal today 157/69, home readings ~120/60. Adherent with amlodipine 5 mg. Continues to exercise 50 min/day 6x/week.     #CKD 3a- Creatinine increased from 1.3 up to 1.5 after ACEi was started, has since remained stable at 1.5 range. Denies any foam or blood in the urine.      # PAF CAD s/p CABG 2021.     # CAD/CABG 4/26/21 (LIMA-LAD, GIORGIO-D1, SVG-OM1)   Meds; ASA 81 mg, Lipitor 40 mg daily     # HTN diagnosed about in his 20's. Patient reports good adherence to the current regiment, which includes Diltiazem. He is following low salt diet. Previously on Losartan-HCTZ and stopped due to Hives. He participates in digital blood pressure monitoring which shows that his home readings are consistently in the 120s/70s on his current medications.  Meds; Lisinopril 5 mg and tolerating well.      #Microcytic anemia- Patient's iron panel obtained in June and July do not suggest iron deficiency, and his stage of CKD 3a is unlikely to cause anemia of renal disease. Review of his labs show that he has had anemia dating back to 2004 (age of 49), he is up-to-date on his colonoscopy and denies blood in his stools. His iron supplement was stopped and he was referred to hematology on 8/15/24       Review of Systems   Respiratory:  Negative for shortness of breath.    Cardiovascular:  Negative for chest pain and leg swelling.   Genitourinary:  Negative for dysuria and hematuria.       PAST HISTORY:     Past Medical  History:   Diagnosis Date    Allergy Childhood    Hay fever    Anemia     Arthritis     Disorder of kidney and ureter     Diverticulosis     Food allergy     GERD (gastroesophageal reflux disease)     Hearing loss     Hypertension     Joint pain     Seasonal allergies        Past Surgical History:   Procedure Laterality Date    APPENDECTOMY      CARDIAC SURGERY      COLONOSCOPY N/A 01/31/2017    Procedure: COLONOSCOPY;  Surgeon: BASILIO Winn MD;  Location: [address]);  Service: Endoscopy;  Laterality: N/A;    COLONOSCOPY N/A 02/05/2020    Procedure: COLONOSCOPY;  Surgeon: Cody Maria MD;  Location: [facility] ([address] [place] [address]);  Service: Endoscopy;  Laterality: N/A;  OKay for Crow or ghazala. Needs to be done in Jan 2020    COLONOSCOPY, SCREENING, LOW RISK PATIENT N/A 4/8/2025    Procedure: COLONOSCOPY, SCREENING, LOW RISK PATIENT;  Surgeon: Fabiana Carver MD;  Location: [facility] ([address] [place] [address]);  Service: Endoscopy;  Laterality: N/A;  3/10 ref by   Trace Noriega MD, PEG, portal-gg  3/17/25- r/s, has prep, instr to portal. DBM  3/31 precall attempted/LVM/mleone  4-2-25- pre call completed- MMG    CORONARY ARTERY BYPASS GRAFT  05/26/2021    CORONARY ARTERY BYPASS GRAFT (CABG) N/A 04/26/2021    Procedure: CORONARY ARTERY BYPASS GRAFT (CABG)X3 WITH VEIN HARVEST;  Surgeon: Fidencio Galindo MD;  Location: [facility] OR [address] [place] [address];  Service: Cardiovascular;  Laterality: N/A;    ENDOSCOPIC HARVEST OF VEIN Left 04/26/2021    Procedure: HARVEST-VEIN-ENDOVASCULAR FOR CABGX3;  Surgeon: Fidencio Galnido MD;  Location: [facility] OR [address] [place] [address];  Service: Cardiovascular;  Laterality: Left;  Vein [facility] Start time: 1201pm  Vein [facility] Stop time: 1226pm    Vein Prep Start time: 1227pm  Vein Prep Stop time: 1250pm    ESOPHAGOGASTRODUODENOSCOPY N/A 02/05/2020    Procedure: EGD (ESOPHAGOGASTRODUODENOSCOPY);  Surgeon: Cody Maria MD;  [address]  Service: Endoscopy;  Laterality: N/A;     ESOPHAGOGASTRODUODENOSCOPY N/A 2020    Procedure: EGD (ESOPHAGOGASTRODUODENOSCOPY);  Surgeon: Cody Maria MD;  Location: [facility] (2ND FLR);  Service: Endoscopy;  Laterality: N/A;  schedule 12 weeks from now  20 - removed from 20, needs to be rescheduled high priority - pg  20-scheduled from high priority list-BB  Covid screening test scheduled for 20-BB  instructions emailed to patient-BB    EXCLUSION OF LEFT ATRIAL APPENDAGE N/A 2021    Procedure: EXCLUSION, LEFT ATRIAL APPENDAGE;  Surgeon: Fidencio Galindo MD;  Location: [facility] OR [address] [place] [address];  Service: Cardiovascular;  Laterality: N/A;  Left Atrial Appendage Resection    HERNIA REPAIR  April/May 2022    LEFT HEART CATHETERIZATION Left 2021    Procedure: Left heart cath;  Surgeon: Aric Alvarado MD;  Location: [facility] CATH LAB;  Service: Cardiology;  Laterality: Left;    UMBILICAL HERNIA REPAIR N/A 2022    Procedure: REPAIR, HERNIA, UMBILICAL, AGE 5 YEARS OR OLDER Open With or Without Mesh;  Surgeon: Matt Delgado MD;  Location: [facility] OR [address] [place] [address];  Service: General;  Laterality: N/A;       Family History   Problem Relation Name Age of Onset    Heart disease Mother Thao Kim         , Coronary artery disease    Hypertension Mother Thao Kim     Cancer Father Michael Kim         colon/prostate    Colon polyps Father Michael Kim     Arthritis Father Michael Kim             Heart disease Father Michael Kim         Coronary artery disease, Afib, CHF    Hypertension Father Michael Kim     Heart failure Father Michael Kim     Hypertension Son x 1     Stroke Neg Hx      Diabetes Neg Hx      Celiac disease Neg Hx      Esophageal cancer Neg Hx      Liver cancer Neg Hx      Liver disease Neg Hx      Stomach cancer Neg Hx      Rectal cancer Neg Hx      Melanoma Neg Hx         Social History     Socioeconomic History    Marital status:    Tobacco Use    Smoking status: Never      Passive exposure: Never    Smokeless tobacco: Never   Substance and Sexual Activity    Alcohol use: Yes     Alcohol/week: 2.0 standard drinks of alcohol     Types: 2 Cans of beer per week     Comment: few times a week     Drug use: No    Sexual activity: Yes     Partners: Female     Birth control/protection: Other-see comments     Comment: Wife, tubal ligation     Social Drivers of Health     Financial Resource Strain: Low Risk  (3/27/2025)    Overall Financial Resource Strain (CARDIA)     Difficulty of Paying Living Expenses: Not hard at all   Food Insecurity: No Food Insecurity (3/27/2025)    Hunger Vital Sign     Worried About Running Out of Food in the Last Year: Never true     Ran Out of Food in the Last Year: Never true   Transportation Needs: No Transportation Needs (3/27/2025)    PRAPARE - Transportation     Lack of Transportation (Medical): No     Lack of Transportation (Non-Medical): No   Physical Activity: Sufficiently Active (3/27/2025)    Exercise Vital Sign     Days of Exercise per Week: 6 days     Minutes of Exercise per Session: 50 min   Stress: Stress Concern Present (3/27/2025)    [employer] [place] [employer] - Occupational Stress Questionnaire     Feeling of Stress : To some extent   Housing Stability: Low Risk  (3/27/2025)    Housing Stability Vital Sign     Unable to Pay for Housing in the Last Year: No     Number of Times Moved in the Last Year: 1     Homeless in the Last Year: No       MEDICATIONS & ALLERGIES:     Current Outpatient Medications on File Prior to Visit   Medication Sig    amLODIPine (NORVASC) 5 MG tablet Take 1 tablet (5 mg total) by mouth once daily.    aspirin (ECOTRIN) 81 MG EC tablet Take 1 tablet (81 mg total) by mouth once daily.    atorvastatin (LIPITOR) 40 MG tablet Take 1 tablet (40 mg total) by mouth every evening.    cetirizine (ZYRTEC) 10 MG tablet Take 1 tablet by mouth Daily.    famotidine (PEPCID) 20 MG tablet Take 2 tablets (40 mg total) by mouth  once daily.    fluticasone propionate (FLONASE) 50 mcg/actuation nasal spray 1 spray (50 mcg total) by Each Nostril route once daily.    multivitamin (THERAGRAN) per tablet Take 1 tablet by mouth once daily.    sildenafiL (VIAGRA) 100 MG tablet Take 0.5 tablets (50 mg total) by mouth as needed for Erectile Dysfunction.     No current facility-administered medications on file prior to visit.       Review of patient's allergies indicates:   Allergen Reactions    Allegra-d 12 hour [fexofenadine-pseudoephedrine] Other (See Comments)     Trouble urinating    Mucinex d [pseudoephedrine-guaifenesin] Other (See Comments)     Trouble urinating    Losartan-hydrochlorothiazide Hives and Rash     Other reaction(s): Hives       OBJECTIVE:     Vital Signs:  Vitals:    08/07/25 1301   BP: (!) 157/69   Pulse: 62   SpO2: 95%   Weight: 68 kg (149 lb 14.6 oz)       Body mass index is 23.48 kg/m².     Physical Exam  Constitutional:       General: He is not in acute distress.     Appearance: He is not ill-appearing.   Cardiovascular:      Rate and Rhythm: Normal rate.      Pulses: Normal pulses.      Heart sounds: Normal heart sounds.   Pulmonary:      Effort: Pulmonary effort is normal.      Breath sounds: Normal breath sounds.   Abdominal:      General: Abdomen is flat. There is no distension.      Palpations: Abdomen is soft.      Tenderness: There is no abdominal tenderness.   Musculoskeletal:      Right lower leg: No edema.      Left lower leg: No edema.   Skin:     General: Skin is warm and dry.   Neurological:      Mental Status: He is alert and oriented to person, place, and time. Mental status is at baseline.         Laboratory  No results found for this or any previous visit (from the past 24 hours).    Diagnostic Results:      Health Maintenance Due   Topic Date Due    Annual UACr  Never done         ASSESSMENT & PLAN:   Mr. Abdias Kim is a 70 y.o. male  with history of CAD s/p CABG who presents for evaluation of CKD 3a  which has remained stable for the past several years. He did have a transient decrease in his eGFR around the time of his CABG in 2021, however was able to recover renal function and eGFr of 59. He was started on Lisinopril 5 mg by his cardiologist in early 2024 which resulted in his creatinine baseline to move to 1.5 and eGFR of 49 where it has remained stable. At this time, I suspect that his CKD is secondary to his CAD and Hypertension, and has remained stable on his current medications.     Chronic kidney disease, stage 3a  -Renal function stable. Continue to monitor.   -No proteinuria  -Lisinopril discontinued due to hyperkalemia     Microcytic anemia  -     He has had persistent microcytic anemia dating back to 2004 which pre-dates his kidney disease. In June 2024, he was started empirically on iron supplementation for suspected KELVIN while iron studies were being checked. His iron stores checked in June and July do not suggest KELVIN as a cause of his microcytic anemia. Previously placed a referral to hematology for more evaluation and consideration of a potential hemoglobinopathy. Patient was instructed to stop his iron supplement at this time.       Renal lesion  Following with urology who is following with annual US. Stable isoechoic exophytic lesion about the right kidney measuring 1.1 x 0.9 x 1.0 cm      Hypertensive cardiovascular-renal disease, stage 1-4 or unspecified chronic kidney disease, without heart failure  -BP not at goal in clinic. Home BPs ~120/60. On amlodipine 5 mg. Asked patient to keep BP log. Possibly white coat hypertension.  -Previously on losartan, discontinued 2/2 angioedema. Lisinopril discontinued 2/2 angioedema.     Coronary artery disease involving native coronary artery of native heart without angina pectoris    Chronic kidney disease, stage 3a    Microcytic anemia    Renal lesion    Coronary artery disease involving native coronary artery of native heart without angina  pectoris    Paroxysmal atrial fibrillation    Hypertension, unspecified type        RTC in 6 months    Discussed with Dr. Feliciano  - staff attestation to follow      Bill Castellano MD  Nephrology PGY-4    [address] [place], [place] [id number] [phone]

## 2025-08-07 NOTE — PROGRESS NOTES
I have reviewed the notes, assessments, and/or procedures performed by Dr. Castellano, I concur with her/his documentation of [place].  Date of Service: 8/7/2025    Stable CKD 3a. HTN, on amlodipine 5 mg daily, stopped lisinopril in the past due to hyperK. SBP 150s today. Counseled the pt to bring the BP machine to our or PCP's office to see if it's white coat effect. Counseled the patient to check BP BID for a week every month. RTC 6 months.

## 2025-08-11 DIAGNOSIS — I10 HYPERTENSION, UNSPECIFIED TYPE: ICD-10-CM

## 2025-08-11 RX ORDER — ASPIRIN 81 MG/1
81 TABLET ORAL DAILY
Qty: 90 TABLET | Refills: 0 | Status: SHIPPED | OUTPATIENT
Start: 2025-08-11 | End: 2026-08-11

## 2025-08-14 DIAGNOSIS — I10 HYPERTENSION, UNSPECIFIED TYPE: ICD-10-CM

## 2025-08-24 ENCOUNTER — PATIENT MESSAGE (OUTPATIENT)
Dept: NEPHROLOGY | Facility: CLINIC | Age: 70
End: 2025-08-24
Payer: MEDICARE

## 2025-08-26 RX ORDER — AMLODIPINE BESYLATE 5 MG/1
5 TABLET ORAL DAILY
Qty: 90 TABLET | Refills: 3 | Status: SHIPPED | OUTPATIENT
Start: 2025-08-26 | End: 2026-08-26

## 2025-08-27 ENCOUNTER — OFFICE VISIT (OUTPATIENT)
Dept: CARDIOLOGY | Facility: CLINIC | Age: 70
End: 2025-08-27
Payer: MEDICARE

## 2025-08-27 VITALS
DIASTOLIC BLOOD PRESSURE: 64 MMHG | WEIGHT: 149.81 LBS | RESPIRATION RATE: 18 BRPM | HEIGHT: 67 IN | BODY MASS INDEX: 23.51 KG/M2 | OXYGEN SATURATION: 100 % | HEART RATE: 51 BPM | SYSTOLIC BLOOD PRESSURE: 172 MMHG

## 2025-08-27 DIAGNOSIS — I10 ESSENTIAL HYPERTENSION: ICD-10-CM

## 2025-08-27 DIAGNOSIS — I25.810 CORONARY ARTERY DISEASE INVOLVING CORONARY BYPASS GRAFT OF NATIVE HEART WITHOUT ANGINA PECTORIS: Primary | ICD-10-CM

## 2025-08-27 DIAGNOSIS — I70.0 AORTIC ATHEROSCLEROSIS: ICD-10-CM

## 2025-08-27 DIAGNOSIS — I10 WHITE COAT SYNDROME WITH DIAGNOSIS OF HYPERTENSION: ICD-10-CM

## 2025-08-27 DIAGNOSIS — Z95.1 S/P CABG (CORONARY ARTERY BYPASS GRAFT): ICD-10-CM

## 2025-08-27 DIAGNOSIS — I48.0 PAF (PAROXYSMAL ATRIAL FIBRILLATION): ICD-10-CM

## 2025-08-27 DIAGNOSIS — N18.31 STAGE 3A CHRONIC KIDNEY DISEASE: ICD-10-CM

## 2025-08-27 DIAGNOSIS — E78.2 MIXED HYPERLIPIDEMIA: ICD-10-CM

## 2025-08-27 PROCEDURE — 99214 OFFICE O/P EST MOD 30 MIN: CPT | Mod: PBBFAC | Performed by: INTERNAL MEDICINE

## 2025-08-27 PROCEDURE — 99214 OFFICE O/P EST MOD 30 MIN: CPT | Mod: S$PBB,,, | Performed by: INTERNAL MEDICINE

## 2025-08-27 PROCEDURE — 99999 PR PBB SHADOW E&M-EST. PATIENT-LVL IV: CPT | Mod: PBBFAC,,, | Performed by: INTERNAL MEDICINE

## 2025-08-27 PROCEDURE — G2211 COMPLEX E/M VISIT ADD ON: HCPCS | Mod: ,,, | Performed by: INTERNAL MEDICINE

## 2025-08-28 RX ORDER — AMLODIPINE BESYLATE 5 MG/1
5 TABLET ORAL DAILY
Qty: 90 TABLET | Refills: 3 | OUTPATIENT
Start: 2025-08-28 | End: 2026-08-28

## (undated) DEVICE — ELECTRODE REM PLYHSV RETURN 9

## (undated) DEVICE — SEE MEDLINE ITEM 146292

## (undated) DEVICE — TOWEL OR DISP STRL BLUE 4/PK

## (undated) DEVICE — GAUZE SPONGE 4X4 12PLY

## (undated) DEVICE — DRESSING TELFA N ADH 3X8

## (undated) DEVICE — CONNECTOR Y 3/8X3/8X3/8

## (undated) DEVICE — APPLICATOR CHLORAPREP ORN 26ML

## (undated) DEVICE — TOURNIQUET TOURNIKWIK 2 TB 6IN

## (undated) DEVICE — DRESSING TRANS 4X4 TEGADERM

## (undated) DEVICE — TRAY MINOR GEN SURG

## (undated) DEVICE — ADHESIVE MASTISOL VIAL 48/BX

## (undated) DEVICE — BLADE ELECTRO EDGE INSULATED

## (undated) DEVICE — WIRE INTRAMYOCARDIAL TEMP

## (undated) DEVICE — BANDAGE ACE ELASTIC 6"

## (undated) DEVICE — SEE MEDLINE ITEM 156894

## (undated) DEVICE — SUT PROLENE 7-0 BV-1 30

## (undated) DEVICE — CLIP SPRING 6MM

## (undated) DEVICE — SUT PROLENE 4-0 SH BLU 36IN

## (undated) DEVICE — DRAPE SLUSH WARMER WITH DISC

## (undated) DEVICE — DRESSING GAUZE 6PLY 4X4

## (undated) DEVICE — APPLIER CLIP LIAGCLIP 9.375IN

## (undated) DEVICE — BLADE SAW STERNAL 5/BX

## (undated) DEVICE — SEE MEDLINE ITEM 157148

## (undated) DEVICE — SUT MCRYL PLUS 4-0 PS2 27IN

## (undated) DEVICE — SUT PROLENE 4-0 RB-1 BL MO

## (undated) DEVICE — ADHESIVE DERMABOND ADVANCED

## (undated) DEVICE — TIP YANKAUERS BULB NO VENT

## (undated) DEVICE — TRAY HEART

## (undated) DEVICE — Device

## (undated) DEVICE — CATH JACKY RADIAL 5FR 100CM

## (undated) DEVICE — SUT 2/0 30IN ETHIBOND

## (undated) DEVICE — SET DECANTER MEDICHOICE

## (undated) DEVICE — SUT SILK BLK BR. 2 2-60

## (undated) DEVICE — SEE MEDLINE ITEM 157117

## (undated) DEVICE — KIT URINARY CATH URINE METER

## (undated) DEVICE — SYR 3CC LUER LOC

## (undated) DEVICE — SEE MEDLINE ITEM 146271

## (undated) DEVICE — SEE MEDLINE ITEM 152622

## (undated) DEVICE — KIT GLIDESHEATH SLEND 6FR 10CM

## (undated) DEVICE — RETRACTOR OCTOBASE INSERT HOLD

## (undated) DEVICE — DRESSING TELFA STRL 4X3 LF

## (undated) DEVICE — SUT MONOCRYL 4-0 PS-1 UND

## (undated) DEVICE — KIT CUSTOM MANIFOLD

## (undated) DEVICE — SEE MEDLINE ITEM 152515

## (undated) DEVICE — NDL BOX COUNTER

## (undated) DEVICE — SUT SILK 2-0 SH 18IN BLACK

## (undated) DEVICE — APPLIER LIGACLIP SM 9.38IN

## (undated) DEVICE — OMNIPAQUE 350 200ML

## (undated) DEVICE — SUT 2/0 30IN SILK BLK BRAI

## (undated) DEVICE — PAD RADIOLUCENT STAT ADULT

## (undated) DEVICE — SUT 4-0 12-18IN SILK BLACK

## (undated) DEVICE — TUBING HF INSUFFLATION W/ FLTR

## (undated) DEVICE — CATH 5FR AL3.0

## (undated) DEVICE — CLOSURE SKIN STERI STRIP 1/4X3

## (undated) DEVICE — SUT 6/0 4-24IN PROLENE

## (undated) DEVICE — GLOVE BIOGEL PI MICRO SZ 7.5

## (undated) DEVICE — DRAIN CHEST DRY SUCTION

## (undated) DEVICE — NDL 18GA X1 1/2 REG BEVEL

## (undated) DEVICE — NDL HYPO REG 25G X 1 1/2

## (undated) DEVICE — MARKER SKIN STND TIP BLUE BARR

## (undated) DEVICE — SPIKE CONTRAST CONTROLLER

## (undated) DEVICE — GOWN SURGICAL X-LARGE

## (undated) DEVICE — WIPE ESENTA BARR STNG FREE 3ML

## (undated) DEVICE — CANNULA VESSEL FREE FLOW

## (undated) DEVICE — DRESSING AQUACEL SACRAL 9 X 9

## (undated) DEVICE — BLOWER MISTER

## (undated) DEVICE — INSERTS STEALTH FIBRA SIZE 5

## (undated) DEVICE — PROTECTION STATION PLUS

## (undated) DEVICE — SUT 2-0 12-18IN SILK

## (undated) DEVICE — SYR 50CC LL

## (undated) DEVICE — KIT PROBE COVER WITH GEL

## (undated) DEVICE — HEMOSTAT VASC BAND REG 24CM

## (undated) DEVICE — SYS VIRTUOSAPH PLUS EVM

## (undated) DEVICE — DRAPE SPLIT STERILE

## (undated) DEVICE — GAUZE SPONGE 4'X4 12 PLY

## (undated) DEVICE — DRESSING TEGADERM 2 3/X2.75IN

## (undated) DEVICE — KIT SAHARA DRAPE DRAW/LIFT

## (undated) DEVICE — TAPE SURG MEDIPORE 6X72IN

## (undated) DEVICE — PUNCH AORTIC 4.8MM

## (undated) DEVICE — HEMOSTAT SURGICEL NU-KNIT 6X9

## (undated) DEVICE — CATH 5FR AL2.0

## (undated) DEVICE — BLADE 4IN EDGE INSULATED

## (undated) DEVICE — PLEDGET SUT SOFT 3/8X3/16X1/16

## (undated) DEVICE — SEE MEDLINE ITEM 146417

## (undated) DEVICE — PAD K-THERMIA 24IN X 60IN

## (undated) DEVICE — SYR ONLY LUER LOCK 20CC

## (undated) DEVICE — SEE MEDLINE ITEM 157187

## (undated) DEVICE — BLADE SAW STERN .076MM 6.1MM L

## (undated) DEVICE — WIRE GUIDE SAFE-T-J .035 260CM

## (undated) DEVICE — SUT VICRYL CT-1 2-0 27IN

## (undated) DEVICE — SUT VICRYL PLUS 3-0 SH 18IN

## (undated) DEVICE — SPONGE LAP 18X18 PREWASHED

## (undated) DEVICE — DRESSING ADH ISLAND 3.6 X 14

## (undated) DEVICE — SUT CTD VICRYL 0 UND CR/CTX

## (undated) DEVICE — SUT VICRYL 3-0 27 SH

## (undated) DEVICE — SUT 6 18IN STEEL MONO CCS

## (undated) DEVICE — DRAIN CHANNEL ROUND 19FR

## (undated) DEVICE — DRAPE ABDOMINAL TIBURON 14X11